# Patient Record
Sex: MALE | Race: WHITE
[De-identification: names, ages, dates, MRNs, and addresses within clinical notes are randomized per-mention and may not be internally consistent; named-entity substitution may affect disease eponyms.]

---

## 2017-01-06 NOTE — PROGRESS NOTES
FOLLOW UP NUTRITION CONSULTATION    Nutrition Assessment    Number of consultations with dietitian: 2    Height:  Ht Readings from Last 1 Encounters:  12/16/16 : 70\"      Weight:   Wt Readings from Last 2 Encounters:  01/06/17 : 251 lb  12/16/16 : 253

## 2017-01-10 ENCOUNTER — PRIOR ORIGINAL RECORDS (OUTPATIENT)
Dept: OTHER | Age: 52
End: 2017-01-10

## 2017-01-13 NOTE — TELEPHONE ENCOUNTER
From: Mary Bush  To: ANDREW Temple  Sent: 1/13/2017 12:04 PM CST  Subject: Visit Follow-up Question    Blood pressure 151 over 83 pulse 82 . I took it 3 times. I will bring my cuff in from now on to calibrate.

## 2017-01-13 NOTE — PROGRESS NOTES
CC: Patient presents with:  Weight Check: one pound weight gain       HPI:   1. Obesity. Patient met with dietician again and has been working out with weights and cardio. He is tolerating topamax and metformin. 2. ISAAC/Hypertenison.  He is using CPAP Take 1 tablet (600 mg total) by mouth 2 (two) times daily before meals. Disp: 60 tablet Rfl: 5   GuanFACINE HCl (TENEX) 1 MG Oral Tab Take 0.5 mg by mouth nightly. Disp:  Rfl:    Vortioxetine HBr (BRINTELLIX) 10 MG Oral Tab Take 10 mg by mouth daily.  Disp: Brachial neuritis or radiculitis NOS     Other specified cardiac dysrhythmias(427.89)     Hypopotassemia     Vitamin D deficiency     Thoracic aneurysm without mention of rupture     Coronary atherosclerosis of unspecified type of vessel, native or graft of metformin. He is at baseline weight.  Since starting topamax he has cut out moutain dew.  7th  B12 injections today and order to check B12 was put in asweeeeeeeInstructed him to hold topamax until he is seen by urologist as kidney stones may be relatedt

## 2017-01-13 NOTE — PATIENT INSTRUCTIONS
Take meds at home Immediately and check bp after [facility] Discharge Instructions for High Blood Pressure (Hypertension)  You have been diagnosed with high blood pressure (also called hypertension).  This means the force of blood · Follow the DASH (Dietary Approaches to Stop Hypertension) eating plan. This plan recommends vegetables, fruits, whole gains, and other heart healthy foods. · Begin an exercise program. Ask your doctor how to get started.  The American Heart Association r

## 2017-02-03 NOTE — PROGRESS NOTES
FOLLOW UP NUTRITION CONSULTATION    Nutrition Assessment    Number of consultations with dietitian: 3    Height:  Ht Readings from Last 1 Encounters:  01/13/17 : 70\"      Weight:   Wt Readings from Last 2 Encounters:  02/03/17 : 249 lb 9.6 oz  01/13/17

## 2017-02-07 NOTE — PROGRESS NOTES
[facility] Internal Medicine Progress Note    CC:  Patient presents with:  Rash: all over body, has tried OTC meds with no help, x 1 week      HPI:   HPI  Rash  Pt started with a rash on his arms about 1.5 weeks ago  And it has now spread all ove Disp: 180 tablet Rfl: 3   ATORVASTATIN CALCIUM 20 MG Oral Tab TAKE ONE TABLET BY MOUTH EVERY NIGHT AT BEDTIME Disp: 90 tablet Rfl: 3   Flecainide Acetate 100 MG Oral Tab Take 100 mg by mouth 2 (two) times daily.  Disp:  Rfl:    Warfarin Sodium 5 MG Oral Tab Negative for chest pain. Gastrointestinal: Negative for nausea and vomiting. Genitourinary: Positive for urgency. Negative for dysuria, frequency, hematuria, decreased urine volume, penile swelling, scrotal swelling, penile pain and testicular pain. at home  Pt to follow-up in 2 weeks for BP recheck, if still elevated at that time will make medication adjustments        Orders Placed This Encounter  URINALYSIS, AUTO, W/O SCOPE  Chlamydia/Gc Amplification [E]    Meds & Refills for this Visit:  Signed P

## 2017-02-07 NOTE — PATIENT INSTRUCTIONS
Thank you for choosing Graeme Flores PA-C at [place] [address]  To Do: Rivera Bush  1. Pt to begin medications as prescribed  2. Pt to follow-up with urology and dermatology  3.  Pt to monitor blood pressure at home, follow-up in 2 weeks for liz benefits outweigh those potential risks and we strive to make you healthier and to improve your quality of life.     Referrals, and Radiology Information:    If your insurance requires a referral to a specialist, please allow 5 business days to process your

## 2017-02-20 PROBLEM — N17.9 ACUTE RENAL FAILURE SUPERIMPOSED ON STAGE 2 CHRONIC KIDNEY DISEASE (HCC): Status: ACTIVE | Noted: 2017-02-20

## 2017-02-20 PROBLEM — R07.9 CHEST PAIN, CARDIAC: Status: ACTIVE | Noted: 2017-02-20

## 2017-02-20 PROBLEM — I25.10 CORONARY ARTERY DISEASE: Chronic | Status: ACTIVE | Noted: 2017-02-20

## 2017-02-20 PROBLEM — Z86.69 HISTORY OF MIGRAINE HEADACHES: Chronic | Status: ACTIVE | Noted: 2017-02-20

## 2017-02-20 PROBLEM — N18.2 ACUTE RENAL FAILURE SUPERIMPOSED ON STAGE 2 CHRONIC KIDNEY DISEASE (HCC): Status: ACTIVE | Noted: 2017-02-20

## 2017-02-20 PROBLEM — N18.2 ACUTE RENAL FAILURE SUPERIMPOSED ON STAGE 2 CHRONIC KIDNEY DISEASE: Status: ACTIVE | Noted: 2017-02-20

## 2017-02-20 PROBLEM — N17.9 ACUTE RENAL FAILURE SUPERIMPOSED ON STAGE 2 CHRONIC KIDNEY DISEASE: Status: ACTIVE | Noted: 2017-02-20

## 2017-02-20 PROBLEM — E11.9 TYPE 2 DIABETES MELLITUS (HCC): Chronic | Status: ACTIVE | Noted: 2017-02-20

## 2017-02-20 NOTE — ED NOTES
Pt appears anxious, hyperventilating, \"my arms and feet feel numb. \" He was cued to slow down breathing. O2 at 3L/nc applied. Reports, \"I still have CP, 6/10. \" Morphine IV given as ordered

## 2017-02-20 NOTE — PROGRESS NOTES
Pt seen this afternoon- c/o pain under left arm and left of center of chest- worse after coughing and raising arms. Felt dizzy when up today. Still c/o of left arm numbness.   Has sick contacts  At home and work  Hx cerv spine issues in past  Ate well at [employer]

## 2017-02-20 NOTE — ED NOTES
Pt returned from CT, states CP 5/10. Nitro increased to 10 mcg/min. Pt appears calmer, HR 70's, NSR.  Will monitor

## 2017-02-20 NOTE — PROGRESS NOTES
Seen and examined. Just returned from CT scan. Appears to have cervical spine symptoms at present -- s/o muscular spasm at the moment -- hopefully no major neural involvement.     Cardiac testing reviewed and normal.    Blood pressures amazingly good for

## 2017-02-20 NOTE — CONSULTS
[facility]  Cardiology Consultation    [facility] Patient Status:  Inpatient    1965 MRN [id number]   [facility] 6NE-A [facility] Day # 1 PCP Henna Limon MD     Reason for Consultation:  Chest p Surgical History    TONSILLECTOMY      INJECTION, W/WO CONTRAST, DX/THERAPEUTIC SUBSTANCE, EPIDURAL/SUBARACHNOID; CERVICAL/THORACIC  4/18/2012    Comment Procedure: CERVICAL EPIDURAL;  Surgeon: Lucas Giles MD;  Location: [address] [place] [address] Intravenous, Continuous  •  Atorvastatin Calcium (LIPITOR) tab 20 mg, 20 mg, Oral, Nightly  •  Clorazepate Dipotassium (TRANXENE) tab 7.5 mg, 7.5 mg, Oral, Daily  •  Flecainide Acetate (TAMBOCOR) tab 100 mg, 100 mg, Oral, BID  •  gemfibrozil (LOPID) tab 60 Systems:  A comprehensive review of systems was negative if not otherwise mention in above HPI.     /86 mmHg  Pulse 68  Temp(Src) 97.8 °F (36.6 °C) (Temporal)  Resp 16  Ht 5' 11\" (1.803 m)  Wt 245 lb (111.131 kg)  BMI 34.19 kg/m2  SpO2 100%  Temp (24 V4-6 compared to prior EKG 7/29/16    CTA chest 2/20/2017: CONCLUSION:     1. No acute pulmonary embolus. 2. Post surgical repair of the ascending thoracic aorta. Stable ectasia of the ascending aorta. No dissection or periaortic hemorrhage.   3. Large lef

## 2017-02-20 NOTE — ED PROVIDER NOTES
Patient Seen in: [facility] Emergency Department    History   Patient presents with:  Chest Pain Angina (cardiovascular)    Stated Complaint: chest pain    HPI    The patient is a 60-year-old male with multiple past medical problems including the re Procedure: CERVICAL EPIDURAL;  Surgeon: New Boyd MD;  Location: [address] [place] [address] [facility] [address] .[facility] [address]  N SV 5+ YR  4/18/2012    Comment Procedure: CERVICAL EPIDURAL;  Surgeon: New Boyd MD;  Location: [address] [place] [address] Dr PEÑA Take 1 tablet (600 mg total) by mouth 2 (two) times daily before meals. GuanFACINE HCl (TENEX) 1 MG Oral Tab,  Take 0.5 mg by mouth nightly. Vortioxetine HBr (BRINTELLIX) 10 MG Oral Tab,  Take 10 mg by mouth daily.    Clorazepate Dipotassium (TRANXENE) Current:/78 mmHg  Pulse 74  Temp(Src) 97.9 °F (36.6 °C) (Temporal)  Resp 18  Ht 180.3 cm (5' 11\")  Wt 111.131 kg  BMI 34.19 kg/m2  SpO2 97%        Physical Exam    General: Alert and oriented in no distress. Neuro: CN II-XII intact.  Grossly n The aPTT Heparin Therapeutic Range is approximately 65- 104 seconds. The therapeutic range has been validated against 0.3-0.7 heparin anti-Xa units/mL. LIPASE - Normal   CBC WITH DIFFERENTIAL WITH PLATELET    Narrative:      The following orders were cr pulmonary edema. No appreciable effusion. No pneumothorax. Cardia mediastinal silhouette appears normal.  Previous sternotomy. CTA chest:  CTA CHEST  IMPRESSION:  Good bolus, mild motion artifact. No definite pulmonary embolism.   Prior ascending aor

## 2017-02-20 NOTE — HISTORICAL OFFICE NOTE
DARIO MARSHALL  : 1965  ACCOUNT:  [id number]  [phone]  PCP: Dr. Richard Stoll     TODAY'S DATE: 01/10/2017  DICTATED BY:  Ed Quinones MD]    CHIEF COMPLAINT: [Followup of Aneurysm, thoracic, Followup of Carotid artery stenosis <50%, Followup of depression, appendectomy and tonsillectomy    PAST CV HISTORY: 7/2014, aneurysm repair 10/2014, cardiac catheterization, carotid artery stenosis, dyslipidemia and paroxysmal atrial fibrillation    FAMILY HISTORY: Negative for premature CAD.  Negative for AA good.  I was happy to see that some of the external stresses in his life are improved at this point. I think this is obviously good for his overall health. He will continue his exercise program and continue his CPAP treatment.       He does not need any Allergy        10MG      1 daily                                  12/18/12 Lipitor               20MG      daily                                      Noam Weinberg MD  WS:og/enid  C: Dr. Yumiko Miguel  DD: 01/11/2017; DT: 01/12/2017  Job #: [id number]

## 2017-02-20 NOTE — PAYOR COMM NOTE
Attending Physician: Kassy Huerta MD    Review Type: ADMISSION   Reviewer: Michelle Del Real       Date: February 20, 2017 - 9:39 AM  Payor: [facility] [address] Number: [id number]  Admit date: 2/19/2017 11:28 PM   Admitted fr     post op from [address] [place] [address]   •  High blood pressure      •  High cholesterol      •       •  Unspecified sleep apnea          wears CPAP    •  ISAAC (obstructive sleep apnea)  3-3-14-PSG/TX-8/19/16        AHI-16, O2 alyce-71%/CPAP-10cwp    •  Kidney stones     topiramate (TOPAMAX) 50 MG Oral Tab,  Take 1 tablet (50 mg total) by mouth 2 (two) times daily.    ATORVASTATIN CALCIUM 20 MG Oral Tab,  TAKE ONE TABLET BY MOUTH EVERY NIGHT AT BEDTIME   Flecainide Acetate 100 MG Oral Tab,  Take 100 mg by mouth 2 (two) maura Positive for stated complaint: chest pain with some shortness of breath.  No abdominal pain.  No nausea or vomiting. Other systems are as noted in HPI.   Constitutional and vital signs reviewed.      All other systems reviewed and negative except as noted   All other components within normal limits    CBC W/ DIFFERENTIAL - Abnormal; Notable for the following:       Neutrophil Absolute Prelim  7.73 (*)        Neutrophil Absolute  7.73 (*)        Monocyte Absolute  0.88 (*)        All other components within Reading: Sinus tachycardia without any ectopy, normal axis, normal intervals, there is significant ST segment depressions in leads I, II, V4, V5 and V6.  There is also ST segment elevation in lead V1 and aVR.  There are T-wave inversions in leads 1, 2, V4, The patient was hypokalemic and this was repleted in the ER.  After the nitroglycerin drip was started his chest pain continued to improve but was still never completely resolved.  It was a 3/10 in severity.  His EKG does show ischemic changes.  Troponin i History of Present Illness: Pretty Arnett is a 46year old male with history of atrial fibrillation, sleep apnea kidney stones presents emergency room with palpitations chest pain shortness of breath that started around 930 this evening pain is sharp lef Past Surgical History:       Past Surgical History      TONSILLECTOMY          INJECTION, W/WO CONTRAST, DX/THERAPEUTIC SUBSTANCE, EPIDURAL/SUBARACHNOID; CERVICAL/THORACIC    4/18/2012      Comment  Procedure: CERVICAL EPIDURAL;  Surgeon: Yolande Camejo nystatin-triamcinolone 738991-4.1 UNIT/GM-% External Ointment  Apply 1 Application topically 2 (two) times daily.  Disp: 1 Tube  Rfl: 0    MetFORMIN HCl  MG Oral Tablet 24 Hr  Take 2 tablets (1,500 mg total) by mouth daily.  Disp: 60 tablet  Rfl: 1  Albuterol Sulfate HFA (VENTOLIN) 108 (90 BASE) MCG/ACT Inhalation Aero Soln  Inhale 2 puffs into the lungs every 6 (six) hours as needed for Shortness of Breath.  (Patient taking differently: Inhale 2 puffs into the lungs every 6 (six) hours as needed for S Recent Labs    Lab  02/19/17   9701    TROP   <0.046        Imaging: Imaging data reviewed in Epic.        ASSESSMENT / PLAN:        · Chest pain-EKG showing sinus tachycardia with ST depression in leads I, 2, V4, V5, V6.  Also ST elevation in V1 and aVR.    Filed: 2/20/2017  6:30 AM Note Time: 2/20/2017  6:07 AM Status: Signed     : Sherman Tellez MD (Physician)         Consult Orders:     1. IP Consult to Cardiology Once [[id number]] ordered by Alexey Rosales DO at 02/20/17 0418            Ex •  Migraines      •  Visual impairment      •  Chronic atrial fibrillation (HCC)  10/14/14        post op from [facility]    •  High blood pressure      •  High cholesterol      •       •  Unspecified sleep apnea          wears CPAP    •  ISAAC (obstructive sleep a No Known Allergies    Medications:    Current facility-administered medications:    •  nitroGLYCERIN infusion 50mg in D5W 250ml, 5-400 mcg/min, Intravenous, Continuous  •  heparin (PORCINE) 02154aujqx/250mL infusion ED INITIAL DOSE, 1,000 Units/hr, Misha Coats •  HYDROmorphone HCl PF (DILAUDID) 1 MG/ML injection 0.2 mg, 0.2 mg, Intravenous, Q2H PRN **OR** HYDROmorphone HCl PF (DILAUDID) 1 MG/ML injection 0.4 mg, 0.4 mg, Intravenous, Q2H PRN **OR** HYDROmorphone HCl PF (DILAUDID) 1 MG/ML injection 0.8 mg, 0.8 mg, CREATSERUM  1.01  02/20/2017    BUN  17  02/20/2017    NA  142  02/20/2017    K  3.3  02/20/2017    CL  107  02/20/2017    CO2  28.0  02/20/2017    GLU  119  02/20/2017    CA  8.3  02/20/2017    ALB  3.8  02/19/2017    ALKPHO  87  02/19/2017    BILT  0.5  Chart Review: Note Routing History      Routing history could not be found for this note.  This is because the note has never been routed or because communication record creation was suppressed.           PLEASE FAX DAYS CERTIFIED AND NEXT REVIEW DATE

## 2017-02-20 NOTE — H&P
[facility]  History and Physical     Wilma Goodness Patient Status:  Inpatient    1965 MRN [id number]   [facility] 6NE-A Attending [facility] Day # 1 PCP Maria Dolores Amaya MD     Chief Complaint: Chest pain sh blood pressure    • High cholesterol    • Obesity, unspecified    • Unspecified sleep apnea      wears CPAP   • ISAAC (obstructive sleep apnea) 3-3-14-PSG/TX-8/19/16     AHI-16, O2 alyce-71%/CPAP-10cwp   • Kidney stones         Past Surgical History:     Pas facility-administered medications on file prior to encounter. Current Outpatient Prescriptions on File Prior to Encounter:  methylPREDNISolone (MEDROL) 4 MG Oral Tablet Therapy Pack As directed.  Disp: 1 kit Rfl: 0   nystatin-triamcinolone 987273-2.5 UNIT/ 6 (six) hours as needed for Shortness of Breath. (Patient taking differently: Inhale 2 puffs into the lungs every 6 (six) hours as needed for Shortness of Breath.  Indications: SOB) Disp: 6.7 g Rfl: 0   AmLODIPine Besylate (NORVASC) 10 MG Oral Tab Take 10 m depression in leads I, 2, V4, V5, V6. Also ST elevation in V1 and aVR. T-wave inversions in leads I, 2, V4, V5, V6 but does not meet criteria for STEMI. Troponin negative. CT of the chest negative for PE or  aortic dissection, or periaortic hemorrhage.

## 2017-02-20 NOTE — ED INITIAL ASSESSMENT (HPI)
Left-sided chest pain that radiates to his back, SOB, nausea, all sxs onset 9pm. Pt had ascending aorta repair back in 2014.

## 2017-02-21 NOTE — PLAN OF CARE
Assumed care of patient this a.m. @ 9913. Patient is A/O x3. VSS. Patient c/o left neck pain that shoots down left arm when he turns his head to the left. KALANIN Norco given. Patient transferred to 2625 CTU, report given to CTU RN.  Patient transferred by [facility]

## 2017-02-21 NOTE — TELEPHONE ENCOUNTER
Divina/Giovanny/Rn  [phone]   requesting pt to be seen as soon as possible for injection. States pt was seen in the ER by Dr. Retana Pretty: 2/19. Requesting to spk to Rn Please call, thank you.  Call connected

## 2017-02-21 NOTE — PLAN OF CARE
Assumed care of patient at 0730, a+ox 4, denies chest pain today but does have left arm and neck numbness.  During cervical xray when patient had to lift his arm over his head his pain became sever to the left arm, radha shah called and informed of this mr

## 2017-02-21 NOTE — RESPIRATORY THERAPY NOTE
ISAAC - Equipment Use Daily Summary:                  . Set Mode: AUTO CPAP WITH C-FLEX                . Usage in hours: 7:14                . 90% Pressure (EPAP) level: 10.4                . 90% Insp. Pressure (IPAP): Dawson Payton  AHI: 12.9

## 2017-02-21 NOTE — PROGRESS NOTES
No major issues overnight. Afebrile  Pressures up a bit this am 523-562 systolic -- sinus raine    MRI revealed significant cervical spine pathology.     A/P: Compensated CV status s/p prior surgical repair of an ascending thoracic aneurysm -- now hospit

## 2017-02-21 NOTE — CONSULTS
Addendum to spine Consult    I evaluated the patient myself and he has left rotator cuff arthopathy and L cervical radiculpathy. On the MRI he has foraminal stenosis correlating with her left C7 radiculopathy, He has very little triceps weakness.    We dis

## 2017-02-21 NOTE — PLAN OF CARE
Patient received from or at 1120. Drowsy on admission, now a+ox4. Tele on and patient is paced. ivf infusing per protocol. A line removed per protocol and pressure held with pressure dressing placed.  Dressing to left leg intact with scant old serosanguinou

## 2017-02-21 NOTE — PLAN OF CARE
Tele D/C'd. IV discontinued with catheter intact. Patient denies chest pain, SOB, dizziness or palpitations. Patient denies calf tenderness. Patient discharge instructions and medication side effects reviewed with patient and verbalized understanding.  Rx h

## 2017-02-21 NOTE — TELEPHONE ENCOUNTER
Call from [facility] for the hospitalist RODRIGUE. Want pt to have a cervical epidural injecitonset up . And PT.  REviewing lthe patient. Not seen here at [place] [address]. Confirmed with managed care. Pt is a [facility] patient not [facility] patient.

## 2017-02-21 NOTE — PROGRESS NOTES
[facility]  Progress Note     Laura Hernández Patient Status:  Inpatient    1965 MRN [id number]   [facility] 2NE-A Attending David Patino MD   [facility] Day # 2 PCP James Deleon MD     Chief Complaint:   Chest pain     S: Patient  Paul Gentleman 02/20/17   0426   TROP  <0.046  <0.046          Imaging: Imaging data reviewed in Epic.   MRI neck   MICRO:   Medications:   • Atorvastatin Calcium  20 mg Oral Nightly   • Clorazepate Dipotassium  7.5 mg Oral Daily   • Flecainide Acetate  100 mg Oral BID w/ pain sx for injection.      Estimated date of discharge: today  Discharge is dependent on: clearance by cards  At this point Mr. Mook Duarte is expected to be discharge to: home     Plan of care discussed with rn, pt , spine sx   ANDREW Hendrix  Pag

## 2017-02-21 NOTE — CONSULTS
[facility]    Spine Surgery H&P  Dr. Herminia De La Cruz Patient Status:  Inpatient    1965 MRN [id number]   [facility] 2NE-A Attending Natalia Lynn MD   Hosp Day # 2 PCP Jordon Oconnor MD     Subjective:   Rivera Bush i Intervention(s): Medication    Intake/Output:    Intake/Output Summary (Last 24 hours) at 02/21/17 1225  Last data filed at 02/21/17 0845   Gross per 24 hour   Intake    550 ml   Output   1100 ml   Net   -550 ml         Neck: tenderness to palpitation.  Ran Studies : REVIEWED    Labs:       Lab Results  Component Value Date   PT 13.7 07/14/2014   PT 13.8 02/07/2011   INR 1.10 02/20/2017   INR 0.97 02/19/2017   INR 1.25* 11/06/2016            Assessment/Plan:    Reviewed MRI imaging.    Dr. Hemalatha Worley MD also

## 2017-02-22 PROBLEM — M50.30 DDD (DEGENERATIVE DISC DISEASE), CERVICAL: Status: ACTIVE | Noted: 2017-02-22

## 2017-02-22 PROBLEM — M47.812 FACET ARTHROPATHY, CERVICAL: Status: ACTIVE | Noted: 2017-02-22

## 2017-02-22 NOTE — DISCHARGE SUMMARY
[facility]  DISCHARGE SUMMARY     [facility] Patient Status:  Inpatient    1965 MRN [id number]   [facility] 2NE-A Attending No att. providers found   [address] Day # 2 PCP Jeniffer Franco MD     Date of Admission: 2017  Date of today but no vomiting, no diarrhea or constipation.  Patient denies any numbness or tingling in extremities or any focal weakness.  No headache, sinus has nasal congestion, or cough.  No orthopnea or PND.  No dizziness, lightheadedness, or syncope.  No hem lessened and so is tingling of the fingers. He will need also follow up with PT therapy, order has been placed he will undergo PT therapy at [facility]. Patient then will need to follow-up with spine service.     Patient denies chest p consult for SUELLEN injection which will be done once has been approved by insurance  · Continue normal follow-up with cardiology   · needs to follow-up with spine surgery after gets injection andHas started PT therapy  · Needs to follow-up with PCP  · We alma delia Clorazepate Dipotassium 7.5 MG Tabs   Last time this was given:  7.5 mg on 2/21/2017  9:20 AM   Commonly known as:  TRANXENE        Take 7.5 mg by mouth daily.     Refills:  0       Flecainide Acetate 100 MG Tabs   Last time this was given:  100 mg on 2/21/ nystatin-triamcinolone 608555-6.6 UNIT/GM-% Oint   Commonly known as:  MYCOLOG           Warfarin Sodium 5 MG Tabs   Commonly known as:  COUMADIN                Where to Get Your Medications      Please  your prescriptions at the location directed b

## 2017-02-22 NOTE — PROGRESS NOTES
Initial Post Discharge Follow Up   Discharge Date: 2/21/17  Contact Date: 2/22/2017    Consent Verification:  Assessment Completed With: Patient  HIPAA Verified? Yes    1.  Tell me why you were in the hospital?  Per patient he was having chest pain, shortn 100-25 MG Oral Tab Take 1 tablet by mouth daily. Disp: 90 tablet Rfl: 1   Metoprolol Succinate ER (TOPROL-XL) 200 MG Oral Tablet 24 Hr Take 1 tablet by mouth 2 (two) times daily.  Disp:  Rfl: 6   gemfibrozil (LOPID) 600 MG Oral Tab Take 1 tablet (600 mg tot present    13. Do you have a follow up visit scheduled with your Primary Care Physician or Specialist?  NCM confirmed TCM HFU on 2/23/17, per Rivera no barriers to keeping appointment.  Corina Shane has an appointment scheduled tomorrow as well with Dr. Bayron Holland for kid

## 2017-02-23 ENCOUNTER — HOSPITAL (OUTPATIENT)
Dept: OTHER | Age: 52
End: 2017-02-23
Attending: UROLOGY

## 2017-02-23 NOTE — PROGRESS NOTES
HPI:    Buster Guzmán is a 46year old male here today for hospital follow up.    He was discharged from Inpatient hospital, [facility] to Home   Admission Date: 2/19/17   Discharge Date: 2/21/17  Hospital Discharge Diagnosis: Cervical radiculopath Pt has an GRISEL scheduled with Dr. David Pace, he saw pain management yesterday    He is having lithotripsy today with Dr. Nimo Fernandez    Pt states he is no longer having chest pain, just neck pain and numbness in the L arm. It is worse with movement to the L.   Brit Sanchez AmLODIPine Besylate (NORVASC) 10 MG Oral Tab Take 10 mg by mouth daily. Indications: High Blood Pressure   GuanFACINE HCl (TENEX) 1 MG Oral Tab Take 0.5 mg by mouth nightly. No current facility-administered medications on file prior to visit.       HIST Respiratory: Negative for cough, shortness of breath and wheezing. Cardiovascular: Negative for chest pain. Gastrointestinal: Negative for nausea and vomiting. Musculoskeletal: Positive for myalgias, neck pain and neck stiffness.  Negative for back p Hospital discharge follow-up  Chest pain, unspecified type  Pt's symptoms have resolved as far as chest pain goes  Symptoms likely due to cervical radicular pain    Cervical radicular pain  Pt has already followed up with pain management  Pt is scheduled f

## 2017-02-23 NOTE — PATIENT INSTRUCTIONS
Thank you for choosing Narciso Thomason PA-C at [place] [address]  To Do: [place] Bush  1. Pt to start physical therapy  2. Pt to follow-up with pain management as scheduled  3.  Follow-up in 6 weeks    • Please signup for MY CHART, which is electron healthier and to improve your quality of life.     Referrals, and Radiology Information:    If your insurance requires a referral to a specialist, please allow 5 business days to process your referral request.    If Kelly Presume, [facility] orders a CT or MRI

## 2017-02-27 PROBLEM — N30.01 ACUTE CYSTITIS WITH HEMATURIA: Status: ACTIVE | Noted: 2017-02-27

## 2017-02-27 PROBLEM — N20.0 KIDNEY STONE: Status: ACTIVE | Noted: 2017-02-27

## 2017-02-27 PROBLEM — Z96.0 RETAINED URETERAL STENT: Status: ACTIVE | Noted: 2017-02-27

## 2017-02-27 NOTE — ED INITIAL ASSESSMENT (HPI)
Patient presents with right flank pain and bladder pain after lithotripsy on Thursday. Passing clots and urine is red. Nausea, no fevers.

## 2017-02-27 NOTE — ED PROVIDER NOTES
Patient Seen in: [facility] Emergency Department    History   Patient presents with:  Abdomen/Flank Pain (GI/)    Stated Complaint: abd/flank pain    HPI    44-year-old male that comes the hospital she complained of having difficulty with abdomina Procedure: CERVICAL EPIDURAL;  Surgeon: New Boyd MD;  Location: [address] [place] [address] BY [facility] [address] [facility]  N SV 5+ YR  4/18/2012    Comment Procedure: CERVICAL EPIDURAL;  Surgeon: New Boyd MD;  Location: [address] [place] [address] Dr PEÑA Albuterol Sulfate HFA (VENTOLIN) 108 (90 BASE) MCG/ACT Inhalation Aero Soln,  Inhale 2 puffs into the lungs every 6 (six) hours as needed for Shortness of Breath. AmLODIPine Besylate (NORVASC) 10 MG Oral Tab,  Take 10 mg by mouth daily.  Indications: High cyanosis or edema noted.   Full range of motion noted without tenderness  Neuro: No focal deficits noted    ED Course     Labs Reviewed   COMP METABOLIC PANEL (14) - Abnormal; Notable for the following:     Potassium 3.3 (*)     All other components within the patient's urologist and the patient admitted to the hospitalist for further pain control  MDM           Disposition and Plan     Clinical Impression:  Acute cystitis with hematuria  (primary encounter diagnosis)  Retained ureteral stent  Kidney stone

## 2017-02-28 NOTE — CM/SW NOTE
SW attempted to see patient for assessment and discharge planning. Patient currently off floor, SW will try again as time permits.

## 2017-02-28 NOTE — PAYOR COMM NOTE
Attending Physician: Arron Skaggs MD    Review Type: ADMISSION   Reviewer: Noam Overall       Date: February 28, 2017 - 8:47 AM  Payor: [facility]/HMO/POS/EPO  Authorization Number: N/A  Admit date: 2/27/2017  5:25 PM     Patient Name: Jaqui Figueroa RN      HYDROmorphone HCl PF (DILAUDID) 1 MG/ML injection 1 mg     Date Action Dose Route User    2/27/2017 1746 Given 1 mg Intravenous Jaqui Figueroa RN      HYDROmorphone HCl PF (DILAUDID) 1 MG/ML injection 1 mg     Date Action Dose Route dehydration  3. Cervical radiculopathy to reschedule appointment with pain clinic  4. Patient denies history of diabetes and he states he was on metformin for weight loss.  He will be on Accu-Cheks twice daily for now  5.  Hypertension controlled continue h

## 2017-02-28 NOTE — PLAN OF CARE
PAIN - ADULT    • Verbalizes/displays adequate comfort level or patient's stated pain goal Not Progressing          GENITOURINARY - ADULT    • Absence of urinary retention Progressing        Patient c/o right groin pain rated 7/10, given morphine per prn o

## 2017-02-28 NOTE — H&P
[facility]  History and Physical     [facility]    1965 MRN [id number]   Location [facility] Attending Laith De Los Santos MD   Hosp Day # 0 PCP Marianna Donovan MD     Chief Complaint: Righ unspecified    • Unspecified sleep apnea      wears CPAP   • ISAAC (obstructive sleep apnea) 3-3-14-PSG/TX-8/19/16     AHI-16, O2 alyce-71%/CPAP-10cwp   • Kidney stones         Past Surgical History:     Past Surgical History    TONSILLECTOMY      INJECTION, encounter. Current Outpatient Prescriptions on File Prior to Encounter:  HYDROcodone-acetaminophen 5-325 MG Oral Tab Take 1 tablet by mouth every 4 (four) hours as needed for Pain.  Disp: 20 tablet Rfl: 0   HYDROcodone-acetaminophen 5-325 MG Oral Tab Take differently: Inhale 2 puffs into the lungs every 6 (six) hours as needed for Shortness of Breath. Indications: SOB) Disp: 6.7 g Rfl: 0   AmLODIPine Besylate (NORVASC) 10 MG Oral Tab Take 10 mg by mouth daily.  Indications: High Blood Pressure Disp:  Rfl: is going to be continued on Ancef started in the emergency room she will be on Norco and morphine as well as n.p.o. after midnight to be evaluated by urology  2. Continue IV fluids for dehydration  3.  Cervical radiculopathy to reschedule appointment with nikki

## 2017-02-28 NOTE — RESPIRATORY THERAPY NOTE
ISAAC - Equipment Use Daily Summary:  · Set Mode   · Usage in hours:   · 90% Pressure (EPAP) level:   · 90% Insp Pressure (IPAP):   · AHI:   · Supplemental Oxygen:  · Comments: NO DATA

## 2017-02-28 NOTE — CONSULTS
[facility]  Report of Consultation    [facility] Patient Status:  Inpatient    1965 MRN [id number]   [facility] 3NE-A Attending Cecilia Fu MD   Hosp Day # 1 PCP Manjinder Oh MD     Impression and Plan:  Postop symptoms a total) by mouth 3 (three) times daily. Disp: 90 tablet Rfl: 5   MetFORMIN HCl  MG Oral Tablet 24 Hr Take 2 tablets (1,500 mg total) by mouth daily.  Disp: 60 tablet Rfl: 1   Ipratropium Bromide (ATROVENT) 0.03 % Nasal Solution 2 sprays by Nasal route tab 20 mEq 20 mEq Oral BID   topiramate (Topamax) tab 50 mg 50 mg Oral BID   glucose (DEX4) oral liquid 15 g 15 g Oral Q15 Min PRN   Or      Glucose-Vitamin C (DEX-4) 4-0.006 g chewable tab 4 tablet 4 tablet Oral Q15 Min PRN   Or      dextrose injection 50 Past Surgical History    TONSILLECTOMY      INJECTION, W/WO CONTRAST, DX/THERAPEUTIC SUBSTANCE, EPIDURAL/SUBARACHNOID; CERVICAL/THORACIC  4/18/2012    Comment Procedure: CERVICAL EPIDURAL;  Surgeon: Yolande Camejo MD;  Location: [facility]  comsumption. Drug Use: No    Sexual Activity: Not on file   Not on file  Other Topics Concern    Caffeine Concern Yes    Comment: 1 cup of coffee daily    Exercise Yes    Comment: 3 times per week.      Social History Narrative       Review of Systems:

## 2017-02-28 NOTE — PROGRESS NOTES
[facility] Note: Antimicrobial Weight Dose Adjustment for: Rocephin (ceftriaxone)    John Ramirez is a 46year old male who has been prescribed Rocephin (ceftriaxone) 1 g IV every 24 hours.   The estimated creatinine clearance is 72.2 mL/min (based on

## 2017-02-28 NOTE — PROGRESS NOTES
[facility]  Progress Note     Alessandro Wilhelm Patient Status:  Inpatient    1965 MRN [id number]   [facility] 3NE-A Attending Shola Fuentes MD   Hosp Day # 1 PCP Rochelle Sawant MD     Chief Complaint: flank pain    S: Patient cont PLAN:     1. Pyelonephritis  1. Rocephin for now pending UCX  2. Groin Pain  1. Likely due to sent  2. Start pyridium and uroxatral  3. P A. Fib  1. Cont. Rate control  2. Off coumadin for now for cervical steroid injection  4. Cervical DJD  1.  For steroid

## 2017-02-28 NOTE — CM/SW NOTE
FUENTES fFound pt thru casefinding for readmission and met with pt for assessment and d/c planning. Pt is a 46 y.o male admitted on 02/27 with acute cystitis. Pt's most recent d/c was on 02/21 with no needs.  Pt had an outpatient ureteroscopic stone extraction o

## 2017-02-28 NOTE — PLAN OF CARE
NURSING ADMISSION NOTE      Patient admitted via Cart  Oriented to room. Safety precautions initiated. Bed in low position. Call light in reach. Received patient from ED via cart, awake and oriented. Admission assessment completed.  On room air, ivy

## 2017-03-01 NOTE — PROGRESS NOTES
Patient seen and examined. Medically clear to discharge home today after stent removal if cleared by .     Saima Upton MD

## 2017-03-01 NOTE — RESPIRATORY THERAPY NOTE
ISAAC - Equipment Use Daily Summary:  · Set Mode AFLEX  · Usage in hours: 4  · 90% Pressure (EPAP) level:   · 90% Insp Pressure (IPAP): 18.8  · AHI: 54  · Supplemental Oxygen:  · Comments:

## 2017-03-01 NOTE — PLAN OF CARE
Diabetes/Glucose Control    • Glucose maintained within prescribed range Progressing        GENITOURINARY - ADULT    • Absence of urinary retention Progressing        METABOLIC/FLUID AND ELECTROLYTES - ADULT    • Electrolytes maintained within normal limit

## 2017-03-01 NOTE — PROGRESS NOTES
NURSING DISCHARGE NOTE    Patient discharged. Parents are taking patient home. He had all of his belongings and discharge paperwork with him. Discharge instructions were reviewed, verbalized understanding.

## 2017-03-01 NOTE — CONSULTS
Pain persists. Discussed options and risks with pt.     Procedure Note    3/1/2017    PREOPERATIVE DIAGNOSIS: flank pain    POSTOPERATIVE DIAGNOSIS: flank pain    PROCEDURE PERFORMED: Bedside Flexible Cystoscopy and Ureteral stent removal    ANESTHESIA:

## 2017-03-02 NOTE — DISCHARGE SUMMARY
[facility]  DISCHARGE SUMMARY     [facility] Patient Status:  Inpatient    1965 MRN [id number]   [facility] 3NE-A Attending No att. providers found   [address] Day # 2 PCP Jas Issa MD     Date of Admission: 2017  Date of lithotripsy and cervical epidural shots that was supposed to be done on March 1.   Consult   Brief Synopsis:   77-year-old with recent diagnosis of kidney stone had undergone outpatient lithotripsy since then had been passing blood and having right testic Medications      CONTINUE taking these medications       Instructions Prescription details    Albuterol Sulfate  (90 Base) MCG/ACT Aers        Inhale 2 puffs into the lungs every 6 (six) hours as needed for Shortness of Breath.     Quantity:  6.7 g mouth daily. Quantity:  90 tablet   Refills:  1       MetFORMIN HCl  MG Tb24   Commonly known as:  GLUCOPHAGE-XR        Take 2 tablets (1,500 mg total) by mouth daily.     Stop taking on:  3/14/2017   Quantity:  60 tablet   Refills:  1       Metopr

## 2017-03-02 NOTE — PROGRESS NOTES
Initial Post Discharge Follow Up   Discharge Date: 3/1/17  Contact Date: 3/2/2017    Consent Verification:  Assessment Completed With: Patient  HIPAA Verified? Yes    1.  Tell me why you were in the hospital?  Per patient I went in for the kidney stone rem times daily before meals. Disp: 60 tablet Rfl: 5   GuanFACINE HCl (TENEX) 1 MG Oral Tab Take 0.5 mg by mouth daily. Disp:  Rfl:    Potassium Chloride ER (K-DUR M20) 20 MEQ Oral Tab CR Take 20 mEq by mouth 2 (two) times daily.    Disp:  Rfl:    Albuterol S ANDREW [facility] ([facility] [address])    Thursday March 16, 2017  9:10 AM FOLLOW UP with Carlin Pack MD [address] [place] [address]  [facility] - [address])    Friday April 07, 2017  9:00 AM Follow up with Dong

## 2017-03-02 NOTE — CM/SW NOTE
Pt d/c 03/01 home with no d/c needs.        03/02/17 1400   Discharge disposition   Discharged to: Home or Self   Discharge transportation Private car

## 2017-03-03 ENCOUNTER — PRIOR ORIGINAL RECORDS (OUTPATIENT)
Dept: OTHER | Age: 52
End: 2017-03-03

## 2017-03-06 ENCOUNTER — PRIOR ORIGINAL RECORDS (OUTPATIENT)
Dept: OTHER | Age: 52
End: 2017-03-06

## 2017-03-09 NOTE — TELEPHONE ENCOUNTER
Future Appointments  Date Time Provider Sindy Cisneros   3/9/2017 2:30 PM Carissa Hammonds PA-C [facility] [address] [facility] [address]   3/10/2017 9:15 AM ANDREW Esteban [facility] [address]   3/13/2017 8:00 AM Caroline MOLINA [facility]   3/16/2017 9:10

## 2017-03-09 NOTE — PATIENT INSTRUCTIONS
Thank you for choosing Orquidea Ward PA-C at [place] [address]  To Do: [place]  1. Pt to continue to follow-up with specialist  2.  Follow-up in office in 3 months, sooner if problems    • Please signup for MY CHART, which is electronic acces healthier and to improve your quality of life.     Referrals, and Radiology Information:    If your insurance requires a referral to a specialist, please allow 5 business days to process your referral request.    If Kelly Presume, [facility] orders a CT or MRI

## 2017-03-09 NOTE — PROGRESS NOTES
HPI:    Amy Street is a 46year old male here today for hospital follow up.    He was discharged from Inpatient hospital, [facility] to Home   Admission Date: 2/27/17   Discharge Date: 3/1/17  Hospital Discharge Diagnosis: Polynephritis  TCM Ailcia He has No Known Allergies. Current Meds:    Current Outpatient Prescriptions on File Prior to Visit:  Atorvastatin Calcium 20 MG Oral Tab Take 20 mg by mouth nightly. Warfarin Sodium 10 MG Oral Tab Take 10 mg by mouth nightly.    aspirin EC 81 MG Oral He  has a past medical history of Unspecified essential hypertension; Pain; Brachial neuritis or radiculitis NOS;  Thoracic or lumbosacral neuritis or radiculitis, unspecified; Migraine, unspecified, without mention of intractable migraine without mention o Genitourinary: Negative for dysuria, urgency, frequency, hematuria, decreased urine volume and testicular pain. Musculoskeletal: Positive for myalgias, neck pain and neck stiffness. Negative for back pain, joint swelling, joint pain and gait problem.    Yair Vaughn Pt to continue to follow-up with urology    Cervical radicular pain  Pt is to be getting GRISEL tomorrow with Dr. Saenz  Pt will follow-up with Dr. Zelda Worrell on when to restart coumadin    Will assist pt with STD paperwork    RTC in 3 months    No orders of the de

## 2017-03-10 ENCOUNTER — PRIOR ORIGINAL RECORDS (OUTPATIENT)
Dept: OTHER | Age: 52
End: 2017-03-10

## 2017-03-10 NOTE — PATIENT INSTRUCTIONS
Eating Heart-Healthy Food: Using the [address] for your heart doesn’t have to be hard or boring. You just need to know how to make healthier choices. The DASH eating plan has been developed to help you do just that.  DASH stands for Dietary Ap · 1 egg  Best choices: Lean poultry and fish. Trim away visible fat. Broil, grill, roast, or boil instead of frying. Remove skin from poultry before eating.  Limit how much red meat you eat.  Nuts, seeds, beans  Servings: 4 to 5 a week  A serving is:  · One

## 2017-03-10 NOTE — PROGRESS NOTES
CC: Patient presents with:  Weight Check: lost 7 pounds       HPI:         Current Outpatient Prescriptions:  topiramate 50 MG Oral Tab Take 50 mg by mouth 2 (two) times daily.  Disp:  Rfl:    Atorvastatin Calcium 20 MG Oral Tab Take 20 mg by mouth ni L3,L4   • Migraine, unspecified, without mention of intractable migraine without mention of status migrainosus    • Other testicular hypofunction    • Unspecified disorder of kidney and ureter    • Other specified cardiac dysrhythmias(427.89)    • Pneumoni kidney disease ([facility] [address]     Foraminal stenosis of cervical region     Cervical radiculopathy     DDD (degenerative disc disease), cervical     Facet arthropathy, cervical     Acute cystitis with hematuria     Retained ureteral stent     Kidney stone     CAD i home and bring in his cuff to calibrate.  Patient is using new CPAP equipment and is feeling so much better with improved sleep quality but needs to try to increase length of time.

## 2017-03-11 NOTE — PROGRESS NOTES
CC: Patient presents with:  Weight Check: lost 7 pounds       HPI:   Obesity. Patient had been in hospital for kidney stone then UTI.  He also thought he was having heart attack as stabbing pain in chest and back and left arm was numb and unable to l Rfl: 5   GuanFACINE HCl (TENEX) 1 MG Oral Tab Take 0.5 mg by mouth daily. Disp:  Rfl:    Potassium Chloride ER (K-DUR M20) 20 MEQ Oral Tab CR Take 20 mEq by mouth 2 (two) times daily.    Disp:  Rfl:    Albuterol Sulfate HFA (VENTOLIN) 108 (90 BASE) MCG/AC or unspecified     Hyperlipidemia     LAD (lymphadenopathy), inguinal     Hypertension     Pharyngitis     Accelerated hypertension     Thoracic aortic aneurysm (HCC)     ISAAC on CPAP     Aneurysm of thoracic aorta (HCC)     Depressive disorder     DARIUS (gen (BMI 30-39. 9)     PLAN:  1.  Obesity (BMI 30-39. 9)/Kidney Stones  Patient lost 7 lb this month on topamax andmetformin. Net loss is 7 lbs. Denies any chest pain but had irecent kidney stone will discontinue topamax but continue metformin.   Topamax had real

## 2017-03-13 NOTE — PROGRESS NOTES
SPINE EVALUATION:   Referring Physician: Dr. Sofy Ferrell  Diagnosis: Cervical radiculopathy Left C6-C7 facet arthrosis with left-sided foraminal stenosis, cervical disc bulge Date of Service: 3/13/2017     PATIENT SUMMARY   Rosa Bloom is a 46 year ol stretches did not help his recent flare up. Neeraj Roque would benefit from skilled Physical Therapy to address the above impairments to increase flexibility, strength, AROM and decrease pain.      Precautions:  None  OBJECTIVE:   Observation/Posture: rounded shoul limitation: None  Rehab Potential:good    FOTO: 46/100    Patient/Family/Caregiver was advised of these findings, precautions, and treatment options and has agreed to actively participate in planning and for this course of care.     Thank you for your refer

## 2017-03-14 NOTE — TELEPHONE ENCOUNTER
LM for patient to call back. Does he want STD paperwork filled out for the cystitis issues or the cervical pain issues or both? If it is both we will need another STD form. We cannot put both conditions on the same form.

## 2017-03-15 NOTE — TELEPHONE ENCOUNTER
I spoke with patient about his disability paperwork. He does need it for his cervical radiculopathy and cystitis. I advised we have been informed by disability company that each condition should be on separate forms.   Patient said he spoke with [employer] and

## 2017-03-16 NOTE — TELEPHONE ENCOUNTER
I spoke with patient and informed form is completed. He will pickup copy - sent to front. Original faxed and confirmed. Copy to green folder.

## 2017-03-17 NOTE — PROGRESS NOTES
Dx: Cervical radiculopathy Left C6-C7 facet arthrosis with left-sided foraminal stenosis, cervical disc bulge         Authorized # of Visits:  10         Next MD visit: none scheduled  Fall Risk: standard         Precautions: n/a             Subjective: he tried to increase cervical rotation and cont to report pressure at T1. Pt discussed performing more cervical mobilizations next to increase mobility and decrease pain. Goals:   Goals:   To be met in 10 visits  Pt to be able to increase cervical ROM

## 2017-03-20 NOTE — TELEPHONE ENCOUNTER
The supplemental questionnaire for [employer] is for patient to fill out. There is no specific indication that a physician office should fill this out and there is not place for a physician signature. Patient informed of this and will  paperwork.  Placed

## 2017-03-20 NOTE — PROGRESS NOTES
Dx: Cervical radiculopathy Left C6-C7 facet arthrosis with left-sided foraminal stenosis, cervical disc bulge         Authorized # of Visits:  10         Next MD visit: none scheduled  Fall Risk: standard         Precautions: n/a             Subjective: Goals:   Goals: To be met in 10 visits  Pt to be able to increase cervical ROM to allow pt to check blind spots while driving. Pt to be able to increase cervical ROM to allow pt to look up into a shelf and down to the floor.   Pt to be able to lift a

## 2017-03-22 ENCOUNTER — PRIOR ORIGINAL RECORDS (OUTPATIENT)
Dept: OTHER | Age: 52
End: 2017-03-22

## 2017-03-22 NOTE — TELEPHONE ENCOUNTER
I spoke with Braden who will RX muscle relaxer for patient. She does not need to see him this Friday - she will give him a note, but patient must get any further medications and notes related to his pain from pain management.   I informed patient and he ve

## 2017-03-22 NOTE — TELEPHONE ENCOUNTER
Patient is suppose to get GRISEL #2 on Monday. Patient has tightness and pain in his neck - shoots down left side. He would like to try a muscle relaxer.   He is getting PT and using Norco.  Patient saw Mookie Mayfield on 3/9/17:  Cervical radicular pain  Pt is to be

## 2017-03-22 NOTE — PROGRESS NOTES
Dx: Cervical radiculopathy Left C6-C7 facet arthrosis with left-sided foraminal stenosis, cervical disc bulge         Authorized # of Visits:  10         Next MD visit: none scheduled  Fall Risk: standard         Precautions: n/a             Subjective: visits  Pt to be able to increase cervical ROM to allow pt to check blind spots while driving. Pt to be able to increase cervical ROM to allow pt to look up into a shelf and down to the floor.   Pt to be able to lift and carry 25-50# with no pain in neck

## 2017-03-23 ENCOUNTER — PRIOR ORIGINAL RECORDS (OUTPATIENT)
Dept: OTHER | Age: 52
End: 2017-03-23

## 2017-03-24 NOTE — PROGRESS NOTES
Dx: Cervical radiculopathy Left C6-C7 facet arthrosis with left-sided foraminal stenosis, cervical disc bulge         Authorized # of Visits:  10         Next MD visit: none scheduled  Fall Risk: standard         Precautions: n/a             Subjective: every couple of months and be able to remain at work. Pt is currently trying to get approved for short term disability. Pt cont to be limited with cervical ROM and difficulty driving with checking blind sports. Goals:   Goals:   To be met in 10 visit

## 2017-03-27 NOTE — TELEPHONE ENCOUNTER
Work letter faxed to number below, confirmation rcvd. Left message on voicemail informing patient that letter was faxed and there is a copy on his MyChart.

## 2017-03-27 NOTE — TELEPHONE ENCOUNTER
Signed note, I think it got sent to my chart though, so could you please print it so I can sign and fax it.

## 2017-03-27 NOTE — TELEPHONE ENCOUNTER
Pt states he needs the note to say that it is okay for him to return to work today and dated for todays date.  Patient states he went to pain management MD who released him to return to work, states they gave him a note for today to return to work but needs

## 2017-03-29 NOTE — PROGRESS NOTES
Dx: Cervical radiculopathy Left C6-C7 facet arthrosis with left-sided foraminal stenosis, cervical disc bulge         Authorized # of Visits:  10         Next MD visit: none scheduled  Fall Risk: standard         Precautions: n/a             Subjective: in 10 visits  Pt to be able to increase cervical ROM to allow pt to check blind spots while driving. Pt to be able to increase cervical ROM to allow pt to look up into a shelf and down to the floor.   Pt to be able to lift and carry 25-50# with no pain in

## 2017-03-31 ENCOUNTER — PRIOR ORIGINAL RECORDS (OUTPATIENT)
Dept: OTHER | Age: 52
End: 2017-03-31

## 2017-03-31 NOTE — PROGRESS NOTES
Dx: Cervical radiculopathy Left C6-C7 facet arthrosis with left-sided foraminal stenosis, cervical disc bulge         Authorized # of Visits:  10         Next MD visit: none scheduled  Fall Risk: standard         Precautions: n/a             Subjective: Goals:   Goals: To be met in 10 visits  Pt to be able to increase cervical ROM to allow pt to check blind spots while driving. Pt to be able to increase cervical ROM to allow pt to look up into a shelf and down to the floor.   Pt to be able to lift and

## 2017-04-03 NOTE — PROGRESS NOTES
Dx: Cervical radiculopathy Left C6-C7 facet arthrosis with left-sided foraminal stenosis, cervical disc bulge         Authorized # of Visits:  10         Next MD visit: none scheduled  Fall Risk: standard         Precautions: n/a             Subjective: spots while driving. Pt to be able to increase cervical ROM to allow pt to look up into a shelf and down to the floor.   Pt to be able to lift and carry 25-50# with no pain in neck or shoulders  Pt to be able to report decreased HA as well as tension in n

## 2017-04-05 NOTE — PROGRESS NOTES
Dx: Cervical radiculopathy Left C6-C7 facet arthrosis with left-sided foraminal stenosis, cervical disc bulge         Authorized # of Visits:  10         Next MD visit: none scheduled  Fall Risk: standard         Precautions: n/a             Subjective: neck or shoulders  Pt to be able to report decreased HA as well as tension in neck and shoulders    Plan:  Pt to be able to require less overall pressure during manual therapy and kait more therex as symptoms reduce.      Date: 3/17/2017  Tx#: 2/10 Date:   3

## 2017-04-07 NOTE — PATIENT INSTRUCTIONS
Eating Healthy on the [address] pre-made salads for eating on the run. Need to eat on the run? This often means grabbing \"junk\" or fast food full of fat, salt, sugar, and cholesterol.  But being in a rush doesn't mean that yo

## 2017-04-07 NOTE — PROGRESS NOTES
FOLLOW UP NUTRITION CONSULTATION    Nutrition Assessment    Number of consultations with dietitian: 4    Height:  Ht Readings from Last 1 Encounters:  03/23/17 : 70\"      Weight:   Wt Readings from Last 2 Encounters:  04/07/17 : 242 lb  03/23/17 : 246

## 2017-04-07 NOTE — PROGRESS NOTES
Progress Summary    Pt has attended 10, cancelled 0, and no shown 0 visits in Physical Therapy.      Subjective:   Pt reports feeling good overall, with approx 100% improvement in functionality with ability to complete all work duties and ADLs with less pa blind spots while driving. met  Pt to be able to increase cervical ROM to allow pt to look up into a shelf and down to the floor.  Met and cont with no tightness   Pt to be able to lift and carry 25-50# with no pain in neck or shoulders cont  Pt to be able tightness  Sidebending: R WFL; L WFL  Rotation: R WFL; L WFL      Palpation:   mod tightness and STRs in L UT, LS, pectoral ms, scalenes, and SCM as well as rhomboids and subscapularis, thoracic and cervical paraspinals, forearm flexors and min/mod on R. 4/3/17  Tx#: 8/10 Date  4/5/17  Tx#  9/10 Date  4/7/17  Tx#  10/10    Joint mobilization x 10 X 10  X 10  X 5 X 5 X 5    Manual therapy x 30 min X 45 X 30 X 25 X 35 X  20    Cervical traction 15 min X 15 - X  15 - -    Thoracic roll - X 2 min - X 2 min X

## 2017-04-07 NOTE — PROGRESS NOTES
CC: Patient presents with:  Weight Check: lost 5 pounds       HPI:   Obesity. Patient doing well on metformin but had prescribed Belviq last visit and pharmacy would not accept coupon and have him pay cash it was over $200.  He is exercising more reg mouth 2 (two) times daily. Disp:  Rfl:    Albuterol Sulfate HFA (VENTOLIN) 108 (90 BASE) MCG/ACT Inhalation Aero Soln Inhale 2 puffs into the lungs every 6 (six) hours as needed for Shortness of Breath.  Disp: 6.7 g Rfl: 0   AmLODIPine Besylate (NORVASC) on CPAP     Aneurysm of thoracic aorta (HCC)     Depressive disorder     DARIUS (generalized anxiety disorder)     Obsessive-compulsive disorders     Depression     Suicidal thoughts     Anxiety disorder     OCD (obsessive compulsive disorder)     Paroxysmal trouble with [place] with Belviq last month. Discontinued Topamax due to kidney stones. He continues to meet with Sandeep Monday. Increased exercise. 2. ISAAC/Hypertension Using CPAP while he sleeps and has been trying to get more and bp good  today.  He has

## 2017-04-21 NOTE — PROGRESS NOTES
Dx: Cervical radiculopathy Left C6-C7 facet arthrosis with left-sided foraminal stenosis, cervical disc bulge         Authorized # of Visits:  20         Next MD visit: Injections on 14th  Fall Risk: standard         Precautions: n/a             Subjective overall improvement in tension and tightness following tx session. Goals:   To be met in 10 visits  Pt to be able to increase cervical ROM to allow pt to check blind spots while driving. met  Pt to be able to increase cervical ROM to allow pt to look u

## 2017-05-01 NOTE — PROGRESS NOTES
Dx: Cervical radiculopathy Left C6-C7 facet arthrosis with left-sided foraminal stenosis, cervical disc bulge         Authorized # of Visits:  20         Next MD visit: Injections on 14th  Fall Risk: standard         Precautions: n/a             Subjective met  Pt to be able to increase cervical ROM to allow pt to look up into a shelf and down to the floor.  Met and cont with no tightness   Pt to be able to lift and carry 25-50# with no pain in neck or shoulders cont  Pt to be able to report decreased HA as w

## 2017-05-01 NOTE — TELEPHONE ENCOUNTER
[facility] Appointment canceled >','<< Less Detail',event)\" href=\"javascript:;\">More Detail >>     Appointment canceled     Yolande Bhakta     Sent: MAX 2017  8:12 AM      Yolande Bhakta     MRN: [id number] : 1965     Pt Home

## 2017-06-05 NOTE — PROGRESS NOTES
CC: Patient presents with: Follow - Up:  Will be coming off the Tenex and Trintellix soon  Diabetes: foot exam, diabetic eye exam  Tingling: in his feet and hands, nerve pain, back pain - he thinks its from the Xarelto or Diabetes - this started about (two) times daily. Disp:  Rfl:    Losartan Potassium-HCTZ 100-25 MG Oral Tab Take 1 tablet by mouth daily. Disp: 90 tablet Rfl: 1   Metoprolol Succinate ER (TOPROL-XL) 200 MG Oral Tablet 24 Hr Take 1 tablet by mouth 2 (two) times daily.  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  Bilateral monofilament/sensation of both feet is decreased bilaterally to mid foot  Pulsation pedal pulse to work on weight loss in clinic   -continue metformin   -check a1c/ microalbumin   -foot exam shows neuropathy symptoms   -refer for eye exam    Vitamin D deficiency  -recheck today   -not taking otc supplemention     Pure hypercholesterolemia  -continue

## 2017-06-23 NOTE — PROGRESS NOTES
FOLLOW UP NUTRITION CONSULTATION    Nutrition Assessment    Number of consultations with dietitian: 5    Height:  Ht Readings from Last 1 Encounters:  06/05/17 : 70\"      Weight:   Wt Readings from Last 2 Encounters:  06/23/17 : 250 lb  06/05/17 : 247

## 2017-07-21 NOTE — PROGRESS NOTES
FOLLOW UP NUTRITION CONSULTATION    Nutrition Assessment    Number of consultations with dietitian: 6    Height:  Ht Readings from Last 1 Encounters:  06/05/17 : 70\"      Weight:   Wt Readings from Last 2 Encounters:  07/21/17 : 247 lb 11.2 oz  06/23/1
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
## 2017-09-05 NOTE — PATIENT INSTRUCTIONS
Thank you for choosing [facility] at [place] [address]  To Do: Rivera Bush  1.  Make appt with Dr. Jesus Blackwood (neck, shoulder pain)  - warm compresses to affected area  - can take muscle relaxer (Methocarbamol) (as needed for pain- no more th [place]  Call [phone] for info    • Please call our office about any questions regarding your treatment/medicines/tests as a result of today's visit.  For your safety, read the entire package insert of all medicines prescribed to you and be aware o

## 2017-09-05 NOTE — TELEPHONE ENCOUNTER
Requesting Atorvastatin  LOV: 6/5/17  RTC: 3 months  Last Labs: 6/5/17  Filled: 8/19/16 #90 with 3 refills  Passes protocol - refilled  Time started: 8181    Time ended: 0816    Total time spent on chart: 2min      Future Appointments  Date Time Provider D
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
## 2017-10-13 NOTE — TELEPHONE ENCOUNTER
Dr. Tanya Mcgrath- Received medical records request on 10/13/2017 from [address] [place] [address]. Sent to [employer] on 10/13/2017.

## 2017-12-05 NOTE — TELEPHONE ENCOUNTER
Requesting: Gemfibrozil 600mg   LOV: 9/5/17  RTC: -  Last Relevant Labs: 6/5/17  Filled: 1/26/16 #60 with 5 refills    No future appointments.       Cholesterol Medication Protocol Tob[place]08/4 10:21 AM   ALT < 80    ALT resulted within past year    Lipid pan

## 2017-12-08 RX ORDER — METFORMIN HYDROCHLORIDE 750 MG/1
TABLET, EXTENDED RELEASE ORAL
Qty: 60 TABLET | Refills: 0 | OUTPATIENT
Start: 2017-12-08

## 2017-12-08 NOTE — TELEPHONE ENCOUNTER
Requesting Methocarbamol 750 mg  LOV: 9/5/17 HFU  RTC: n.a  Last Labs: 6/5/17  Filled: 9/9/17 #30 with 0 refills    No future appointments.     Total time spent on chart: 2 mins

## 2017-12-08 NOTE — TELEPHONE ENCOUNTER
Requesting METFORMIN HCL  MG Oral Tablet 24 Hr  LOV: 4/7/17  RTC: 4 weeks  Last Relevant Labs: 2/3/17  Filled: 8/2/1 #60 with 0 refills    No future appointments.

## 2017-12-26 NOTE — PROGRESS NOTES
Fausto Marcano is a 46year old male. HPI:   Here to establish care. Cardio- sees cardiology for afib, on flecainide, amlodipine, metoprolol, and xarelto. S/p open ascending thoracic aortic aneurysm repair 2014 at [facility].    Hypertension: BP at home TAKE 2 TABLETS BY MOUTH EVERY DAY Disp: 60 tablet Rfl: 0   XARELTO 20 MG Oral Tab Take 1 tablet by mouth daily. Disp:  Rfl:    Sildenafil Citrate (VIAGRA) 50 MG Oral Tab Take 1 tablet (50 mg total) by mouth as needed for Erectile Dysfunction.  Disp: 8 table (obstructive sleep apnea) 3-3-14-PSG/TX-8/19/16    AHI-16, O2 alyce-71%/CPAP-10cwp   • Other specified cardiac dysrhythmias(427.89)    • Other testicular hypofunction    • Pneumonia, organism unspecified(486)    • Suicidal thoughts 9/24/2015   • Thoracic o encounter diagnosis)- check labs, referred to ophthalmology, continue low-carb diet and regular exercise  Benign essential htn- poorly controlled, pt states he did not take meds today.  Continue present meds and monitor BID, f/u 1-2 wks for recheck and poss

## 2017-12-26 NOTE — PATIENT INSTRUCTIONS
Continue current medications  Have labs drawn, fasting 8-10 hours prior (water before is ok). Start symbicort inhaler as directed, 2 puffs twice a day. Monitor blood pressure.  Please contact us if your blood pressure remains over 150/100 and we will

## 2017-12-27 NOTE — TELEPHONE ENCOUNTER
Requesting  METFORMIN HCL  MG Oral Tablet 24 Hr   LOV: 4/7/17  RTC: 4 weeks  Last Relevant Labs:   Filled: 8/2/1#60 with 0 refills    Future Appointments  Date Time Provider Sindy Cisneros   1/12/2018 9:00 AM Nathalie Giles PA-C [facility]

## 2017-12-27 NOTE — TELEPHONE ENCOUNTER
----- Message from Jose Daniel Zuniga PA-C sent at 12/27/2017 10:13 AM CST -----  Please inform pt: Vitamin D low- replace with 50,000 IU weekly x 12 wks then recheck . Otherwise labs are stable, cholesterol at goal and DM at goal. Continue current meds.  R

## 2017-12-28 PROBLEM — Z96.0 RETAINED URETERAL STENT: Status: RESOLVED | Noted: 2017-02-27 | Resolved: 2017-12-28

## 2017-12-28 PROBLEM — Z86.69 HISTORY OF MIGRAINE HEADACHES: Chronic | Status: RESOLVED | Noted: 2017-02-20 | Resolved: 2017-12-28

## 2017-12-28 PROBLEM — N20.0 KIDNEY STONE: Status: RESOLVED | Noted: 2017-02-27 | Resolved: 2017-12-28

## 2017-12-28 PROBLEM — R07.9 CHEST PAIN, CARDIAC: Status: RESOLVED | Noted: 2017-02-20 | Resolved: 2017-12-28

## 2017-12-28 PROBLEM — N18.2 ACUTE RENAL FAILURE SUPERIMPOSED ON STAGE 2 CHRONIC KIDNEY DISEASE (HCC): Status: RESOLVED | Noted: 2017-02-20 | Resolved: 2017-12-28

## 2017-12-28 PROBLEM — M47.812 FACET ARTHROPATHY, CERVICAL: Status: RESOLVED | Noted: 2017-02-22 | Resolved: 2017-12-28

## 2017-12-28 PROBLEM — Z86.79 S/P ASCENDING AORTIC ANEURYSM REPAIR: Status: ACTIVE | Noted: 2017-12-28

## 2017-12-28 PROBLEM — N18.2 ACUTE RENAL FAILURE SUPERIMPOSED ON STAGE 2 CHRONIC KIDNEY DISEASE: Status: RESOLVED | Noted: 2017-02-20 | Resolved: 2017-12-28

## 2017-12-28 PROBLEM — N17.9 ACUTE RENAL FAILURE SUPERIMPOSED ON STAGE 2 CHRONIC KIDNEY DISEASE (HCC): Status: RESOLVED | Noted: 2017-02-20 | Resolved: 2017-12-28

## 2017-12-28 PROBLEM — J45.41 MODERATE PERSISTENT ASTHMA WITH ACUTE EXACERBATION: Status: ACTIVE | Noted: 2017-12-28

## 2017-12-28 PROBLEM — Z98.890 S/P ASCENDING AORTIC ANEURYSM REPAIR: Status: ACTIVE | Noted: 2017-12-28

## 2017-12-28 PROBLEM — N30.01 ACUTE CYSTITIS WITH HEMATURIA: Status: RESOLVED | Noted: 2017-02-27 | Resolved: 2017-12-28

## 2017-12-28 PROBLEM — I25.10 CORONARY ARTERY DISEASE: Chronic | Status: RESOLVED | Noted: 2017-02-20 | Resolved: 2017-12-28

## 2017-12-28 PROBLEM — N17.9 ACUTE RENAL FAILURE SUPERIMPOSED ON STAGE 2 CHRONIC KIDNEY DISEASE: Status: RESOLVED | Noted: 2017-02-20 | Resolved: 2017-12-28

## 2018-01-05 NOTE — TELEPHONE ENCOUNTER
Denied.      LOV: 4/7/17 Araceli Mckeon 69    RTC: 4 weeks    Last refilled: 8/2/17 #60 with No refills  [facility]      6:09 PM  8/2/17  Note      Last refill as patient needs to schedule appt before he is out again        Future Appointments  Date Time

## 2018-01-05 NOTE — TELEPHONE ENCOUNTER
From: Amrik Bush  Sent: 1/5/2018 10:59 AM CST  Subject: Medication Renewal Request    William Milligandat would like a refill of the following medications:     METFORMIN HCL  MG Oral Tablet 24 Hr [facility] Hairston]    Preferred pharmacy: [address]

## 2018-01-12 NOTE — PATIENT INSTRUCTIONS
Increase hydralazine to 50mg three times daily and monitor blood pressure  Continue other medications  Stop symbicort and switch to dulera 2 puffs twice daily  Take prednisone as directed until completely finished. Recommend taking with food.      Schedule

## 2018-01-12 NOTE — PROGRESS NOTES
Wellness Exam    CC: Patient is presenting for a wellness exam    HPI:   Current Complaints: asthma still not well controlled. Last visit added symbicort 160/4.5, pt taking as directed along with prn albuterol and claritin for allergies.  Still feels chest disorders 9/24/2015   • ISAAC (obstructive sleep apnea) 3-3-14-PSG/TX-8/19/16    AHI-16, O2 alyce-71%/CPAP-10cwp   • Other specified cardiac dysrhythmias(427.89)    • Other testicular hypofunction    • Pneumonia, organism unspecified(486)    • Suicidal thoug Quit date: 7/12/2004  Smokeless tobacco: Never Used                      Alcohol use: Yes              Comment: social. He is trying to limit his alcohol               comsumption.       Current Outpatient Prescriptions on File Prior to Visit:  Angie Beebe Rfl: 0   AmLODIPine Besylate (NORVASC) 10 MG Oral Tab Take 10 mg by mouth daily. Indications: High Blood Pressure Disp:  Rfl:      No current facility-administered medications on file prior to visit.      Review of Systems   Constitutional: Negative for fev There is no tenderness. No HSM. Abdominal aorta normal in size, no hernia appreciated. Musculoskeletal: Normal range of motion. He exhibits no edema. Lymphadenopathy: He has no cervical or supraclavicular adenopathy. : No hernias.   No lesions or te Patient/Caregiver Education:  Patient/Caregiver Education: There are no barriers to learning. Medical education done. Outcome: Patient verbalizes understanding.       Educated by: Hiram English

## 2018-01-16 ENCOUNTER — PRIOR ORIGINAL RECORDS (OUTPATIENT)
Dept: OTHER | Age: 53
End: 2018-01-16

## 2018-02-16 NOTE — PROGRESS NOTES
Fausto Marcano is a 46year old male. HPI:   Patient presents for recheck of his hypertension. Pt has been taking medications as instructed, no medication side effects, home BP monitoring in the range of 386'O systolic and 16'S diastolic.  Affected by 0   GEMFIBROZIL 600 MG Oral Tab TAKE ONE TABLET TWO TIMES A DAY WITH FOOD Disp: 60 tablet Rfl: 0   ATORVASTATIN 20 MG Oral Tab TAKE ONE TABLET BY MOUTH EVERY NIGHT AT BEDTIME Disp: 30 tablet Rfl: 5   METFORMIN HCL  MG Oral Tablet 24 Hr TAKE 2 TABLETS 6/29/2012   • Migraines    • Obesity, unspecified    • Obsessive-compulsive disorders 9/24/2015   • ISAAC (obstructive sleep apnea) 3-3-14-PSG/TX-8/19/16    AHI-16, O2 alyce-71%/CPAP-10cwp   • Other specified cardiac dysrhythmias(427.89)    • Other testicula Packs/day: 0.00      Years: 28.00        Quit date: 7/12/2004  Smokeless tobacco: Never Used                      Alcohol use:  Yes              Comment: social. He is trying to limit his alcohol 21. AAP reviewed. Continue current tx. No prescriptions requested or ordered in this encounter . The patient indicates understanding of these issues and agrees to the plan.   The patient is asked to return in Return in about 3 months (around 5/16/2

## 2018-02-19 NOTE — PATIENT INSTRUCTIONS
Continue current medications and monitor blood pressure.  Please call or send us a message if readings are above 140/90

## 2018-03-07 NOTE — TELEPHONE ENCOUNTER
From: César Bush  Sent: 3/7/2018 10:44 AM CST  Subject: Medication Renewal Request    Lamar Pérez would like a refill of the following medications:     ATORVASTATIN 20 MG Oral Tab Lisa Bazan MD]    Preferred pharmacy: [facility][id number] Ame Estevez

## 2018-03-15 ENCOUNTER — MYAURORA ACCOUNT LINK (OUTPATIENT)
Dept: OTHER | Age: 53
End: 2018-03-15

## 2018-03-15 ENCOUNTER — PRIOR ORIGINAL RECORDS (OUTPATIENT)
Dept: OTHER | Age: 53
End: 2018-03-15

## 2018-03-28 DIAGNOSIS — E55.9 VITAMIN D DEFICIENCY: ICD-10-CM

## 2018-03-28 RX ORDER — ERGOCALCIFEROL 1.25 MG/1
CAPSULE ORAL
Qty: 4 CAPSULE | Refills: 0 | OUTPATIENT
Start: 2018-03-28

## 2018-04-06 ENCOUNTER — MYAURORA ACCOUNT LINK (OUTPATIENT)
Dept: OTHER | Age: 53
End: 2018-04-06

## 2018-04-11 ENCOUNTER — PRIOR ORIGINAL RECORDS (OUTPATIENT)
Dept: OTHER | Age: 53
End: 2018-04-11

## 2018-04-26 NOTE — PROGRESS NOTES
HPI:   Mukesh Cardenas is a 48year old male who presents with ill symptoms for  1  weeks. Patient reports itching eyes, facial pressure, nasal congestion, dry throat, non productive cough, some wheezing.  Has tried Albuterol inhaler 4 times daily with some Soln Inhale 2 puffs into the lungs every 6 (six) hours as needed for Shortness of Breath. Disp: 6.7 g Rfl: 0   AmLODIPine Besylate (NORVASC) 10 MG Oral Tab Take 10 mg by mouth daily.  Indications: High Blood Pressure Disp:  Rfl:      No current facility-adm [facility]               [facility]NAG[facility]  5/11/2012: INJECTION, W/WO CONTRAST, DX/THERAPEUTIC SUBST*      Comment: Procedure: CERVICAL EPIDURAL;  Surgeon:                Jonh Hassan MD;  Location: [place] [address] MANAGEMENT  4/18/201 congested but non toxic, sniffling and clearing throat often  HEENT: atraumatic, normocephalic,eyes with red sclera and tearing, ears with normal appearing TMs bilaterally, nares with minimal erythema or edema, throat with no erythema or edema, some clear exposure and wear sunscreen during use. · Probiotics or yogurt daily during antibiotic use will help decrease stomach upset and restore good bacteria to the gut. · Start daily Allegra or Claritin for allergic rhinitis.   · Use Albuterol inhaler 2 puffs ev

## 2018-05-07 ENCOUNTER — PRIOR ORIGINAL RECORDS (OUTPATIENT)
Dept: OTHER | Age: 53
End: 2018-05-07

## 2018-05-18 NOTE — PROGRESS NOTES
HPI:   Michael Mo is a 48year old male who presents for recheck of his diabetes. Patient’s FBS have been unchecked- does not have meter at home. Diet control has been fair. Last visit with ophthalmologist was > 1 yr.     Pt has been checking his f CHOLESTROL (mg/dL)   Date Value   07/12/2014 74   04/07/2014 93   12/31/2013 116   ----------  LDL Cholesterol (mg/dL)   Date Value   12/26/2017 76   06/05/2017 88   04/21/2016 92   ----------  AST (U/L)   Date Value   12/26/2017 38   06/05/2017 23 02/27 tablet (25 mg total) by mouth 3 (three) times daily. Disp: 90 tablet Rfl: 5   Ipratropium Bromide (ATROVENT) 0.03 % Nasal Solution 2 sprays by Nasal route every 12 (twelve) hours.  Disp: 1 Bottle Rfl: 0   Flecainide Acetate 100 MG Oral Tab Take 100 mg by mo RADIOLOGICAL S & I      Comment: Procedure: CERVICAL EPIDURAL;  Surgeon:                Audi Mireles MD;  Location: [address] [facility] FOR               PAIN MANAGEMENT  5/11/2012: H[facility]ctor CHEN & Janelle Common NDL/CATH SPI DX/THER KXC      Comment: Procedure: CERVICAL balance.    PSYCH: denies depression, anxiety , insomnia    EXAM:   /74   Pulse 72   Temp 98 °F (36.7 °C) (Oral)   Resp 16   Ht 70\"   Wt 258 lb   SpO2 98%   BMI 37.02 kg/m²   GENERAL: well developed, well nourished,in no apparent distress  SKIN: no r Take 1 tablet (20 mg total) by mouth 2 (two) times daily. Blood Glucose Monitoring Suppl (ONETOUCH VERIO) w/Device Does not apply Kit 1 kit 0      Si strip by Does not apply route daily.           The patient indicates understanding of these issues

## 2018-05-18 NOTE — PATIENT INSTRUCTIONS
Have labs drawn, fasting 8-10 hours prior (water before is ok). Take prednisone and cyclobenzaprine as directed for rib pain.  Call if pain not resolved, we may recommend a chest x-ray  Continue current medications  Watch portion control and continue regu

## 2018-05-18 NOTE — TELEPHONE ENCOUNTER
Called to discuss blood work results from today  Hypokalemia- pt states he has has had in the past, d/t diuretic. Recommend k-dur 20meq daily x 14 days then recheck. Orders placed. DM still under excellent control.  Ok to decrease metformin to 750mg ER

## 2018-06-18 PROBLEM — J18.9 COMMUNITY ACQUIRED PNEUMONIA OF LEFT LOWER LOBE OF LUNG: Status: ACTIVE | Noted: 2018-06-18

## 2018-06-18 NOTE — H&P
[facility]  History and Physical     [facility] Patient Status:  Emergency    1965 MRN [id number]   Location [address] [facility] Attending Lakia Renee MD   Hosp Day # 0 PCP Jim Armstrong MD     Chief Complaint: ANGIOGRAM      Comment: no cad noted  4/18/2012: EPIDUROGRAPHY, RADIOLOGICAL S & I      Comment: Procedure: CERVICAL EPIDURAL;  Surgeon:                Kulwinder Perez MD;  Location: [place] [address] MANAGEMENT  5/11/2012: FLUOR GID & LO No Known Allergies    Medications:      No current facility-administered medications on file prior to encounter.    Current Outpatient Prescriptions on File Prior to Encounter:  MetFORMIN HCl  MG Oral Tablet 24 Hr Take 1 tablet (750 mg total) by mouth (113.4 kg)   SpO2 98%   BMI 34.87 kg/m²   General: No acute distress. Alert and oriented x 3. HEENT: Normocephalic atraumatic. Respiratory: Diminished, minimal air movement  Cardiovascular: S1, S2. Irregular   Chest and Back: No tenderness or deformity.

## 2018-06-18 NOTE — ED NOTES
Patient had received 1 liter of NS fluids per marta ALEMAN - currently getting his second liter of the septic bolus    Pt provided ice pack for back of neck per request

## 2018-06-18 NOTE — ED PROVIDER NOTES
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 Gait normal.  Skin:  warm and dry, no rashes. No jaundice. Brisk capillary refill  Musculoskeletal: neck is supple, no lymphadenopathy, non tender.  no meningeal sign saturations and his pulse became variable. His temperature continued to climb in spite of Tylenol. He had an IV line established and blood work was drawn.   White blood cell count was 17,000 and, potassium was noted to be 2.8, creatinine slightly elevated

## 2018-06-18 NOTE — ED PROVIDER NOTES
I reviewed that chart and discussed the case. I have examined the patient and noted HEENT exam is normal lungs were clear cardiovascular exam shows a tachycardia abdomen soft nontender skin shows no signs of rash is diaphoretic.       I agree with the foll

## 2018-06-18 NOTE — ED NOTES
Patient sts that the chest pressure has improved. Diaphoresis has improved. Patient sts that he is feeling slightly better. Denies dizziness or nausea.

## 2018-06-18 NOTE — PROGRESS NOTES
[facility] Pharmacy Note: Antimicrobial Weight Dose Adjustment for: ceftriaxone ([place])    Fausto Marcano is a 48year old male who has been prescribed ceftriaxone (ROCEPHIN) 1 g x1 in ER.   CrCl is CrCl cannot be calculated (Patient's most recent lab result i

## 2018-06-18 NOTE — ED PROVIDER NOTES
Patient Seen in: [facility] Emergency Department    History   Patient presents with:  Fever (infectious)  Hypokalemia  Dyspnea HALEY SOB (respiratory)    Stated Complaint: Shortness of breath     HPI    Mauro Bahai is a 24-year-old male who presents for evalu hypertensive 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 and with chronic kidney disease stage I through stage IV, or unspecified(404.00) 6/29/2012   • Migraines    • Obesity, unspecified    • Obsessive-compulsive disorders 9/24/2015   • ISAAC (obstructiv MANAGEMENT  10/14/2014: SYMP AAA URGENT REPAIR      Comment: [place]; ascending aorta  No date: TONSILLECTOMY        Smoking status: Former Smoker                                                              Packs/day: 0.00      Years: 28.00        Quit dae BLUE   RAINBOW DRAW LAVENDER   RAINBOW DRAW LIGHT GREEN   RAINBOW DRAW GOLD   BLOOD CULTURE   BLOOD CULTURE     EKG    Rate, intervals and axes as noted on EKG Report. Rate: 87bpm  Rhythm: Sinus Rhythm  Reading: Normal sinus rhythm.   Left ventricular hype CARDIAC:  No enlargement, pericardial thickening, or significant calcification. PLEURA:  No mass or effusion. CHEST WALL:  Sternal wires are present.  LIMITED ABDOMEN:  Bilateral adrenal hyperplasia, previously identified left adrenal adenoma is incomplete scenario. Followup to resolution. Dictated by: Sisi Agarwal MD on 6/18/2018 at 15:00     Approved by: Sisi Agarwal MD            This case is discussed with Dr. Eri Pérez who evaluates the patient and agrees with the plan of care.     NATALEE   Cli

## 2018-06-18 NOTE — ED INITIAL ASSESSMENT (HPI)
Pt arrives from [facility] w/ c/o fever x4 days, dizziness and lightheadedness and shortness of breath w/ 02 sat room air = 88%.  Pt 97% 2L  - had blood completed w/ WBC = 17; D-DIMER 629, K+ = 2.4     Temp was 102.3 upon arrival to [facility]

## 2018-06-18 NOTE — ED NOTES
Patient c/o feeling dizzy, lightheaded and chest pressure. Dr June Xiao at bedside to evaluate patient.

## 2018-06-19 NOTE — RESPIRATORY THERAPY NOTE
ISAAC - Equipment Use Daily Summary:  · Set Mode AFLEX  · Usage in hours: 7  · 90% Pressure (EPAP) level:   · 90% Insp Pressure (IPAP): 7  · AHI: 1  · Supplemental Oxygen:  · Comments:

## 2018-06-19 NOTE — CM/SW NOTE
06/19/18 1400   CM/SW Screening   Referral [address] staff; Chart review;Nursing rounds   Patient's Mental Status Alert;Oriented   Patient lives with (Family)   Patient Status Prior to Admission   Independent with ADLs and

## 2018-06-19 NOTE — PROGRESS NOTES
Assumed patient care from [address] [place] [address] at approximately 2300. Patient appears to be resting comfortably in bed. Will continue to monitor.

## 2018-06-19 NOTE — PAYOR COMM NOTE
--------------  Order Requisition   Admit to inpatient Once (Order #[id number]) on 6/18/18     ADMISSION REVIEW     Payor: Vee Moseley #:  [id number]  Authorization Number: N/A    Admit date: 6/18/18  Admit time: 1830 Abnormal; Notable for the following:      ISTAT Potassium 2.8 (*)       ISTAT Chloride 96 (*)       ISTAT Ionized Calcium 1.08 (*)       ISTAT Creatinine 1.40 (*)       ISTAT Glucose 131 (*)       All other components within normal limits   ISTAT TROPONIN 80-year-old male who presents for evaluation of fever, shortness of breath and chest pain. He was transferred here from [facility] immediate care in [facility] after having a syncopal episode in their care.   He has a past medical history of a.fib, diabetes, unspecified     L3,L4     Past Surgical History:  10/14/2014: SYMP AAA URGENT REPAIR      Comment: [place]; ascending aorta    Physical Exam   BP: 126/57  Pulse: 86  Resp: 22  Temp: (!) 102  Oral  SpO2: 97 % on O2 Device: Nasal cannula  BMI 34.87      Physi lung       [facility]  History and Physical     Chief Complaint: Fever    History of Present Illness: Rogerio Johnson is a 48year old male with a history of Chronic atrial fibrillation on Xarelto, Essential hypertension, Diabetes mellitus, Chronic 1,000 mg     Date Action Dose Route User    Admitted on 6/18/2018    Discharged on 6/18/2018 6/18/2018 1239 Given 1000 mg Oral Maeve Giles RN      atorvastatin (LIPITOR) tab 20 mg     Date Action Dose Route User    6/18/2018 2139 Given 20 mg Route User    6/19/2018 0804 Given 3 mL Nebulization Krupa Marquis    6/19/2018 0445 Given 3 mL Nebulization Karla Valerie, [facility]    6/19/2018 0100 Given 3 mL Nebulization Karla Valerie, [place]    6/18/2018 1927 Given 3 mL Nebulization Daryle Barley

## 2018-06-19 NOTE — PROGRESS NOTES
NURSING ADMISSION NOTE      Patient admitted via Cart  Oriented to room 530. Safety precautions initiated. Bed in low position. Call light in reach.

## 2018-06-19 NOTE — PROGRESS NOTES
Pharmacy Progress Note: Discharge Medication Counseling    Fausto Marcano has been counseled on 17 medications.    Of these medications, the following are new to the patient:    1. prednisone    2. cefdinir    The indication and common side effects of the

## 2018-06-19 NOTE — PROGRESS NOTES
[facility]  Progress Note     Laura Hernández Patient Status:  Inpatient    1965 MRN [id number]   [facility] 5NW-A Attending Hernandez Iglesias MD   Hosp Day # 1 PCP Josué mAanda MD     Chief Complaint: SOB  S:  Feels much bett 40 mg Intravenous Q8H   • Metoprolol Succinate ER  200 mg Oral 2x Daily(Beta Blocker)     ASSESSMENT / PLAN:   1. Fever with hypoxia 2/2 Acute on chronic bronchitis with bronchospasm. Suspect early bacterial component  1. Convert to oral abx  2.  Convert to

## 2018-06-20 ENCOUNTER — HOSPITAL (OUTPATIENT)
Dept: OTHER | Age: 53
End: 2018-06-20
Attending: HOSPITALIST

## 2018-06-20 LAB
2009 H1N1 SUBTYPE (RF1N1): NOT DETECTED
ADENOVIRUS (RADENO): NOT DETECTED
ALBUMIN SERPL-MCNC: 3.2 GM/DL (ref 3.6–5.1)
ALBUMIN/GLOB SERPL: 0.8 {RATIO} (ref 1–2.4)
ALP SERPL-CCNC: 83 UNIT/L (ref 45–117)
ALT SERPL-CCNC: 59 UNIT/L
AMPHETAMINES UR QL SCN>500 NG/ML: NEGATIVE
ANALYZER ANC (IANC): ABNORMAL
ANION GAP SERPL CALC-SCNC: 15 MMOL/L (ref 10–20)
APTT PPP: 36 SECONDS (ref 22–30)
APTT PPP: ABNORMAL S
AST SERPL-CCNC: 34 UNIT/L
BARBITURATES UR QL SCN>200 NG/ML: NEGATIVE
BASE DEFICIT BLDA-SCNC: ABNORMAL MMOL/L
BASE EXCESS BLDA CALC-SCNC: 2 MMOL/L (ref 0–3)
BASOPHILS # BLD: 0 THOUSAND/MCL (ref 0–0.3)
BASOPHILS NFR BLD: 0 %
BDY SITE: ABNORMAL
BENZODIAZ UR QL SCN>200 NG/ML: NEGATIVE
BILIRUB SERPL-MCNC: 0.3 MG/DL (ref 0.2–1)
BNP SERPL-MCNC: 445 PG/ML
BOCAVIRUS (RBOCA): NOT DETECTED
BODY TEMPERATURE: 36.4 DEGREES
BUN SERPL-MCNC: 27 MG/DL (ref 6–20)
BUN SERPL-MCNC: 31 MG/DL (ref 6–20)
BUN/CREAT SERPL: 25 (ref 7–25)
BUN/CREAT SERPL: 30 (ref 7–25)
BZE UR QL SCN>150 NG/ML: NEGATIVE
C. PNEUMONIAE (RCHLP): NOT DETECTED
CA-I BLD ISE-SCNC: 1.2 MMOL/L (ref 1.15–1.29)
CA-I BLDA-SCNC: 18 % (ref 15–23)
CALCIUM SERPL-MCNC: 9.2 MG/DL (ref 8.4–10.2)
CANNABINOIDS UR QL SCN>50 NG/ML: NEGATIVE
CENTROMERE AB SER-ACNC: <0.2 AI (ref 0–0.9)
CHLORIDE: 107 MMOL/L (ref 98–107)
CHROMATIN AB SERPL-ACNC: <0.2 AI (ref 0–0.9)
CO2 SERPL-SCNC: 21 MMOL/L (ref 21–32)
COHGB MFR BLD: 1.1 %
CONDITION: ABNORMAL
CONDITION: ABNORMAL
CORONAVIRUS 229E (RC229E): NOT DETECTED
CORONAVIRUS HKU1 (RCHKU1): NOT DETECTED
CORONAVIRUS NL63 (RCNL63): NOT DETECTED
CORONAVIRUS OC43 (RCO43): NOT DETECTED
CREAT SERPL-MCNC: 1.04 MG/DL (ref 0.67–1.17)
CREAT SERPL-MCNC: 1.1 MG/DL (ref 0.67–1.17)
DIFFERENTIAL METHOD BLD: ABNORMAL
DSDNA AB SER IA-ACNC: 1 UNIT/ML
ENA JO1 IGG SER-ACNC: <0.2 AI (ref 0–0.9)
ENA RNP IGG SER IA-ACNC: 0.2 AI (ref 0–0.9)
ENA SCL70 IGG SER QL: <0.2 AI (ref 0–0.9)
ENA SM IGG SER IA-ACNC: <0.2 AI (ref 0–0.9)
ENA SM+RNP IGG SER IA-ACNC: <0.2 AI (ref 0–0.9)
ENA SS-A AB SER IA-ACNC: <0.2 AI (ref 0–0.9)
ENA SS-B IGG SER IA-ACNC: <0.2 AI (ref 0–0.9)
EOSINOPHIL # BLD: 0 THOUSAND/MCL (ref 0.1–0.5)
EOSINOPHIL NFR BLD: 0 %
ERYTHROCYTE [DISTWIDTH] IN BLOOD: 14.6 % (ref 11–15)
GLOBULIN SER-MCNC: 4.1 GM/DL (ref 2–4)
GLUCOSE BLDC GLUCOMTR-MCNC: 125 MG/DL (ref 65–99)
GLUCOSE BLDC GLUCOMTR-MCNC: 166 MG/DL (ref 65–99)
GLUCOSE SERPL-MCNC: 122 MG/DL (ref 65–99)
HCO3 BLDA-SCNC: 25 MMOL/L (ref 22–28)
HEMATOCRIT: 41.1 % (ref 39–51)
HGB BLD-MCNC: 13.9 GM/DL (ref 13–17)
HGB BLD-MCNC: 14.2 GM/DL (ref 13–17)
HIV ANTIGEN/ANTIBODY SCREEN: NONREACTIVE
HOROWITZ INDEX BLD+IHG-RTO: ABNORMAL MM[HG]
IMM GRANULOCYTES # BLD AUTO: 0.2 THOUSAND/MCL (ref 0–0.2)
IMM GRANULOCYTES NFR BLD: 1 %
INFLUENZA A SUBTYPE H1 (RFLH1): NOT DETECTED
INFLUENZA A SUBTYPE H3 (RFLH3): NOT DETECTED
INFLUENZA A UNSUBTYPABLE (RIAU): NOT DETECTED
INFLUENZA B VIRUS (XFLUB): NOT DETECTED
INR PPP: 1.2
LACTATE BLDA-MCNC: 2.2 MMOL/L
LACTATE BLDV-SCNC: 2.3 MMOL/L (ref 0–2)
LACTATE BLDV-SCNC: 2.6 MMOL/L (ref 0–2)
LDH SERPL-CCNC: 283 UNIT/L (ref 86–234)
LIPASE SERPL-CCNC: 118 UNIT/L (ref 73–393)
LYMPHOCYTES # BLD: 0.9 THOUSAND/MCL (ref 1–4)
LYMPHOCYTES NFR BLD: 4 %
M. PNEUMONIAE (RMYPP): NOT DETECTED
MCH RBC QN AUTO: 30 PG (ref 26–34)
MCHC RBC AUTO-ENTMCNC: 33.8 GM/DL (ref 32–36.5)
MCV RBC AUTO: 88.8 FL (ref 78–100)
METAPNEUMOVIRUS (RMETA): NOT DETECTED
METHGB MFR BLD: 0.8 %
MONOCYTES # BLD: 1.3 THOUSAND/MCL (ref 0.3–0.9)
MONOCYTES NFR BLD: 5 %
NEUTROPHILS # BLD: 22.3 THOUSAND/MCL (ref 1.8–7.7)
NEUTROPHILS NFR BLD: 90 %
NEUTS SEG NFR BLD: ABNORMAL %
NRBC (NRBCRE): 0 /100 WBC
OPIATES UR QL SCN>300 NG/ML: NEGATIVE
OXYHGB MFR BLD: 89.6 % (ref 94–98)
PARAINFLUENZA, TYPE 1 (RPAR1): NOT DETECTED
PARAINFLUENZA, TYPE 2 (RPAR2): NOT DETECTED
PARAINFLUENZA, TYPE 3 (RPAR3): NOT DETECTED
PARAINFLUENZA, TYPE 4 (RPAR4): NOT DETECTED
PCO2 BLDA: 33 MM HG (ref 35–48)
PCP UR QL SCN>25 NG/ML: NEGATIVE
PH BLDA: 7.48 UNIT (ref 7.35–7.45)
PLATELET # BLD: 252 THOUSAND/MCL (ref 140–450)
PO2 BLDA: 58 MM HG (ref 83–108)
POTASSIUM BLD-SCNC: 3.5 MMOL/L (ref 3.4–5.1)
POTASSIUM SERPL-SCNC: 3.6 MMOL/L (ref 3.4–5.1)
PROCALCITONIN SERPL IA-MCNC: 0.12 NG/ML
PROT SERPL-MCNC: 7.3 GM/DL (ref 6.4–8.2)
PROTHROMBIN TIME: 11.9 SECONDS (ref 9.7–11.8)
PROTHROMBIN TIME: ABNORMAL
RBC # BLD: 4.63 MILLION/MCL (ref 4.5–5.9)
RHINOVIRUS/ENTEROVIRUS (RRHINO): NOT DETECTED
RIBOSOMAL P IGG SER-ACNC: <0.2 AI (ref 0–0.9)
RSV, SUBTYPE A (RRSVA): NOT DETECTED
RSV, SUBTYPE B (RRSVB): NOT DETECTED
SAO2 % BLDA: 91 % (ref 95–99)
SODIUM BLD-SCNC: 142 MMOL/L (ref 135–145)
SODIUM SERPL-SCNC: 139 MMOL/L (ref 135–145)
SPECIMEN SOURCE: NORMAL
TROPONIN I SERPL HS-MCNC: 0.04 NG/ML
TSH SERPL-ACNC: 2.04 MCUNIT/ML (ref 0.35–5)
WBC # BLD: 24.7 THOUSAND/MCL (ref 4.2–11)

## 2018-06-20 NOTE — PROGRESS NOTES
Patient called to report that he is currently inpatient at [facility]. Patient reports that he had been taking his medications as directed and then last night started having difficulty breathing, states that he can't lie down, he checked his pulse oxi

## 2018-06-20 NOTE — PROGRESS NOTES
Initial Post Discharge Follow Up   Discharge Date: 6/19/18  Contact Date: 6/20/2018    Consent Verification:  Assessment Completed With: {Consent:5161}  HIPAA Verified?   {Yes/No:829::\"Yes\"}    Discharge Dx:  Pneumonia    General:   • How have you been mouth nightly. Disp:  Rfl:    gemfibrozil 600 MG Oral Tab Take 600 mg by mouth 2 (two) times daily before meals. Disp:  Rfl:    Rivaroxaban (XARELTO) 20 MG Oral Tab Take 20 mg by mouth daily with food.  Disp:  Rfl:    MetFORMIN HCl  MG Oral Tablet 24 effects discussed? {yes no:300426}  o Do you have any questions about your new medication?  {YES NO STONHLB:2773807}  • Did you  your discharge medications when you left the hospital? {YES NO [facility]:0450979}  • May I go over your medications with [facility], [address] [place] [address] [facility] [address] [phone]          PCP TCM/HFU appointment: scheduled at D/C within 7-14 days  {yes no:354791}     NCM Reviewed/scheduled/resc

## 2018-06-20 NOTE — DISCHARGE SUMMARY
[facility]PITAL[facility]  DISCHARGE SUMMARY     Bonnell Boeck [facility] Patient Status:  Inpatient    1965 MRN [id number]   [facility] 5NW-A Attending Gwen Riley MD   Hosp Day # 1 PCP Law David MD     Date of Admission: 2018  Date of saturating well on room air upon discharge. He is to f/u with PCP in 1 week. He was Dc in good condition.      Procedures during hospitalization:   • None     Incidental or significant findings and recommendations (brief descriptions):  • Per Brief Synops Ipratropium Bromide 0.03 % Soln  Commonly known as:  ATROVENT      2 sprays by Nasal route every 12 (twelve) hours.    Quantity:  1 Bottle  Refills:  0     Losartan Potassium-HCTZ 100-25 MG Tabs  Commonly known as:  HYZAAR      Take 1 tablet by mouth helen

## 2018-06-21 LAB
ALBUMIN SERPL-MCNC: 2.9 GM/DL (ref 3.6–5.1)
ALBUMIN/GLOB SERPL: 0.7 {RATIO} (ref 1–2.4)
ALP SERPL-CCNC: 74 UNIT/L (ref 45–117)
ALT SERPL-CCNC: 41 UNIT/L
ANALYZER ANC (IANC): ABNORMAL
ANION GAP SERPL CALC-SCNC: 13 MMOL/L (ref 10–20)
AST SERPL-CCNC: 17 UNIT/L
BASOPHILS # BLD: 0 THOUSAND/MCL (ref 0–0.3)
BASOPHILS NFR BLD: 0 %
BILIRUB SERPL-MCNC: 0.4 MG/DL (ref 0.2–1)
BUN SERPL-MCNC: 33 MG/DL (ref 6–20)
BUN/CREAT SERPL: 31 (ref 7–25)
CALCIUM SERPL-MCNC: 8.6 MG/DL (ref 8.4–10.2)
CHLORIDE: 99 MMOL/L (ref 98–107)
CO2 SERPL-SCNC: 26 MMOL/L (ref 21–32)
CREAT SERPL-MCNC: 1.08 MG/DL (ref 0.67–1.17)
DIFFERENTIAL METHOD BLD: ABNORMAL
EOSINOPHIL # BLD: 0 THOUSAND/MCL (ref 0.1–0.5)
EOSINOPHIL NFR BLD: 0 %
ERYTHROCYTE [DISTWIDTH] IN BLOOD: 14 % (ref 11–15)
GLOBULIN SER-MCNC: 4 GM/DL (ref 2–4)
GLUCOSE BLDC GLUCOMTR-MCNC: 191 MG/DL (ref 65–99)
GLUCOSE BLDC GLUCOMTR-MCNC: 194 MG/DL (ref 65–99)
GLUCOSE BLDC GLUCOMTR-MCNC: 223 MG/DL (ref 65–99)
GLUCOSE BLDC GLUCOMTR-MCNC: 229 MG/DL (ref 65–99)
GLUCOSE SERPL-MCNC: 240 MG/DL (ref 65–99)
HEMATOCRIT: 37.9 % (ref 39–51)
HGB BLD-MCNC: 12.6 GM/DL (ref 13–17)
IMM GRANULOCYTES # BLD AUTO: 0.1 THOUSAND/MCL (ref 0–0.2)
IMM GRANULOCYTES NFR BLD: 1 %
LACTATE BLDV-SCNC: 1.5 MMOL/L (ref 0–2)
LYMPHOCYTES # BLD: 0.8 THOUSAND/MCL (ref 1–4)
LYMPHOCYTES NFR BLD: 6 %
MAGNESIUM SERPL-MCNC: 2.2 MG/DL (ref 1.7–2.4)
MCH RBC QN AUTO: 29.1 PG (ref 26–34)
MCHC RBC AUTO-ENTMCNC: 33.2 GM/DL (ref 32–36.5)
MCV RBC AUTO: 87.5 FL (ref 78–100)
MONOCYTES # BLD: 0.7 THOUSAND/MCL (ref 0.3–0.9)
MONOCYTES NFR BLD: 5 %
NEUTROPHILS # BLD: 12.3 THOUSAND/MCL (ref 1.8–7.7)
NEUTROPHILS NFR BLD: 88 %
NEUTS SEG NFR BLD: ABNORMAL %
NRBC (NRBCRE): 0 /100 WBC
PLATELET # BLD: 239 THOUSAND/MCL (ref 140–450)
POTASSIUM SERPL-SCNC: 3.2 MMOL/L (ref 3.4–5.1)
PROT SERPL-MCNC: 6.9 GM/DL (ref 6.4–8.2)
RBC # BLD: 4.33 MILLION/MCL (ref 4.5–5.9)
SODIUM SERPL-SCNC: 135 MMOL/L (ref 135–145)
WBC # BLD: 13.8 THOUSAND/MCL (ref 4.2–11)

## 2018-06-22 LAB
ALBUMIN SERPL-MCNC: 2.8 GM/DL (ref 3.6–5.1)
ALBUMIN/GLOB SERPL: 0.8 {RATIO} (ref 1–2.4)
ALP SERPL-CCNC: 69 UNIT/L (ref 45–117)
ALT SERPL-CCNC: 39 UNIT/L
ANALYZER ANC (IANC): ABNORMAL
ANION GAP SERPL CALC-SCNC: 11 MMOL/L (ref 10–20)
ANION GAP SERPL CALC-SCNC: 12 MMOL/L (ref 10–20)
AST SERPL-CCNC: 14 UNIT/L
BASOPHILS # BLD: 0 THOUSAND/MCL (ref 0–0.3)
BASOPHILS NFR BLD: 0 %
BILIRUB SERPL-MCNC: 0.3 MG/DL (ref 0.2–1)
BUN SERPL-MCNC: 29 MG/DL (ref 6–20)
BUN SERPL-MCNC: 31 MG/DL (ref 6–20)
BUN/CREAT SERPL: 27 (ref 7–25)
BUN/CREAT SERPL: 28 (ref 7–25)
CALCIUM SERPL-MCNC: 8.5 MG/DL (ref 8.4–10.2)
CALCIUM SERPL-MCNC: 8.9 MG/DL (ref 8.4–10.2)
CHLORIDE: 101 MMOL/L (ref 98–107)
CHLORIDE: 101 MMOL/L (ref 98–107)
CO2 SERPL-SCNC: 29 MMOL/L (ref 21–32)
CO2 SERPL-SCNC: 30 MMOL/L (ref 21–32)
CREAT SERPL-MCNC: 1.02 MG/DL (ref 0.67–1.17)
CREAT SERPL-MCNC: 1.14 MG/DL (ref 0.67–1.17)
DIFFERENTIAL METHOD BLD: ABNORMAL
EOSINOPHIL # BLD: 0 THOUSAND/MCL (ref 0.1–0.5)
EOSINOPHIL NFR BLD: 0 %
ERYTHROCYTE [DISTWIDTH] IN BLOOD: 13.5 % (ref 11–15)
GLOBULIN SER-MCNC: 3.7 GM/DL (ref 2–4)
GLUCOSE BLDC GLUCOMTR-MCNC: 125 MG/DL (ref 65–99)
GLUCOSE BLDC GLUCOMTR-MCNC: 129 MG/DL (ref 65–99)
GLUCOSE BLDC GLUCOMTR-MCNC: 131 MG/DL (ref 65–99)
GLUCOSE BLDC GLUCOMTR-MCNC: 196 MG/DL (ref 65–99)
GLUCOSE SERPL-MCNC: 138 MG/DL (ref 65–99)
GLUCOSE SERPL-MCNC: 159 MG/DL (ref 65–99)
HEMATOCRIT: 38.2 % (ref 39–51)
HGB BLD-MCNC: 12.6 GM/DL (ref 13–17)
IMM GRANULOCYTES # BLD AUTO: 0.2 THOUSAND/MCL (ref 0–0.2)
IMM GRANULOCYTES NFR BLD: 1 %
LYMPHOCYTES # BLD: 1.1 THOUSAND/MCL (ref 1–4)
LYMPHOCYTES NFR BLD: 7 %
MAGNESIUM SERPL-MCNC: 2.2 MG/DL (ref 1.7–2.4)
MAGNESIUM SERPL-MCNC: 2.4 MG/DL (ref 1.7–2.4)
MCH RBC QN AUTO: 28.8 PG (ref 26–34)
MCHC RBC AUTO-ENTMCNC: 33 GM/DL (ref 32–36.5)
MCV RBC AUTO: 87.4 FL (ref 78–100)
MONOCYTES # BLD: 1 THOUSAND/MCL (ref 0.3–0.9)
MONOCYTES NFR BLD: 7 %
NEUTROPHILS # BLD: 12.5 THOUSAND/MCL (ref 1.8–7.7)
NEUTROPHILS NFR BLD: 85 %
NEUTS SEG NFR BLD: ABNORMAL %
NRBC (NRBCRE): 0 /100 WBC
PLATELET # BLD: 246 THOUSAND/MCL (ref 140–450)
POTASSIUM SERPL-SCNC: 3.3 MMOL/L (ref 3.4–5.1)
POTASSIUM SERPL-SCNC: 3.6 MMOL/L (ref 3.4–5.1)
PROT SERPL-MCNC: 6.5 GM/DL (ref 6.4–8.2)
RBC # BLD: 4.37 MILLION/MCL (ref 4.5–5.9)
SODIUM SERPL-SCNC: 138 MMOL/L (ref 135–145)
SODIUM SERPL-SCNC: 139 MMOL/L (ref 135–145)
WBC # BLD: 14.8 THOUSAND/MCL (ref 4.2–11)

## 2018-06-22 NOTE — TELEPHONE ENCOUNTER
Dr. Dana Torres called and Belkis Morrison is being discharged from [facility] on 06/23/2018 or 6/25/2018 and would like us to make sure he schedules a follow up.

## 2018-06-22 NOTE — TELEPHONE ENCOUNTER
Short term disability claim form received from [facility], IK#[phone], BRV#[phone]. Complete and sign Attending Physician's Statement and fax back to # above. Forms in triage.

## 2018-06-23 LAB
ALBUMIN SERPL-MCNC: 2.8 GM/DL (ref 3.6–5.1)
ALBUMIN/GLOB SERPL: 0.8 {RATIO} (ref 1–2.4)
ALP SERPL-CCNC: 60 UNIT/L (ref 45–117)
ALT SERPL-CCNC: 39 UNIT/L
ANALYZER ANC (IANC): ABNORMAL
ANION GAP SERPL CALC-SCNC: 4 MMOL/L (ref 10–20)
AST SERPL-CCNC: 14 UNIT/L
BASOPHILS # BLD: 0 THOUSAND/MCL (ref 0–0.3)
BASOPHILS NFR BLD: 0 %
BILIRUB SERPL-MCNC: 0.4 MG/DL (ref 0.2–1)
BUN SERPL-MCNC: 29 MG/DL (ref 6–20)
BUN/CREAT SERPL: 26 (ref 7–25)
CALCIUM SERPL-MCNC: 8.4 MG/DL (ref 8.4–10.2)
CHLORIDE: 104 MMOL/L (ref 98–107)
CO2 SERPL-SCNC: 31 MMOL/L (ref 21–32)
CREAT SERPL-MCNC: 1.13 MG/DL (ref 0.67–1.17)
DIFFERENTIAL METHOD BLD: ABNORMAL
EOSINOPHIL # BLD: 0.1 THOUSAND/MCL (ref 0.1–0.5)
EOSINOPHIL NFR BLD: 1 %
ERYTHROCYTE [DISTWIDTH] IN BLOOD: 14.1 % (ref 11–15)
GLOBULIN SER-MCNC: 3.6 GM/DL (ref 2–4)
GLUCOSE BLDC GLUCOMTR-MCNC: 117 MG/DL (ref 65–99)
GLUCOSE BLDC GLUCOMTR-MCNC: 83 MG/DL (ref 65–99)
GLUCOSE SERPL-MCNC: 95 MG/DL (ref 65–99)
HEMATOCRIT: 40.2 % (ref 39–51)
HGB BLD-MCNC: 13.2 GM/DL (ref 13–17)
IMM GRANULOCYTES # BLD AUTO: 0.1 THOUSAND/MCL (ref 0–0.2)
IMM GRANULOCYTES NFR BLD: 1 %
LYMPHOCYTES # BLD: 1.9 THOUSAND/MCL (ref 1–4)
LYMPHOCYTES NFR BLD: 16 %
MAGNESIUM SERPL-MCNC: 2.3 MG/DL (ref 1.7–2.4)
MCH RBC QN AUTO: 29.2 PG (ref 26–34)
MCHC RBC AUTO-ENTMCNC: 32.8 GM/DL (ref 32–36.5)
MCV RBC AUTO: 88.9 FL (ref 78–100)
MONOCYTES # BLD: 0.8 THOUSAND/MCL (ref 0.3–0.9)
MONOCYTES NFR BLD: 7 %
NEUTROPHILS # BLD: 9.4 THOUSAND/MCL (ref 1.8–7.7)
NEUTROPHILS NFR BLD: 75 %
NEUTS SEG NFR BLD: ABNORMAL %
NRBC (NRBCRE): 0 /100 WBC
PLATELET # BLD: 267 THOUSAND/MCL (ref 140–450)
POTASSIUM SERPL-SCNC: 3.3 MMOL/L (ref 3.4–5.1)
PROT SERPL-MCNC: 6.4 GM/DL (ref 6.4–8.2)
RBC # BLD: 4.52 MILLION/MCL (ref 4.5–5.9)
SODIUM SERPL-SCNC: 136 MMOL/L (ref 135–145)
WBC # BLD: 12.5 THOUSAND/MCL (ref 4.2–11)

## 2018-06-26 ENCOUNTER — PRIOR ORIGINAL RECORDS (OUTPATIENT)
Dept: OTHER | Age: 53
End: 2018-06-26

## 2018-06-26 PROBLEM — I50.9 ACUTE CONGESTIVE HEART FAILURE (HCC): Status: ACTIVE | Noted: 2018-06-26

## 2018-06-26 NOTE — PROGRESS NOTES
HPI:    Leia Mcardle is a 48year old male here today for hospital follow up.    He was discharged from Inpatient hospital, [facility] to Home   Admit Date: 6/18/18  Discharge Date: 6/19/18  Hospital Discharge Diagnosis:    Hospital Discharge Diag with community providers and services. Medication Reconciliation:  I am aware of an inpatient discharge within the last 30 days. The discharge medication list has been reconciled with the patient's current medication list and reviewed by me.   See medic (2/27/2017); Anxiety disorder; Brachial neuritis or radiculitis NOS; Chronic atrial fibrillation ([facility] (10/14/14); Depression (9/24/2015); Diabetes ([facility] High blood pressure; High cholesterol; Hyperlipidemia; Kidney stone (2/27/2017);  Malignant hypertensi vision or double vision  HEENT: denies nasal congestion, sinus pain or ST  LUNGS: denies shortness of breath with exertion. Cough improving, no more wheezing.   CARDIOVASCULAR: denies chest pain on exertion or palpitations  GI: denies abdominal pain, denies without long-term current use of insulin ([facility]- well controlled, reschedule diabetic eye exam.   -     MetFORMIN HCl  MG Oral Tablet 24 Hr; Take 1 tablet (750 mg total) by mouth once daily.     Acute bronchitis, unspecified organism- resolved, f/u wit

## 2018-06-28 NOTE — TELEPHONE ENCOUNTER
Approvedon June 26   CaseId:[id number];Status:Approved; Review Type:Prior Auth; Coverage Start Date:05/27/2018; Coverage End Date:06/25/2021;  Pharmacy aware.

## 2018-06-29 ENCOUNTER — PRIOR ORIGINAL RECORDS (OUTPATIENT)
Dept: OTHER | Age: 53
End: 2018-06-29

## 2018-06-29 ENCOUNTER — MYAURORA ACCOUNT LINK (OUTPATIENT)
Dept: OTHER | Age: 53
End: 2018-06-29

## 2018-07-18 ENCOUNTER — MYAURORA ACCOUNT LINK (OUTPATIENT)
Dept: OTHER | Age: 53
End: 2018-07-18

## 2018-07-18 ENCOUNTER — PRIOR ORIGINAL RECORDS (OUTPATIENT)
Dept: OTHER | Age: 53
End: 2018-07-18

## 2018-07-18 NOTE — TELEPHONE ENCOUNTER
[facility] called and requests a refill for patient for One Touch Delica Strips. I spoke w/Donya and call back # [phone]    Ref.  #[id number]    [address], [facility] [phone], [phone]

## 2018-07-23 LAB
BUN: 32 MG/DL
CALCIUM: 8.8 MG/DL
CHLORIDE: 106 MEQ/L
CREATININE, SERUM: 1.4 MG/DL
GLUCOSE: 187 MG/DL
MAGNESIUM: 2.3 MG/DL
POTASSIUM, SERUM: 4 MEQ/L
SODIUM: 138 MEQ/L

## 2018-08-15 ENCOUNTER — PRIOR ORIGINAL RECORDS (OUTPATIENT)
Dept: OTHER | Age: 53
End: 2018-08-15

## 2018-08-17 PROBLEM — J18.9 COMMUNITY ACQUIRED PNEUMONIA OF LEFT LOWER LOBE OF LUNG: Status: RESOLVED | Noted: 2018-06-18 | Resolved: 2018-08-17

## 2018-08-17 PROBLEM — J45.40 MODERATE PERSISTENT ASTHMA WITHOUT COMPLICATION: Status: ACTIVE | Noted: 2017-12-28

## 2018-08-17 PROBLEM — I50.9 CHRONIC CONGESTIVE HEART FAILURE (HCC): Status: ACTIVE | Noted: 2018-06-26

## 2018-08-17 NOTE — PROGRESS NOTES
HPI:   Johanna Raza is a 48year old male who presents for recheck of his diabetes. Patient’s FBS have been well controlled. Feels he may have had better appetite control when taking metformin BID. Last a1c was 5.4%.    Last visit with ophthalmologist 38   06/05/2017 23   07/11/2014 23   04/07/2014 21   12/03/2013 23   ----------  ALT (U/L)   Date Value   07/11/2014 44   04/07/2014 34   12/03/2013 32   ----------  Alt (U/L)   Date Value   05/18/2018 26   12/26/2017 56   06/05/2017 33   ----------     Ma Potassium-HCTZ 100-25 MG Oral Tab Take 1 tablet by mouth daily. Disp: 90 tablet Rfl: 1   Metoprolol Succinate ER (TOPROL-XL) 200 MG Oral Tablet 24 Hr Take 200 mg by mouth 2 (two) times daily.    Disp:  Rfl: 6   Albuterol Sulfate HFA (VENTOLIN) 108 (90 BASE) Surgeon:                Andressa Valerio MD;  Location: [address] [place] [address] Drive MANAGEMENT  5/11/2012: Odalis CHEN & Ricardo Hernandez NDL/CATH SPI DX/THER EQE      Comment: Procedure: CERVICAL EPIDURAL;  Surgeon:                Maxim Kaye MD;  Location lb   SpO2 99%   BMI 36.59 kg/m²   GENERAL: well developed, well nourished,in no apparent distress  SKIN: no rashes,no suspicious lesions  NECK: supple,no adenopathy,no bruits  LUNGS: clear to auscultation  CARDIO: RRR without murmur  GI: good BS's,no adry

## 2018-08-30 LAB
BUN: 12 MG/DL
CALCIUM: 9 MG/DL
CHLORIDE: 108 MEQ/L
CREATININE, SERUM: 1.08 MG/DL
GLUCOSE: 76 MG/DL
POTASSIUM, SERUM: 3.4 MEQ/L
SODIUM: 143 MEQ/L

## 2018-09-05 NOTE — PROGRESS NOTES
FOLLOW UP NUTRITION CONSULTATION    Nutrition Assessment    Number of consultations with dietitian: 7    Height:  Ht Readings from Last 1 Encounters:  08/17/18 : 70\"      Weight:   Wt Readings from Last 2 Encounters:  09/05/18 : 258 lb  08/17/18 : 255

## 2018-10-25 PROBLEM — M50.30 DDD (DEGENERATIVE DISC DISEASE), CERVICAL: Status: RESOLVED | Noted: 2017-02-22 | Resolved: 2018-10-25

## 2018-10-26 NOTE — PROGRESS NOTES
HPI:    Patient ID: Fausto Marcano is a 48year old male. HPI  HPI:   Fausto Marcano is a 48year old male who presents for recheck of his diabetes, htn and hyperlipidemia. Patient’s FBS have been at goal. No hypoglycemia.  Last visit with ophthalmolog mouth daily.  (Patient taking differently: Take 40 mg by mouth daily.  ) Disp: 90 tablet Rfl: 0   ONETOUCH DELICA LANCETS FINE Does not apply Misc  Disp:  Rfl:    Budesonide-Formoterol Fumarate 160-4.5 MCG/ACT Inhalation Aerosol Inhale 2 puffs into the lung failure and with chronic kidney disease stage I through stage IV, or unspecified(404.00) 6/29/2012   • Migraines    • Obesity, unspecified    • Obsessive-compulsive disorders 9/24/2015   • ISAAC (obstructive sleep apnea) 3-3-14-PSG/TX-8/19/16    AHI-16, O2 n Location: [facility]   • M-SEDAJ BY  [facility]YS [id number] [facility]  N SVC 5+ YR  4/18/2012    Procedure: CERVICAL EPIDURAL;  Surgeon: Lucas Giles MD;  Location: [facility] FOR PAIN MANAGEMENT   • M-SEDAJ BY  PHYS [address] [facility] 59  N SVC 5+ YR  5/11/2012    P goal  HTN- uncontrolled interestingly his potassium has been low prior to lasix. And htn dx's in his 19's. Suspect hyperaldo. Check ration  And adolfo levels.  Cont urrent med even though bp is not controlled due to possible additionof aldactone  Recommendati mouth daily. Disp: 90 tablet Rfl: 1   Metoprolol Succinate ER (TOPROL-XL) 200 MG Oral Tablet 24 Hr Take 200 mg by mouth 2 (two) times daily.    Disp:  Rfl: 6   Albuterol Sulfate HFA (VENTOLIN) 108 (90 BASE) MCG/ACT Inhalation Aero Soln Inhale 2 puffs into t

## 2018-11-08 NOTE — TELEPHONE ENCOUNTER
----- Message from Jeremiah Chance MD sent at 11/8/2018  6:22 AM CST -----  dm2 controlled. micro protein in UA. Cont arb  hiv and syphilis screen are negative  Normal renal function  Low potassium due to diuretic vs hyperaldosterone.  Increase k dur to 40 m

## 2018-11-08 NOTE — TELEPHONE ENCOUNTER
Relayed results and recommendations to patient and he verbalized understanding. He agreed with plan. Patient currently taking K+ 20 mEq BID. Per Dr. Cuba Iglesias continue current dose: 20mEq BID.  Await Tarun results    Patient aware and agreed with plan

## 2018-11-08 NOTE — PROGRESS NOTES
Patient teaching on Bydureon performed. Pt injected in the office today with a sample pen. Patient demonstrated back, all questions were answered and patient verbalized understanding.  SIENA

## 2018-11-08 NOTE — PROGRESS NOTES
HPI:    Patient ID: Jose Elias Deluna is a 48year old male. Hypertension       Jose Elias Deluna is a 48year old male. HPI:   Patient presents for recheck of his hypertension.  Pt has been taking medications as instructed, no medication side effects, h tablet Rfl: 1   Glucose Blood (ONETOUCH VERIO) In Vitro Strip Test blood sugar once daily Disp: 100 strip Rfl: 2   furosemide 40 MG Oral Tab Take 1 tablet (40 mg total) by mouth daily.  (Patient taking differently: Take 40 mg by mouth daily.  ) Disp: 90 tab 9/24/2015   • Diabetes ([facility]    • Flatulence/gas pain/belching    • Food intolerance    • Heart palpitations    • Hemorrhoids    • High blood pressure    • High cholesterol    • Hyperlipidemia    • Kidney stone 2/27/2017   • Leg swelling    • Malignant hyp 5/11/2012    Procedure: CERVICAL EPIDURAL;  Surgeon: Devyn Paniagua MD;  Location: [address] [place] [address]   • INJECTION, W/WO CONTRAST, DX/THERAPEUTIC SUBSTANCE, EPIDURAL/SUBARACHNOID; CERVICAL/THORACIC  4/18/2012    Procedure: CERVICAL EPIDURAL; masses, HSM or tenderness  EXTREMITIES: no cyanosis, clubbing or edema    ASSESSMENT AND PLAN:   Pt presents for a recheck of his hypertension. BP is no significant medication side effects noted, needs improvement.   PLAN: will continue present medications, Rfl:    hydrALAzine HCl 25 MG Oral Tab Take 1 tablet (25 mg total) by mouth 3 (three) times daily. Disp: 90 tablet Rfl: 5   Flecainide Acetate 100 MG Oral Tab Take 100 mg by mouth 2 (two) times daily.  Disp:  Rfl:    Losartan Potassium-HCTZ 100-25 MG Oral

## 2018-11-12 PROBLEM — E26.09 BENIGN SECONDARY HYPERTENSION DUE TO PRIMARY ALDOSTERONISM (HCC): Status: ACTIVE | Noted: 2018-11-12

## 2018-11-12 PROBLEM — I15.2 BENIGN SECONDARY HYPERTENSION DUE TO PRIMARY ALDOSTERONISM (HCC): Status: ACTIVE | Noted: 2018-11-12

## 2018-11-12 PROBLEM — E26.9 HYPERALDOSTERONISM (HCC): Status: ACTIVE | Noted: 2018-11-12

## 2018-11-12 NOTE — PROGRESS NOTES
HISTORY OF PRESENT ILLNESS  Patient presents with:  Weight Check: up 15 lb      Regan Jiménez is a 48year old male here for follow up in medical weight loss program.     Denies chest pain, shortness of breath, dizziness, blurred vision, headache, parest 107 11/07/2018    AST 16 11/07/2018    ALT 26 11/07/2018    BILT 0.6 11/07/2018    TP 7.5 11/07/2018    ALB 3.9 11/07/2018    GLOBULIN 3.6 11/07/2018     (H) 11/07/2018    K 3.2 (L) 11/07/2018     11/07/2018    CO2 32.0 11/07/2018     Lab Resul HCl 25 MG Oral Tab Take 1 tablet (25 mg total) by mouth 3 (three) times daily. Disp: 90 tablet Rfl: 5   Flecainide Acetate 100 MG Oral Tab Take 100 mg by mouth 2 (two) times daily.  Disp:  Rfl:    Losartan Potassium-HCTZ 100-25 MG Oral Tab Take 1 tablet by weight loss. Reviewed the role of insulin resistance as inhibitor of weight loss. · Follow up with dietitian and psychologist as recommended. · Discussed the role of sleep and stress in weight management.   · Counseled on comprehensive weight loss plan i

## 2018-11-12 NOTE — TELEPHONE ENCOUNTER
The pt has an appointment scheduled with Dheeraj Tijerina for 12/6/2018. Rx sent to retail and lab order was entered.

## 2018-11-12 NOTE — TELEPHONE ENCOUNTER
----- Message from Law David MD sent at 11/12/2018 10:25 AM CST -----  Discussed with pt  Tarun/renin ratio points to hyperaldosterone as cause of HTN. At this time please send alactone 25 mg daily to pt's local pharmacy.  Recheck bmp in 2 weeks and foll

## 2018-11-16 NOTE — TELEPHONE ENCOUNTER
Called [facility] to see if they are able to get medication. They state they have been trying to order bydureon bcise since last week and it is still on back order. He is do to do his next injection Thursday.  They want to know if he can get a rx for regular bydu

## 2018-11-16 NOTE — TELEPHONE ENCOUNTER
Pharmacy called states the rx byderuon sent is not in stock , would like to know if they can change rx to regular bydureon

## 2018-11-19 NOTE — TELEPHONE ENCOUNTER
Called pt back and asked if he had another pharmacy to check if they had in stock. Pt states he doesn't use any other pharmacy and asked for a sample. Sample set aside for him.

## 2018-11-20 NOTE — TELEPHONE ENCOUNTER
Patient does not want to switch pharmacies and the pharmacy still can not get bydureon bcise. He wants to know if can go back to the regular Bydureon.

## 2018-11-30 NOTE — PROGRESS NOTES
FOLLOW UP NUTRITION CONSULTATION    Nutrition Assessment    Number of consultations with dietitian: 8    Height:  Ht Readings from Last 1 Encounters:  11/08/18 : 70\"      Weight:   Wt Readings from Last 2 Encounters:  11/28/18 : 263 lb 12.8 oz  11/08/1

## 2018-12-06 PROBLEM — I50.32 CHRONIC DIASTOLIC CONGESTIVE HEART FAILURE (HCC): Status: ACTIVE | Noted: 2018-06-26

## 2018-12-06 NOTE — PROGRESS NOTES
HPI:    Patient ID: Cailin Boucher is a 48year old male. Patient presents with:  Hypertension  Cough: cough, runny nose, sinus congestion, sore throat x2-3 days      Hypertension   This is a chronic problem.  The current episode started more than 1 yea nausea, sore throat, vertigo or weakness. The symptoms are aggravated by stress (work related). He has tried rest and relaxation for the symptoms. The treatment provided no relief. Review of Systems   Constitutional: Negative. Negative for fatigue. Obsessive-compulsive disorders 9/24/2015   • ISAAC (obstructive sleep apnea) 3-3-14-PSG/TX-8/19/16    AHI-16, O2 alyce-71%/CPAP-10cwp   • Other specified cardiac dysrhythmias(427.89)    • Other testicular hypofunction    • Pain in joints    • Pneumonia, orga on file    Tobacco Use      Smoking status: Former Smoker        Years: 28.00        Quit date: 2004        Years since quittin.4      Smokeless tobacco: Never Used    Substance and Sexual Activity      Alcohol use: Yes        Comment: social. He for Erectile Dysfunction. Disp: 8 tablet Rfl: 2   Vitamin D3 2000 units Oral Cap Take 2,000 Units by mouth 2 (two) times daily. Disp:  Rfl:    hydrALAzine HCl 25 MG Oral Tab Take 1 tablet (25 mg total) by mouth 3 (three) times daily.  Disp: 90 tablet Rfl: 5 51 05/18/2018    VLDL 47 (H) 05/18/2018    TCHDLRATIO 3.72 05/18/2018    NONHDLC 98 05/18/2018     Lab Results   Component Value Date     11/07/2018    A1C 5.3 11/07/2018     Lab Results   Component Value Date    T4F 1.0 08/06/2013    TSH 2.050 12/2 daily.    Cough- start taking flonase twice a day to help with PND      BMP today, follow up in 2 weeks, I will discuss next steps with Dr. Tristen Tyler regarding repeating renin level or CT abdomen            REINA Skinner

## 2018-12-11 NOTE — TELEPHONE ENCOUNTER
Per Radha MORALES CT abdomen without contrast to further assess adrenal nodule seen on CTA in June. D/w pt above testing. He is aware not to schedule until notified by our referral dept. He expressed understanding. Order placed.

## 2018-12-13 NOTE — ED INITIAL ASSESSMENT (HPI)
Pt states he has been having congestion and multiple nose bleeds daily x 1 week. Pt states each nose bleed lasts approximately 5 minutes. This morning pt began with vomiting blood x 2.  Pt is having lightheadedness and states he \"sees stars\", + abdominal

## 2018-12-13 NOTE — ED PROVIDER NOTES
Patient Seen in: [facility] Emergency Department    History   Patient presents with:  Nausea/Vomiting/Diarrhea (gastrointestinal)  Nose Bleed (nasopharyngeal)    Stated Complaint: patient was vomiting this morning, saw blood clot.  States he is on Xa dysrhythmias(427.89)    • Other testicular hypofunction    • Pain in joints    • Pneumonia, organism unspecified(486)    • Sleep disturbance    • Stress    • Suicidal thoughts 9/24/2015   • Thoracic or lumbosacral neuritis or radiculitis, unspecified     L air)       Current:BP (!) 140/100   Pulse 80   Resp 18   Ht 180.3 cm (5' 11\")   Wt 117.9 kg   SpO2 96%   BMI 36.26 kg/m²         Physical Exam    Well-developed well-nourished male who is sitting on the gurney, he is awake and alert and appears in no acut Final result                 Please view results for these tests on the individual orders.    RAINBOW DRAW BLUE   RAINBOW DRAW LAVENDER   RAINBOW DRAW LIGHT GREEN   RAINBOW DRAW GOLD                MDM   Patient remained stable during his visit here in the

## 2018-12-13 NOTE — TELEPHONE ENCOUNTER
The pt was seen in the office by Unknown Repine for cough and congestion on 12/6/2018. The pt states has been having a cough for the past week. The pt states has been having the chills and sweats, rhinorrhea, facial pressure that has worsened during this week.  Erna An

## 2018-12-20 NOTE — PROGRESS NOTES
Dedrick Claros is covered, but a prior authorization ([place]) is needed      Patient teaching on Ozempic performed. Pt injected Ozempic 0.25 mg in the office with a sample pen.    Patient demonstrated back, all questions were answered and patient verbalized [place]

## 2018-12-20 NOTE — PROGRESS NOTES
HISTORY OF PRESENT ILLNESS  Patient presents with:  Weight Check: down 2 lb      Sandra Cordoba is a 48year old male here for follow up in medical weight loss program.     Denies chest pain, shortness of breath, dizziness, blurred vision, headache, pares 1.36 (H) 12/13/2018    ANIONGAP 8 12/13/2018    GFR 80 12/26/2017    GFRNAA 59 (L) 12/13/2018    GFRAA 68 12/13/2018    CA 9.2 12/13/2018    OSMOCALC 295 12/13/2018    ALKPHO 115 12/13/2018    AST 21 12/13/2018    ALT 34 12/13/2018    BILT 0.7 12/13/2018 into the lungs 2 (two) times daily. Disp: 3 Inhaler Rfl: 1   atorvastatin 20 MG Oral Tab Take 20 mg by mouth nightly. Disp:  Rfl:    Rivaroxaban (XARELTO) 20 MG Oral Tab Take 20 mg by mouth daily with food.  Disp:  Rfl:    Sildenafil Citrate (VIAGRA) 50 MG patient. Medication contraindications: cannot use sympathomimetics, topamax due to history, significant history of mood disorder. Victoza and saxenda are not covered.    · Advised to continue to work with Jaqui Rivera  · We reviewed studies in regards to bydureon

## 2018-12-27 NOTE — PATIENT INSTRUCTIONS
Increase hydralazine to 50 mg three time a day  Increase spirolactolone 50 mg daily  Continue amolidpine 10 mg and metorpolol 200 mg twice a day  Increase lexapro 10 mg daily, notify me if headaches are worsen with increase  [facility] navigator will call

## 2018-12-27 NOTE — PROGRESS NOTES
HPI:    Patient ID: Chantal Hassan is a 48year old male. Patient presents with: Anxiety: Started Lexapro--does not feel it works for him, experiencing many headaches  HTN: patient took medications this am      Anxiety   This is a recurrent problem.  Jayy Rutledge experiencing: feelings of worthlessness, palpitations, shortness of breath, suicidal ideas, suicidal planning and thoughts of death.   Frequency of symptoms: constantly   Severity: interfering with daily activities   Aggravated by: family issues, work stres [facility])    • Flatulence/gas pain/belching    • Food intolerance    • Heart palpitations    • Hemorrhoids    • High blood pressure    • High cholesterol    • Hyperlipidemia    • Kidney stone 2/27/2017   • Leg swelling    • Malignant hypertensive heart and kid Disorder Paternal Grandfather    • Hypertension Sister    • Lipids Sister    • Hypertension Brother    • Lipids Brother    • Psychiatric Brother    • Hypertension Mother    • Lipids Mother    • Psychiatric Mother      Social History    Socioeconomic Histor total) by mouth daily. (Patient taking differently: Take 40 mg by mouth daily.  ) Disp: 90 tablet Rfl: 0   Budesonide-Formoterol Fumarate 160-4.5 MCG/ACT Inhalation Aerosol Inhale 2 puffs into the lungs 2 (two) times daily.  Disp: 3 Inhaler Rfl: 1   atorvas .0 12/13/2018    MPV 11.2 (H) 10/27/2011     Lab Results   Component Value Date    CHOLEST 134 05/18/2018    TRIG 235 (H) 05/18/2018    HDL 36 (L) 05/18/2018    LDL 51 05/18/2018    VLDL 47 (H) 05/18/2018    TCHDLRATIO 3.72 05/18/2018    [place] Diagnoses and all orders for this visit:    Essential hypertension, benign- increase spironolactone, increase hydralazine 50 mg TID, continue other medications as directed  -     spironolactone 50 MG Oral Tab; Take 1 tablet (50 mg total) by mouth daily.

## 2019-01-01 ENCOUNTER — EXTERNAL RECORD (OUTPATIENT)
Dept: HEALTH INFORMATION MANAGEMENT | Facility: OTHER | Age: 54
End: 2019-01-01

## 2019-01-01 DIAGNOSIS — F41.9 ANXIETY: ICD-10-CM

## 2019-01-02 RX ORDER — ESCITALOPRAM OXALATE 5 MG/1
TABLET ORAL
Qty: 30 TABLET | Refills: 0 | OUTPATIENT
Start: 2019-01-02

## 2019-01-03 NOTE — PATIENT INSTRUCTIONS
Anatomy of a Normal Spine  The spinal column is a stack of bones (vertebrae) that are  by soft pads of tissue (disks). Each of these bones has a canal that runs top to bottom.  Together these canals form a tunnel called the spinal canal. Running

## 2019-01-04 NOTE — PROGRESS NOTES
HPI:    Patient ID: Juan Rosas is a 48year old male. Hypertension   Associated symptoms include neck pain. Pertinent negatives include no chest pain, palpitations or shortness of breath.    Depression Patient is not experiencing: choking sensation, Outpatient Medications:  spironolactone (ALDACTONE) 100 MG Oral Tab Take 1 tablet (100 mg total) by mouth daily. Disp: 90 tablet Rfl: 1   HydrALAZINE HCl 100 MG Oral Tab Take 1 tablet (100 mg total) by mouth 3 (three) times daily.  Disp: 270 tablet Rfl: 1 (VENTOLIN) 108 (90 BASE) MCG/ACT Inhalation Aero Soln Inhale 2 puffs into the lungs every 6 (six) hours as needed for Shortness of Breath. Disp: 6.7 g Rfl: 0   AmLODIPine Besylate (NORVASC) 10 MG Oral Tab Take 10 mg by mouth daily.  Indications: High Blood CERVICAL EPIDURAL N/A 3/27/2017    Performed by Alok Hermosillo MD at [facility] N/A 3/10/2017    Performed by Alok Hermosillo MD at [address]  N/A 5/11/2012    Performed b eval of cervical stenosis of spine  Cont current med therapy for depression and asthma as these are controlled  The patient indicates understanding of these issues and agrees to the plan. The patient is asked to return in 2 weeks.     Review of Systems   C Tab Take 1 tablet (40 mg total) by mouth daily. (Patient taking differently: Take 40 mg by mouth daily.  ) Disp: 90 tablet Rfl: 0   Budesonide-Formoterol Fumarate 160-4.5 MCG/ACT Inhalation Aerosol Inhale 2 puffs into the lungs 2 (two) times daily.  Disp: 3

## 2019-01-15 ENCOUNTER — MYAURORA ACCOUNT LINK (OUTPATIENT)
Dept: OTHER | Age: 54
End: 2019-01-15

## 2019-01-15 ENCOUNTER — PRIOR ORIGINAL RECORDS (OUTPATIENT)
Dept: OTHER | Age: 54
End: 2019-01-15

## 2019-01-15 NOTE — H&P
NEUROSURGERY CLINIC VISIT    [facility]  4/17/1965      CC: Neck pain, cervical stenosis      HPI:   Juan Rosas is a 48year old right handed male with a past m Diagnosis Date   • Acute cystitis with hematuria 2/27/2017   • Anxiety disorder    • Arthritis    • Back pain    • Blood in the stool    • Brachial neuritis or radiculitis NOS     C4,C5, nerve roots   • [facility] ([facility] 10/14/14    post • CERVICAL EPIDURAL N/A 4/18/2012    Performed by Charyl Hatchet, MD at [facility], INFRARENL ABDOM AORTIC ANEURYSM/DISSECT     • SYMP AAA URGENT REPAIR  10/14/2014    [place]; ascending aorta   • TONSILLECTOMY (two) times daily. Disp:  Rfl:    Flecainide Acetate 100 MG Oral Tab Take 100 mg by mouth 2 (two) times daily. Disp:  Rfl:    Metoprolol Succinate ER (TOPROL-XL) 200 MG Oral Tablet 24 Hr Take 200 mg by mouth 2 (two) times daily.    Disp:  Rfl: 6   Albuterol prominently at C5-6 and C6-7. No clonus. Romberg mildly positive. Positive Spurling's bilaterally. Negative Bustos's/Tromner's bilaterally. Lhermitte positive.      Upper extremity strength:         Deltoid     Triceps      Biceps          Wrist Extens abnormality, spinal stenosis, or foraminal narrowing. C3-C4:  No significant disc/facet abnormality, spinal stenosis, or foraminal narrowing.   C4-C5:  Mild broad-based bulging disc with mild central canal stenosis with mild dural     effacement mild ventr bilateral    (R29.898) Subjective upper extremity weakness    (R29.818) Lhermitte's sign positive    (R29.818) Romberg's test positive    (R26.9) Poor tandem gait      1.  Medication:    - Discussed side effects with escitalopram with [facility] inpatient pharmacy time: > 60 min  More than 50% spent coordinating care, discussing medication therapy, reviewing imaging, providing patient education, discussing further imaging, discussing PT and pain management and counseling. Roman Brown M.S., CODI [place]

## 2019-01-15 NOTE — PROGRESS NOTES
Location of Pain: neck pain radiating to bilateral shoulders     Date Pain Began:  5 years - on and off           Work Related:   No        Receiving Work Comp/Disability:   No    Numeric Rating Scale:  Pain at Present:  5

## 2019-01-17 PROBLEM — Z12.11 SPECIAL SCREENING FOR MALIGNANT NEOPLASM OF COLON: Status: ACTIVE | Noted: 2019-01-17

## 2019-01-17 PROBLEM — D64.9 ANEMIA: Status: ACTIVE | Noted: 2019-01-17

## 2019-01-22 NOTE — PROGRESS NOTES
HPI:    Patient ID: Mariposa Miranda is a 48year old male. HTN--  He is being seen by a cardiologist and was start on Clonidine as needed when he feels like his BP is elevated. He took it this morning before leaving, ~1hr ago.      Vision loss--  Pt pres Psychiatric/Behavioral: Positive for sleep disturbance. Negative for self-injury and suicidal ideas. Current Outpatient Medications:  escitalopram 20 MG Oral Tab Take 1 tablet (20 mg total) by mouth daily.  Disp: 90 tablet Rfl: 1   Cyclobenzaprin Rfl: 6   Albuterol Sulfate HFA (VENTOLIN) 108 (90 BASE) MCG/ACT Inhalation Aero Soln Inhale 2 puffs into the lungs every 6 (six) hours as needed for Shortness of Breath.  Disp: 6.7 g Rfl: 0   AmLODIPine Besylate (NORVASC) 10 MG Oral Tab Take 10 mg by mouth take prescribed medications from cardiologist  -repeat labs prior to f/u in 1mo    Intractable migraine without aura and without status migrainosus  -check CT brain, vision changes  -f/u with opthalmology for full eye exam and dilated eye exam    Anxiety

## 2019-01-22 NOTE — TELEPHONE ENCOUNTER
Per [place] pharmacist quantity was adjusted and medication was covered. No need for PA.  Patient has picked up script

## 2019-01-24 NOTE — TELEPHONE ENCOUNTER
rcvd Letter of medical necessity & rx from [facility] for a postural brace/spinal 1/24/19.  Endorsed to [facility]

## 2019-01-24 NOTE — PROGRESS NOTES
CERVICAL EVALUATION:   Referring Physician: Dr. Thomas Kurtz  Diagnosis: cervical pain     Date of Service: 1/24/2019     PATIENT Jessica Layne is a 48year old y/o male who presents to therapy today with complaints of neck pain.   Pt with new diagnos colon      ASSESSMENT  Pt presents with limited cervical AROM with greatest limitation in extension, SB and right rotation. Pt with limited AP glides in C4-7 and upper thoracic spine.   Pt with max STRs and tightness in pectoral ms, scalenes, SCM, UT, LS, Treatment: 45 min     Total Treatment Time: 60 min     PLAN OF CARE:    Goals:    · Pt will improve cervical AROM SB to 45 degrees to improve tolerance for looking down to tie shoes (12 visits)  · Pt will improve cervical AROM extension  by mod limitation From:1/24/2019 To 4/24/2019

## 2019-01-27 NOTE — PROGRESS NOTES
HISTORY OF PRESENT ILLNESS  Patient presents with:  Weight Check: down 8 lb      Laura Him is a 48year old male here for follow up in medical weight loss program.     Denies chest pain, shortness of breath, dizziness, blurred vision, headache, pares 12/13/2018     12/27/2018    K 3.4 (L) 12/27/2018     12/27/2018    CO2 30.0 12/27/2018     Lab Results   Component Value Date     11/07/2018    A1C 5.3 11/07/2018     Lab Results   Component Value Date    CHOLEST 134 05/18/2018    TRIG furosemide 40 MG Oral Tab Take 1 tablet (40 mg total) by mouth daily.  (Patient taking differently: Take 40 mg by mouth daily.  ) Disp: 90 tablet Rfl: 0   Budesonide-Formoterol Fumarate 160-4.5 MCG/ACT Inhalation Aerosol Inhale 2 puffs into the lungs 2 (t for stress and work related stress  · Follow up with dietitian and psychologist as recommended. · Discussed the role of sleep and stress in weight management.   · Counseled on comprehensive weight loss plan including attention to nutrition, exercise and be

## 2019-01-28 ENCOUNTER — PRIOR ORIGINAL RECORDS (OUTPATIENT)
Dept: OTHER | Age: 54
End: 2019-01-28

## 2019-01-28 NOTE — TELEPHONE ENCOUNTER
Patient left a voicemail saying his pulse rate has been high for a few hours - between 150-160 - and he wants to speak with a nurse about possibly changing up his medication. Please c/b on cell #.

## 2019-01-28 NOTE — TELEPHONE ENCOUNTER
Spoke with pt last night he had dinner with his family and had 3 glasses of wine. His noticed that is blood pressure was elevated and his pulse was 50. This morning he woke up and his chest did not feel right.   He took his blood pressure and it was 150/1

## 2019-01-31 NOTE — PROGRESS NOTES
Dx: Cervical pain          Authorized # of Visits:  12         Next MD visit: none scheduled  Fall Risk: NA        Precautions: A fib             Subjective:   Pt reports that he was feeling good on Friday and sat with good overall relief but Sunday starte Charges: manual:4       Total Timed Treatment: 60 min  Total Treatment Time: 60 min

## 2019-02-07 NOTE — PROGRESS NOTES
Dx: Cervical pain          Authorized # of Visits:  12         Next MD visit: none scheduled  Fall Risk: NA        Precautions: A fib             Subjective:   Pt reports that he had great relief following last tx session until yesterday and then tension s Date:2/7/19               TX#: 3/12   Date:               TX#: 4/ Date:               TX#: 5/ Date:               TX#: 6/ Date:               TX#: 7/ Date:               TX#: 8/   Manual: STM, deep tissue massage, trigger point release, suboccipital releas

## 2019-02-11 NOTE — TELEPHONE ENCOUNTER
Medication: Gabapentin 100 Mg     Date of last refill: 1/15/19  60 Tabs     [place]     Last office visit: 1/15/19     Due back to clinic per last office note: 3 Months     Date next office visit scheduled: 4/4/19 management prior. 6. Follow up in 3 months with Dr. Norah Ritchie for possible surgical discussion, or call, come sooner or go to the ED for any new, worsening or concerning signs/symptoms.     I reviewed imaging.  I discussed the plan and reviewed imaging with kaitlin

## 2019-02-13 NOTE — PROGRESS NOTES
Dx: Cervical pain          Authorized # of Visits:  12         Next MD visit: none scheduled  Fall Risk: NA        Precautions: A fib             Subjective:   Pt reports that with the ice and rain he has had more soreness in hips and low back as well as m TX#: 5/ Date:               TX#: 6/ Date:               TX#: 7/ Date:               TX#: 8/   Manual: STM, deep tissue massage, trigger point release, suboccipital release and shoulder stretching.   Manual: STM, deep tissue massage, trigger point relea

## 2019-02-14 NOTE — PROGRESS NOTES
Dx: Cervical pain          Authorized # of Visits:  12         Next MD visit: none scheduled  Fall Risk: NA        Precautions: A fib             Subjective:   Pt reports that he felt great following last tx session and all through work but woke up with mi release  Date: 1/31/2019   TX#: 2/12 Date:2/7/19               TX#: 3/12   Date: 2/12/19        TX#: 412/ Date: 2/14/19          TX#: 5/12 Date:               TX#: 6/ Date:               TX#: 7/ Date:               TX#: 8/   Manual: STM, deep tissue massag

## 2019-02-18 NOTE — PROGRESS NOTES
Wellness Exam    CC: Patient is presenting for a wellness exam    HPI:   Current Complaints: Overall feels ok. Migraines are affecting him daily. Blood pressure is improved with current treatment. FBS stable, range . Checks feet daily.  Wears CPAP nig AHI-16, O2 alyce-71%/CPAP-10cwp   • Other specified cardiac dysrhythmias(427.89)    • Other testicular hypofunction    • Pain in joints    • Pneumonia, organism unspecified(486)    • Sleep disturbance    • Stress    • Suicidal thoughts 9/24/2015   • Thorac HCl 0.1 MG Oral Tab  Disp:  Rfl:    escitalopram 20 MG Oral Tab Take 1 tablet (20 mg total) by mouth daily. Disp: 90 tablet Rfl: 1   Cyclobenzaprine HCl 10 MG Oral Tab Take 0.5 tablets (5 mg total) by mouth nightly.  Disp: 30 tablet Rfl: 0   spironolactone In Vitro Strip Test blood sugar once daily Disp: 100 strip Rfl: 2   Vitamin D3 2000 units Oral Cap Take 2,000 Units by mouth 2 (two) times daily. Disp:  Rfl:    Flecainide Acetate 100 MG Oral Tab Take 100 mg by mouth 2 (two) times daily.  Disp:  Rfl:      N heard.  Pulmonary/Chest: Effort normal and breath sounds normal b/l. He has no wheezes or rales. Abdominal: Soft. Bowel sounds are normal. There is no tenderness. No HSM. Abdominal aorta normal in size, no hernia appreciated.   Musculoskeletal: Normal ra

## 2019-02-19 NOTE — TELEPHONE ENCOUNTER
Requesting Ozempic  LOV: 1/24/19  RTC: 8 weeks  Last Relevant Labs: 2/15/19  Filled: 1/24/19 #2 pens with 1 refills      3/21/2019  1:00 PM Sean JORGENSEN EDCHRISTIE ROMEOVIL   3/28/2019 11:40 AM Calleen Denver, MD [facility]     Denied refill

## 2019-02-21 NOTE — PROGRESS NOTES
Dx: Cervical pain          Authorized # of Visits:  12         Next MD visit: none scheduled  Fall Risk: NA        Precautions: A fib             Subjective:   Pt reports that the spinal Q caused some skin irritation along LS, UT secondary to wearing again foam rolling and lacrosse ball trigger point release  Date: 1/31/2019   TX#: 2/12 Date:2/7/19               TX#: 3/12   Date: 2/12/19        TX#: 412/ Date: 2/14/19          TX#: 5/12 Date: 2/21/19          TX#: 6/12 Date:               TX#: 7/ Date:

## 2019-02-27 NOTE — PROGRESS NOTES
Dx: Cervical pain          Authorized # of Visits:  12         Next MD visit: none scheduled  Fall Risk: NA        Precautions: A fib             Subjective:   Pt reports that he is sore and tight across thoracic region, B hips and lower lumbar region, sub progressing  · Pt will report decreased frequency of headaches to <1-2x/week (12 visits) progressing  · Pt will be independent and compliant with comprehensive HEP to maintain progress achieved in PT (12 visits)    Plan:   Pt to be able to cont with foam r

## 2019-02-28 VITALS
SYSTOLIC BLOOD PRESSURE: 152 MMHG | BODY MASS INDEX: 35.56 KG/M2 | WEIGHT: 254 LBS | HEIGHT: 71 IN | HEART RATE: 100 BPM | DIASTOLIC BLOOD PRESSURE: 74 MMHG

## 2019-02-28 VITALS
HEIGHT: 71 IN | BODY MASS INDEX: 34.72 KG/M2 | SYSTOLIC BLOOD PRESSURE: 110 MMHG | WEIGHT: 248 LBS | DIASTOLIC BLOOD PRESSURE: 60 MMHG | HEART RATE: 63 BPM

## 2019-02-28 VITALS
WEIGHT: 246 LBS | BODY MASS INDEX: 34.44 KG/M2 | HEIGHT: 71 IN | HEART RATE: 73 BPM | DIASTOLIC BLOOD PRESSURE: 82 MMHG | SYSTOLIC BLOOD PRESSURE: 120 MMHG

## 2019-02-28 VITALS
SYSTOLIC BLOOD PRESSURE: 115 MMHG | HEIGHT: 71 IN | WEIGHT: 252 LBS | HEART RATE: 74 BPM | DIASTOLIC BLOOD PRESSURE: 70 MMHG | BODY MASS INDEX: 35.28 KG/M2

## 2019-02-28 VITALS
BODY MASS INDEX: 35.14 KG/M2 | WEIGHT: 251 LBS | HEIGHT: 71 IN | SYSTOLIC BLOOD PRESSURE: 144 MMHG | HEART RATE: 68 BPM | DIASTOLIC BLOOD PRESSURE: 88 MMHG

## 2019-02-28 NOTE — PROGRESS NOTES
Dx: Cervical pain          Authorized # of Visits:  12         Next MD visit: none scheduled  Fall Risk: NA        Precautions: A fib             Subjective:   Pt reports migraine HA yesterday secondary to having an eye doctor apt and having to use pen lig transition to more stretching  Date: 1/31/2019   TX#: 2/12 Date:2/7/19               TX#: 3/12   Date: 2/12/19        TX#: 835/ Date: 2/14/19          TX#: 5/12 Date: 2/21/19          TX#: 6/12 Date:2/26/19               TX#: 7/12 Date:2/28/19

## 2019-03-01 VITALS
SYSTOLIC BLOOD PRESSURE: 116 MMHG | HEIGHT: 71 IN | HEART RATE: 75 BPM | BODY MASS INDEX: 34.72 KG/M2 | WEIGHT: 248 LBS | DIASTOLIC BLOOD PRESSURE: 64 MMHG

## 2019-03-01 VITALS
WEIGHT: 253 LBS | HEIGHT: 71 IN | DIASTOLIC BLOOD PRESSURE: 90 MMHG | HEART RATE: 72 BPM | BODY MASS INDEX: 35.42 KG/M2 | SYSTOLIC BLOOD PRESSURE: 150 MMHG

## 2019-03-05 NOTE — PROGRESS NOTES
Dx: Cervical pain          Authorized # of Visits:  12         Next MD visit: none scheduled  Fall Risk: NA        Precautions: A fib             Subjective:   Pt reports migraine HA that started Sunday night and into Monday and continuing today.   Pt repor Date:2/26/19               TX#: 7/12 Date:2/28/19               TX#: 8/12 3/5/19  9/12   Manual: STM, deep tissue massage, trigger point release, suboccipital release and shoulder stretching.   Manual: STM, deep tissue massage, trigger point release, subocc

## 2019-03-07 NOTE — PROGRESS NOTES
Dx: Cervical pain          Authorized # of Visits:  12         Next MD visit: none scheduled  Fall Risk: NA        Precautions: A fib             Subjective:   Pt reports that he saw neurosurgeon this am and is recommending epidural injections mainly on L upright posturing and decreased pain with reaching OH (12 visits) progressing  · Pt will report decreased frequency of headaches to <1-2x/week (12 visits) progressing  · Pt will be independent and compliant with comprehensive HEP to maintain progress achie

## 2019-03-07 NOTE — H&P
Name: Fran Wilson   : 1965   DOS: 3/7/2019     Chief complaint: Cervical radiculopathy    History of present illness:  Fran Wilson is a 48year old right-handed male with a past medical history of diabetes, hyperaldosteronism, asthma hypert cardiac dysrhythmias(427.89)    • Other testicular hypofunction    • Pain in joints    • Pneumonia, organism unspecified(486)    • Sleep disturbance    • Stress    • Suicidal thoughts 9/24/2015   • Thoracic or lumbosacral neuritis or radiculitis, unspecifi 1 TABLET DAILY Disp: 90 tablet Rfl: 1   Glucose Blood (ONETOUCH VERIO) In Vitro Strip Test blood sugar once daily Disp: 100 strip Rfl: 2   furosemide 40 MG Oral Tab Take 1 tablet (40 mg total) by mouth daily.  (Patient taking differently: Take 40 mg by mout aorta   • TONSILLECTOMY        Family History   Problem Relation Age of Onset   • Hypertension Father    • Lipids Father    • Other (Other[other]) Paternal Grandfather    • Heart Disorder Paternal Grandfather    • Hypertension Sister    • Lipids Sister Extension:   5    5   Knee Flexion:    5    5   Ant.  Tibialis:    5    5  Extensor Hallucis Longus:   5    5  Peroneals:     5    5  Gastrocsoleus:     5    5      Radiology diagnostic studies:       Cervical MRI dated February 4, 2019 reviewed independent

## 2019-03-07 NOTE — TELEPHONE ENCOUNTER
Started PA thru [address] [place] [id number]  Pending reference number is [id number]

## 2019-03-07 NOTE — TELEPHONE ENCOUNTER
Medical clearance needed- yes- Xarelto / Dr. Amber Dangelo    Pt seen in [address] today by Dr. Ameya Lucas and recommended for CESIs (X 3). Please begin PA process for procedure(s).      Laterality: n/a  Level(s): n/a    Pt informed callback will be given when PA feedback receiv

## 2019-03-07 NOTE — TELEPHONE ENCOUNTER
Medical clearance requested from Dr. Anthony Blandon for patient to hold xarelto for 3 days for TBD procedures. Awaiting response.

## 2019-03-08 NOTE — TELEPHONE ENCOUNTER
Spoke to Luann mcdaniel at Dr Malia Vann office,Medical Clearance Letter for upcoming Dr Kevin Lam procedures has been received, waiting for Dr Lucas Castro to respond

## 2019-03-11 NOTE — TELEPHONE ENCOUNTER
Medication: Gabapentin 100 Mg and Cyclobenzaprine 10 Mg     Date of last refill: Gabapentin 2/11/19 60 Caps and Cyclobenzaprine 1/15/19 30 Tabs     [place]     Last office visit: 1/15/19     Due back to clinic per las will exhaust all conservative options including PT and pain management prior.   6. Follow up in 3 months with Dr. Peri Green for possible surgical discussion, or call, come sooner or go to the ED for any new, worsening or concerning signs/symptoms.     I reviewe

## 2019-03-12 NOTE — PROGRESS NOTES
Dx: Cervical pain          Authorized # of Visits:  12         Next MD visit: none scheduled  Fall Risk: NA        Precautions: A fib             Subjective:   Pt reports he has been feeling good lately with use of hot pack at home, spinal Q brace and cerv TX#: 7/12 Date:2/28/19               TX#: 8/12 3/5/19  9/12 3/7/19  10/12 3/12/19  11/12   Manual: STM, deep tissue massage, trigger point release, suboccipital release and shoulder stretching.   Manual: STM, deep tissue, trigger point, subocc

## 2019-03-13 NOTE — TELEPHONE ENCOUNTER
Spoke to Luann mcdaniel at Dr Vance Zhao office in re to the Medical Clearance Letter for upcoming Dr Scott Ward procedures,Sandy states Dr oJrdyn Frias has received the letter but has not responded yet

## 2019-03-19 NOTE — TELEPHONE ENCOUNTER
Spoke to Luann carty at Dr Joo Torres office in re to the Medical Clearance Letter for upcoming Dr Kofi Chun procedures, Luann mcdaniel states she will try to get the response and fax it over

## 2019-03-19 NOTE — PROGRESS NOTES
Progress Summary  Pt has attended 12 visits in Physical Therapy.      Dx: Cervical pain          Authorized # of Visits:  12         Next MD visit: none scheduled  Fall Risk: NA        Precautions: A fib             Subjective:   Pt reports that he still mccarty with comprehensive HEP to maintain progress achieved in PT (12 visits).  Met and cont    NEW GOALS:  Pt to be able to perform cervical extension with min limitations to be able to reach [facility] to perform work duties  Pt to be able to perform lift and carry appr release to UT, LS, SCM, lumbar paraspinals, perisacral region, glute med,Passive stretching to neck and shoulders.   Manual: suboccipital release, supra/infraorbital pressure release, temoral massage, C7 mobs grade 2-3, thoracic STM and prone L IR stretch w

## 2019-03-20 ENCOUNTER — TELEPHONE (OUTPATIENT)
Dept: CARDIOLOGY | Age: 54
End: 2019-03-20

## 2019-03-21 ENCOUNTER — TELEPHONE (OUTPATIENT)
Dept: CARDIOLOGY | Age: 54
End: 2019-03-21

## 2019-03-25 NOTE — TELEPHONE ENCOUNTER
Response received to Medical Clearance Letter from Dr Yumiko Mccann for upcoming Dr Zoya Martinez procedures,endorsed to Pain Nurse Medical Clearance folder in [phone]

## 2019-03-26 NOTE — PROGRESS NOTES
Dx: Cervical pain          Authorized # of Visits:  25         Next MD visit:cervical injection 4/3  Fall Risk: NA        Precautions: A fib             Subjective:   Pt reports overall improvement in HA and pain as well as tension with use of heat.       O Manual: suboccipital release, supra/infraorbital pressure release, temoral massage, C7 mobs grade 2-3, thoracic STM and prone L IR stretch with post delt massage.   Manual:PT performing manual therapy with passive cervical stretching, lateral glides along C

## 2019-03-26 NOTE — TELEPHONE ENCOUNTER
Patient called and scheduled for:    Appointment Date:  4/3/19  Procedure Time:  1704 am  Check-in Time:  945 am    Xarelto approved to be held only 48 hours. Pre-procedure instructions including medication holds reviewed with patient.   Patient Montana Cruz

## 2019-03-26 NOTE — TELEPHONE ENCOUNTER
Spoke to [employer] at [employer]. Authorization #[id number] updated for 4/3/19 DOS. Time on call: 7:16.

## 2019-03-26 NOTE — TELEPHONE ENCOUNTER
Received letter from Dr. Bar Smith. It states that patient may hold Xarelto for 48 hours only. Please advise if ok to proceed.

## 2019-03-28 NOTE — PROGRESS NOTES
Preventative Counseling for Diet and Exercise     /70   Pulse 88   Resp 16   Ht 70\"   Wt 244 lb   BMI 35.01 kg/m²   Body mass index is 35.01 kg/m².     Wt Readings from Last 6 Encounters:  03/28/19 : 244 lb  03/07/19 : 250 lb  02/15/19 : 248 lb  01/2

## 2019-03-28 NOTE — PROGRESS NOTES
HISTORY OF PRESENT ILLNESS  Patient presents with:  Weight Check: down 3 lb      Buster Guzmán is a 48year old male here for follow up in medical weight loss program.     Denies chest pain, shortness of breath, dizziness, blurred vision, headache, pares 0.6 02/15/2019    TP 7.3 02/15/2019    ALB 4.2 02/15/2019    GLOBULIN 3.1 02/15/2019     (H) 02/15/2019    K 4.0 02/15/2019     (H) 02/15/2019    CO2 31.0 02/15/2019     Lab Results   Component Value Date    EAG 97 02/15/2019    A1C 5.0 02/15/2 once daily Disp: 100 strip Rfl: 2   furosemide 40 MG Oral Tab Take 1 tablet (40 mg total) by mouth daily.  (Patient taking differently: Take 40 mg by mouth daily.  ) Disp: 90 tablet Rfl: 0   Budesonide-Formoterol Fumarate 160-4.5 MCG/ACT Inhalation Aerosol · Nutrition: low carb diet/ recommended to eat breakfast daily/ regular protein intake  · Continue ozempic, increase to 1.0 mg as tolerated  · Continue metformin  · Medication use and side effects reviewed with patient.   Medication contraindications: can

## 2019-03-28 NOTE — PROGRESS NOTES
Dx: Cervical pain          Authorized # of Visits:  25         Next MD visit:cervical injection 4/3  Fall Risk: NA        Precautions: A fib             Subjective:   Pt reports that he is having more pectoral pain in R UE.   Pt cont to start therapy with u Pt to be able to transition to more stretching  Date: 1/31/2019   TX#: 2/12 3/7/19  10/12 3/12/19  11/12 3/19/19  12/12 3/26/19  13/24 3/28/19  14/24   Manual: STM, deep tissue massage, trigger point release, suboccipital release and shoulder stretching.

## 2019-03-29 NOTE — TELEPHONE ENCOUNTER
Left message for patient to call back to confirm procedure date of 4/3/19 with Dr Bucky Chris and to be checked in at outpatient registration at 9:45 am. Patient instructed to call pre-procedure line before procedure at [phone].  Patient instructed to call of

## 2019-04-02 NOTE — TELEPHONE ENCOUNTER
Requesting Ozempic  LOV: 3/28/19  RTC: 3 months  Last Relevant Labs: 2/15/19  Filled: 3/28/19 #2 pens with 1 refills    6/27/2019 11:40 AM Helena Scanlon MD [facility]     Refill denied because pharmacy requesting received the refill.

## 2019-04-03 NOTE — OPERATIVE REPORT
[facility]  Operative Report  4/3/2019     [facility] Patient Status:  Hospital Outpatient Surgery    1965 MRN [id number]   [facility] ENDOSCOPY Attending Maryan Lacy MD   Hosp Day # 0 PCP Mj Kitchen MD     Indication:  To recovered and was discharged to a responsible adult after discharge criteria were met. Complications: None. Follow up: The patient was followed in the pain clinic as needed basis.           Cindy Robbins MD

## 2019-04-03 NOTE — H&P
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  Disp: 180 tablet Rfl: 3 Taking   Glucose Blood (ONETOUCH VERIO) In Vitro Strip Test blood sugar once daily Disp: 100 strip Rfl: 2 Taking   furosemide 40 MG Oral Tab Take 1 tablet (40 mg tota Acute cystitis with hematuria 2/27/2017   • Anxiety disorder    • Arthritis    • Back pain    • Blood in the stool    • Brachial neuritis or radiculitis NOS     C4,C5, nerve roots   • Chronic atrial fibrillation ([facility] [address] 10/14/14    post op from [facility]   • Commu N/A 4/18/2012    Performed by Helane Closs, MD at [facility], INFRARENL ABDOM AORTIC ANEURYSM/DISSECT     • SYMP AAA URGENT REPAIR  10/14/2014    [place]; ascending aorta   • TONSILLECTOMY       Family History   Pr

## 2019-04-04 NOTE — PROGRESS NOTES
Neurosurgery Cervical  Follow up      REASON FOR VISIT: Follow up after pain management      HISTORY OF PRESENT ILLNESS:Rivera Druan is a 48year old male s/p SUELLEN with Dr. Zuly Padilla on 4/5/19.  Having residual burning to left upper shoulder and tricep region 2016/2017 states they provided relief for about 4-6 months. Numbness and tingling to the legs. Heaviness to the legs when climbing stairs.      Rates the pain a 5/10. Physical activity aggravates the pain. Heat and arching his back alleviate mildly.  Unable stenosis. C3-C4:  Posterior disc osteophyte complex without spinal canal or neural foraminal stenosis.   C4-C5:  Posterior disc osteophyte complex with mild right facet hypertrophic change and moderate to severe bilateral neural foraminal stenosis and mild reversal of the cervical lordosis in the neutral position. There is decreased range of motion in flexion extension. Especially the lower cervical spine. There is mild C5-6 uncinate arthropathy. No   fracture.   DISC SPACES:  There are anterior endplate providing patient education, discussing pain management, discussing ortho referral and counseling. Roman Thomas M.S., PA-C  [address], [address] [facility] Bo Rodgers, [address] [facility]  [phone]  4/4/2019 11:09 AM

## 2019-04-04 NOTE — PROGRESS NOTES
Dx: Cervical pain          Authorized # of Visits:  25         Next MD visit:cervical injection 4/3  Fall Risk: NA        Precautions: A fib             Subjective:   Pt reports that he had cervical injections in neck C7-T1 with improved mobility.   Pt had comprehensive HEP to maintain progress achieved in PT (12 visits).  Met and cont    NEW GOALS:  Pt to be able to perform cervical extension with min limitations to be able to reach [facility] to perform work duties  Pt to be able to perform lift and carry approx 50 min

## 2019-04-11 NOTE — PROGRESS NOTES
Dx: Cervical pain          Authorized # of Visits:  25         Next MD visit:cervical injection 4/3  Fall Risk: NA        Precautions: A fib             Subjective:   Pt reports that he had a migraine on Monday and today and feels that the lack of sleep ma 3/28/19  14/24 4/4/19  15/24 4/11/19  16/24   Manual: STM, deep tissue massage, trigger point release, suboccipital release and shoulder stretching.   Manual: supraorbital pressure release and sinus release, suboccipital release, UT stretching, thoracic PA

## 2019-04-12 RX ORDER — ATORVASTATIN CALCIUM 20 MG/1
TABLET, FILM COATED ORAL
COMMUNITY
Start: 2012-12-18 | End: 2020-01-21 | Stop reason: SDUPTHER

## 2019-04-12 RX ORDER — CETIRIZINE HYDROCHLORIDE 10 MG/1
TABLET ORAL
COMMUNITY

## 2019-04-12 RX ORDER — METOPROLOL SUCCINATE 200 MG/1
TABLET, EXTENDED RELEASE ORAL
COMMUNITY
Start: 2018-06-29 | End: 2019-06-13 | Stop reason: SDUPTHER

## 2019-04-12 RX ORDER — SPIRONOLACTONE 100 MG/1
TABLET, FILM COATED ORAL
COMMUNITY
Start: 2019-01-15

## 2019-04-12 RX ORDER — HYDRALAZINE HYDROCHLORIDE 100 MG/1
TABLET, FILM COATED ORAL
COMMUNITY
Start: 2019-01-15 | End: 2020-05-26

## 2019-04-12 RX ORDER — POTASSIUM CHLORIDE 1500 MG/1
TABLET, EXTENDED RELEASE ORAL
COMMUNITY
Start: 2018-08-15

## 2019-04-12 RX ORDER — FUROSEMIDE 20 MG/1
TABLET ORAL
COMMUNITY
Start: 2018-07-23 | End: 2019-05-25 | Stop reason: SDUPTHER

## 2019-04-12 RX ORDER — FLUTICASONE PROPIONATE 50 MCG
SPRAY, SUSPENSION (ML) NASAL
COMMUNITY
Start: 2014-07-21

## 2019-04-12 RX ORDER — ESCITALOPRAM OXALATE 10 MG/1
20 TABLET ORAL
COMMUNITY
Start: 2019-01-15

## 2019-04-12 RX ORDER — AMLODIPINE BESYLATE 10 MG/1
TABLET ORAL
COMMUNITY
Start: 2018-06-29 | End: 2019-05-25 | Stop reason: SDUPTHER

## 2019-04-12 RX ORDER — ATORVASTATIN CALCIUM 20 MG/1
TABLET, FILM COATED ORAL
COMMUNITY
Start: 2018-06-29

## 2019-04-12 RX ORDER — ERGOCALCIFEROL 1.25 MG/1
CAPSULE ORAL
COMMUNITY
Start: 2016-04-22 | End: 2021-01-26

## 2019-04-12 RX ORDER — LOSARTAN POTASSIUM 100 MG/1
TABLET ORAL
COMMUNITY
Start: 2019-01-15

## 2019-04-12 RX ORDER — FLECAINIDE ACETATE 100 MG/1
TABLET ORAL
COMMUNITY
Start: 2018-12-21

## 2019-04-18 NOTE — TELEPHONE ENCOUNTER
Prior authorization request completed for: [address] [place] [address] Road #[id number]  Authorization dates: 5/6/19  CPT codes approved: 97956  Number of visits/dates of service approved: 1  Physician: Criss Jnekins    *authorization is date specific, please notify PA tea

## 2019-04-18 NOTE — PROGRESS NOTES
Discharge Summary    Pt has attended 17, cancelled 0, and no shown 0 visits in Physical Therapy. Subjective:   Pt reports that he is battling a flare up with kidney stones today.   Pt reports that he started to get a migraine yesterday and cont with mi actively participate in planning and for this course of care. Thank you for your referral. Please co-sign or sign and return this letter via fax as soon as possible to [phone]. If you have any questions, please contact me at Dept: [phone]. Hot pack to thoracic spine     Charges:manual:3     Total Timed Treatment: 45 min  Total Treatment Time: 45 min

## 2019-04-18 NOTE — TELEPHONE ENCOUNTER
Patient called and scheduled procedure. Instructions below reviewed including xarelto hold for 48 hours only per medical clearance. Patient verbalized understanding.     [address]  PRE-PROCEDURE INSTRUCTIONS WITHOUT SEDATION    Appointment  Good nutrition is important when healing from an illness, injury, or surgery. Follow any nutrition recommendations given to you during your hospital stay.  If you were given an oral nutrition supplement while in the hospital, continue to take this supplement at home. You can take it with meals, in-between meals, and/or before bedtime. These supplements can be purchased at most local grocery stores, pharmacies, and chain super-stores.  If you have any questions about your diet or nutrition, call the hospital and ask for the dietitian.       Renal diet low in potassium ? Lovenox (Enoxaparin) 24 hours  ? Aspirin  ? 81mg 24 hours  ? Greater than 81 mg (325mg) 7 days  ? Coumadin       5 days   · Procedure may be cancelled if INR is elevated. ? Excedrin (with aspirin) 7 days  ? Plavix (Clopidogrel)  ?  Epidural ____ - 10 day ** TO AVOID CANCELLATION AND/OR RESCHEDULING: PLEASE CALL [facility] PRE-PROCEDURE LINE -[phone] FOR DETAILED INSTRUCTIONS FIVE TO SEVEN DAYS PRIOR TO PROCEDURE**      Epidural Injections  What is an epidural injection?   An epidural injection is an injecti No, this procedure is done with a local anesthetic. Some patients choose to receive intravenous sedation that can make the procedure easier to tolerate.  This type of sedation may cause amnesia and patients may not remember parts or all of the actual proced Most patients do not receive more than three injections in a six-month period. This is because the effects of the medication injected frequently last for six months or more.  If three injections have not helped you very much, you may be a candidate for a di

## 2019-04-18 NOTE — TELEPHONE ENCOUNTER
Spoke with Belia Weiss at [facility]  Reference #:[id number]   Time:12:27    Upated DOS to 4/24/19

## 2019-04-19 NOTE — TELEPHONE ENCOUNTER
Neena Dubin from [address] [place] [address] called to say the patient was there for an appt this morning and his blood pressure was high. She said at 8:05am it was 197/121, then the patient took his BP med dose at 8:10am; she re-checked at 8:38am and it was 192/117.  Patient ha

## 2019-04-19 NOTE — TELEPHONE ENCOUNTER
Spoke to patient, confirmed procedure date of 4/24/19 and to be checked in at outpatient registration at 1:30 pm. Patient called pre-procedure line and understood instructions. Patient instructed to call office if there are additional questions .

## 2019-04-19 NOTE — TELEPHONE ENCOUNTER
Pt is currently home now and does not have a vehicle to get him back to office today. He typically takes his medication at 9am and 9pm.      His current blood pressure is 163/104 pulse 94.     Per Bucky MORALES continue current medication regimen and continue t

## 2019-04-23 NOTE — PROGRESS NOTES
HPI:    Patient ID: Juan Rosas is a 47year old male. Flank Pain    Pain location: left flank. This is a recurrent problem. The current episode started in the past 7 days. There has been no history of extremity trauma.  The problem occurs intermitte difficulty urinating, dysuria, frequency, hematuria and urgency. Musculoskeletal: Positive for neck pain (chronic, improved with neck injections and PT). Negative for back pain, myalgias and stiffness. Skin: Negative. Negative for itching.    Neurologi LANCETS FINE Does not apply Misc Test blood sugar once daily asdirected Disp: 100 each Rfl: 2   Potassium Chloride ER 20 MEQ Oral Tab CR Take 1 tablet (20 mEq total) by mouth 2 (two) times daily.  Disp: 180 tablet Rfl: 3   Glucose Blood (ONETOUCH VERIO) In 254 lb   BMI 35.43 kg/m²         ASSESSMENT/PLAN:   Moderate persistent asthma without complication    -well controlled, continue current inhalers   History of nephrolithiasis  Left flank pain  -UA/UC ordered, check CT abd/pelvis kidney stone protocol.  Patti Gilmore

## 2019-04-23 NOTE — PATIENT INSTRUCTIONS
Continue current medications  Take 1000mg tylenol as needed for pain; follow up with Dr. Emiliana Mahoney as planned tomorrow  Schedule CT scan after your insurance has approved the order.  Call immediately if any new or worsening symptoms  Watch sodium intake and cont

## 2019-04-24 NOTE — H&P
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  Disp: 180 tablet Rfl: 3 Taking   Gl pain    • Blood in the stool    • Blurred vision    • Brachial neuritis or radiculitis NOS     C4,C5, nerve roots   • Calculus of kidney     Last year do to medication i was taking   • Chronic atrial fibrillation ([address] 10/14/14    post op from [facility]   • Comm [facility]   • CERVICAL EPIDURAL N/A 3/10/2017    Performed by Marija Mckee MD at [address] N/A 5/11/2012    Performed by Teo Vargas MD at [address] [facility] [place] EP

## 2019-04-24 NOTE — OPERATIVE REPORT
[facility]  Operative Report  2019     [facility] Patient Status:  Hospital Outpatient Surgery    1965 MRN [id number]   [facility] ENDOSCOPY Attending Shan Mejia MD   Hosp Day # 0 PCP Josué Amanda MD     Indication: T recovered and was discharged to a responsible adult after discharge criteria were met. Complications: None. Follow up: The patient was followed in the pain clinic as needed basis.           Lina Jarquin MD

## 2019-05-07 NOTE — PROGRESS NOTES
Sandra Cordoba is a 47year old male. HPI:   Patient presents for recheck of his hypertension. Pt has been taking medications as instructed, no medication side effects, home BP monitoring in the range of 183-708'S systolic and 'Q diastolic.     Marcos Riddle mg into the skin once a week. Disp: 2 pen Rfl: 1   GABAPENTIN 100 MG Oral Cap TAKE 1 CAPSULE BY MOUTH TWICE DAILY Disp: 60 capsule Rfl: 0   Sildenafil Citrate 50 MG Oral Tab Take 1 tablet (50 mg total) by mouth daily as needed for Erectile Dysfunction.  Dis neuritis or radiculitis NOS     C4,C5, nerve roots   • Calculus of kidney     Last year do to medication i was taking   • Chronic atrial fibrillation ([address] 10/14/14    post op from A[facility]   • Community acquired pneumonia of left lower lobe of lung ([address] 6/18/ 3/10/2017    Performed by Yovany Hernández MD at [address] N/A 5/11/2012    Performed by Roberto Patel MD at [address] N/A 4/18/2012    Performed by Samantha Lindsey MD side effects with med change. The patient indicates understanding of these issues and agrees to the plan. The patient is asked to return in 2w.

## 2019-05-07 NOTE — PATIENT INSTRUCTIONS
Increase spironolactone to 150mg (1.5 tablets) daily  Decrease losartan to 50mg (0.5 tablets) daily  Monitor blood pressure twice a day and record  Complete blood test before your next appointment; fast 4 hours ahead  Call if any adverse reaction to the me

## 2019-05-09 NOTE — TELEPHONE ENCOUNTER
Started PA for last injection online [facility]   Uploaded clinical notes   Case pending [id number]

## 2019-05-15 NOTE — TELEPHONE ENCOUNTER
Prior authorization request completed for: 3rd SUELLEN   Authorization #:[id number]    Authorization dates: 05/20/19  CPT codes approved:72065  Number of visits/dates of service approved: 1  Physician: Emily Mora     Patient can be scheduled, Please advise PA Team

## 2019-05-21 NOTE — TELEPHONE ENCOUNTER
Called patient and scheduled procedure. Instructions below reviewed including xarelto hold for 48 hours per medical clearance. Patient verbalized understanding.     [address]  PRE-PROCEDURE INSTRUCTIONS WITHOUT SEDATION    Appointment Date ? Lovenox (Enoxaparin) 24 hours  ? Aspirin  ? 81mg 24 hours  ? Greater than 81 mg (325mg) 7 days  ? Coumadin       5 days   · Procedure may be cancelled if INR is elevated. ? Excedrin (with aspirin) 7 days  ? Plavix (Clopidogrel)  ?  Epidural ____ - 10 day ** TO AVOID CANCELLATION AND/OR RESCHEDULING: PLEASE CALL [facility] PRE-PROCEDURE LINE -[phone] FOR DETAILED INSTRUCTIONS FIVE TO SEVEN DAYS PRIOR TO PROCEDURE**      Epidural Injections  What is an epidural injection?   An epidural injection is an injecti No, this procedure is done with a local anesthetic. Some patients choose to receive intravenous sedation that can make the procedure easier to tolerate.  This type of sedation may cause amnesia and patients may not remember parts or all of the actual proced Most patients do not receive more than three injections in a six-month period. This is because the effects of the medication injected frequently last for six months or more.  If three injections have not helped you very much, you may be a candidate for a di

## 2019-05-24 NOTE — PROGRESS NOTES
Cailin Boucher is a 47year old male. HPI:   Patient presents for recheck of his hypertension. Pt has been taking medications as instructed, no medication side effects, home BP monitoring in the range of 195'Z systolic and 48'N diastolic.     Last visi density, or significant focal lesion. BILIARY:  Stable tiny calcific foci consistent with gallstones in gallbladder lumen. No bile duct dilatation. PANCREAS:  No lesion, fluid collection, ductal dilatation, or atrophy.   SPLEEN:  No enlargement or focal l HDL 35 (L) 02/15/2019    HDL 36 (L) 05/18/2018    HDL 33 (L) 12/26/2017     Lab Results   Component Value Date    TRIG 183 (H) 02/15/2019    TRIG 235 (H) 05/18/2018    TRIG 85 12/26/2017     Lab Results   Component Value Date    LDL 80 02/15/2019    LDL tablet (100 mg total) by mouth 3 (three) times daily.  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

## 2019-05-24 NOTE — PATIENT INSTRUCTIONS
Continue current medications  Monitor blood pressure   Before next visit, complete blood draw    Schedule follow-up with Dr. Ko Thomas regarding flank pain  Establish with surgeon for umbilical hernia

## 2019-05-28 RX ORDER — HYDRALAZINE HYDROCHLORIDE 100 MG/1
100 TABLET, FILM COATED ORAL 3 TIMES DAILY
Qty: 270 TABLET | Refills: 0 | Status: SHIPPED | OUTPATIENT
Start: 2019-05-28 | End: 2019-11-07 | Stop reason: SDUPTHER

## 2019-05-28 RX ORDER — RIVAROXABAN 20 MG/1
TABLET, FILM COATED ORAL
Qty: 90 TABLET | Refills: 0 | Status: SHIPPED | OUTPATIENT
Start: 2019-05-28 | End: 2019-11-07 | Stop reason: SDUPTHER

## 2019-05-28 RX ORDER — AMLODIPINE BESYLATE 10 MG/1
TABLET ORAL
Qty: 90 TABLET | Refills: 0 | Status: SHIPPED | OUTPATIENT
Start: 2019-05-28 | End: 2019-11-07 | Stop reason: SDUPTHER

## 2019-05-28 RX ORDER — FUROSEMIDE 20 MG/1
TABLET ORAL
Qty: 90 TABLET | Refills: 0 | Status: SHIPPED | OUTPATIENT
Start: 2019-05-28 | End: 2019-11-07 | Stop reason: SDUPTHER

## 2019-05-31 NOTE — TELEPHONE ENCOUNTER
Spoke to patient, confirmed procedure date of 6/5/19 and to be checked in at outpatient registration at 10:15 am. Patient called pre-procedure line and understood instructions. Patient instructed to call office if there are additional questions .

## 2019-06-05 NOTE — OPERATIVE REPORT
[facility]  Operative Report  2019     [facility] Patient Status:  Hospital Outpatient Surgery    1965 MRN [id number]   [facility] ENDOSCOPY Attending Annemarie Rocha MD   Hosp Day # 0 PCP Alisson Rojas MD     Indication:  To recovered and was discharged to a responsible adult after discharge criteria were met. Complications: None. Follow up: The patient was followed in the pain clinic as needed basis.           Shaheed Ulloa MD

## 2019-06-05 NOTE — H&P
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  Disp: 180 tablet Rfl: 3 Taking   Glucose Blood (ONETOUCH VERIO) In Vitro Strip Test blood sugar once daily Disp: 100 strip Rfl: 2 Taking   furosemide 40 MG Oral Tab Take 1 tablet (40 mg year do to medication i was taking   • Chronic atrial fibrillation ([facility] [address] 10/14/14    post op from [facility]   • Community acquired pneumonia of left lower lobe of lung ([facility] [address] 6/18/2018   • Decorative tattoo    • Depression 9/24/2015   • Diabetes [facility])    • Sherren Person CERVICAL EPIDURAL N/A 5/11/2012    Performed by Vadim Crabtree MD at [address] [facility] N/A 4/18/2012    Performed by Lauro Garner MD at [facility] [address] [facility] STEROID INJECTION N/A 4/24

## 2019-06-05 NOTE — TELEPHONE ENCOUNTER
Spoke to pt who states he has experienced sneezing, congested, itchy eyes, post-nasal drip within the past 3-4 days. Pt states he attributes these sx to seasonal allergies as opposed to illness.  Pt denies fever, yellow sputum, or any other indication of in

## 2019-06-06 NOTE — PROGRESS NOTES
HISTORY OF PRESENT ILLNESS  Patient presents with:  Weight Check: up 7 lb      Elmo Santana is a 47year old male here for follow up in medical weight loss program.     Denies chest pain, shortness of breath, dizziness, blurred vision, headache, paresth 05/23/2019    OSMOCALC 291 05/23/2019    ALKPHO 104 02/15/2019    AST 17 02/15/2019    ALT 32 02/15/2019    BILT 0.6 02/15/2019    TP 7.3 02/15/2019    ALB 4.2 02/15/2019    GLOBULIN 3.1 02/15/2019     05/23/2019    K 3.5 05/23/2019     05/23/2 total) by mouth 3 (three) times daily.  Disp: 270 tablet Rfl: 1   ONETOUCH DELICA LANCETS FINE Does not apply Misc Test blood sugar once daily asdirected Disp: 100 each Rfl: 2   Potassium Chloride ER 20 MEQ Oral Tab CR Take 1 tablet (20 mEq total) by mouth (JARDIANCE) 10 MG Oral Tab; Take 1 tablet by mouth daily.         PLAN  · Initial consult 12/37/18: 255 lb   · Up 7 lb / down 4 lb total   · Nutrition: low carb diet/ recommended to eat breakfast daily/ regular protein intake  · Continue ozempic at  1.0 mg

## 2019-06-06 NOTE — TELEPHONE ENCOUNTER
I spoke with [facility] since I could not do the PA online with [facility].   I was transferred 4 times and a case was started  Case #[id number]  [facility] is [phone]  Pt ID is [id number]    Awaiting decision

## 2019-06-12 NOTE — TELEPHONE ENCOUNTER
Received note from [employer] stating PA is cancelled for Ozempic - the maximum number of allowed retail fills has been exceeded. [phone] to set up account at [facility] - he cannot get med locally after the initial fill.     7/18/2019  9:00 AM Flip Moss,

## 2019-06-13 RX ORDER — METOPROLOL SUCCINATE 200 MG/1
TABLET, EXTENDED RELEASE ORAL
Qty: 180 TABLET | Refills: 0 | Status: SHIPPED | OUTPATIENT
Start: 2019-06-13 | End: 2019-10-10 | Stop reason: SDUPTHER

## 2019-06-14 NOTE — TELEPHONE ENCOUNTER
Denied refill of Ozempic today from [facility] because it was sent 6/12/19 and confirmed. OZEMPIC 1 MG/DOSE Subcutaneous Solution Pen-injector 6 pen 0 6/12/2019     Sig - Route: Inject 1 mg into the skin once a week.  - Subcutaneous    Sent to pharmacy as: Gopi Stephenson

## 2019-06-18 ENCOUNTER — TELEPHONE (OUTPATIENT)
Dept: CARDIOLOGY | Age: 54
End: 2019-06-18

## 2019-06-19 NOTE — TELEPHONE ENCOUNTER
Fax from [facility] - possible interaction between spironolactone 25mg and potassium cl 20meq. Complete form and fax back to #[phone]. Form in triage.

## 2019-06-21 NOTE — PROGRESS NOTES
Cailin Boucher is a 47year old male. HPI:   Patient presents for recheck of his hypertension. Pt has been taking medications as instructed, no medication side effects, home BP monitoring in the range of 586-563'Z systolic and 15'H diastolic.   Stopped tablet Rfl: 1   Albuterol Sulfate  (90 Base) MCG/ACT Inhalation Aero Soln Inhale 2 puffs into the lungs every 6 (six) hours as needed for Shortness of Breath.  Disp: 3 Inhaler Rfl: 3   Budesonide-Formoterol Fumarate 160-4.5 MCG/ACT Inhalation Aerosol CYCLOBENZAPRINE HCL 10 MG Oral Tab TAKE 1/2 TABLET (5MG) BY MOUTH NIGHTLY Disp: 15 tablet Rfl: 0      Past Medical History:   Diagnosis Date   • Acute cystitis with hematuria 2/27/2017   • Anxiety disorder    • Arthritis    • Back pain    • Blood in the Laterality Date   • ANGIOGRAM  7/15/14    no cad noted   • CERVICAL EPIDURAL N/A 4/14/2017    Performed by Suyapa Hall MD at [address] [facility] N/A 3/27/2017    Performed by Suyapa Hall MD at [address] [facility] adenopathy,no bruits  LUNGS: clear to auscultation  CARDIO: RRR without murmur  GI: good BS's,no masses, HSM or tenderness  EXTREMITIES: no cyanosis, clubbing or edema    ASSESSMENT AND PLAN:   Hyperaldosteronism (hcc)  (primary encounter diagnosis)  Velia

## 2019-07-31 NOTE — TELEPHONE ENCOUNTER
Requesting Ozempic  LOV: 6/6/19  RTC: one month  Last Relevant Labs: 2/15/19  Filled: 6/12/19 #6 pens with 0 refills    Future Appointments   Date Time Provider Sindy Cisneros   8/29/2019 10:20 AM Arjun Batista MD [facility] [address]   9/20/2019  9:00 AM

## 2019-08-05 NOTE — TELEPHONE ENCOUNTER
Requesting Jardiance  LOV: 6/6/19  RTC: one month  Last Relevant Labs: 2/15/19  Filled: 6/6/19 #30 with 1 refills    Future Appointments   Date Time Provider Sindy Cisneros   8/29/2019 10:20 AM Zaida Carson MD [facility] [address]   9/20/2019  9:00 AM Cra

## 2019-08-07 NOTE — TELEPHONE ENCOUNTER
Request from [address] [place] [address] to do PA for Robin [phone] [id number]    Questions answered on [facility]    Awaiting decisions.

## 2019-08-15 NOTE — PROGRESS NOTES
HISTORY OF PRESENT ILLNESS  Patient presents with:  Weight Check: down 2 lb      Lobito Ryan is a 47year old male here for follow up in medical weight loss program.     Denies chest pain, shortness of breath, dizziness, blurred vision, headache, pares 73 05/23/2019    GFRAA 84 05/23/2019    CA 8.9 05/23/2019    OSMOCALC 291 05/23/2019    ALKPHO 104 02/15/2019    AST 17 02/15/2019    ALT 32 02/15/2019    BILT 0.6 02/15/2019    TP 7.3 02/15/2019    ALB 4.2 02/15/2019    GLOBULIN 3.1 02/15/2019     0 MG Oral Cap TAKE 1 CAPSULE BY MOUTH TWICE DAILY Disp: 60 capsule Rfl: 0   Sildenafil Citrate 50 MG Oral Tab Take 1 tablet (50 mg total) by mouth daily as needed for Erectile Dysfunction.  Disp: 6 tablet Rfl: 2   CloNIDine HCl 0.1 MG Oral Tab Take 0.1 mg by OZEMPIC 1 MG/DOSE Subcutaneous Solution Pen-injector; Inject 1 mg into the skin once a week. -     Semaglutide (OZEMPIC) 0.25 or 0.5 MG/DOSE Subcutaneous Solution Pen-injector; Inject 0.5 mg into the skin every 7 days.         PLAN  · Initial consult 12/37

## 2019-08-16 NOTE — TELEPHONE ENCOUNTER
----- Message from Nancy Zaman PA-C sent at 8/16/2019  6:31 AM CDT -----  Please inform pt: potassium slightly low increase potassium to 40meq qam, 20mg qpm, recheck BMP 1 week

## 2019-08-26 NOTE — TELEPHONE ENCOUNTER
Requesting Metformin ER  LOV: 8/15/19  RTC: 3 months  Last Relevant Labs: 2/15/19  Filled: 6/6/19 #90 with 1 refills to local pharmacy    Future Appointments   Date Time Provider Sindy Cisneros   9/20/2019  9:00 AM Nathalie Giles PA-C [facility] 14 [facility] 95

## 2019-09-20 NOTE — PATIENT INSTRUCTIONS
Continue with psychologist  Increase lexapro to 1.5 tablets daily  Take xanax at bedtime if needed for sleep

## 2019-09-20 NOTE — PROGRESS NOTES
HPI:   Leia Mcardle is a 47year old male who presents for recheck of his diabetes. Patient’s FBS have been controlled. Last visit with ophthalmologist was 9/2018. Pt has been checking his feet on a regular basis.  Pt denies any tingling of the fe Tab Take 1 tablet (0.25 mg total) by mouth 2 (two) times daily as needed for Sleep. Disp: 30 tablet Rfl: 0   escitalopram 20 MG Oral Tab Take 1.5 tablets (30 mg total) by mouth daily.  Disp: 135 tablet Rfl: 1   METFORMIN HCL  MG Oral Tablet 24 Hr TAKE Vitro Strip Test blood sugar once daily Disp: 100 strip Rfl: 2   furosemide 40 MG Oral Tab Take 1 tablet (40 mg total) by mouth daily.  (Patient taking differently: Take 40 mg by mouth daily.  ) Disp: 90 tablet Rfl: 0   atorvastatin 20 MG Oral Tab Take 20 m sweats    • Obesity, unspecified    • Obsessive-compulsive disorders 9/24/2015   • ISAAC (obstructive sleep apnea) 3-3-14-PSG/TX-8/19/16    AHI-16, O2 alyce-71%/CPAP-10cwp   • Other specified cardiac dysrhythmias(427.89)    • Other testicular hypofunction Smokeless tobacco: Never Used    Alcohol use: Yes      Comment: Wine club once a month    Drug use: No         REVIEW OF SYSTEMS:   GENERAL HEALTH: feels well otherwise  SKIN: denies any unusual skin lesions or rashes  RESPIRATORY: denies shortness of kellee weeks (around 11/15/2019) for follow-up.

## 2019-09-25 NOTE — TELEPHONE ENCOUNTER
Fax from [facility] - clarification needed on escitalopram tab 20mg - directions to take 1 1/2 tabs daily exceed maximum recommended dose and may require PA. Needs to sign off ok to continue or will change therapy. Sign and fax back to #[phone].    Form

## 2019-09-25 NOTE — TELEPHONE ENCOUNTER
[facility] is aware of dosage and does not want to change. Form completed and returned.

## 2019-10-03 NOTE — TELEPHONE ENCOUNTER
Requesting Ozempic  LOV: 8/15/19  RTC: 3 months  Last Relevant Labs: 2/15/19  Filled: 8/15/19 #6 pens with 0 refills    Future Appointments   Date Time Provider Sindy Cisneros   10/10/2019  1:00 PM Jillian Tyler RD [facility] [address]   10/17/2019 12:20

## 2019-10-11 RX ORDER — METOPROLOL SUCCINATE 200 MG/1
TABLET, EXTENDED RELEASE ORAL
Qty: 180 TABLET | Refills: 1 | Status: SHIPPED | OUTPATIENT
Start: 2019-10-11 | End: 2020-03-17

## 2019-10-16 NOTE — PROGRESS NOTES
HISTORY OF PRESENT ILLNESS  Patient presents with:  Weight Check: 6# down      Rashaad Deleon is a 47year old male here for follow up in medical weight loss program.     Denies chest pain, shortness of breath, dizziness, blurred vision, headache, paresth ANIONGAP 5 09/20/2019    GFR 80 12/26/2017    GFRNAA 73 09/20/2019    GFRAA 84 09/20/2019    CA 8.9 09/20/2019    OSMOCALC 293 09/20/2019    ALKPHO 104 02/15/2019    AST 17 02/15/2019    ALT 32 02/15/2019    BILT 0.6 02/15/2019    TP 7.3 02/15/2019    ALB capsule, Rfl: 0  Sildenafil Citrate 50 MG Oral Tab, Take 1 tablet (50 mg total) by mouth daily as needed for Erectile Dysfunction. , Disp: 6 tablet, Rfl: 2  HydrALAZINE HCl 100 MG Oral Tab, Take 1 tablet (100 mg total) by mouth 3 (three) times daily. , Disp: tablet (750 mg total) by mouth once daily. 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         PLAN  · I

## 2019-11-07 RX ORDER — FUROSEMIDE 20 MG/1
TABLET ORAL
Qty: 90 TABLET | Refills: 0 | Status: SHIPPED | OUTPATIENT
Start: 2019-11-07 | End: 2019-11-21 | Stop reason: SDUPTHER

## 2019-11-07 RX ORDER — RIVAROXABAN 20 MG/1
TABLET, FILM COATED ORAL
Qty: 90 TABLET | Refills: 0 | Status: SHIPPED | OUTPATIENT
Start: 2019-11-07 | End: 2019-11-21 | Stop reason: SDUPTHER

## 2019-11-07 RX ORDER — HYDRALAZINE HYDROCHLORIDE 100 MG/1
100 TABLET, FILM COATED ORAL 3 TIMES DAILY
Qty: 270 TABLET | Refills: 0 | Status: SHIPPED | OUTPATIENT
Start: 2019-11-07 | End: 2019-11-21 | Stop reason: SDUPTHER

## 2019-11-07 RX ORDER — AMLODIPINE BESYLATE 10 MG/1
TABLET ORAL
Qty: 90 TABLET | Refills: 0 | Status: SHIPPED | OUTPATIENT
Start: 2019-11-07 | End: 2019-11-21 | Stop reason: SDUPTHER

## 2019-11-07 NOTE — TELEPHONE ENCOUNTER
Requesting Ozempic  LOV: 10/16/19  RTC: 3 months  Last Relevant Labs: 2/15/19  Filled: 10/16/19 #6 pens with 0 refills    1/16/2020  9:00 AM MD REED Sal [facility] [address]     Denied refill request as receipt is confirmed at pharmacy requesting.

## 2019-11-15 NOTE — TELEPHONE ENCOUNTER
----- Message from Zachary Harrell PA-C sent at 11/15/2019 12:44 PM CST -----  Please inform pt: findings consistent with nondisplaced fracture of lateral malleolus, area of patient's tenderness.  See ortho Dr. Kary Love

## 2019-11-15 NOTE — PATIENT INSTRUCTIONS
Complete blood test (no fasting required) and ankle x-ray  Start physical therapy  Take muscle relaxer at bedtime

## 2019-11-15 NOTE — PROGRESS NOTES
Wilma Ruano is a 47year old male. HPI:   Pt here for f/u on anxiety. Last visit increased lexapro to 30mg daily and added prn xanax. Pt reports significant improvement with these changes. Patient presents for recheck of his hypertension.  Pt has 100 MG Oral Tab Take 1.5 tablets (150 mg total) by mouth daily. 90 tablet 1   • Albuterol Sulfate  (90 Base) MCG/ACT Inhalation Aero Soln Inhale 2 puffs into the lungs every 6 (six) hours as needed for Shortness of Breath.  3 Inhaler 3   • Budesonide post op from [address] acquired pneumonia of left lower lobe of lung 6/18/2018   • Decorative tattoo    • Depression 9/24/2015   • [facility]    • Diarrhea, unspecified     I believe from my medication   • Feeling lonely    • Flatulence/gas jarod shortness of breath with exertion, denies cough or wheezing  CARDIOVASCULAR: denies chest pain or palpitations, denies leg swelling  GI: denies abdominal pain and denies heartburn.  Denies nausea, vomiting, diarrhea, constipation  NEURO: denies headaches, d nightly. The patient indicates understanding of these issues and agrees to the plan. The patient is asked to return in prn; 6m med check.

## 2019-11-15 NOTE — TELEPHONE ENCOUNTER
[address], [facility] [address] [facility], [address] [place] [address]  [phone]    Results and referral recommendations d/w pt he expressed understanding. Referral info given.

## 2019-11-18 NOTE — TELEPHONE ENCOUNTER
----- Message from Judge Courtney PA-C sent at 11/18/2019 11:56 AM CST -----  Please inform pt: no concerns on CBC, CMP, UA, TSH; A1C shows excellent DM control.  Recommend MRI brain w/wo contrast for chronic intermittent blurred vision with normal eye

## 2019-11-22 NOTE — TELEPHONE ENCOUNTER
Requesting Ozempic  LOV: 10/16/19  RTC: 3 months  Last Relevant Labs: 11/15/19  Filled: 10/16/19 #6 pens with 0 refills    1/16/2020  9:00 AM Galen Duggan MD [facility] [address]   5/15/2020  9:30 AM Nathalie Giles PA-C [facility] [address]     Denied re

## 2019-11-22 NOTE — TELEPHONE ENCOUNTER
Pt states he has sever claustrophobia while in in MRI increased anxiety substantially    Will consult with cathy and advise pt on plan of care     Per cathy pt is to use diazepam is to be used 30 minutes prior and then immediatly before scan.  Pt also advis

## 2019-11-22 NOTE — TELEPHONE ENCOUNTER
Pt tried to have an MRI at the [place] location and could not do it, wondering if he can get something to relax him, call back

## 2019-11-25 RX ORDER — HYDRALAZINE HYDROCHLORIDE 100 MG/1
100 TABLET, FILM COATED ORAL 3 TIMES DAILY
Qty: 270 TABLET | Refills: 0 | Status: SHIPPED | OUTPATIENT
Start: 2019-11-25 | End: 2020-01-21 | Stop reason: SDUPTHER

## 2019-11-25 RX ORDER — RIVAROXABAN 20 MG/1
TABLET, FILM COATED ORAL
Qty: 90 TABLET | Refills: 0 | Status: SHIPPED | OUTPATIENT
Start: 2019-11-25 | End: 2020-02-06 | Stop reason: SDUPTHER

## 2019-11-25 RX ORDER — AMLODIPINE BESYLATE 10 MG/1
TABLET ORAL
Qty: 90 TABLET | Refills: 0 | Status: SHIPPED | OUTPATIENT
Start: 2019-11-25 | End: 2020-02-06 | Stop reason: SDUPTHER

## 2019-11-25 RX ORDER — FUROSEMIDE 20 MG/1
TABLET ORAL
Qty: 90 TABLET | Refills: 0 | Status: SHIPPED | OUTPATIENT
Start: 2019-11-25 | End: 2020-01-21 | Stop reason: ALTCHOICE

## 2019-12-07 NOTE — PROGRESS NOTES
FOLLOW UP NUTRITION CONSULTATION    Nutrition Assessment    Number of consultations with dietitian: 10    Height:  Ht Readings from Last 1 Encounters:  11/18/19 : 71\"      Weight:   Wt Readings from Last 2 Encounters:  12/05/19 : 250 lb 6.4 oz (113.6 k

## 2019-12-09 NOTE — TELEPHONE ENCOUNTER
Requesting Ozempic  LOV: 10/16/19  RTC: 3 months  Last Relevant Labs: 11/15/19  Filled: 10/16/19 #6 pens with 0 refills    1/16/2020  9:00 AM Helena Scanlon MD [facility] [address]   5/15/2020  9:30 AM Nathalie Giles PA-C [facility] [address]     Not due f

## 2019-12-12 NOTE — PROGRESS NOTES
LOWER EXTREMITY EVALUATION:   Referring Physician: Dr. Joya Bonilla  Diagnosis: Closed low lateral malleolus fracture (S82.61XA     Date of Service: 12/12/2019     PATIENT SUMMARY   Rashaad Deleon is a 47year old male who presents to therapy today with comp primary aldosteronism ([facility]     Anemia     Special screening for malignant neoplasm of colon                  Axis III:   Past Medical History:   Diagnosis Date   • Acute cystitis with hematuria 2/27/2017   • Anxiety disorder    • Arthritis    • Back pain ASSESSMENT  Kit Jeffery presents to physical therapy evaluation with primary c/o R ankle pain.  The results of the objective tests and measures show max tightness in all directions with AROM and PROM, decreased strength and balance as well as increased edema Low Complexity, Manual:1, TE:2      Total Timed Treatment: 45 min     Total Treatment Time: 60 min     Based on clinical rationale and outcome measures, this evaluation involved Low Complexity   PLAN OF CARE:    Goals: (to be met in 10 visits)  · Pt will d Date____________________    Certification From: 15/89/0644  To:3/11/2020

## 2019-12-17 NOTE — PROGRESS NOTES
Dx: Closed low lateral malleolus fracture (S82.61XA)             Authorized # of Visits:  10         Next MD visit: none scheduled  Fall Risk: medium        Precautions: NA             Subjective:   Pt report good compliance with HEP but has difficulty wit 12/17/2019   TX#: 2/10 Date:               TX#: 3/   Date:               TX#: 4/ Date:               TX#: 5/ Date:               TX#: 6/ Date:               TX#: 7/ Date:               TX#: 8/   TE x 45 mins  Bike X 5 mins seat 4  TM x 5 mins   Slant board

## 2019-12-19 NOTE — PROGRESS NOTES
Dx: Closed low lateral malleolus fracture (S82.61XA)             Authorized # of Visits:  10         Next MD visit: none scheduled  Fall Risk: medium        Precautions: NA             Subjective:   Pt reports increased pain since last visit with more diff board stretch 3 x 30 sec R and 2 x 30 sec L  Heel and toe raises x 15  Towel crunches x 2 mins  IV/EV slides x 2 min ea  [place]  ---  PF roll out x 2 min  Seated IV/EV stretch  Alphabet x 1  TE x 15 mins  Rocker board DF/PF x 2 mins  Slant board str

## 2019-12-24 NOTE — PROGRESS NOTES
Dx: Closed low lateral malleolus fracture (S82.61XA)             Authorized # of Visits:  10         Next MD visit: none scheduled  Fall Risk: medium        Precautions: NA             Subjective:   Pt reports that he is feeling good today and has been abl work and home activities such as stair negotiation and prolonged community amb, ascending ramp.     · Pt will be independent and compliant with comprehensive HEP to maintain progress achieved in PT    Plan:   Pt to be able to normalize gait with less deviat

## 2019-12-26 NOTE — TELEPHONE ENCOUNTER
Requesting Ozempic  LOV: 10/16/19  RTC: 3 months  Last Relevant Labs: 11/15/19  Filled: 10/16/19 #6 pens with 0 refills    1/16/2020  9:00 AM Calleen Denver, MD [facility] [address]     Mail order requesting.   Pended for approval

## 2019-12-30 NOTE — TELEPHONE ENCOUNTER
Requesting Ozempic   LOV: 10/16  RTC: 3 mths  Last Relevant Labs:   Filled: 12/30/19 #6 pens with 0 refills    Future Appointments   Date Time Provider Sindy Cervantesisti   1/3/2020 12:00 PM Bharati MAGAÑA [facility]   1/6/2020 11:00 AM Rosendo Urrutia

## 2020-01-02 NOTE — PROGRESS NOTES
Dx: Closed low lateral malleolus fracture (S82.61XA)             Authorized # of Visits:  10         Next MD visit: none scheduled  Fall Risk: medium        Precautions: NA             Subjective:   Pt reports that he has noticed more overall edema in B LE pain with work and home activities such as stair negotiation and prolonged community amb, ascending ramp.     · Pt will be independent and compliant with comprehensive HEP to maintain progress achieved in PT    Plan:   Pt to be able to normalize gait with l

## 2020-01-06 LAB
ANION GAP SERPL CALC-SCNC: NORMAL MMOL/L
BUN SERPL-MCNC: 20 MG/DL
BUN/CREAT SERPL: NORMAL
CALCIUM SERPL-MCNC: 8.9 MG/DL
CHLORIDE SERPL-SCNC: 108 MMOL/L
CO2 SERPL-SCNC: 30 MMOL/L
CREAT SERPL-MCNC: 1.31 MG/DL
GLUCOSE SERPL-MCNC: 145 MG/DL
LENGTH OF FAST TIME PATIENT: NORMAL H
POTASSIUM SERPL-SCNC: 4.6 MMOL/L
SODIUM SERPL-SCNC: 142 MMOL/L

## 2020-01-06 NOTE — PROGRESS NOTES
Dx: Closed low lateral malleolus fracture (S82.61XA)             Authorized # of Visits:  10         Next MD visit: none scheduled  Fall Risk: medium        Precautions: NA             Subjective:   Pt reports that he is feeling stronger and is able to amb 12/19/19          TX#: 3/10   Date: 12/24/19        TX#: 4/10 Date: 1/2/20               TX#: 5/10 Date:1/6/20               TX#: 6/10 Date:               TX#: 7/ Date:               TX#: 8/   TE x 45 mins  Bike X 5 mins seat 4  TM x 5 mins   Slant board s

## 2020-01-08 NOTE — PROGRESS NOTES
Dx: Closed low lateral malleolus fracture (H56.04PG)             Authorized # of Visits:  10         Next MD visit: none scheduled  Fall Risk: medium        Precautions: NA             Subjective:   Pt reports that he did not have any issues with soreness TX#: 4/10 Date: 1/2/20               TX#: 5/10 Date:1/6/20               TX#: 6/10 Date: 1/8/20               TX#: 7/10 Date:               TX#: 8/ TE x 45 mins  Bike X 5 mins seat 4  TM x 5 mins   Slant board stretch 3 x 30 sec R and 2 x 30 sec L  Ryan

## 2020-01-14 NOTE — PROGRESS NOTES
Dx: Closed low lateral malleolus fracture (S82.61XA) R LILLY             Authorized # of Visits:  10         Next MD visit: none scheduled  Fall Risk: medium        Precautions: NA             Subjective:   Pt reports that overall he is making great improveme TX#: 6/10 Date: 1/8/20               TX#: 7/10 Date: 1/14/20              TX#: 8/10   TE x 45 mins  Bike X 5 mins seat 4  TM x 5 mins   Slant board stretch 3 x 30 sec R and 2 x 30 sec L  Heel and toe raises x 15  Towel crunches x 2 mins  IV/E

## 2020-01-15 PROBLEM — G43.811 OTHER MIGRAINE WITH STATUS MIGRAINOSUS, INTRACTABLE: Status: ACTIVE | Noted: 2020-01-15

## 2020-01-15 PROBLEM — G43.909 MIGRAINE: Status: ACTIVE | Noted: 2020-01-15

## 2020-01-15 NOTE — ED PROVIDER NOTES
Patient Seen in: [facility] Emergency Department      History   Patient presents with:  Headache  Numbness Weakness    Stated Complaint: stroke vs migraine, facial droop/altered taste x few days    HPI    Simmie Coma is a pleasant 59-year-old male presentin apnea) 3-3-14-PSG/TX-8/19/16    AHI-16, O2 alyce-71%/CPAP-10cwp   • Other specified cardiac dysrhythmias(427.89)    • Other testicular hypofunction    • Pain in joints    • Pneumonia, organism unspecified(486)    • Sleep disturbance    • Stress    • Suicid Drug use: No             Review of Systems    Positive for stated complaint: stroke vs migraine, facial droop/altered taste x few days  Other systems are as noted in HPI. Constitutional and vital signs reviewed.       All other systems reviewed and negativ and unremitting headache it did initially show some improvement with morphine but it is since returned he will be admitted for pain control and further evaluation  Admission disposition: 1/15/2020 10:12 PM                   Disposition and [address] [facility]

## 2020-01-15 NOTE — ED INITIAL ASSESSMENT (HPI)
Pt to ED from MD office with c/o migraine x2 weeks, facial drooping, eye tearing, sour taste and left neck pain x3 days

## 2020-01-15 NOTE — PROGRESS NOTES
Cailin Boucher is a 47year old male. HPI:   Patient presents today complaining of significant headache, squeezing sensation in neck, and labile BP numbers. Readings at home past 3 days have been 100/60 range which is very low for him.  States he has noti total) by mouth daily. 135 tablet 1   • HYDROcodone-acetaminophen 5-325 MG Oral Tab   0   • spironolactone (ALDACTONE) 100 MG Oral Tab Take 1.5 tablets (150 mg total) by mouth daily.  90 tablet 1   • Albuterol Sulfate  (90 Base) MCG/ACT Inhalation Ae medication i was taking   • Chronic atrial fibrillation ([facility] 10/14/14    post op from [facility]   • Community acquired pneumonia of left lower lobe of lung 6/18/2018   • Decorative tattoo    • Depression 9/24/2015   • [facility]    • Diarrhea, unspecified weight change  SKIN: denies any unusual skin lesions or rashes  NECK: + Squeezing sensation, left side  RESPIRATORY: denies shortness of breath with exertion, denies cough or wheezing  CARDIOVASCULAR: + palpitations and racing heart intermittently.  Denies

## 2020-01-16 NOTE — ED NOTES
Patient taken back to CT at this time by PCT. Patient and his mother remain updated with plan of care.

## 2020-01-16 NOTE — PLAN OF CARE
Improved weakness to BLE and BUE  No facial droop noted  Per patient improved headache and photo sensitivity  IVF  Neurology on consult  Possible increase steroids  Pain control

## 2020-01-16 NOTE — RESPIRATORY THERAPY NOTE
ISAAC - Equipment Use Daily Summary:                  . Set Mode: AUTO CPAP WITH C-FLEX                . Usage in hours:8:14                . 90% Pressure (EPAP) level:8.7                . 90% Insp. Pressure (IPAP): Cosme Grant AHI:1.4                .  Reji Romero

## 2020-01-16 NOTE — ED NOTES
Patient reports head pain slightly decreased to 5/10 but neck pain remains at 8/10. MD at bedside to reassess patient as well and update with plan of care.

## 2020-01-16 NOTE — TELEPHONE ENCOUNTER
From: Joya Bush  To: Henna Limon MD  Sent: 1/16/2020 12:29 AM CST  Subject: Visit Follow-up Question    I was admitted to [facility] last night. I need another follow up appointment.

## 2020-01-16 NOTE — PLAN OF CARE
PATIENT TO FLOOR FROM ER. REPORT RECEIVED. PATIENT ALERT AND ORIENTED X4. VSS ON ROOM AIR/CPAP OVERNIGHT. SR ON TELE. DENIES CHEST PAIN AND SOB. PATIENT C/O RIGHT SIDED HEADACHE WITH PHOTOSENSITIVITY AND LEFT SIDED NECK PAIN.   MILD LEFT SIDED WEAKNES

## 2020-01-16 NOTE — PROGRESS NOTES
[facility]  Progress Note     Priscila Manning Patient Status:  Observation    1965 MRN [id number]   [facility] 0SW-A Attending [facility] Day # 0 PCP Vinay Stallings MD     Chief Complaint: Headache and left CrCl cannot be calculated (Unknown ideal weight.). No results for input(s): PTP, INR in the last 168 hours. Recent Labs   Lab 01/15/20  1714   TROP <0.045            Imaging: Imaging data reviewed in Epic.     Medications:   • spironolactone  10

## 2020-01-16 NOTE — OCCUPATIONAL THERAPY NOTE
Attempted to see pt for OT. Pt with L side weakness and neuro consult pending. PM: Pt seen by neuro APN. MRI pending to r/o stroke. Will re-attempt to see pt as appropriate and as able.

## 2020-01-16 NOTE — H&P
[facility]  History and Physical     [facility] Patient Status:  Emergency    1965 MRN [id number]   Location [address] [facility] Attending Cristin Dickson MD   Hosp Day # 0 PCP Adin Whitman MD     Chief Complaint: medication   • Feeling lonely    • Flatulence/gas pain/belching    • Food intolerance    • Headache disorder     migraines   • Heart palpitations    • Hemorrhoids    • High blood pressure    • High cholesterol    • History of depression    • Hyperlipidemia Performed by Rinku Carreon MD at [address] [place] [address] INJECTION N/A 4/24/2019    Performed by Rinku Carreon MD at [address] [facility] INJECTION N/A 4/3/2019    Performed by Rinku Carreon MD at [facility] ENDOSCOPY   • ENDO DELICA PLUS [id number] Does not apply Misc, USE TO TEST BLOOD SUGAR  ONCE DAILY AS DIRECTED, Disp: 100 each, Rfl: 2  metFORMIN HCl  MG Oral Tablet 24 Hr, Take 1 tablet (750 mg total) by mouth once daily. , Disp: 90 tablet, Rfl: 0  ALPRAZolam (XANAX) 0. Acetate 100 MG Oral Tab, Take 100 mg by mouth 2 (two) times daily. , Disp: , Rfl:   Metoprolol Succinate ER (TOPROL-XL) 200 MG Oral Tablet 24 Hr, Take 200 mg by mouth 2 (two) times daily.   , Disp: , Rfl: 6  AmLODIPine Besylate (NORVASC) 10 MG Oral Tab, Take control  4. Neurochecks  5. Neurology consult  2. Atrial fibrillation on DOAC  1. Toprol  2. DOAC  3. Telemetry  3. Essential hypertension   1. Toprol, Hydralazine, Norvasc, Losartan  2. Monitor hemodynamics   4. Dyslipidemia   1. Lipitor   5.  Diabetes kym

## 2020-01-16 NOTE — PROGRESS NOTES
[id number] Sonja Obrien Neurology Preliminary Note    Leslee Brittle [facility] Patient Status:  Observation    1965 MRN [id number]   [facility] 0SW-A [facility] PCP Rakesh Gr MD     [address]  disorder    • Arthritis    • Back pain    • Blood in the stool    • Blurred vision    • Brachial neuritis or radiculitis NOS     C4,C5, nerve roots   • Calculus of kidney     Last year do to medication i was taking   • [facility] 10/ EPIDURAL N/A 3/27/2017    Performed by Marija Mckee MD at [address] [facility] N/A 3/10/2017    Performed by Marija Mckee MD at [address]  N/A 5/11/2012    Performed by Aleena De La Garza injection 15 mg, 15 mg, Intravenous, Q6H PRN    Or  ketorolac tromethamine (TORADOL) 30 MG/ML injection 30 mg, 30 mg, Intravenous, Q6H PRN  Albuterol Sulfate  (90 Base) MCG/ACT inhaler 2 puff, 2 puff, Inhalation, Q6H PRN  ALPRAZolam (XANAX) tab 0.25 PRN  Metoclopramide HCl (REGLAN) injection 10 mg, 10 mg, Intravenous, Q8H PRN  Insulin Aspart Pen (NOVOLOG) 100 UNIT/ML flexpen 2-10 Units, 2-10 Units, Subcutaneous, TID CC and HS  influenza vaccine split quad (FLULAVAL) ages 6 months to 64 years inj 0.5ml taste. He was given decadron in ED with improvement of his headache.  HCT with on acute process  Will obtain MRI to r/o stroke   Will cont decadron for his headache     Possible complex migraine vs less likely small stroke       Plan:     Cont home xeralto

## 2020-01-16 NOTE — ED NOTES
Patient taken to CT department at this time by cart by PCT. Remains updated with plan of care. Mother sitting at bedside.

## 2020-01-17 NOTE — RESPIRATORY THERAPY NOTE
ISAAC - Equipment Use Daily Summary:  · Set Mode CFLEX  · Usage in hours: 5:36  · 90% Pressure (EPAP) level: 6.5  · 90% Insp Pressure (IPAP):   · AHI: 1.8  · Supplemental Oxygen:   · Comments:

## 2020-01-17 NOTE — CONSULTS
[id number] Sonja Obrien Neurology Initial Consultation     [facility] Patient Status:  Observation    1965 MRN [id number]   [facility] 0SW-A Attending [place] [facility] Day # 0 PCP Marybelle Mortimer, MD     REASON FOR CON Anxiety disorder    • Arthritis    • Back pain    • Blood in the stool    • Blurred vision    • Brachial neuritis or radiculitis NOS     C4,C5, nerve roots   • Calculus of kidney     Last year do to medication i was taking   • Chronic atrial fibrillation ( EPIDURAL N/A 3/27/2017    Performed by Pino Velázquez MD at [address] [facility] N/A 3/10/2017    Performed by Pino Velázquez MD at [address]  N/A 5/11/2012    Performed by Sonia Nix injection 15 mg, 15 mg, Intravenous, Q6H PRN    Or  ketorolac tromethamine (TORADOL) 30 MG/ML injection 30 mg, 30 mg, Intravenous, Q6H PRN  Albuterol Sulfate  (90 Base) MCG/ACT inhaler 2 puff, 2 puff, Inhalation, Q6H PRN  ALPRAZolam (XANAX) tab 0.25 PRN  Metoclopramide HCl (REGLAN) injection 10 mg, 10 mg, Intravenous, Q8H PRN  Insulin Aspart Pen (NOVOLOG) 100 UNIT/ML flexpen 2-10 Units, 2-10 Units, Subcutaneous, TID CC and HS  influenza vaccine split quad (FLULAVAL) ages 6 months to 64 years inj 0.5ml 18:16     Approved by: Ivana Sanches MD on 1/15/2020 at 18:24          Ct Soft Tissue Of Neck(contrast Only) (cpt=70491)    Result Date: 1/15/2020  CONCLUSION:   1.  Mildly enlarged level 1B submandibular group lymph nodes measuring up to 1.4 cm with fatt

## 2020-01-17 NOTE — PROGRESS NOTES
[facility]PITAL[facility]  Progress Note     Saba Conroy Patient Status:  Observation    1965 MRN [id number]   [facility] 0SW-A [facility] PCP Ana Cristina De La Torre MD     Chief Complaint: Headache and left hours. Recent Labs   Lab 01/15/20  1714   TROP <0.045            Imaging: Imaging data reviewed in Epic.     Medications:   • spironolactone  100 mg Oral Daily   • gabapentin  100 mg Oral Daily   • dexamethasone Sodium Phosphate  4 mg Intravenous Q8H   •

## 2020-01-17 NOTE — PROGRESS NOTES
[id number] Sonja Obrien Neurology Progress Note    [facility] Patient Status:  Observation    1965 MRN [id number]   [facility] 0SW-A [facility] PCP Alisson Rojas MD         Subjective:   Kei Vaughn 100 mg Oral TID   • losartan  100 mg Oral Daily   • Metoprolol Succinate ER  200 mg Oral 2x Daily(Beta Blocker)   • furosemide  20 mg Oral Daily   • Rivaroxaban  20 mg Oral Daily with food   • Insulin Aspart Pen  2-10 Units Subcutaneous TID CC and HS migrainosus, intractable  Active Problems:    Migraine    Recommendations:  Ok to dc   outpt follow up with [facility] neurology after dc  Advised No work for 3 days  I discussed with patient/family at length regarding all studies' results, assessment, treatment

## 2020-01-17 NOTE — PROGRESS NOTES
01/16/20 1803   Clinical Encounter Type   Visited With Patient   Routine Visit Introduction   Continue Visiting No   Patient's Supportive Strategies/Resources  provided emotional support to the patient.    Patient Spiritual Encounters   Spiritual

## 2020-01-17 NOTE — PLAN OF CARE
RECEIVED PATIENT ALERT AND ORIENTED X4. MRI COMPLETED THIS NOC. IMPROVEMENT NOTED TO LEFT FACIAL DROOP AND LEFT SIDED WEAKNESS. PATIENT NOTES IMPROVEMENT TO RIGHT SIDED HEADACHE, PHOTOSENSITIVITY AND LEFT SIDED NECK PAIN. NEURO CHECKS EVERY 4 HOURS.   I

## 2020-01-17 NOTE — OCCUPATIONAL THERAPY NOTE
OCCUPATIONAL THERAPY QUICK EVALUATION - INPATIENT    Room Number: 0017/0017-A  Evaluation Date: 1/17/2020     Type of Evaluation: Initial  Presenting Problem: complex migraine    Physician Order: IP Consult to Occupational Therapy  Reason for Therapy:  ADL 3-3-14-PSG/TX-8/19/16    AHI-16, O2 alyce-71%/CPAP-10cwp   • Other specified cardiac dysrhythmias(427.89)    • Other testicular hypofunction    • Pain in joints    • Pneumonia, organism unspecified(486)    • Sleep disturbance    • Stress    • Suicidal thou Glasses    Prior Level of Function: Pt reports independence with ADL/IADL and ambulation without AD prior to admit.       SUBJECTIVE      Patient self-stated goal is to go home    OBJECTIVE     Fall Risk: Standard fall risk    WEIGHT BEARING RESTRICTION and concerns addressed; Family present    ASSESSMENT     Patient is a 47year old male admitted on 1/15/2020 for complex migraine. Complete medical history and occupational profile noted above. Functional outcome measures completed include AMPAC, ROM, MMT.

## 2020-01-21 ENCOUNTER — OFFICE VISIT (OUTPATIENT)
Dept: CARDIOLOGY | Age: 55
End: 2020-01-21

## 2020-01-21 VITALS
BODY MASS INDEX: 34.86 KG/M2 | SYSTOLIC BLOOD PRESSURE: 142 MMHG | DIASTOLIC BLOOD PRESSURE: 80 MMHG | HEIGHT: 71 IN | WEIGHT: 249 LBS | HEART RATE: 77 BPM

## 2020-01-21 DIAGNOSIS — I65.23 BILATERAL CAROTID ARTERY STENOSIS: ICD-10-CM

## 2020-01-21 DIAGNOSIS — I71.21 THORACIC ASCENDING AORTIC ANEURYSM (CMD): ICD-10-CM

## 2020-01-21 DIAGNOSIS — I1A.0 RESISTANT HYPERTENSION: Primary | ICD-10-CM

## 2020-01-21 DIAGNOSIS — I48.0 PAROXYSMAL ATRIAL FIBRILLATION (CMD): ICD-10-CM

## 2020-01-21 DIAGNOSIS — E78.2 HYPERLIPIDEMIA, MIXED: ICD-10-CM

## 2020-01-21 PROCEDURE — 93000 ELECTROCARDIOGRAM COMPLETE: CPT | Performed by: INTERNAL MEDICINE

## 2020-01-21 PROCEDURE — 99214 OFFICE O/P EST MOD 30 MIN: CPT | Performed by: INTERNAL MEDICINE

## 2020-01-21 RX ORDER — FUROSEMIDE 40 MG/1
40 TABLET ORAL 2 TIMES DAILY
COMMUNITY
End: 2020-02-07 | Stop reason: DRUGHIGH

## 2020-01-21 RX ORDER — CLONIDINE HYDROCHLORIDE 0.1 MG/1
0.1 TABLET ORAL PRN
COMMUNITY

## 2020-01-21 RX ORDER — GABAPENTIN 100 MG/1
100 CAPSULE ORAL 2 TIMES DAILY
COMMUNITY

## 2020-01-21 RX ORDER — METFORMIN HYDROCHLORIDE 750 MG/1
750 TABLET, EXTENDED RELEASE ORAL
COMMUNITY

## 2020-01-21 ASSESSMENT — PATIENT HEALTH QUESTIONNAIRE - PHQ9
1. LITTLE INTEREST OR PLEASURE IN DOING THINGS: NOT AT ALL
2. FEELING DOWN, DEPRESSED OR HOPELESS: NOT AT ALL
SUM OF ALL RESPONSES TO PHQ9 QUESTIONS 1 AND 2: 0
SUM OF ALL RESPONSES TO PHQ9 QUESTIONS 1 AND 2: 0

## 2020-01-21 NOTE — PROGRESS NOTES
Discharge Summary  Pt has attended 9 visits in Physical Therapy.      Dx: Closed low lateral malleolus fracture (S82.61XA) R LE             Authorized # of Visits:  10         Next MD visit: none scheduled  Fall Risk: medium        Precautions: NA [de-identified] certification required:  Yes  Please co-sign or sign and return this letter via fax as soon as possible to [phone]. I certify the need for these services furnished under this plan of treatment and while under my care.     X___________ TE x 45 mins  Backward walking on TM x 5 min . 6mph  Slant board stretch 3 x 30 sec ea  Balance beam front and back and side to side x 4 ea  SLB ball toss front/side/side x 20 R ball  baps board CW/CCW x 1 min ea  Ankle 4 way x 20 G TB  Heel and toe walking

## 2020-01-21 NOTE — PROGRESS NOTES
Initial Post Discharge Follow Up   Discharge Date: 1/17/20  Contact Date: 1/21/2020    Consent Verification:  Assessment Completed With: Patient  HIPAA Verified? Yes    Discharge Dx:      Other migraine with status migrainosus, intractable  Afib  CHF  A today at 11:15 for a-fib follow up and medication review. The patient plans to bring medications in original pill bottles along with the patient's medication list/pill box.   The patient did report glucose levels have been controlled with fasting readings i tablet 0   • LOSARTAN 100 MG Oral Tab TAKE 1 TABLET BY MOUTH  DAILY 90 tablet 1   • Potassium Chloride ER 20 MEQ Oral Tab CR Take 2 tablets in the morning and 1 tablet in the evening.  (Patient taking differently: Take 1 tablets in the morning and 1 tablet 20 MG Oral Tab Take 20 mg by mouth daily with food. • Vitamin D3 2000 units Oral Cap Take 2,000 Units by mouth 2 (two) times daily. • Flecainide Acetate 100 MG Oral Tab Take 100 mg by mouth 2 (two) times daily.      • Metoprolol Succinate ER (TOPROL CODI [address], Ana Maria Melgoza ([address] [facility]            [address], [facility] [address]  [address] [place] [address], [address] [address]  [phone] e

## 2020-01-21 NOTE — DISCHARGE SUMMARY
[facility] HOSPITAL[facility]  DISCHARGE SUMMARY     [facility] Patient Status:  Observation    1965 MRN [id number]   [facility] 0SW-A Attending No att. providers found   Hosp Day # 0 PCP Jaimee Valadez MD     Date of Admission: 1/15/2020  Da pressure, atrial fibrillation. Patient was placed on hyperglycemia protocol with correction factor insulin.   With patient's had a resolving facial droop improved patient was cleared for discharge home by all consulting services    Lace+ Score: 61  59-90 H ALPRAZolam 0.25 MG Tabs  Commonly known as:  XANAX      Take 1 tablet (0.25 mg total) by mouth 2 (two) times daily as needed for Sleep.    Quantity:  30 tablet  Refills:  0     amLODIPine Besylate 10 MG Tabs  Commonly known as:  NORVASC      Take 10 mg by ONETOUCH VERIO Strp      Test blood sugar once daily   Quantity:  100 strip  Refills:  2     OZEMPIC (1 MG/DOSE) 2 MG/1.5ML Sopn  Generic drug:  Semaglutide (1 MG/DOSE)      INJECT SUBCUTANEOUSLY 1MG  ONCE A WEEK   Quantity:  6 pen  Refills:  0     Silde minutes

## 2020-01-23 ENCOUNTER — TELEPHONE (OUTPATIENT)
Dept: INTERNAL MEDICINE CLINIC | Facility: CLINIC | Age: 55
End: 2020-01-23

## 2020-01-23 NOTE — PROGRESS NOTES
HISTORY OF PRESENT ILLNESS  Patient presents with:  Weight Check: up 9 lb      Rashaad Deleon is a 47year old male here for follow up in medical weight loss program.     Denies chest pain, shortness of breath, dizziness, blurred vision, headache, paresth 01/16/2020    MPV 11.2 (H) 10/27/2011     Lab Results   Component Value Date     (H) 01/16/2020    BUN 20 (H) 01/16/2020    BUNCREA 15.3 01/16/2020    CREATSERUM 1.31 (H) 01/16/2020    ANIONGAP 4 01/16/2020    GFR 80 12/26/2017    GFRNAA 61 01/16/20 once daily. , Disp: 90 tablet, Rfl: 0  ALPRAZolam (XANAX) 0.25 MG Oral Tab, Take 1 tablet (0.25 mg total) by mouth 2 (two) times daily as needed for Sleep., Disp: 30 tablet, Rfl: 0  escitalopram 20 MG Oral Tab, Take 1.5 tablets (30 mg total) by mouth daily. Metoprolol Succinate ER (TOPROL-XL) 200 MG Oral Tablet 24 Hr, Take 200 mg by mouth 2 (two) times daily. , Disp: , Rfl: 6  AmLODIPine Besylate (NORVASC) 10 MG Oral Tab, Take 10 mg by mouth daily.  Indications: High Blood Pressure, Disp: , Rfl:     No curr management. · Counseled on comprehensive weight loss plan including attention to nutrition, exercise and behavior/stress management for success. See patient instruction below for more details.   · Discussed strategies to overcome barriers to successful diamond

## 2020-01-27 PROBLEM — Z12.11 SPECIAL SCREENING FOR MALIGNANT NEOPLASM OF COLON: Status: RESOLVED | Noted: 2019-01-17 | Resolved: 2020-01-27

## 2020-01-27 PROBLEM — G43.811 OTHER MIGRAINE WITH STATUS MIGRAINOSUS, INTRACTABLE: Status: RESOLVED | Noted: 2020-01-15 | Resolved: 2020-01-27

## 2020-01-27 RX ORDER — SEMAGLUTIDE 1.34 MG/ML
1 INJECTION, SOLUTION SUBCUTANEOUS WEEKLY
Qty: 6 PEN | Refills: 0 | OUTPATIENT
Start: 2020-01-27 | End: 2020-02-27

## 2020-01-27 NOTE — PROGRESS NOTES
HPI:    Sandra Cordoba is a 47year old male here today for hospital follow up.    He was discharged from Inpatient hospital, [facility] to Home   Admission Date: 1/15/20   Discharge Date: 1/17/20  Hospital Discharge Diagnoses (since 12/28/2019)   D home by all consulting services    Since discharge: c/o return of migraines, since stopped steroid. Has congestion, sinus pressure, and ear discomfort, worse on left. Allergies:  He is allergic to jardiance [empagliflozin].     Current Meds:  Austin Mcrae )  Glucose Blood (ONETOUCH VERIO) In Vitro Strip, Test blood sugar once daily  atorvastatin 20 MG Oral Tab, Take 20 mg by mouth nightly. Rivaroxaban (XARELTO) 20 MG Oral Tab, Take 20 mg by mouth daily with food.   Flecainide Acetate 100 MG Oral Tab, Take 1 Thoracic or lumbosacral neuritis or radiculitis, unspecified, Uncomfortable fullness after meals, Unspecified essential hypertension, Unspecified sleep apnea, Visual impairment, Wears glasses, Weight gain, and Weight loss.     He  has a past surgical histor anemia, or bruising, denies bleeding  ENDOCRINE: denies thyroid history  ALL/ASTHMA: denies hx of allergy or asthma    PHYSICAL EXAM:   No LMP for male patient.   Estimated body mass index is 35.87 kg/m² as calculated from the following:    Height as of thi methylPREDNISolone (MEDROL) 4 MG Oral Tablet Therapy Pack 1 kit 0     Sig: As directed. • Doxycycline Hyclate 100 MG Oral Cap 20 capsule 0     Sig: Take 1 capsule (100 mg total) by mouth 2 (two) times daily for 10 days.        Imaging & Consults:  None

## 2020-01-27 NOTE — PATIENT INSTRUCTIONS
Take antibiotic and steroid pack as directed until completely finished. Contact us if you experience any adverse reaction to the medication.

## 2020-01-27 NOTE — PROGRESS NOTES
HPI:    Yolande Bhakta is a 47year old male here today for hospital follow up.    He was discharged from Inpatient hospital, [facility] to Home   Admission Date: 1/15/20   Discharge Date: 1/17/20  Hospital Discharge Diagnoses (since 12/28/2019)   Non ongoing global headache x 2 weeks. Associated with sensitivity to light and sound. No numbness, tingling, weakness. He typically takes OTC migraine medication without relief. He also noted tenderness to left TMJ, worse with eating.  His left neck also felt Tablet 24 Hr, Take 1 tablet (750 mg total) by mouth once daily.   ALPRAZolam (XANAX) 0.25 MG Oral Tab, Take 1 tablet (0.25 mg total) by mouth 2 (two) times daily as needed for Sleep.  escitalopram 20 MG Oral Tab, Take 1.5 tablets (30 mg total) by mouth helen in the stool, Blurred vision, Brachial neuritis or radiculitis NOS, Calculus of kidney, Chronic atrial fibrillation ([facility] [address] (10/14/14), Community acquired pneumonia of left lower lobe of lung (6/18/2018), Decorative tattoo, Depression (9/24/2015), Diabetes ( grandfather; Heart Disorder in his paternal grandfather; Hypertension in his brother, father, maternal grandfather, mother, and sister; Lipids in his brother, father, mother, and sister;  Other in his paternal grandfather; Psychiatric in his brother and mot

## 2020-02-05 DIAGNOSIS — Z51.81 ENCOUNTER FOR THERAPEUTIC DRUG MONITORING: ICD-10-CM

## 2020-02-05 DIAGNOSIS — E66.9 OBESITY (BMI 30-39.9): ICD-10-CM

## 2020-02-05 RX ORDER — TOPIRAMATE 25 MG/1
25 TABLET ORAL NIGHTLY
Qty: 30 TABLET | Refills: 0 | OUTPATIENT
Start: 2020-02-05 | End: 2020-03-06

## 2020-02-06 ENCOUNTER — TELEPHONE (OUTPATIENT)
Dept: CARDIOLOGY | Age: 55
End: 2020-02-06

## 2020-02-06 ENCOUNTER — CLINICAL ABSTRACT (OUTPATIENT)
Dept: CARDIOLOGY | Age: 55
End: 2020-02-06

## 2020-02-06 NOTE — TELEPHONE ENCOUNTER
Requesting   Requested Prescriptions     Pending Prescriptions Disp Refills   • METFORMIN HCL  MG Oral Tablet 24 Hr [Pharmacy Med Name: METFORMIN ER 750MG TABLET] 90 tablet 0     Sig: TAKE 1 TABLET BY MOUTH ONCE DAILY     LOV: 1/23/20  RTC: 3 months

## 2020-02-07 RX ORDER — RIVAROXABAN 20 MG/1
TABLET, FILM COATED ORAL
Qty: 90 TABLET | Refills: 0 | Status: SHIPPED | OUTPATIENT
Start: 2020-02-07 | End: 2020-05-08

## 2020-02-07 RX ORDER — FUROSEMIDE 20 MG/1
TABLET ORAL
Qty: 90 TABLET | Refills: 3 | Status: SHIPPED | OUTPATIENT
Start: 2020-02-07

## 2020-02-07 RX ORDER — AMLODIPINE BESYLATE 10 MG/1
TABLET ORAL
Qty: 90 TABLET | Refills: 3 | Status: SHIPPED | OUTPATIENT
Start: 2020-02-07 | End: 2021-01-26

## 2020-02-09 NOTE — TELEPHONE ENCOUNTER
Started PA for 2nd SUELLEN [id number] thru HOSP [facility] Clinical Information   Reference #:[id number] Pillow/Family informed/Explanation of wait/Warm blanket/Patient informed

## 2020-02-14 NOTE — PROGRESS NOTES
[facility] Neurology outpatient progress note  Date of service: 2/14/2020    Patient was in the hospital on 01/15/2020 for headaches. The patient states while on the medrol dose daquan he had no headaches,.  The patient states he is having some headache he believes in the morning and 1 tablet in the evening. ), Disp: 270 tablet, Rfl: 1  •  ONETOUCH DELICA PLUS [id number] Does not apply Misc, USE TO TEST BLOOD SUGAR  ONCE DAILY AS DIRECTED, Disp: 100 each, Rfl: 2  •  ALPRAZolam (XANAX) 0.25 MG Oral Tab, Take 1 tablet ( Indications: High Blood Pressure, Disp: , Rfl:   •  Sildenafil Citrate 50 MG Oral Tab, Take 1 tablet (50 mg total) by mouth daily as needed for Erectile Dysfunction.  (Patient not taking: Reported on 2/14/2020 ), Disp: 6 tablet, Rfl: 2  Allergies:    Trung Brown essential hypertension    • Unspecified sleep apnea     wears CPAP   • Visual impairment    • Wears glasses    • Weight gain    • Weight loss      Past Surgical History:   Procedure Laterality Date   • ANGIOGRAM  7/15/14    no cad noted   • CERVICAL EPIDUR Maternal Grandfather       Physical Examination:  /74   Pulse 74   Resp 16   Wt 250 lb (113.4 kg)   BMI 35.87 kg/m²   Lungs: clear to auscultation   CV: S1, S2+  Abdomen: soft, non tender, no masses  Extremities: no edema  Carotid bruits: no  Neurolo patient indicates understanding of these issues and agrees with the plan.   RTC botox injection once approved, nerve block prn, would not consider anti CGRP mabs given her cardiac & cerebrovascluar risk factors  Pt is advised to go ER for any new or worseni

## 2020-02-14 NOTE — TELEPHONE ENCOUNTER
Patient in office and referral for Botox placed. Explained process to patient. Patient denies any further needs at this time. Encouraged patient to call office for any questions or concerns. Patient VU.

## 2020-02-14 NOTE — PROGRESS NOTES
Patient was in the hospital on 01/15/2020 for headaches. The patient states while on the medrol dose daquan he had no headaches,. The patient states he is having some headache he believes it how his computer is position.  The patient has light and sound sensi

## 2020-02-26 NOTE — TELEPHONE ENCOUNTER
Requesting ozempic  LOV: 1/23/20  RTC: 3 months  Last Relevant Labs: 01/15/20  Filled: 1/27/20 #6 pens with 0 refills  Sent to local [place]    Future Appointments   Date Time Provider Sindy Cisneros   5/15/2020  9:30 AM Nathalie Giles PA-C [facility]

## 2020-02-27 NOTE — TELEPHONE ENCOUNTER
Received note from [facility] stating patient wants to move his refill to them    Now sent to [facility]

## 2020-03-03 NOTE — TELEPHONE ENCOUNTER
Last time medication was refilled 4/23/19  Quantity and number of refills 3 w/ 3  Last OV 1/27/2020  Next OV 5/15/2020

## 2020-03-17 RX ORDER — METOPROLOL SUCCINATE 200 MG/1
TABLET, EXTENDED RELEASE ORAL
Qty: 180 TABLET | Refills: 3 | Status: SHIPPED | OUTPATIENT
Start: 2020-03-17

## 2020-03-30 NOTE — TELEPHONE ENCOUNTER
Last time medication was refilled 10/28/19  Quantity and number of refills 90 w/ 1  Last OV 1/27/2020  Next OV 5/15/2020

## 2020-03-31 NOTE — TELEPHONE ENCOUNTER
Spoke with pt regarding refill on spironolactone as doses are conflicting. Pt confirms he is taking Spironolactone 100mg 1.5 tabs daily. Pt does not need refill at this time.   Pt does report 15# weight gain and with swelling in ankles by the end of the d

## 2020-03-31 NOTE — TELEPHONE ENCOUNTER
Requesting Metformin  mg  LOV: 1/23/20  RTC: 3 months  Last Relevant Labs: 1/15/20  Filled: 2/6/20 #90 with 0 refills    Future Appointments   Date Time Provider Sindy Cisneros   5/15/2020  9:30 AM Nathalie Giles PA-C [facility] [address]

## 2020-04-13 NOTE — TELEPHONE ENCOUNTER
Last time medication was refilled 10/28/19  Quantity and number of refills 90 w/ 1  Last OV 8/1/19  Next OV 5/15/2020

## 2020-04-26 DIAGNOSIS — I50.32 CHRONIC DIASTOLIC CONGESTIVE HEART FAILURE (HCC): ICD-10-CM

## 2020-04-26 RX ORDER — FUROSEMIDE 20 MG/1
20 TABLET ORAL DAILY
Qty: 30 TABLET | Refills: 2 | Status: SHIPPED | OUTPATIENT
Start: 2020-04-26 | End: 2020-07-09

## 2020-04-30 NOTE — TELEPHONE ENCOUNTER
Requesting ozempic  LOV: 1/23/20  RTC: 3 months  Last Relevant Labs: 1/15/20  Filled: 2/27/20 #6 pens with 0 refills    Future Appointments   Date Time Provider Sindy Cisneros   6/15/2020 10:00 AM REINA Wharton [facility] [address]     Receipt co

## 2020-05-08 DIAGNOSIS — J45.40 MODERATE PERSISTENT ASTHMA WITHOUT COMPLICATION: ICD-10-CM

## 2020-05-08 RX ORDER — BUDESONIDE AND FORMOTEROL FUMARATE DIHYDRATE 160; 4.5 UG/1; UG/1
AEROSOL RESPIRATORY (INHALATION)
Qty: 3 INHALER | Refills: 1 | Status: SHIPPED | OUTPATIENT
Start: 2020-05-08 | End: 2020-12-17

## 2020-05-08 RX ORDER — RIVAROXABAN 20 MG/1
TABLET, FILM COATED ORAL
Qty: 90 TABLET | Refills: 0 | Status: SHIPPED | OUTPATIENT
Start: 2020-05-08 | End: 2020-10-14

## 2020-05-08 RX ORDER — HYDRALAZINE HYDROCHLORIDE 100 MG/1
TABLET, FILM COATED ORAL
Qty: 270 TABLET | OUTPATIENT
Start: 2020-05-08

## 2020-05-10 NOTE — TELEPHONE ENCOUNTER
Future Appointments   Date Time Provider Sindy Cisneros   5/18/2020  9:20 AM Zack Schultz MD [facility] [address]   6/15/2020 10:00 AM REINA Wharton [facility] [address]     Now scheduled do you want to refill?

## 2020-05-10 NOTE — TELEPHONE ENCOUNTER
Requesting ozempic and metformin  LOV: 1/23/20  RTC: 3 months  Last Relevant Labs: 1/15/20  Filled: 2/27/20 #6 pens with 0 refills  Filled: 2/6/20 #90 with 0 refills  Metformin    Future Appointments   Date Time Provider Sindy Cisneros   6/15/2020 10:00

## 2020-05-18 NOTE — PROGRESS NOTES
[facility] Weight Management check in/follow up encounter  Virtual/Telephone Check-In    [facility] verbally consents to a Virtual/Telephone Check-In service on 05/18/20  Back on second shift   Does feel he is eating less.    Wakes up and exercise

## 2020-05-26 RX ORDER — HYDRALAZINE HYDROCHLORIDE 100 MG/1
TABLET, FILM COATED ORAL
Qty: 270 TABLET | Refills: 2 | Status: SHIPPED | OUTPATIENT
Start: 2020-05-26

## 2020-06-15 PROBLEM — E26.9 HYPERALDOSTERONISM (HCC): Status: RESOLVED | Noted: 2018-11-12 | Resolved: 2020-06-15

## 2020-06-15 NOTE — PROGRESS NOTES
Wellness Exam    CC: Patient is presenting for a wellness exam    HPI:   Current Complaints: Taking medications as directed. Reviewed medications today. Feels a lot of head pressure after he takes his morning medication.  Monitors blood pressure ranges from Heart palpitations    • Hemorrhoids    • High blood pressure    • High cholesterol    • History of depression    • Hyperlipidemia    • Indigestion 2-7-19    not normal   • Kidney stone 2/27/2017   • Leg swelling    • Loss of appetite    • Malignant hyperte EPIDURAL STEROID INJECTION N/A 4/3/2019    Performed by Marino Diaz MD at [address], INFRARENL ABDOM AORTIC ANEURYSM/DISSECT     • SYMP AAA URGENT REPAIR  10/14/2014    [place]; ascending aorta   • TONSILLECTOMY       Social History Vitamin D3 2000 units Oral Cap, Take 2,000 Units by mouth 2 (two) times daily. , Disp: , Rfl:   Flecainide Acetate 100 MG Oral Tab, Take 100 mg by mouth 2 (two) times daily. , Disp: , Rfl:   Metoprolol Succinate ER (TOPROL-XL) 200 MG Oral Tablet 24 Hr, Wang Exam   Constitutional: He is oriented to person, place, and time. He appears well-developed. No distress. HENT: Normocephalic and atraumatic. Nose normal. TMs pearly gray, + light reflex.   Mucous membranes moist, dentition normal.  Oropharynx without santa rate controlled, on xarelto    Migraines- controlled    ISAAC- wears cpap nightly, discussed cleaning mask routinely    Obesity- following with Dr. Tish Kohli, on ozempic- sample provided today     Early satiety- check CT with contrast    Adrenal adenoma- recheck

## 2020-06-17 RX ORDER — SPIRONOLACTONE 25 MG/1
TABLET ORAL
Qty: 90 TABLET | Refills: 0 | OUTPATIENT
Start: 2020-06-17

## 2020-06-17 NOTE — TELEPHONE ENCOUNTER
----- Message from REINA Bolton sent at 6/17/2020  9:50 AM CDT -----  Results reviewed.  Please inform patient PSA, TSH, CBC, UA, Kidney, liver, and electrolytes WNL, although potassium level is low which can be d/t diuretics, is he taking potassium

## 2020-06-17 NOTE — TELEPHONE ENCOUNTER
D/w pt results. He reports he has been taking Potassium 20 meq BID and was fasting. Per Roman MORALES no change in potassium supplement at this time repeat potassium in 1 week, diet changes for elevated triglycerides.  Schedule f/u with nephrology and complet

## 2020-06-18 NOTE — TELEPHONE ENCOUNTER
English [1] Pain

Patient becoming restless with grimacing. Administered 1 mg morphine. Will continue to 
monitor.

## 2020-07-06 ENCOUNTER — TELEPHONE (OUTPATIENT)
Dept: CARDIOLOGY | Age: 55
End: 2020-07-06

## 2020-07-14 DIAGNOSIS — I1A.0 RESISTANT HYPERTENSION: ICD-10-CM

## 2020-07-14 DIAGNOSIS — E87.6 HYPOKALEMIA: Primary | ICD-10-CM

## 2020-07-14 RX ORDER — SPIRONOLACTONE 25 MG/1
TABLET ORAL
Qty: 90 TABLET | Refills: 0 | Status: SHIPPED | OUTPATIENT
Start: 2020-07-14 | End: 2020-10-14

## 2020-07-29 NOTE — PROCEDURES
[address]       Accredited by the [facility] ([facility])    PATIENT'S NAME:        Mehrdad Noonan  ATTENDING PHYSICIAN:   Nidia Horan M.D. REFERRING PHYSICIAN:   PRISCILA Pinto initiated on CPAP at 5 cm of water and titrated up to a final pressure of 11 cm of water. On this setting patient was able to achieve a prolonged period of stage REM sleep in the supine position.   While on this setting patient had an overall apnea-hypopne

## 2020-08-06 NOTE — TELEPHONE ENCOUNTER
Last time medication was refilled 7/9/2020  Quantity and number of refills 30 w/ 0  Last OV 6/15/2020  Next OV 9/4/2020

## 2020-08-10 DIAGNOSIS — Z51.81 THERAPEUTIC DRUG MONITORING: ICD-10-CM

## 2020-08-10 DIAGNOSIS — E11.9 TYPE 2 DIABETES MELLITUS WITHOUT COMPLICATION, WITHOUT LONG-TERM CURRENT USE OF INSULIN (HCC): Chronic | ICD-10-CM

## 2020-08-10 NOTE — TELEPHONE ENCOUNTER
Requesting Metformin  mg  LOV: 5/18/20  RTC: one month  Last Relevant Labs: 6/15/20  Filled: 5/11/20 #90 with 0 refills             No appt scheduled and overdue = my chart sent.

## 2020-08-11 RX ORDER — METFORMIN HYDROCHLORIDE 750 MG/1
TABLET, EXTENDED RELEASE ORAL
Qty: 90 TABLET | Refills: 3 | OUTPATIENT
Start: 2020-08-11

## 2020-08-11 NOTE — TELEPHONE ENCOUNTER
Requesting Ozempic   LOV: 5/18/20  RTC: one month   Last Relevant Labs: 6/2020  Filled: 6/15/20 #9 ml with 0 refills    Future Appointments   Date Time Provider Sindy Cisneros   8/21/2020 11:40 AM CODI [place] [facility] [address]     Denied

## 2020-08-21 DIAGNOSIS — J45.40 MODERATE PERSISTENT ASTHMA WITHOUT COMPLICATION: ICD-10-CM

## 2020-08-21 RX ORDER — ALBUTEROL SULFATE 90 UG/1
AEROSOL, METERED RESPIRATORY (INHALATION)
Qty: 54 G | Refills: 1 | Status: SHIPPED | OUTPATIENT
Start: 2020-08-21 | End: 2023-04-21

## 2020-08-21 NOTE — PATIENT INSTRUCTIONS
We are here to support you with weight loss, but please remember that you still need your primary care provider for your routine health maintenance. Please try to work on the following dietary changes:  1.  Goals: Aim for 20-30 grams of protein/ meal 2. \"7 minute workout\" to help with exercise/activity which takes 7 minutes of your day and that you can do at home! 3. \"Calm\" or \"Headspace\" which helps with mindfulness, meditation, clarity, sleep, and lakisha to your daily life.    4. [employer] blog

## 2020-08-21 NOTE — PROGRESS NOTES
HISTORY OF PRESENT ILLNESS  Patient presents with:  Weight Check: up 2076 Josué Daniels is a 54year old male here for follow up in medical weight loss program.   Up 4lbs  Pt is on Ozempic and metformin  Recent A1C 5.1  He states he was all the way up BUNCREA 15.3 01/16/2020    CREATSERUM 1.11 06/15/2020    ANIONGAP 4 01/16/2020    GFR 80 12/26/2017    GFRNAA 74 06/15/2020    GFRAA 86 06/15/2020    CA 9.0 06/15/2020    OSMOCALC 299 (H) 01/16/2020    ALKPHO 114 06/15/2020    AST 18 06/15/2020    ALT 23 ONETOUCH DELICA PLUS [id number] Does not apply Misc, USE TO TEST BLOOD SUGAR  ONCE DAILY AS DIRECTED, Disp: 100 each, Rfl: 2  ALPRAZolam (XANAX) 0.25 MG Oral Tab, Take 1 tablet (0.25 mg total) by mouth 2 (two) times daily as needed for Sleep., Disp: 30 tabl ISAAC on CPAP    CAD in native artery    Other orders  -     OZEMPIC, 1 MG/DOSE, 2 MG/1.5ML Subcutaneous Solution Pen-injector; Inject 1 mg into the skin once a week.         PLAN  · Initial weight: 255lbs  · Up 4lbs,/Net gain 257 lbs  · Will continue Ozempic 3. Focus on protein: (15-30 grams with each meal) ie. greek yogurt, cottage cheese, string cheese, hard boiled eggs  4.  Healthy snacks: peanut butter and apples, hummus and carrots, berries, nuts (1/4 cup), tuna and crackers                 Protein Shakes: -cured meats (monitor for sodium issues)   -hummus with vegetables  -bean dip with vegetables     FRUIT  Low carb fruit options   Raspberries: Half a cup (60 grams) contains 3 grams of carbs. Blackberries: Half a cup (70 grams) contains 4 grams of carbs.

## 2020-08-21 NOTE — TELEPHONE ENCOUNTER
Requesting Metformin  mg  LOV: 5/18/20  RTC: one month  Last Relevant Labs: 6/2020  Filled: 5/11/20 #90 with 0 refills    Future Appointments   Date Time Provider Sindy Cisneros   8/21/2020 11:40 AM Taylor Byrd PA-C EM[facility]EL [facility] [address]

## 2020-09-01 NOTE — PROGRESS NOTES
Nephrology Consult Note    REASON FOR CONSULT: HTN    ASSESSMENT/PLAN:        1) HTN- longstanding refractory hypertension x 30 yrs with persistent hypokalemia, metabolic alkalosis and borderline hyponatremia is suggestive of hyperaldosteronism; this suppo above.    ROS:    Denies fever/chills  Denies wt loss/gain  Denies HA or visual changes  Denies CP or palpitations  Denies SOB/cough/hemoptysis  Denies abd or flank pain  Denies N/V/D  Denies change in urinary habits or gross hematuria  Denies LE edema  Aneta Dance Unspecified essential hypertension    • Unspecified sleep apnea     wears CPAP   • Visual impairment    • Wears glasses    • Weight gain    • Weight loss        PSH:  Past Surgical History:   Procedure Laterality Date   • ANGIOGRAM  7/15/14    no cad noted SPIRONOLACTONE 25 MG Oral Tab TAKE 1 TABLET BY MOUTH  DAILY 90 tablet 0   • hydrALAZINE HCl 100 MG Oral Tab Take 1 tablet (100 mg total) by mouth daily.  30 tablet 0   • Budesonide-Formoterol Fumarate 160-4.5 MCG/ACT Inhalation Aerosol USE 2 PUFFS BY MOUTH file    Tobacco Use      Smoking status: Former Smoker        Packs/day: 0.50        Years: 28.00        Pack years: 15        Quit date: 2004        Years since quittin.1      Smokeless tobacco: Never Used    Substance and Sexual Activity      A

## 2020-09-04 NOTE — PROGRESS NOTES
CC: HTN    HPI:   Current Complaints: Taking medications as directed. Saw Dr. Ramon Melton and completed labs as ordered c/o blood pressure dropping into 110's in mid afternoon, he has sensation of heaviness in his chest. No pain or shortness of breath.  Does not c 1/2 TABLETS BY  MOUTH DAILY, Disp: 135 tablet, Rfl: 1  ALPRAZolam (XANAX) 0.25 MG Oral Tab, Take 1 tablet (0.25 mg total) by mouth 2 (two) times daily as needed for Sleep., Disp: 30 tablet, Rfl: 0  spironolactone (ALDACTONE) 100 MG Oral Tab, Take 1.5 table Neurological: Negative for tremors, weakness and numbness. Hematological: Negative for adenopathy. Does not bruise/bleed easily. Psychiatric/Behavioral: Negative for confusion and agitation. The patient is not nervous/anxious.       BP (!) 146/92   Pu

## 2020-09-14 NOTE — TELEPHONE ENCOUNTER
Left VM for pt- metanephrines WNL; adolfo / renin > 100- will proceed with adrenal vein sampling given need for multiple BP meds despite near maximal adolfo-blockade-    Ashley- please have pt schedule a phone visit- ari Arrieta

## 2020-10-14 DIAGNOSIS — E87.6 HYPOKALEMIA: ICD-10-CM

## 2020-10-14 DIAGNOSIS — E26.09 BENIGN SECONDARY HYPERTENSION DUE TO PRIMARY ALDOSTERONISM (HCC): ICD-10-CM

## 2020-10-14 DIAGNOSIS — I15.2 BENIGN SECONDARY HYPERTENSION DUE TO PRIMARY ALDOSTERONISM (HCC): ICD-10-CM

## 2020-10-14 DIAGNOSIS — I1A.0 RESISTANT HYPERTENSION: ICD-10-CM

## 2020-10-14 RX ORDER — SPIRONOLACTONE 25 MG/1
TABLET ORAL
Qty: 90 TABLET | Refills: 1 | Status: SHIPPED | OUTPATIENT
Start: 2020-10-14 | End: 2020-12-04

## 2020-10-14 RX ORDER — LOSARTAN POTASSIUM 100 MG/1
TABLET ORAL
Qty: 90 TABLET | Refills: 1 | Status: SHIPPED | OUTPATIENT
Start: 2020-10-14 | End: 2020-12-04

## 2020-10-14 RX ORDER — RIVAROXABAN 20 MG/1
TABLET, FILM COATED ORAL
Qty: 90 TABLET | Refills: 3 | Status: SHIPPED | OUTPATIENT
Start: 2020-10-14

## 2020-10-19 NOTE — PROGRESS NOTES
HPI:    Patient ID: Tammy Casas is a 54year old male. Patient presents with: Foot Pain: left foot \"under arch and outter edge\" ongoing few months     Foot Pain   The pain is present in the left foot. This is a new problem.  The current episode s mg total) by mouth 2 (two) times daily with meals.  180 tablet 0   • VENTOLIN  (90 Base) MCG/ACT Inhalation Aero Soln INHALE 2 PUFFS INTO THE  LUNGS EVERY 6 HOURS AS  NEEDED FOR SHORTNESS OF  BREATH. 54 g 1   • OZEMPIC, 1 MG/DOSE, 2 MG/1.5ML Subcutan Indications: High Blood Pressure       Allergies:  Jardiance [Empaglif*    DIZZINESS   PHYSICAL EXAM:   Physical Exam   Constitutional: He appears well-developed and well-nourished. No distress.    Cardiovascular: Normal rate, regular rhythm, normal heart s QUAD PRESERVATIVE FREE 0.5 ML       #[id number]

## 2020-10-19 NOTE — PATIENT INSTRUCTIONS
Take naproxen twice daily with food for 7 days, then up to twice a day with food as needed  Complete x-ray  If normal, we will send an order to start physical therapy.  If abnormal, we will place a referral for podiatry   Add hydralazine 25mg twice daily du

## 2020-10-20 NOTE — TELEPHONE ENCOUNTER
Xu Case D.P.M. [facility], [address]  [place], [address]  [phone]    Patient expresses understanding of image results and processes going forward.    Contact information provided via my chart

## 2020-10-20 NOTE — TELEPHONE ENCOUNTER
----- Message from Katrina Lanier PA-C sent at 10/20/2020  7:10 AM CDT -----  Please inform pt: No fracture seen. + heel spurs present.  Recommend consulting podiatry before starting PT, Dr. Jareth Meier

## 2020-10-27 NOTE — PROGRESS NOTES
Nephrology Progress Note      ASSESSMENT/PLAN:        1) HTN- longstanding refractory hypertension x 30 yrs with persistent hypokalemia, metabolic alkalosis and borderline hyponatremia is c/w hyperaldosteronism; this supported by an elevated aldosterone to Anxiety disorder    • Arthritis    • Back pain    • Blood in the stool    • Blurred vision    • Brachial neuritis or radiculitis NOS     C4,C5, nerve roots   • Calculus of kidney     Last year do to medication i was taking   • Chronic atrial fibrillation ( 3/27/2017    Performed by Alok Hermosillo MD at [address] N/A 3/10/2017    Performed by Alok Hermosillo MD at [address] N/A 5/11/2012    Performed by Wilma Joya MD a 0   • Potassium Chloride ER 20 MEQ Oral Tab CR TAKE 1 TABLET BY MOUTH IN  THE MORNING AND 1 TABLET BY MOUTH IN THE EVENING 180 tablet 3   • FUROSEMIDE 20 MG Oral Tab TAKE 1 TABLET BY MOUTH DAILY  30 tablet 0   • hydrALAZINE HCl 100 MG Oral Tab Take 1 table Use      Smoking status: Former Smoker        Packs/day: 0.50        Years: 28.00        Pack years: 15        Quit date: 2004        Years since quittin.3      Smokeless tobacco: Never Used    Substance and Sexual Activity      Alcohol use: Not

## 2020-11-06 NOTE — H&P
[facility] New Patient Note    CC: Patient presents with: Foot Pain: left heel, arch pain. no known injury      HPI: This 54year old male presents today with complaints of left heel pain of about 3 months duration. No injury.   Patient has hyper Other testicular hypofunction    • Pain in joints    • Pneumonia, organism unspecified(486)    • Sleep disturbance    • Stress    • Suicidal thoughts 9/24/2015   • Thoracic or lumbosacral neuritis or radiculitis, unspecified     L3,L4   • Uncomfortable ful MG Oral Tab TAKE 1 TABLET BY MOUTH  DAILY 90 tablet 1   • SPIRONOLACTONE 25 MG Oral Tab TAKE 1 TABLET BY MOUTH  DAILY 90 tablet 1   • cloNIDine HCl 0.1 MG Oral Tab Take 1 tablet (0.1 mg total) by mouth daily as needed.  90 tablet 0   • metFORMIN HCl 500 MG daily.     • Flecainide Acetate 100 MG Oral Tab Take 100 mg by mouth 2 (two) times daily. • Metoprolol Succinate ER (TOPROL-XL) 200 MG Oral Tablet 24 Hr Take 200 mg by mouth 2 (two) times daily.     6   • AmLODIPine Besylate (NORVASC) 10 MG Oral Tab Geena Cancer heel mild pain with side-to-side compression, negative Tinel's sign at the ankle pain up into the arch as well.   Upon stance he has a high arch foot configuration bilaterally    Imaging: personally viewed, independently interpreted and radiology report lyndon

## 2020-11-12 NOTE — PROGRESS NOTES
LOWER EXTREMITY EVALUATION:   Referring Physician: Dr. 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

## 2020-11-17 NOTE — PROGRESS NOTES
Dx: L plantar fasciitis           Insurance (Authorized # of Visits):  PPO           Authorizing Physician: Dr. Tian Castro  Next MD visit: none scheduled  Fall Risk: standard         Precautions: n/a             Subjective: Felt much better after the initial t L1 x5 min  ankle pumps x15  seated toe extension x10  seated marble  (poor-6) x3 min  standing calf stretch (toes on step) 2x30s ea       Manual Therapy   Prone TC distraction with pronation and supination calcaneal glides x3 min  STM and IASTM to L

## 2020-11-18 NOTE — PROGRESS NOTES
Dx: L plantar fasciitis           Insurance (Authorized # of Visits):  PPO           Authorizing Physician: Dr. Lois Navarrete  Next MD visit: none scheduled  Fall Risk: standard         Precautions: n/a             Subjective: Felt great after session yesterday. hindfoot accessory motion to be able to ambulate >30 minutes with 4/10 pain  · Pt will report <5/10 pain following work day without need to take anti-inflammatory prior to bed  · Pt will be independent and compliant with comprehensive HEP to maintain progr

## 2020-11-19 NOTE — PROCEDURES
[facility]  Pre-Procedure Note    Name: Joe Joseph  MRN#: [id number]  : 1965    Procedure: Adrenal vein sampling    Indication: Long standing HTN. Hypokalemia. Bilateral adrenal enlargement.   Left adrenal mass    Allergies:      Jose Daniel Modi

## 2020-11-19 NOTE — IVS NOTE
Pt s/p IR procedure and recovered in holding. Right groin vein manual pressure, dressing CDI no bleeding or hematoma. +pedals. Pt flat for one hour then HOB elevated. Pt ate and drank.  Pt urinated in urinal. Discharge instructions reviewed with pt and pt kurt

## 2020-11-19 NOTE — PROCEDURES
[facility] Patient Status:  Outpatient in a Bed    1965 MRN [id number]   Location [facility] Attending Rickie Mack MD   [facility] Day # 0 PCP Luz Becker MD         Brief Procedure Report    Pre

## 2020-11-24 NOTE — PROGRESS NOTES
Dx: L plantar fasciitis           Insurance (Authorized # of Visits):  PPO           Authorizing Physician: Dr. Stephanie Moore  Next MD visit: none scheduled  Fall Risk: standard         Precautions: n/a             Subjective: Pt reports he was off his feet from flexibility to be able to standing with <4/10 pain after driving to work or therapy (45+min) -progress  · Pt will have improved gastroc flexibility (MIN-MOD tightness) and WNL hindfoot accessory motion to be able to ambulate >30 minutes with 4/10 pain -pro IASTM to L plantar fascia, heel, and lateral foot x10 min   Dorsal/plantar mobilizations through first and 5th ray, cuboid and navicular gr III x4 min  PF and calf stretch prone 3x30       Neuro Re-ed  - - Neuro Re-ed  airex march x20 (good)  airex SLS wit

## 2020-11-25 NOTE — PROGRESS NOTES
Dx: L plantar fasciitis           Insurance (Authorized # of Visits):  PPO           Authorizing Physician: Dr. Shyam Shaw  Next MD visit: none scheduled  Fall Risk: standard         Precautions: n/a             Subjective: Pt reports that he felt great after 11/17/2020  TX#: 2/10 Date: 11/18/2020             TX#: 3/10 Date: 11/24/2020       TX#: 4/10 Date: 11/25/2020         TX#: 5/10 Date:    Tx#: 6/   Therex  Bike L1 x5 min  ankle pumps x15  seated toe extension x10  seated marble  (poor-6) x3 min  sta dorsal/plantar glides Gr III x2 min  -first ray dorsal/plantar glides Gr III x2 min  -cuboid whip x3 (no cavitation)    Seated talocrural distraction manip x2 (no cavitation)  Seated dorsal/plantar mobilizations through first and 5th ray, and navicular gr

## 2020-12-02 NOTE — PROGRESS NOTES
Dx: L plantar fasciitis           Insurance (Authorized # of Visits):  PPO           Authorizing Physician: Dr. Kelsy Garay  Next MD visit: 12/11/20  Fall Risk: standard         Precautions: n/a             Subjective: Pt reports that he has been feeling good l will have decreased plantar flexor tenderness and improved flexibility to be able to standing with <4/10 pain after driving to work or therapy (45+min) -part MET but with anti-inflammatories   · Pt will have improved gastroc flexibility (MIN-MOD tightness) calf stretch 2x30s  Manual Therapy   Prone TC distraction with pronation and supination calcaneal glides x3 min  STM for L foot and ankle edema x3 min  STM and IASTM to L plantar fascia, heel, and lateral foot x10 min   Dorsal/plantar mobilizations through x8 min  US  L plantar fascia x8 min US  L plantar fascia x8 min US  L plantar fascia x5 min US  L plantar fascia x5 min   Ice L heel x10 min Ice at home - Ice L heel x10 min -   HEP: ankle pumps x10, seated toe extension x10, seated towel crunch x1 min, st

## 2020-12-04 NOTE — TELEPHONE ENCOUNTER
Confirmed with pt he is only taking spironolactone 100mg take 1 1/2 tabs daily. He is not taking additional 25mg. Fax sent to [facility].

## 2020-12-04 NOTE — PROGRESS NOTES
HPI:    Patient ID: Suzi Brian is a 54year old male. Patient presents with:  Hypertension      Taking medications as prescribed.  Completed adrenal vein sampling last month, he has been in contact with Dr. 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 90 tablet 1   • spironolactone (ALDACTONE) 100 MG Oral Tab Take 1.5 tablets (150 mg total) by mouth daily.  90 tablet 2   • Potassium Chloride ER 20 MEQ Oral Tab CR TAKE 1 TABLET BY MOUTH IN  THE MORNING AND 1 TABLET BY MOUTH IN THE EVENING 180 06/15/2020    ALB 4.4 06/15/2020    GLOBULT 2.5 06/15/2020    GLOBULIN 3.7 01/15/2020    ALBGLOBRAT 1.8 06/15/2020     11/16/2020    K 4.1 11/16/2020     11/16/2020    CO2 31.0 11/16/2020     Lab Results   Component Value Date    WBC 7.8 11/16/ person, place, and time. Skin: Skin is warm and dry. Psychiatric: He has a normal mood and affect.             ASSESSMENT/PLAN:   Diagnoses and all orders for this visit:    Resistant hypertension- awaiting adrenal sampling, stable at this time, continu

## 2020-12-08 NOTE — PROGRESS NOTES
Dx: L plantar fasciitis           Insurance (Authorized # of Visits):  PPO           Authorizing Physician: Dr. Shyam Shaw  Next MD visit: 12/11/20  Fall Risk: standard         Precautions: n/a             Subjective: Pt reports that his L foot feels much bett accessory motion to be able to ambulate >30 minutes with 4/10 pain -progress with anti-inflammatories  · Pt will report <5/10 pain following work day without need to take anti-inflammatory prior to bed -part MET, but with mid-day dose  · Pt will be indepen for L foot and ankle edema x3 min  STM and IASTM to L plantar fascia, heel, and lateral foot x10 min   Dorsal/plantar mobilizations through first and 5th ray, cuboid and navicular gr III x4 min  PF and calf stretch seated 3x30  TC posterior glide with DF M back x20 s (increased L pain); L back x30s (no pain) Neuro Re-ed  airex tandem stance R back x30s ea  Gait on airex beam and 1/2 foam roll x1 lap (fair, increase soreness)  SLS with short foot and ER reach 2x6 ea    Neuro Re-ed  SLS on airex with ER cone t

## 2020-12-09 NOTE — TELEPHONE ENCOUNTER
From: Dylan Bush  To: Beverley Barnett MD  Sent: 12/2/2020 10:28 AM CST  Subject: Test Results Question    I haven't heard anything from my IVR test on November19th.

## 2020-12-09 NOTE — TELEPHONE ENCOUNTER
Spoke to pt at length- informed him that adrenal vein sampling procedure to eval hyperaldosteronism was performed appropriately but unfortunately blood was sent for cortisol and renin instead of cortisol and aldosterone from R / L adrenal veins + IVC.  Pt i

## 2020-12-10 NOTE — PROGRESS NOTES
ProgressSummary  Pt has attended 8 visits in Physical Therapy.    Dx: L plantar fasciitis           Insurance (Authorized # of Visits):  PPO           Authorizing Physician: Dr. Luisa Espinoza  Next MD visit: 12/11/20  Fall Risk: standard         Precautions: n/a <4/10 pain after driving to work or therapy (45+min) -part MET but with anti-inflammatories   · Pt will have improved gastroc flexibility (MIN-MOD tightness) and WNL hindfoot accessory motion to be able to ambulate >30 minutes with 4/10 pain -progress with (toes on step) 2x30s ea Therex  Bike L1 x5 min  Standing KINGSLEY tilt board  -A/P x15   -M/L x15  seated marble  (good) x3 min    TC self glides on step x15 Therex  Bike L1 x5 min  Standing KINGSLEY tilt board  -A/P x15   -M/L x15  seated marble  (goo ray dorsal/plantar glides Gr III x2 min  -cuboid whip x3 (no cavitation)    Seated talocrural distraction manip x2 (no cavitation)  Seated dorsal/plantar mobilizations through first and 5th ray, and navicular gr III x3 min    IASTM to L plantar fascia, tino x8 min US  L plantar fascia x5 min US  L plantar fascia x5 min - -   Ice L heel x10 min Ice at home - Ice L heel x10 min - declined -   HEP: ankle pumps x10, seated toe extension x10, seated towel crunch x1 min, standing calf stretch (toes on step) 2x30s;

## 2020-12-11 NOTE — PROGRESS NOTES
[facility] Progress Note    Subjective: Veronica Riddle returns for his second injection today left foot/heel. He has improved and physical therapy has been very helpful. He states that the pain has moved.   He has good inserts and good shoe gear    Objectiv

## 2020-12-16 NOTE — PROGRESS NOTES
Discharge  Dx: L plantar fasciitis           Insurance (Authorized # of Visits):  PPO           Authorizing Physician: Dr. Rivera Martinez  Next MD visit: n/a  Fall Risk: standard         Precautions: n/a             Subjective/Assessment: Rivera reports he got the board  -A/P x15   -M/L x15  seated marble  (good) x3 min    TC self glides on step x15    Short foot training in seated 5s x10   -standing 5s x10  Therex  Bike L1 x5 min  Standing KINGSLEY tilt board no UE  -A/P x15   -M/L x15    Standing heel raises 1x1 plantar fascia, heel, and lateral foot x8 min    Plantar fascia pin and stretch x5    gastroc contract-relax stretch x6     STM R ankle x5 min Manual Therapy   Prone TC distraction with pronation and supination calcaneal glides Gr IV x2 min  -cuboid mobs d

## 2020-12-17 NOTE — TELEPHONE ENCOUNTER
LMTCB: Looks like med was d/c'd 12/4, but Tila Ambrosio is saying pt wants 1 yr refill. Is pt taking med?

## 2020-12-28 NOTE — PROGRESS NOTES
HPI:    Patient ID: Aniceto Seymour is a 54year old male. Patient presents with:  Vertigo: Pt has had vertigo in the past. His symptoms started around 12/23. Began with a migraine and now he feels he is spinning at times.  Tried Dramamine with some hel rhythm, normal heart sounds and intact distal pulses. No murmur heard. Pulmonary/Chest: Effort normal and breath sounds normal.   Neurological: He is alert and oriented to person, place, and time. No cranial nerve deficit.  Coordination normal.   Skin: S

## 2020-12-30 NOTE — H&P
[facility] Patient Status:  Outpatient in a Bed    1965 MRN [id number]   Location [address] [facility] Attending Jose Allen MD   [facility] Day # 0 PCP Jenny Love MD     Admitting Marilyn Tineo radiculitis, unspecified     L3,L4   • Uncomfortable fullness after meals    • Unspecified essential hypertension    • Unspecified sleep apnea     wears CPAP   • Visual impairment    • Wears glasses    • Weight gain    • Weight loss        Past Surgical Hi Mother    • Lipids Mother    • Psychiatric Mother    • Hypertension Maternal Grandfather    • Heart Attack Maternal Grandfather    • Stroke Maternal Grandfather        Allergies/Medications:    Allergies:    Jardiance [Empaglif*    DIZZINESS    Medications:

## 2020-12-30 NOTE — PLAN OF CARE
Patient received from IR lab s/p adrenal vein samling. Right groin site soft, no hematoma, dressing C/D/I. Pulses intact. Hemodynamics stable. Post-op orders received and implemented. Patient and family educated on flat bedrest, verbalize understanding.

## 2020-12-30 NOTE — PLAN OF CARE
Patient ambulated in verde without any difficulty s/p adrenal vein sampling. Right groin site remains soft, no hematoma, dressing C/D/I. Tolerating PO intake. Discharge orders received on patient.  Discharge instructions/education reviewed with patient and f

## 2020-12-30 NOTE — PROCEDURES
[facility]  Procedure Note    [facility] Patient Status:  Outpatient in a Bed    1965 MRN [id number]   Location [address] Attending Ledy Salguero MD   [address] Day # 0 PCP Jim Armstrong MD     Procedure: Marty Joseph

## 2020-12-31 NOTE — PROGRESS NOTES
VESTIBULAR EVALUATION:   Referring Physician: Dr. Clyde Allen  Diagnosis: vertigo     Date of Service: 12/29/2020     PATIENT SUMMARY   Carrie Clements is a 54year old male who presents to therapy today with reports of dizziness that started on 12/23/20 9/24/2015   • Diabetes ([facility]    • Diarrhea, unspecified     I believe from my medication   • Feeling lonely    • Flatulence/gas pain/belching    • Food intolerance    • Headache disorder     migraines   • Heart palpitations    • Hemorrhoids    • High blood necessary to address the above impairments and reach functional goals.     Precautions:  None     OBJECTIVE:   Physical Exam:  Posture/Observation: fwd head, rounded shoulders  Neuro Screen: Sensation: light touch WNL     Coordination Testing:   Finger to N structures involved/activity limitations, and unstable symptoms including changes in dizziness. PLAN OF CARE:    Goals: (to be met in 6 visits)     1. Pt will report no symptoms of dizziness for 5 consecutive days in order to return to ADLs  2.  Pt will

## 2020-12-31 NOTE — TELEPHONE ENCOUNTER
Patient called to report all symptoms of dizziness have resolved following CRP during evaluation. Pt. Would like to cx remaining visits.

## 2021-01-03 NOTE — TELEPHONE ENCOUNTER
----- Message from Annalisa Pretty MD sent at 1/2/2021 11:12 AM CST -----  Pt has an abnormal MARLIN and autoantibodies consistent with SLE  Refer to DR Fontaine (rheum) for further eval

## 2021-01-05 NOTE — TELEPHONE ENCOUNTER
Dr. Coronado [facility]    [address]  [phone]       Spoke with patient and discussed results and recommendations, contact information was provided, and understanding was expressed.      Order placed

## 2021-01-06 NOTE — TELEPHONE ENCOUNTER
D/W pt- unable to cannulate R adrenal vein based on cortisol levels so study is nondiagnostic- cannot differentiate between unilateral vs bilateral hyperaldo- referred to Dr. Samina Paniagua at [facility] to eval; continue current meds; will f/u after eval- thx fa Single

## 2021-01-08 NOTE — PROGRESS NOTES
?  RHEUMATOLOGY NEW PATIENT   Date of visit: 1/8/2021  ? Patient presents with:  Establish Care: new pt. Dr. Lauren Silveira (nephrology) referral. Joint pain, swelling, fatigue. Previously thought it was side effects of meds.  Bloodwork abnormal. Seeing neph for blo results to the patient/family/caregiver and care coordination with the patient's other providers.     ?  Plan:  Diagnoses and all orders for this visit:    Positive MARLIN (antinuclear antibody)  -     CK CREATINE KINASE (NOT CREATININE)  -     LDH  -     STEPHANIE and pain in his feet/ankles. Admits to significant dry eyes and dry mouth. Symptoms have been present for years. Gets swelling in the legs, not just over the joints.  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, or unspecified(404.00) 6/29/2012   • Migraines    • Nausea     on and off   • Night sweats    • Obesity, unspecifi ascending aorta   • TONSILLECTOMY       Family History:  Family History   Problem Relation Age of Onset   • Hypertension Father    • Lipids Father    • Other (Other[other]) Paternal Grandfather    • Heart Disorder Paternal Grandfather    • Hypertension Sis [id number] Does not apply Misc, USE TO TEST BLOOD SUGAR  ONCE DAILY AS DIRECTED, Disp: 100 each, Rfl: 2    •  Glucose Blood (ONETOUCH VERIO) In Vitro Strip, Test blood sugar once daily, Disp: 100 strip, Rfl: 2    •  atorvastatin 20 MG Oral Tab, Take 20 mg b nervous/anxious. The patient does not have insomnia. PHYSICAL EXAM   Today's Vitals:  Temperature Blood Pressure Heart Rate Resp Rate SpO2   Temp: 97.8 °F (36.6 °C) BP: (!) 174/92 Pulse: 62 Resp: 18     ?   Current Weight Height BMI BSA Pain   Wt Readi not diaphoretic. No erythema. Psychiatric: He has a normal mood and affect. His behavior is normal.   Nursing note and vitals reviewed.     ?  Radiology review:  Ir Venogram    Result Date: 12/30/2020  CONCLUSION:  Technically successful bilateral adrenal normal      Leigha Fontaine DO  [facility]  1/8/2021

## 2021-01-13 NOTE — TELEPHONE ENCOUNTER
Call patient. Discussed test results in detail  Labs are grossly normal including MARLIN was negative, complements were normal, muscle enzymes were grossly normal.  Minimal elevation in aldolase, however CK and LDH were normal.  ANCA studies were negative.

## 2021-01-17 NOTE — TELEPHONE ENCOUNTER
Requesting Metformin  mg  LOV: 10/16/20  RTC: one month  Last Relevant Labs: 12/29/20  Filled: 12/4/20 #180 with 1 refills    Denied refill at [facility] as it was sent for 6 months to [facility]

## 2021-01-26 ENCOUNTER — OFFICE VISIT (OUTPATIENT)
Dept: CARDIOLOGY | Age: 56
End: 2021-01-26

## 2021-01-26 VITALS
SYSTOLIC BLOOD PRESSURE: 146 MMHG | DIASTOLIC BLOOD PRESSURE: 78 MMHG | HEIGHT: 71 IN | BODY MASS INDEX: 38.5 KG/M2 | WEIGHT: 275 LBS | HEART RATE: 68 BPM

## 2021-01-26 DIAGNOSIS — I65.23 BILATERAL CAROTID ARTERY STENOSIS: ICD-10-CM

## 2021-01-26 DIAGNOSIS — I48.0 PAROXYSMAL ATRIAL FIBRILLATION (CMD): ICD-10-CM

## 2021-01-26 DIAGNOSIS — I71.21 THORACIC ASCENDING AORTIC ANEURYSM (CMD): ICD-10-CM

## 2021-01-26 DIAGNOSIS — I1A.0 RESISTANT HYPERTENSION: Primary | ICD-10-CM

## 2021-01-26 DIAGNOSIS — E78.2 HYPERLIPIDEMIA, MIXED: ICD-10-CM

## 2021-01-26 PROCEDURE — 93000 ELECTROCARDIOGRAM COMPLETE: CPT | Performed by: INTERNAL MEDICINE

## 2021-01-26 PROCEDURE — 99215 OFFICE O/P EST HI 40 MIN: CPT | Performed by: INTERNAL MEDICINE

## 2021-01-26 RX ORDER — AMLODIPINE BESYLATE 10 MG/1
TABLET ORAL
Qty: 90 TABLET | Refills: 3 | Status: SHIPPED | OUTPATIENT
Start: 2021-01-26

## 2021-01-26 ASSESSMENT — PATIENT HEALTH QUESTIONNAIRE - PHQ9
SUM OF ALL RESPONSES TO PHQ9 QUESTIONS 1 AND 2: 0
CLINICAL INTERPRETATION OF PHQ9 SCORE: NO FURTHER SCREENING NEEDED
SUM OF ALL RESPONSES TO PHQ9 QUESTIONS 1 AND 2: 0
2. FEELING DOWN, DEPRESSED OR HOPELESS: NOT AT ALL
1. LITTLE INTEREST OR PLEASURE IN DOING THINGS: NOT AT ALL
CLINICAL INTERPRETATION OF PHQ2 SCORE: NO FURTHER SCREENING NEEDED

## 2021-03-24 NOTE — TELEPHONE ENCOUNTER
Pt c/o gross hematuria that began 03/23 at nighttime. Pt also c/o bilateral lower back pain that feels like severe muscle soreness, pain rated 8/10. Pt denies any pain while urinating, increased urgency, fever, nausea/vomitting, diarrhea.  Has hx of kidney

## 2021-03-25 NOTE — PROGRESS NOTES
HPI:    Patient ID: Jose Elias Deluna is a 54year old male. Patient presents with:  Swelling      Hx of systolic heart failure last echo Ef 40-45%, taking lasix 20 mg daily. Reports bilateral LE swelling at night his legs are the same size as thighs.  He Feeling lonely    • Flatulence/gas pain/belching    • Food intolerance    • Headache disorder     migraines   • Heart palpitations    • Hemorrhoids    • High blood pressure    • High cholesterol    • History of depression    • Hyperlipidemia    • Indigesti CERVICAL EPIDURAL STEROID INJECTION N/A 4/24/2019    Performed by Tatiana Mary MD at [facility] N/A 4/3/2019    Performed by Tatiana Mary MD at [address] [address], INFRARENL ABDOM AORTIC ANEURYSM/DISSE Feeling of Stress :   Social Connections:       Frequency of Communication with Friends and Family:       Frequency of Social Gatherings with Friends and Family:       Attends Jainism Services:       Active Member of Clubs or Organizations:       Attends (TOPROL-XL) 200 MG Oral Tablet 24 Hr Take 200 mg by mouth 2 (two) times daily. 6   • AmLODIPine Besylate (NORVASC) 10 MG Oral Tab Take 10 mg by mouth daily.  Indications: High Blood Pressure     • ONETOUCH DELICA PLUS [id number] Does not apply Misc USE TO Component Value Date    IRON 67 05/23/2019     Lab Results   Component Value Date    B12 359 11/07/2018     No results found for: PHOS, PHOSPHORUS  Lab Results   Component Value Date    MG 2.1 08/15/2019        PHYSICAL EXAM:   BP (!) 164/96   Pulse 64

## 2021-06-04 NOTE — PROGRESS NOTES
Wellness Exam    CC: Patient is presenting for a wellness exam    HPI:   Current Complaints: asthma control is good, using rescue inhaler rarely. Compliant on symbicort. ACT today is 25. The patient presents for recheck of his diabetes.  Patient’s FBS h Community acquired pneumonia of left lower lobe of lung 6/18/2018   • Decorative tattoo    • Depression 9/24/2015   • [facility]    • Diarrhea, unspecified     I believe from my medication   • Feeling lonely    • Flatulence/gas pain/belching    • Food EPIDURAL/SUBARACHNOID; CERVICAL/THORACIC  4/18/2012    Procedure: CERVICAL EPIDURAL;  Surgeon: Tarun Hodges MD;  Location: [facility] FOR PAIN [facility]NAG[facility]   • INJECTION, W/WO CONTRAST, DX/THERAPEUTIC SUBSTANCE, EPIDURAL/SUBARACHNOID; CERVICAL/THORACIC tablet, Rfl: 2  Potassium Chloride ER 20 MEQ Oral Tab CR, TAKE 1 TABLET BY MOUTH IN  THE MORNING AND 1 TABLET BY MOUTH IN THE EVENING, Disp: 180 tablet, Rfl: 3  VENTOLIN  (90 Base) MCG/ACT Inhalation Aero Soln, INHALE 2 PUFFS INTO THE  LUNGS EVERY 6 weakness. Occasional numbness left outer thigh, noticed since he has gained weight  Hematological: Negative for adenopathy. Does not bruise/bleed easily. Psychiatric/Behavioral: Negative for confusion and agitation. The patient is not nervous/anxious. labs, safety, immunizations were discussed with the patient and ordered as follows:    Routine physical examination  (primary encounter diagnosis)  Encounter for routine laboratory testing  -check labs    Type 2 diabetes mellitus without complication, with

## 2021-10-04 NOTE — TELEPHONE ENCOUNTER
Confirmed with pt he is no longer taking Hydralazine 100mg BID. New Rx sent with note to [facility] regarding this.

## 2021-11-05 PROBLEM — I26.93 SINGLE SUBSEGMENTAL PULMONARY EMBOLISM WITHOUT ACUTE COR PULMONALE (HCC): Status: ACTIVE | Noted: 2021-11-05

## 2021-11-05 NOTE — TELEPHONE ENCOUNTER
Pt took BP medicine at 11:30 and BP has gone up since then. Last reading 180/110 at 12:00     Pt feels like his head is going to expolode. He is having a hard time talking.        Pulse 68   he thinks he is a-fibing too    Ears ringing

## 2021-11-05 NOTE — TELEPHONE ENCOUNTER
Per patient symptoms began this morning. Patient took blood pressure around noon with a reading of 180/110. Patient has been keeping track of his heart rate and states his heart rate was as high as the 130s.  Per patient states he feels his heart is \"flutt

## 2021-11-05 NOTE — ED INITIAL ASSESSMENT (HPI)
Pt reports left sided chest pain that started at 1130, \"it feels like afib, like I get a rush feeling in my chest and throat, and it comes and goes. \" on xarelto.   \"when I am talking I feel like I am yelling and I feel so much pressure in my head\" start

## 2021-11-06 NOTE — HISTORICAL OFFICE NOTE
Khushbu Olvera MD - 2021 10:15 AM CST  Formatting of this note might be different from the original.  2021    [address] [facility] [address] [address]    [facility]  Clinic Note    Debbe Balloon  : 1965 Reactions   • Empagliflozin DIZZINESS     Medications:    Current Outpatient Medications   Medication Sig Dispense Refill   • Cholecalciferol (vitamin D3) 25 mcg (1,000 units) capsule Take by mouth daily.  Take two capsules daily   • Xarelto 20 MG Tab TAKE auscultation  Cardiac: RRR   Abdomen: Soft, non tender, difficult to assess abd aorta  Musculoskeletal: ambulatory  Extremities: pulses intact  Skin: Warm  Neuro: A & O  Psychiatric: Normal affect.     Labs:    Cholesterol, Total (mg/dL)   Date Value   04/2

## 2021-11-06 NOTE — PLAN OF CARE
Pt. Is alert and oriented times four. He says that his chest pain is resolved and his headache is almost gone. He is sinus rhythm on monitor at present. Heparin infusing at 300 units/hr. Lower extremities with minimal pitting edema noted.

## 2021-11-06 NOTE — PROGRESS NOTES
[facility]  Progress Note     Fran Wilson Patient Status:  Inpatient    1965 MRN [id number]   [facility] 8NE-A Attending Anabela Ross, [address] [facility] Day # 0 PCP Dawna Dickerson MD     Chief Complaint: Chest pain    S: Patient re 11/05/21  1612   PTP 15.8*   INR 1.23*     Recent Labs   Lab 11/05/21  1612 11/05/21  1941 11/06/21  0318   TROP <0.045 <0.045 <0.045      Imaging: Imaging data reviewed in Epic.     Medications:   • amLODIPine  10 mg Oral Daily   • atorvastatin  20 mg Oral expected to be discharge to: Home    Plan of care discussed with patient, RN.     Ally Mckinney DO

## 2021-11-06 NOTE — ED PROVIDER NOTES
Patient Seen in: [facility] Emergency Department      History   No chief complaint on file.     Stated Complaint: Chest Pain    Subjective:   HPI    12-year-old male history of A. fib on Xarelto, asthma, heart failure, diabetes, status post ascending on and off   • Night sweats    • Obesity, unspecified    • Obsessive-compulsive disorders 9/24/2015   • ISAAC (obstructive sleep apnea) 3-3-14-PSG/TX-8/19/16    AHI-16, O2 alyce-71%/CPAP-10cwp   • Other specified cardiac dysrhythmias(427.89)    • Other testi normal          ED Course     Labs Reviewed   PROTHROMBIN TIME (PT) - Abnormal; Notable for the following components:       Result Value    PT 15.8 (*)     INR 1.23 (*)     All other components within normal limits   PTT, ACTIVATED - Abnormal; Notable for ---------                               -----------         ------                     CBC W/ DIFFERENTIAL[[id number]]                              Final result                 Please view results for these tests on the individual orders.    PTT, ACTI Cbc done twice today, plt was 176 on one and 149 on other.      Admitted to hospitalist.      Critical Care Statement:   Upon my evaluation, this patient had a high probability of imminent or life-threatening deterioration which required my direct atten

## 2021-11-06 NOTE — ED QUICK NOTES
Orders for admission, patient is aware of plan and ready to go upstairs. Any questions, please call ED RN Rodolfo Knowles at extension [phone].      Vaccinated yes  Type of COVID test sent:  COVID Suspicion level: Low      Titratable drug(s) infusing:  Rate: 23ml hepar

## 2021-11-06 NOTE — CONSULTS
THE [facility] Report of Consultation      Patient Name: Maria Luisa Kirby   YOB: 1965  Medical Record Number: [id number]  Consulting Physician: Mesha Kemp. Paulina Lowe M.D.    Referring Physician: Ford Chris DO    Date of Con • Heart palpitations    • Hemorrhoids    • High blood pressure    • High cholesterol    • History of depression    • Hyperlipidemia    • Indigestion 2-7-19    not normal   • Kidney stone 2/27/2017   • Leg swelling    • Loss of appetite    • Malignant hyp Procedure: CERVICAL EPIDURAL;  Surgeon: Andie Sawant MD;  Location: [address] [place] [address] BY [facility] [address] .[facility]  [facility] 5+ YR  4/18/2012    Procedure: CERVICAL EPIDURAL;  Surgeon: Alfred Banuelos MD;  Location: [address] [place] [address] reactions. Eyes No significant visual changes. ENMT No oral pain, sore throat, epistaxis, hearing changes. Hematologic/Lymphatic No tender or enlarged lymph nodes; no easy bruising or bleeding. Respiratory As per HPI.   Cardiovascular No anginal chest p Potassium 3.8 3.5 - 5.1 mmol/L    Chloride 107 98 - 112 mmol/L    CO2 29.0 21.0 - 32.0 mmol/L    Anion Gap 4 0 - 18 mmol/L    BUN 12 7 - 18 mg/dL    Creatinine 1.17 0.70 - 1.30 mg/dL    Calcium, Total 9.4 8.5 - 10.1 mg/dL    Calculated Osmolality 289 275 - 11/05/21  4:12 PM   Result Value Ref Range    Pro-Beta Natriuretic Peptide 105 <125 pg/mL   Troponin I    Collection Time: 11/05/21  7:41 PM   Result Value Ref Range    Troponin <0.045 <0.045 ng/mL       Radiology   PROCEDURE:  CT ANGIOGRAPHY, CHEST (CPT=7 or significant calcification.    PLEURA:  No mass or effusion.     CHEST WALL:  No mass or axillary adenopathy.     LIMITED ABDOMEN:  Limited images of the upper abdomen are unremarkable.     BONES:  No bony lesion or fracture.  Mild degenerative changes of AM        Impression and Plan   It is not at all certain that the findings on CTA represent an acute PE. Patient's chest pain is musculoskeletal in nature as it is reproducible upon chest wall palpation. His shortness of breath improved with IV diuresis.  D

## 2021-11-06 NOTE — H&P
[facility]  History and Physical     Lobito Ryan Patient Status:  Inpatient    1965 MRN [id number]   [facility] 8NE-A Attending Alli Bertrand MD   Hosp Day # 0 PCP Rakesh Gr MD     Chief Complaint: Chest pain    Histor kidney disease stage I through stage IV, or unspecified(404.00) 6/29/2012   • Migraines    • Nausea     on and off   • Night sweats    • Obesity, unspecified    • Obsessive-compulsive disorders 9/24/2015   • ISAAC (obstructive sleep apnea) 3-3-14-PSG/TX-8/19 EPIDURAL;  Surgeon: Roberto Patel MD;  Location: [facility] FOR PAIN MANAGEMENT   • SYMP AAA URGENT REPAIR  10/14/2014    [place]; ascending aorta   • TONSILLECTOMY         Social History:  reports that he quit smoking about 17 years ago.  He has a 14.00 p 2  Glucose Blood (ONETOUCH VERIO) In Vitro Strip, Test blood sugar once daily, Disp: 100 strip, Rfl: 2  atorvastatin 20 MG Oral Tab, Take 20 mg by mouth nightly., Disp: , Rfl:   Rivaroxaban 20 MG Oral Tab, Take 20 mg by mouth daily with food. , Disp: , Rfl: No focal neurological deficits. CNII-XII grossly intact. Musculoskeletal: Moves all extremities. Extremities: No edema or cyanosis. Integument: No rashes or lesions. Psychiatric: Appropriate mood and affect.       Diagnostic Data:      Labs:  Recent La

## 2021-11-06 NOTE — CONSULTS
[facility] SERVICES Cardiovascular [place]  Report of Consultation    Amrik Will [facility] Patient Status:  Inpatient    1965 MRN [id number]   [facility] 8NE-A Attending Kaylie Luna, [address] [facility] Day # 0 PCP MD Lynda Prince 6/18/2018   • Decorative tattoo    • Depression 9/24/2015   • Diabetes ([facility]    • Diarrhea, unspecified     I believe from my medication   • Feeling lonely    • Flatulence/gas pain/belching    • Food intolerance    • Headache disorder     migraines   • Hea Procedure: CERVICAL EPIDURAL;  Surgeon: Jessie Dean MD;  Location: [facility] FOR PAIN MANAGEMENT   • INJECTION, W/WO CONTRAST, DX/THERAPEUTIC SUBSTANCE, EPIDURAL/SUBARACHNOID; CERVICAL/THORACIC  5/11/2012    Procedure: CERVICAL EPIDURAL;  Surgeon: Destiny Irvin Oral, 2x Daily(Beta Blocker)  spironolactone (ALDACTONE) tab 150 mg, 150 mg, Oral, Daily  fluticasone furoate-vilanterol (BREO ELLIPTA) 200-25 MCG/INH inhaler 1 puff, 1 puff, Inhalation, Daily  albuterol 108 (90 Base) MCG/ACT inhaler 2 puff, 2 puff, Inhala °C), Min:97.8 °F (36.6 °C), Max:98.1 °F (36.7 °C)    Wt Readings from Last 3 Encounters:  11/06/21 : 271 lb 6.4 oz (123.1 kg)  08/27/21 : 278 lb (126.1 kg)  06/04/21 : 278 lb (126.1 kg)      Telemetry: reviewed and in NSR  General: Awake, alert and in no a ([address]      1. Completely reproducible musculoskeletal chest wall discomfort  2. Small pulmonary embolism  3. History of thoracic ascending aorta repair  4. History of atrial fibrillation, on flecainide and Xarelto  5. Hypertension  6.   Dyslipidemia  7

## 2021-11-07 NOTE — PLAN OF CARE
Patient alert and oriented X 4. Up ad gael on RA. NSR on tele. Continent of bowel and bladder. No complaints of pain, sob, or chest pain/discomfort. POC: discharge planning. Call light within reach.   Problem: Diabetes/Glucose Control  Goal: Glucose maintain

## 2021-11-07 NOTE — PROGRESS NOTES
NURSING DISCHARGE NOTE    Discharged Home via Wheelchair. Accompanied by Support staff  Belongings Taken by patient/family. Patient discharged to home. Discharge instructions given to and understood by patient. Appointments reviewed.  All questions

## 2021-11-07 NOTE — PLAN OF CARE
Received pt at 1930. A&Ox4. NSR on tele. On RA. No complaints of CP or SOB. Heparin gtt switched to xarelto BID. PO lasix daily. QID. Lungs diminished. CPAP at night. 1+ edema to LLE, 2+ to RLE, doppler (-) for DVT.  Pt currently resting in chair and update

## 2021-11-07 NOTE — PROGRESS NOTES
THE [facility] Progress Note      Patient Name: Oleg Mcclelland   YOB: 1965  Medical Record Number: [id number]    Subjective  Patient feels well. Breathing is comfortable. No bleeding.      Current Medications potassium chlori MG/5ML suspension 30 mL, 30 mL, Oral, Daily PRN  bisacodyl (DULCOLAX) rectal suppository 10 mg, 10 mg, Rectal, Daily PRN  Fleet Enema (FLEET) 7-19 GM/118ML enema 133 mL, 1 enema, Rectal, Once PRN  ondansetron (ZOFRAN) injection 4 mg, 4 mg, Intravenous, Q6H POC Glucose 87 70 - 99 mg/dL   POCT Glucose    Collection Time: 11/06/21  9:47 PM   Result Value Ref Range    POC Glucose 83 70 - 99 mg/dL   Potassium    Collection Time: 11/07/21  4:58 AM   Result Value Ref Range    Potassium 3.4 (L) 3.5 - 5.1 mmol/L   PO

## 2021-11-07 NOTE — DISCHARGE SUMMARY
[facility]ITAL[facility]  DISCHARGE SUMMARY     [facility] Patient Status:  Inpatient    1965 MRN [id number]   [facility] 8NE-A Attending Loni Garcia, [address] [facility] Day # 1 PCP Jenny Love MD     Date of Admission: 2021  Date of Marilyn Tineo Risk  0-28   Low Risk       TCM Follow-Up Recommendation:  LACE > 58:  High Risk of readmission after discharge from the hospital.    Procedures during hospitalization:   • None    Incidental or significant findings and recommendations (brief descriptions): MAG-OX      Take 400 mg by mouth daily.    Refills: 0     metFORMIN HCl  MG Tb24  Commonly known as: GLUCOPHAGE-XR      TAKE 2 TABLETS BY MOUTH  DAILY   Quantity: 180 tablet  Refills: 3     Metoprolol Succinate  MG Tb24  Commonly known as: TOPRO an appointment as soon as possible for a visit in 2 weeks  Please call office Monday for appt 1-2 weeks with ANDREW or Dr. Leidy Helm for Next 30 Days 11/7/2021 - 12/7/2021              Date Arrival Time Visit Type Length Department Provider

## 2021-11-07 NOTE — PROGRESS NOTES
Subjective:   OOB in chair on exam , no acute events overnight. He denies CP, SOB, dizziness, or palpitations. Still with some mild lower extremity edema but improved per patient, eager to go home.     Objective:   /75 (BP Location: Left arm) sinus of Valsalva. 3. Ascending aorta: The ascending aorta was poorly visualized. Prior repair of ascending aorta graft noted. 4. Left atrium: The left atrium was moderately to markedly dilated. 5. Right ventricle:  The cavity size was normal. Systoli nightly., Disp: , Rfl:   Rivaroxaban 20 MG Oral Tab, Take 20 mg by mouth daily with food. , Disp: , Rfl:   Vitamin D3 2000 units Oral Cap, Take 2,000 Units by mouth 2 (two) times daily. , Disp: , Rfl:   Flecainide Acetate 100 MG Oral Tab, Take 100 mg by mout

## 2021-11-07 NOTE — PROGRESS NOTES
[facility]  Progress Note     Jose Verdugo Patient Status:  Inpatient    1965 MRN [id number]   [facility] 8NE-A Attending Brandy Romano, [address] [facility] Day # 1 PCP Star Locke MD     Chief Complaint: Chest pain    S: Patient de 11/05/21  1612   PTP 15.8*   INR 1.23*     Recent Labs   Lab 11/05/21  1612 11/05/21  1941 11/06/21  0318   TROP <0.045 <0.045 <0.045      Imaging: Imaging data reviewed in Epic.     Medications:   • potassium chloride  40 mEq Oral Q4H   • amLODIPine  10 mg Diamantos is expected to be discharge to: Home    Plan of care discussed with patient, RN.     Amanda Wiseman DO

## 2021-11-08 NOTE — PAYOR COMM NOTE
--------------  ADMISSION REVIEW     Payor: [address] [place] [address] #:  [id number]  Authorization Number: [id number]       ED Provider Notes        History   No chief complaint on file.     Stated Complaint: Chest Pain    Subjective: chronic kidney disease stage I through stage IV, or unspecified(404.00) 6/29/2012   • Migraines    • Nausea     on and off   • Night sweats    • Obesity, unspecified    • Obsessive-compulsive disorders 9/24/2015   • ISAAC (obstructive sleep apnea) 3-3-14-PSG ACTIVATED - Abnormal; Notable for the following components:    .6 (*)     All other components within normal limits   BASIC METABOLIC PANEL (8) - Abnormal; Notable for the following components:    CO2 36.0 (*)     Creatinine 1.31 (*)     All other c aortic aneurysm, acute coronary syndrome, PE    Reviewed records and saw cardiac cath 2014 with angiographically normal coronary arteries and normal left ventricular systolic function. Troponin negative, no new ischemic changes on EKG.   CTA chest done g similar to when he had a AAA diagnosed that required repair. Does take his Xarelto every day. Denies any trauma, falls, recent exercise. Denies fever, chills, nausea/vomiting, diarrhea.   When the pain is there does take his breath away but otherwise no resume xarelto 15 mg BID x 3 weeks and then 20 mg daily thereafter due to questionable PE  5. LE dopplers negative for DVT  6. 2D echo  2. Epigastric pain, resolved  1. Lipase normal  2. Trial of PPI  3. AAA s/p repair  1. Stable on imaging  4. ISAAC  1.  ISAAC Xarelto, DVT study negative  · Hypokalemia–as expected with diuresis        Plan:  · Continue current cardiac meds–rates well controlled on flecainide and beta-blocker  · Anticoagulated with Xarelto, appreciate hematology recs  · Hypertension–controlled on

## 2021-11-09 NOTE — PROGRESS NOTES
Initial Post Discharge Follow Up   Discharge Date: 11/7/21  Contact Date: 11/9/2021    Consent Verification:  Assessment Completed With: Patient  HIPAA Verified?   Yes    Discharge Dx:   Single subsegmental pulmonary embolism without acute cor pulmonale TAKE 2 TABLETS BY MOUTH  DAILY 180 tablet 3   • FUROSEMIDE 20 MG Oral Tab TAKE 1 TABLET BY MOUTH  DAILY 90 tablet 3   • SPIRONOLACTONE 100 MG Oral Tab TAKE 1 AND 1/2 TABLETS BY  MOUTH DAILY 135 tablet 3   • CLONIDINE HCL 0.1 MG Oral Tab TAKE ONE TABLET BY D/C:  Home Health/Services ordered at D/C? No      DME ordered at D/C? No        SDOH:  Transportation Needs: No Transportation Needs      Lack of Transportation (Medical):  No      Lack of Transportation (Non-Medical): No  Financial Resource Strain: Low Ri states that he is feeling absolutely great right now, better than he has in a long time. He has one complaint which is right elbow pain. He states that it is near where the IV site was and is constant.  NCM instructed on s/s of infection, which he denied ha

## 2021-11-10 NOTE — PAYOR COMM NOTE
--------------  CONTINUED STAY REVIEW    Payor: [facility] CHOICE/HMO/POS/EPO  Subscriber #:  [id number]  Authorization Number: [id number]      OBS IS IN ERROR, PLEASE REVIEW AS INPT  THANKS

## 2021-11-12 NOTE — TELEPHONE ENCOUNTER
From: Mary Bush  To: Justin Celis PA-C  Sent: 11/12/2021 3:02 AM CST  Subject: Refill for Flecainide Acet 100mg    Can I get a refill for Felcainide Acet 100mg twice a day sent to the [facility] on file

## 2021-11-15 ENCOUNTER — TELEPHONE (OUTPATIENT)
Dept: CARDIOLOGY | Age: 56
End: 2021-11-15

## 2021-11-15 NOTE — PROGRESS NOTES
HPI:    Alessandro Gutierrez is a 64year old male here today for hospital follow up.    He was discharged from Inpatient hospital, [facility] to Home   Admission Date: 11/5/21   Discharge Date: 11/7/21  Hospital Discharge Diagnoses (since 10/16/2021)   S strain. He was continued on his home cardiac regimen and discharged home in stable condition with outpatient follow-up.     Since discharge: complains of neck pain x 5 days. Stiffness, L>R posterior neck, sometimes shoots into arms.  Worse in am after [facility] Units by mouth 2 (two) times daily. Metoprolol Succinate ER (TOPROL-XL) 200 MG Oral Tablet 24 Hr, Take 200 mg by mouth 2 (two) times daily. AmLODIPine Besylate (NORVASC) 10 MG Oral Tab, Take 10 mg by mouth daily.  Indications: High Blood Pressure    No (4/18/2012); epidurography, radiological s & i (4/18/2012); [facility]2012); injection, w/wo contrast, dx/therapeutic substance, epidural/subarachnoid; cervical/thoracic (5/11/2012); m-sedaj by  phys [facility] 5[facility] yr (5/ (36.7 °C)   Resp 16   Ht 5' 11\" (1.803 m)   Wt 282 lb (127.9 kg)   SpO2 97%   BMI 39.33 kg/m²    GENERAL: well developed, well nourished, in no apparent distress  SKIN: no rashes, no suspicious lesions  HEENT: atraumatic, normocephalic, ears and throat ar patient was made within 2 business days of discharge on date above   Medical Decision Making- Based on service period of discharge to 30 days:   · Number of Possible Diagnoses and/or Management Options: moderate  · Amount and/or Complexity of Data to Be Re

## 2021-11-15 NOTE — PATIENT INSTRUCTIONS
Schedule eye exam if due  Before next visit: Have labs drawn, fasting 8-10 hours prior (water before is ok).

## 2021-11-15 NOTE — PAYOR COMM NOTE
Discharge Notification    Patient Name: Marylene Stai: [address] #: [id number]  Authorization Number: [id number]  Admit Date/Time: 11/5/2021 6:20 PM  Discharge Date/Time: 11/7/2021 1:57 PM

## 2021-12-02 NOTE — TELEPHONE ENCOUNTER
Contacted patient to review screening questions before OV tomorrow with Ciaran Maldonado at 11:00 am. Patient stated he does has a cough /chest congestion that seems to be getting worse with the weather change , however goes away when he takes his inhaler.  Please adv

## 2021-12-03 NOTE — PROGRESS NOTES
Leia Mcardle is a 64year old male. HPI:   C/o irritating cough, chest tightness, frequently over the past several weeks. Intermittent. Denies fever, chills, sweats, body aches, loss of taste/smell. COVID testing negative when hospitalized last month. INHALATIONS BY MOUTH  TWICE DAILY 1 Inhaler 3   • losartan 100 MG Oral Tab Take 1 tablet (100 mg total) by mouth daily.  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

## 2021-12-17 NOTE — PATIENT INSTRUCTIONS
Follow up with psychiatrist   Start lexapro: take 10mg daily x 2 weeks then increase to 20mg daily  Start nexium over-the-counter for 2 weeks  Continue other current medications   Schedule COVID booster

## 2021-12-17 NOTE — PROGRESS NOTES
Elmo Santana is a 64year old male. HPI:   Patient presents for recheck of his hypertension. Pt has been taking medications as instructed, no medication side effects, home BP monitoring in the range of 434'J systolic and 95'I diastolic.   C/o some in Capsule SR 24 Hr Take 120 mg by mouth daily. • rivaroxaban 20 MG Oral Tab Take 20 mg by mouth daily. • cyclobenzaprine 10 MG Oral Tab Take 1 tablet (10 mg total) by mouth nightly.  7 tablet 0   • spironolactone 100 MG Oral Tab Take 1 tablet (100 mg stool    • Blurred vision    • Brachial neuritis or radiculitis NOS     C4,C5, nerve roots   • Calculus of kidney     Last year do to medication i was taking   • Chronic atrial fibrillation ([address] 10/14/14    post op from AAR   • Community acquired pneumoni MANAGEMENT   • GIN GID & Cristhian Mariscal NDL/CATH SPI DX/THER NJX  5/11/2012    Procedure: CERVICAL EPIDURAL;  Surgeon: Ray Cody MD;  Location: [facility]   • INJECTION, W/WO CONTRAST, DX/THERAPEUTIC SUBSTANCE, EPIDURAL/SUBARACHNOID; CE clear  NECK: supple,no adenopathy,no bruits  LUNGS: clear to auscultation  CARDIO: RRR without murmur  GI: good BS's,no masses, HSM or tenderness  EXTREMITIES: no cyanosis, clubbing or edema    ASSESSMENT AND PLAN:   Anxiety, generalized  (primary encounte

## 2022-01-17 NOTE — PROGRESS NOTES
Kat Diaz is a 64year old male. HPI:   Here for f/u on anxiety, insomnia  Since last visit: still has difficulty sleeping. Worse past few days with sinus symptoms  C/o sinus congestion, ear pressure, eyes are painful, migraine headache x 5 days.  [facility] MG Oral Tab TAKE ONE TABLET BY MOUTH DAILY AS NEEDED  90 tablet 0   • magnesium oxide 400 MG Oral Tab Take 400 mg by mouth daily.      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 After irrigation: b/l TM's injected, right > left. No perforation or effusion.   + frontal and maxillary sinus tenderness  +nasal turbinate inflammation   Voice is normal  Conjunctivae mildly injected bl  NECK: supple,no adeno

## 2022-01-17 NOTE — PATIENT INSTRUCTIONS
Schedule COVID booster through mychart  Take antibiotic as directed until completely finished. Contact us if you experience any adverse reaction to the medication.     Continue current medications   Take maxalt if needed for migraine headache    What can I sleep aid. Antidepressants often improve sleep and can help with other worries, too. Can I use alcohol to help me sleep? — No, do not use alcohol as a sleep aid. Even though alcohol makes you sleepy at first, it disrupts sleep later in the night.        Al

## 2022-02-01 ENCOUNTER — APPOINTMENT (OUTPATIENT)
Dept: CARDIOLOGY | Age: 57
End: 2022-02-01

## 2022-02-25 PROBLEM — D64.9 ANEMIA: Status: RESOLVED | Noted: 2019-01-17 | Resolved: 2022-02-25

## 2022-02-25 NOTE — PATIENT INSTRUCTIONS
[facility]  [address] [place] [address]  [phone]      Schedule eye exam

## 2022-04-19 NOTE — PATIENT INSTRUCTIONS
Take hydralazine if greater than 155 systolic or 281 diastolic: maximum of 3 tablets per day  Increase spirinolactone     Schedule eye exam   Establish with psychiatrist     Try nexium x 2 weeks over-the-counter

## 2022-04-20 PROBLEM — I26.93 SINGLE SUBSEGMENTAL PULMONARY EMBOLISM WITHOUT ACUTE COR PULMONALE (HCC): Status: RESOLVED | Noted: 2021-11-05 | Resolved: 2022-04-20

## 2022-05-06 NOTE — PATIENT INSTRUCTIONS
Schedule visit with Dr. Justin Garcia for elevated heart rate and afib episode  Continue current medications including spironolactone 125mg daily   Establish with psychiatrist for anxiety  Schedule annual eye exam

## 2022-06-03 NOTE — PATIENT INSTRUCTIONS
Start naproxen over-the-counter, 2 tablets twice a day for 5 days.  Take with meals  Use aquaphor or vaseline on the irritated skin as needed

## 2022-06-06 NOTE — TELEPHONE ENCOUNTER
----- Message from Erick Perea PA-C sent at 6/4/2022 12:08 PM CDT -----  Triglycerides are still above goal. Recommend adding vascepa 2g bid with meals, continue statin. Recheck FLP 3m.

## 2022-06-25 NOTE — PATIENT INSTRUCTIONS
Take meclizine up to 3 times daily as needed for dizziness  *schedule follow-up with cardiologist for palpitations  *schedule yearly eye exam    HOME TREATMENT OF BPPV:  RIOS-DAROFF EXERCISES    The Rios-Daroff Exercises are a home method of treating BPPV, usually used when the side of BPPV is unclear. Their use has been declining in recent years, as the home Epley maneuver (see below) is considerably more effective. They succeed in 95% of cases but are more arduous than the office treatments. These exercises also may take longer than the other maneuvers -- the response rate at one week is only about 25% Chan et al, 1999). These exercises are performed in three sets per day for two weeks. In each set, one performs the maneuver as shown five times. 1 repetition = maneuver done to each side in turn (takes 2 minutes)   Suggested Schedule for Rios-Daroff exercises   Time Exercise Duration   Morning 5 repetitions 10 minutes   Noon 5 repetitions 10 minutes   Evening 5 repetitions 10 minutes   Start sitting upright (position 1). Then move into the side-lying position (position 2), with the head angled upward about long term. An easy way to remember this is to imagine someone standing about 6 feet in front of you, and just keep looking at their head at all times. Stay in the side-lying position for 30 seconds, or until the dizziness subsides if this is longer, then go back to the sitting position (position 3). Stay there for 30 seconds, and then go to the opposite side (position 4) and follow the same routine. These exercises should be performed for two weeks, three times per day, or for three weeks, twice per day. This adds up to 42 sets in total. In most persons, complete relief from symptoms is obtained after 30 sets, or about 10 days. In approximately 30 percent of patients, BPPV will recur within one year. Unfortunately, daily exercises are not effective in preventing recurrence (Mariluz and Tlyer, 2008).  The Rios-Daroff exercises as well as the Semont and Epley maneuvers are compared in an article by Molly Cabrera (1994), listed in the reference section. When performing the Rios-Daroff maneuver, caution is advised should neurological symptoms (i.e. weakness, numbness, visual changes other than vertigo) occur. Occasionally such symptoms are caused by compression of the vertebral arteries Jordy Scotty et al, 2003). In this situation we advise not proceeding with the exercises and consulting ones physician.

## 2022-06-29 NOTE — TELEPHONE ENCOUNTER
Spoke with pt and stated he is taking medication twice daily and somewhat helps with his symptoms, he will discuss further at his upcoming appt   Has few tabs left  Rx pended for approval

## 2022-07-01 NOTE — PATIENT INSTRUCTIONS
Schedule eye exam  In 3 months: Have labs drawn, fasting 8-10 hours prior (water before is ok).     See cardiologist and complete groin ultrasound as planned

## 2022-07-08 NOTE — TELEPHONE ENCOUNTER
Meclizine 25 mg tab  Last time medication was refilled 6/30/22  Quantity and # of refills 21 tab 1 refill  Last OV 7/1/22 annual phys  Next OV 10/22/22

## 2022-07-21 NOTE — TELEPHONE ENCOUNTER
Meclizine 25 mg tab  Last time medication was refilled  6/30/22  Quantity and # of refills 21 tab, seven day supply  Last OV 7/1/22 annual physical  Next OV 10/22/22  for med check

## 2022-07-25 NOTE — TELEPHONE ENCOUNTER
Last time medication was refilled 7/21/22  Quantity and # of refills 21 tabs w/0 refills  Last OV 7/1/22  Next OV   Future Appointments   Date Time Provider Sindy Cisneros   10/22/2022  9:00 AM Nathalie Giles PA-C [facility] [address]

## 2022-10-22 NOTE — PATIENT INSTRUCTIONS
Have labs drawn, fasting 8-10 hours prior (water before is ok).     Schedule eye exam   COVID booster at pharmacy

## 2022-11-06 NOTE — TELEPHONE ENCOUNTER
Last time medication was refilled 8/5/22  Quantity and # of refills 90/0  Last OV 11/1/22  Next OV 1/28/23

## 2022-12-02 NOTE — TELEPHONE ENCOUNTER
Diabetic eye exam results received via fax from [facility]. DM flow sheet updated and fax sent to scan.

## 2023-01-01 NOTE — PROGRESS NOTES
FOLLOW UP NUTRITION CONSULTATION    Nutrition Assessment    Number of consultations with dietitian: 9    Height:  Ht Readings from Last 1 Encounters:  09/20/19 : 71\"      Weight:   Wt Readings from Last 2 Encounters:  10/10/19 : 247 lb (112 kg)  09/20/ High calorie content of cheese and nuts was discussed. Adding vegetables to snacking and reducing high fat foods was encouraged.     Goals:  · Plan dinners  · Opt for lower calorie snacks (listed) vs. Cheese and nuts in large portions    Monitoring/Evalua cross cradle football hold

## 2023-01-29 PROBLEM — E66.01 SEVERE OBESITY (BMI 35.0-39.9) WITH COMORBIDITY (HCC): Status: ACTIVE | Noted: 2023-01-29

## 2023-02-03 DIAGNOSIS — E78.2 ELEVATED TRIGLYCERIDES WITH HIGH CHOLESTEROL: ICD-10-CM

## 2023-02-03 RX ORDER — ATORVASTATIN CALCIUM 80 MG/1
TABLET, FILM COATED ORAL
Qty: 90 TABLET | Refills: 0 | Status: SHIPPED | OUTPATIENT
Start: 2023-02-03

## 2023-02-15 PROCEDURE — 3061F NEG MICROALBUMINURIA REV: CPT | Performed by: PHYSICIAN ASSISTANT

## 2023-02-16 DIAGNOSIS — Z79.899 ENCOUNTER FOR LONG-TERM (CURRENT) USE OF MEDICATIONS: Primary | ICD-10-CM

## 2023-02-16 DIAGNOSIS — E78.2 ELEVATED TRIGLYCERIDES WITH HIGH CHOLESTEROL: ICD-10-CM

## 2023-02-16 RX ORDER — FENOFIBRATE 145 MG/1
145 TABLET, COATED ORAL DAILY
Qty: 30 TABLET | Refills: 1 | Status: SHIPPED | OUTPATIENT
Start: 2023-02-16

## 2023-02-17 DIAGNOSIS — I48.20 CHRONIC ATRIAL FIBRILLATION (HCC): ICD-10-CM

## 2023-02-17 RX ORDER — FLECAINIDE ACETATE 100 MG/1
100 TABLET ORAL 2 TIMES DAILY
Qty: 180 TABLET | Refills: 3 | Status: SHIPPED | OUTPATIENT
Start: 2023-02-17

## 2023-03-13 RX ORDER — BLOOD SUGAR DIAGNOSTIC
STRIP MISCELLANEOUS
Qty: 100 STRIP | Refills: 0 | Status: SHIPPED | OUTPATIENT
Start: 2023-03-13

## 2023-03-21 ENCOUNTER — HOSPITAL ENCOUNTER (OUTPATIENT)
Dept: ULTRASOUND IMAGING | Age: 58
Discharge: HOME | End: 2023-03-21
Attending: PHYSICIAN ASSISTANT
Payer: COMMERCIAL

## 2023-03-21 ENCOUNTER — HOSPITAL ENCOUNTER (OUTPATIENT)
Dept: CT IMAGING | Age: 58
Discharge: HOME | End: 2023-03-21
Attending: PHYSICIAN ASSISTANT
Payer: COMMERCIAL

## 2023-03-21 DIAGNOSIS — R31.9 HEMATURIA, UNSPECIFIED TYPE: Primary | ICD-10-CM

## 2023-03-21 DIAGNOSIS — K80.20 GALLSTONES: ICD-10-CM

## 2023-03-21 DIAGNOSIS — N32.89 BLADDER WALL THICKENING: ICD-10-CM

## 2023-03-21 DIAGNOSIS — K42.9 UMBILICAL HERNIA WITHOUT OBSTRUCTION AND WITHOUT GANGRENE: ICD-10-CM

## 2023-03-21 DIAGNOSIS — R31.9 HEMATURIA, UNSPECIFIED TYPE: ICD-10-CM

## 2023-03-21 DIAGNOSIS — N20.0 KIDNEY STONES: ICD-10-CM

## 2023-03-21 DIAGNOSIS — N50.89 SWELLING OF SCROTUM: ICD-10-CM

## 2023-03-21 PROCEDURE — 76870 US EXAM SCROTUM: CPT | Performed by: PHYSICIAN ASSISTANT

## 2023-03-21 PROCEDURE — 74176 CT ABD & PELVIS W/O CONTRAST: CPT | Performed by: PHYSICIAN ASSISTANT

## 2023-03-21 PROCEDURE — 93975 VASCULAR STUDY: CPT | Performed by: PHYSICIAN ASSISTANT

## 2023-04-07 DIAGNOSIS — E78.2 ELEVATED TRIGLYCERIDES WITH HIGH CHOLESTEROL: ICD-10-CM

## 2023-04-07 RX ORDER — FENOFIBRATE 145 MG/1
TABLET, COATED ORAL
Qty: 30 TABLET | Refills: 0 | Status: SHIPPED | OUTPATIENT
Start: 2023-04-07

## 2023-04-21 ENCOUNTER — OFFICE VISIT (OUTPATIENT)
Dept: INTERNAL MEDICINE CLINIC | Facility: CLINIC | Age: 58
End: 2023-04-21
Payer: COMMERCIAL

## 2023-04-21 VITALS
TEMPERATURE: 98 F | SYSTOLIC BLOOD PRESSURE: 130 MMHG | HEIGHT: 71 IN | DIASTOLIC BLOOD PRESSURE: 70 MMHG | HEART RATE: 60 BPM | WEIGHT: 280 LBS | BODY MASS INDEX: 39.2 KG/M2 | RESPIRATION RATE: 19 BRPM | OXYGEN SATURATION: 98 %

## 2023-04-21 DIAGNOSIS — I48.20 CHRONIC ATRIAL FIBRILLATION (HCC): ICD-10-CM

## 2023-04-21 DIAGNOSIS — E26.09 BENIGN SECONDARY HYPERTENSION DUE TO PRIMARY ALDOSTERONISM (HCC): ICD-10-CM

## 2023-04-21 DIAGNOSIS — I15.2 BENIGN SECONDARY HYPERTENSION DUE TO PRIMARY ALDOSTERONISM (HCC): ICD-10-CM

## 2023-04-21 DIAGNOSIS — E11.65 TYPE 2 DIABETES MELLITUS WITH HYPERGLYCEMIA, WITHOUT LONG-TERM CURRENT USE OF INSULIN (HCC): Primary | Chronic | ICD-10-CM

## 2023-04-21 DIAGNOSIS — N64.4 BREAST TENDERNESS IN MALE: ICD-10-CM

## 2023-04-21 DIAGNOSIS — E66.01 SEVERE OBESITY (BMI 35.0-39.9) WITH COMORBIDITY (HCC): ICD-10-CM

## 2023-04-21 DIAGNOSIS — R73.01 IMPAIRED FASTING GLUCOSE: ICD-10-CM

## 2023-04-21 DIAGNOSIS — J45.40 MODERATE PERSISTENT ASTHMA WITHOUT COMPLICATION: ICD-10-CM

## 2023-04-21 DIAGNOSIS — N20.0 NEPHROLITHIASIS: ICD-10-CM

## 2023-04-21 DIAGNOSIS — E78.2 ELEVATED TRIGLYCERIDES WITH HIGH CHOLESTEROL: ICD-10-CM

## 2023-04-21 DIAGNOSIS — E87.6 HYPOKALEMIA: ICD-10-CM

## 2023-04-21 DIAGNOSIS — F41.1 ANXIETY, GENERALIZED: ICD-10-CM

## 2023-04-21 DIAGNOSIS — I10 RESISTANT HYPERTENSION: Primary | ICD-10-CM

## 2023-04-21 RX ORDER — ATORVASTATIN CALCIUM 80 MG/1
80 TABLET, FILM COATED ORAL NIGHTLY
Qty: 90 TABLET | Refills: 0 | Status: SHIPPED | OUTPATIENT
Start: 2023-04-21

## 2023-04-21 RX ORDER — ESCITALOPRAM OXALATE 20 MG/1
20 TABLET ORAL DAILY
Qty: 90 TABLET | Refills: 0 | Status: SHIPPED | OUTPATIENT
Start: 2023-04-21

## 2023-04-21 RX ORDER — DILTIAZEM HYDROCHLORIDE 120 MG/1
120 CAPSULE, EXTENDED RELEASE ORAL DAILY
Qty: 90 CAPSULE | Refills: 0 | Status: SHIPPED | OUTPATIENT
Start: 2023-04-21

## 2023-04-21 RX ORDER — ALBUTEROL SULFATE 90 UG/1
2 AEROSOL, METERED RESPIRATORY (INHALATION) EVERY 6 HOURS PRN
Qty: 54 G | Refills: 1 | Status: SHIPPED | OUTPATIENT
Start: 2023-04-21

## 2023-04-21 RX ORDER — METOPROLOL SUCCINATE 200 MG/1
200 TABLET, EXTENDED RELEASE ORAL 2 TIMES DAILY
Qty: 180 TABLET | Refills: 1 | Status: SHIPPED | OUTPATIENT
Start: 2023-04-21

## 2023-04-21 RX ORDER — TIRZEPATIDE 2.5 MG/.5ML
2.5 INJECTION, SOLUTION SUBCUTANEOUS WEEKLY
Qty: 0.5 ML | Refills: 0 | Status: SHIPPED | OUTPATIENT
Start: 2023-04-21

## 2023-04-21 RX ORDER — FLECAINIDE ACETATE 100 MG/1
100 TABLET ORAL 2 TIMES DAILY
Qty: 180 TABLET | Refills: 3 | Status: SHIPPED | OUTPATIENT
Start: 2023-04-21

## 2023-04-21 RX ORDER — SPIRONOLACTONE 100 MG/1
100 TABLET, FILM COATED ORAL DAILY
Qty: 135 TABLET | Refills: 3 | Status: SHIPPED | OUTPATIENT
Start: 2023-04-21

## 2023-04-21 RX ORDER — HYDRALAZINE HYDROCHLORIDE 25 MG/1
25 TABLET, FILM COATED ORAL 2 TIMES DAILY
Qty: 180 TABLET | Refills: 3 | Status: SHIPPED | OUTPATIENT
Start: 2023-04-21

## 2023-04-21 RX ORDER — METFORMIN HYDROCHLORIDE 750 MG/1
1500 TABLET, EXTENDED RELEASE ORAL DAILY
Qty: 180 TABLET | Refills: 3 | Status: SHIPPED | OUTPATIENT
Start: 2023-04-21

## 2023-04-21 RX ORDER — POTASSIUM CHLORIDE 20 MEQ/1
TABLET, EXTENDED RELEASE ORAL
Qty: 180 TABLET | Refills: 3 | Status: SHIPPED | OUTPATIENT
Start: 2023-04-21

## 2023-04-21 RX ORDER — BUDESONIDE AND FORMOTEROL FUMARATE DIHYDRATE 160; 4.5 UG/1; UG/1
2 AEROSOL RESPIRATORY (INHALATION) 2 TIMES DAILY
Qty: 1 EACH | Refills: 3 | Status: SHIPPED | OUTPATIENT
Start: 2023-04-21

## 2023-04-21 RX ORDER — LOSARTAN POTASSIUM 100 MG/1
100 TABLET ORAL DAILY
Qty: 90 TABLET | Refills: 3 | Status: SHIPPED | OUTPATIENT
Start: 2023-04-21

## 2023-04-21 RX ORDER — FENOFIBRATE 145 MG/1
145 TABLET, COATED ORAL DAILY
Qty: 30 TABLET | Refills: 0 | Status: SHIPPED | OUTPATIENT
Start: 2023-04-21 | End: 2023-04-24

## 2023-04-21 NOTE — PATIENT INSTRUCTIONS
After 5/15: Have labs drawn, fasting 8-10 hours prior (water before is ok). Fiber supplement 1-2 tsp daily   Start mounjaro: take 2.5mg weekly x 4 weeks then we will increase to 5mg weekly     -try taking spironolactone at night to see if it helps the breast tenderness.  Also discuss with Dr Boaz Ryan

## 2023-04-24 ENCOUNTER — TELEPHONE (OUTPATIENT)
Dept: INTERNAL MEDICINE CLINIC | Facility: CLINIC | Age: 58
End: 2023-04-24

## 2023-04-24 DIAGNOSIS — I48.20 CHRONIC ATRIAL FIBRILLATION (HCC): Primary | ICD-10-CM

## 2023-04-24 DIAGNOSIS — E78.2 ELEVATED TRIGLYCERIDES WITH HIGH CHOLESTEROL: ICD-10-CM

## 2023-04-24 RX ORDER — FENOFIBRATE 145 MG/1
145 TABLET, COATED ORAL DAILY
Qty: 90 TABLET | Refills: 1 | Status: SHIPPED | OUTPATIENT
Start: 2023-04-24

## 2023-04-24 NOTE — TELEPHONE ENCOUNTER
Pharmacy has questions on 3 meds:    Tirzepatide [facility]) 2.5 MG/0.5ML Subcutaneous Solution Pen-injector    rivaroxaban 20 MG Oral Tab    fenofibrate 145 MG Oral Tab

## 2023-04-24 NOTE — TELEPHONE ENCOUNTER
Spoke with pharmacist insurance requires 90 day for xarelto and fenofibrate. Mounjaro needs PA. They will fax info. PA initiated via Epic. Awaiting task. Refills sent. Unable to complete via Epic. PA completed via [employer] electronically. Awaiting decision.

## 2023-04-27 NOTE — TELEPHONE ENCOUNTER
[address] and spoke with Wilfred Gonzalez. Advised PA has been approved for the [facility] and out of pocket cost is $25. rx will be ready for pt to . Called and notified pt as well and pt verbalized understanding.

## 2023-05-03 DIAGNOSIS — E78.2 ELEVATED TRIGLYCERIDES WITH HIGH CHOLESTEROL: ICD-10-CM

## 2023-05-03 RX ORDER — ATORVASTATIN CALCIUM 80 MG/1
TABLET, FILM COATED ORAL
Qty: 90 TABLET | Refills: 0 | OUTPATIENT
Start: 2023-05-03

## 2023-05-18 DIAGNOSIS — E11.65 TYPE 2 DIABETES MELLITUS WITH HYPERGLYCEMIA, WITHOUT LONG-TERM CURRENT USE OF INSULIN (HCC): Chronic | ICD-10-CM

## 2023-05-19 RX ORDER — TIRZEPATIDE 2.5 MG/.5ML
2.5 INJECTION, SOLUTION SUBCUTANEOUS WEEKLY
Qty: 2 ML | Refills: 0 | Status: SHIPPED | OUTPATIENT
Start: 2023-05-19

## 2023-05-30 ENCOUNTER — PATIENT MESSAGE (OUTPATIENT)
Dept: INTERNAL MEDICINE CLINIC | Facility: CLINIC | Age: 58
End: 2023-05-30

## 2023-05-30 ENCOUNTER — TELEMEDICINE (OUTPATIENT)
Dept: INTERNAL MEDICINE CLINIC | Facility: CLINIC | Age: 58
End: 2023-05-30
Payer: COMMERCIAL

## 2023-05-30 DIAGNOSIS — J01.40 ACUTE NON-RECURRENT PANSINUSITIS: Primary | ICD-10-CM

## 2023-05-30 DIAGNOSIS — E26.09 BENIGN SECONDARY HYPERTENSION DUE TO PRIMARY ALDOSTERONISM (HCC): ICD-10-CM

## 2023-05-30 DIAGNOSIS — I15.2 BENIGN SECONDARY HYPERTENSION DUE TO PRIMARY ALDOSTERONISM (HCC): ICD-10-CM

## 2023-05-30 PROBLEM — I50.31 ACUTE DIASTOLIC (CONGESTIVE) HEART FAILURE (HCC): Status: ACTIVE | Noted: 2018-06-26

## 2023-05-30 PROBLEM — I48.0 PAF (PAROXYSMAL ATRIAL FIBRILLATION) (HCC): Status: ACTIVE | Noted: 2021-11-09

## 2023-05-30 PROCEDURE — 99214 OFFICE O/P EST MOD 30 MIN: CPT

## 2023-05-30 RX ORDER — PREDNISONE 20 MG/1
40 TABLET ORAL DAILY
Qty: 14 TABLET | Refills: 0 | Status: SHIPPED | OUTPATIENT
Start: 2023-05-30 | End: 2023-06-06

## 2023-05-30 RX ORDER — AMOXICILLIN AND CLAVULANATE POTASSIUM 875; 125 MG/1; MG/1
1 TABLET, FILM COATED ORAL 2 TIMES DAILY
Qty: 20 TABLET | Refills: 0 | Status: SHIPPED | OUTPATIENT
Start: 2023-05-30 | End: 2023-06-09

## 2023-05-30 RX ORDER — EPLERENONE 50 MG/1
TABLET, FILM COATED ORAL
COMMUNITY
Start: 2023-05-24

## 2023-05-30 NOTE — TELEPHONE ENCOUNTER
From: Allie Bush  To: Glover Goldberg, PA-C  Sent: 5/30/2023 4:25 AM CDT  Subject: Sinus Infection     I have a sinus infection on my left side. It effects my left eye and ear. I have pain in my ear, eye and noise. My eye is tearing up. I have pain when I turn my head to the left and look left. Starting to get draining in my throat. Blowing my noise. Can I get a Z-pack or do I need an appointment. If I need an appointment can we do a phone one. When I look left it makes me dizzy.

## 2023-06-16 DIAGNOSIS — E11.65 TYPE 2 DIABETES MELLITUS WITH HYPERGLYCEMIA, WITHOUT LONG-TERM CURRENT USE OF INSULIN (HCC): Chronic | ICD-10-CM

## 2023-06-16 RX ORDER — TIRZEPATIDE 2.5 MG/.5ML
2.5 INJECTION, SOLUTION SUBCUTANEOUS WEEKLY
Qty: 2 ML | Refills: 0 | Status: SHIPPED | OUTPATIENT
Start: 2023-06-16

## 2023-06-30 PROCEDURE — 3044F HG A1C LEVEL LT 7.0%: CPT | Performed by: PHYSICIAN ASSISTANT

## 2023-07-01 LAB
ALBUMIN/GLOBULIN RATIO: 1.9 (CALC) (ref 1–2.5)
ALBUMIN: 4.5 G/DL (ref 3.6–5.1)
ALKALINE PHOSPHATASE: 81 U/L (ref 35–144)
ALT: 42 U/L (ref 9–46)
APPEARANCE: CLEAR
AST: 27 U/L (ref 10–35)
BILIRUBIN, TOTAL: 0.4 MG/DL (ref 0.2–1.2)
BILIRUBIN: NEGATIVE
BUN: 11 MG/DL (ref 7–25)
CALCIUM: 9.8 MG/DL (ref 8.6–10.3)
CARBON DIOXIDE: 29 MMOL/L (ref 20–32)
CHLORIDE: 104 MMOL/L (ref 98–110)
CHOL/HDLC RATIO: 7.8 (CALC)
CHOLESTEROL, TOTAL: 219 MG/DL
COLOR: YELLOW
CREATININE: 1.3 MG/DL (ref 0.7–1.3)
EGFR: 64 ML/MIN/1.73M2
GLOBULIN: 2.4 G/DL (CALC) (ref 1.9–3.7)
GLUCOSE: 86 MG/DL (ref 65–99)
GLUCOSE: NEGATIVE
HDL CHOLESTEROL: 28 MG/DL
HEMOGLOBIN A1C: 5.3 % OF TOTAL HGB
KETONES: NEGATIVE
LEUKOCYTE ESTERASE: NEGATIVE
NITRITE: NEGATIVE
NON-HDL CHOLESTEROL: 191 MG/DL (CALC)
OCCULT BLOOD: NEGATIVE
PH: 5.5 (ref 5–8)
POTASSIUM: 4.3 MMOL/L (ref 3.5–5.3)
PROTEIN, TOTAL: 6.9 G/DL (ref 6.1–8.1)
PROTEIN: NEGATIVE
SODIUM: 141 MMOL/L (ref 135–146)
SPECIFIC GRAVITY: 1.02 (ref 1–1.03)
TRIGLYCERIDES: 872 MG/DL

## 2023-07-19 DIAGNOSIS — F41.1 ANXIETY, GENERALIZED: ICD-10-CM

## 2023-07-19 DIAGNOSIS — E78.2 ELEVATED TRIGLYCERIDES WITH HIGH CHOLESTEROL: ICD-10-CM

## 2023-07-19 RX ORDER — DILTIAZEM HYDROCHLORIDE 120 MG/1
120 CAPSULE, EXTENDED RELEASE ORAL DAILY
Qty: 90 CAPSULE | Refills: 0 | Status: SHIPPED | OUTPATIENT
Start: 2023-07-19

## 2023-07-19 RX ORDER — ESCITALOPRAM OXALATE 20 MG/1
20 TABLET ORAL DAILY
Qty: 90 TABLET | Refills: 0 | Status: SHIPPED | OUTPATIENT
Start: 2023-07-19

## 2023-07-19 RX ORDER — ATORVASTATIN CALCIUM 80 MG/1
80 TABLET, FILM COATED ORAL NIGHTLY
Qty: 90 TABLET | Refills: 0 | Status: SHIPPED | OUTPATIENT
Start: 2023-07-19

## 2023-07-19 NOTE — TELEPHONE ENCOUNTER
Escitalopram 20 MG  Last time medication was refilled 04/21/2023  Quantity and # of refills 90 w/ 0  Last OV 05/30/2023  Next OV   Future Appointments   Date Time Provider Sindy Cisneros   8/25/2023 10:30 AM Nathalie Giles PA-C [facility] [address]         Atorvastatin 80 MG   Last time medication was refilled 04/21/2023  Quantity and # of refills 90 w/ 0   Last OV 05/30/2023  Next OV   Future Appointments   Date Time Provider Sindy Cisneros   8/25/2023 10:30 AM Nathalie Giles PA-C [facility] [address]         Diltiazem  MG   Last time medication was refilled 04/21/2023  Quantity and # of refills 90 w/ 0  Last OV 05/30/2023  Next OV   Future Appointments   Date Time Provider Sindy Cisneros   8/25/2023 10:30 AM Nathalie Giles PA-C [facility] [address]     Escitalopram not on protocol. Sent to [facility] for approval.    Atorvastatin and Diltiazem refilled per protocol.

## 2023-08-21 ENCOUNTER — OFFICE VISIT (OUTPATIENT)
Dept: INTERNAL MEDICINE CLINIC | Facility: CLINIC | Age: 58
End: 2023-08-21
Payer: COMMERCIAL

## 2023-08-21 VITALS
WEIGHT: 173 LBS | DIASTOLIC BLOOD PRESSURE: 84 MMHG | BODY MASS INDEX: 24.22 KG/M2 | TEMPERATURE: 97 F | SYSTOLIC BLOOD PRESSURE: 136 MMHG | HEIGHT: 71 IN | HEART RATE: 67 BPM | RESPIRATION RATE: 16 BRPM | OXYGEN SATURATION: 96 %

## 2023-08-21 DIAGNOSIS — F41.1 ANXIETY, GENERALIZED: ICD-10-CM

## 2023-08-21 DIAGNOSIS — Z00.00 ROUTINE PHYSICAL EXAMINATION: Primary | ICD-10-CM

## 2023-08-21 DIAGNOSIS — G47.33 OBSTRUCTIVE SLEEP APNEA SYNDROME: ICD-10-CM

## 2023-08-21 DIAGNOSIS — E66.01 SEVERE OBESITY (BMI 35.0-39.9) WITH COMORBIDITY (HCC): ICD-10-CM

## 2023-08-21 DIAGNOSIS — E26.09 BENIGN SECONDARY HYPERTENSION DUE TO PRIMARY ALDOSTERONISM (HCC): ICD-10-CM

## 2023-08-21 DIAGNOSIS — E78.2 MIXED HYPERLIPIDEMIA: ICD-10-CM

## 2023-08-21 DIAGNOSIS — J45.40 MODERATE PERSISTENT ASTHMA WITHOUT COMPLICATION: ICD-10-CM

## 2023-08-21 DIAGNOSIS — I15.2 BENIGN SECONDARY HYPERTENSION DUE TO PRIMARY ALDOSTERONISM (HCC): ICD-10-CM

## 2023-08-21 DIAGNOSIS — E11.65 TYPE 2 DIABETES MELLITUS WITH HYPERGLYCEMIA, WITHOUT LONG-TERM CURRENT USE OF INSULIN (HCC): Chronic | ICD-10-CM

## 2023-08-21 PROBLEM — I50.31 ACUTE DIASTOLIC (CONGESTIVE) HEART FAILURE (HCC): Status: RESOLVED | Noted: 2018-06-26 | Resolved: 2023-08-21

## 2023-08-21 PROCEDURE — 3075F SYST BP GE 130 - 139MM HG: CPT | Performed by: PHYSICIAN ASSISTANT

## 2023-08-21 PROCEDURE — 99396 PREV VISIT EST AGE 40-64: CPT | Performed by: PHYSICIAN ASSISTANT

## 2023-08-21 PROCEDURE — 3008F BODY MASS INDEX DOCD: CPT | Performed by: PHYSICIAN ASSISTANT

## 2023-08-21 PROCEDURE — 3079F DIAST BP 80-89 MM HG: CPT | Performed by: PHYSICIAN ASSISTANT

## 2023-08-21 RX ORDER — TIRZEPATIDE 2.5 MG/.5ML
2.5 INJECTION, SOLUTION SUBCUTANEOUS WEEKLY
Qty: 2 ML | Refills: 2 | Status: SHIPPED | OUTPATIENT
Start: 2023-08-21

## 2023-08-21 RX ORDER — ROSUVASTATIN CALCIUM 40 MG/1
40 TABLET, COATED ORAL NIGHTLY
Qty: 90 TABLET | Refills: 1 | Status: SHIPPED | OUTPATIENT
Start: 2023-08-21

## 2023-08-21 NOTE — PATIENT INSTRUCTIONS
Colonoscopy due in January Dr [facility]ond Aid   Flu shot this fall  Try knee-high compression socks at work    Continue fenofibrate.  Switch from atorvastatin to rosuvastatin

## 2023-09-13 NOTE — LETTER
ASTHMA ACTION PLAN for [facility]     : 1965     Date: 3/25/2021  Provider:  REINA Sung  Phone for doctor or clinic: [address] [place] [address] 2, [facility] 2, [address] [address]  [phone] SUBJECTIVE:    Chief Complaint   Patient presents with   • Office Visit     6 month f/u       HPI:  Patient ID: Frank is a 69 year old male with  pmhx of HTN, GERD, BPH, DM2, disthymic disorder, presents for follow up .  His most recent a1c was 6.2 on 9/8/23, stable.  His diabetes is diet controlled. Patient  Is compliant with  His blood pressure medication.  Takes Effexor few times a week. Tried to go off it; noticed mood changes. He takes it every other day with  Improvement.   Patient has just completed EGD and colonoscopy. EGD was compatible with Izaguirre's. Patient  Takes omeprazole. Repeat colonoscopy and EGD is recommended in 3  Years.  Patient  Reports pain and swelling in R hand for the past week.. Pain mostly in the thumb. Patient  Reports pain with  Mowing the lawn. He is left handed. Denies numbness or tingling.   no other acute concerns/complains  10 point ROS reviewed and negative unless otherwise stated      Patient Active Problem List   Diagnosis   • Benign prostatic hyperplasia with urinary obstruction   • Dysthymic disorder   • Essential hypertension   • Hyperlipemia   • Hypermetropia   • Presbyopia   • Other male erectile dysfunction   • Gastroesophageal reflux disease with esophagitis   • DM (diabetes mellitus) (CMD)     Past Surgical History:   Procedure Laterality Date   • Hernia repair     • Varicose vein surgery       ALLERGIES:  No Known Allergies  Current Outpatient Medications   Medication Sig Dispense Refill   • metoPROLOL tartrate (LOPRESSOR) 25 MG tablet Take 1 tablet by mouth in the morning and 1 tablet in the evening. 30 tablet 11   • venlafaxine XR (EFFEXOR XR) 37.5 MG 24 hr capsule Take 1 capsule by mouth every other day. 45 capsule 3   • Sodium Sulfate-Mag Sulfate-KCl 9817-172-262 MG Tab Take 12 tablets by mouth as directed. See Colonoscopy Instructions. 24 tablet 0   • sildenafil (VIAGRA) 50 MG tablet Take 1 tablet by mouth as needed for Erectile Dysfunction. 10 tablet 6   •  omeprazole (PrilOSEC) 20 MG capsule Take 1 capsule by mouth daily. As needed for heartburn 90 capsule 3   • econazole (SPECTAZOLE) 1 % cream Apply  topically 2 (two) times daily. Apply To The Affected Area As Directed. - Topical     • NYSTATIN EX Apply bid for up to 10 days at a time       No current facility-administered medications for this visit.     Facility-Administered Medications Ordered in Other Visits   Medication   • dextrose (GLUTOSE) 40 % gel 30 g   • dextrose 50 % injection 25 g   • insulin regular (human) (HumuLIN R, NovoLIN R) sliding scale injection   • sodium chloride 0.9 % flush bag 25 mL   • sodium chloride (PF) 0.9 % injection 2 mL   • lactated ringers infusion   • sodium chloride 0.9% infusion   • dextrose 5 % infusion     Social History     Socioeconomic History   • Marital status: /Civil Union     Spouse name: Not on file   • Number of children: Not on file   • Years of education: Not on file   • Highest education level: Not on file   Occupational History   • Not on file   Tobacco Use   • Smoking status: Never   • Smokeless tobacco: Never   Vaping Use   • Vaping Use: never used   Substance and Sexual Activity   • Alcohol use: Not Currently   • Drug use: Never   • Sexual activity: Yes     Partners: Male   Other Topics Concern   • Not on file   Social History Narrative   • Not on file     Social Determinants of Health     Financial Resource Strain: Not on file   Food Insecurity: Not on file   Transportation Needs: Not on file   Physical Activity: Not on file   Stress: Not on file   Social Connections: Not on file   Intimate Partner Violence: Not on file     Family History   Problem Relation Age of Onset   • COPD Mother 74   • Cancer, Esophageal Father 66   • Cancer, Kidney Brother    • Cancer, Prostate Brother        Review of Systems   Respiratory: Negative for shortness of breath.    Cardiovascular: Negative for chest pain.   Musculoskeletal: Positive for arthralgias.   All other  systems reviewed and are negative.      OBJECTIVE:  Visit Vitals  /72   Temp 97.9 °F (36.6 °C) (Tympanic)   Resp 20   Wt 96.3 kg (212 lb 4.8 oz)   BMI 32.28 kg/m²     Physical Exam  Vitals and nursing note reviewed.   Constitutional:       Appearance: Normal appearance.   HENT:      Head: Normocephalic.      Right Ear: Tympanic membrane, ear canal and external ear normal.      Left Ear: Tympanic membrane, ear canal and external ear normal.      Nose: Nose normal.      Mouth/Throat:      Mouth: Mucous membranes are moist.      Pharynx: Oropharynx is clear.   Eyes:      Conjunctiva/sclera: Conjunctivae normal.      Pupils: Pupils are equal, round, and reactive to light.   Cardiovascular:      Rate and Rhythm: Normal rate and regular rhythm.      Pulses: Normal pulses.      Heart sounds: Normal heart sounds.   Pulmonary:      Effort: Pulmonary effort is normal.      Breath sounds: Normal breath sounds.   Musculoskeletal:         General: Tenderness present.      Right hand: Tenderness present.      Comments: Positive Finkelstein test on the R thumb  Tenderness to palpation with  Lump sensation of R ulnar palm   Skin:     General: Skin is warm and dry.   Neurological:      General: No focal deficit present.      Mental Status: He is alert and oriented to person, place, and time. Mental status is at baseline.   Psychiatric:         Mood and Affect: Mood normal.         Thought Content: Thought content normal.         Judgment: Judgment normal.           ASSESSMENT & PLAN:  Encounter for long-term (current) use of medications  - Lipid Panel With Reflex; Future  - CBC with Automated Differential; Future  - Glycohemoglobin; Future  - Thyroid Stimulating Hormone Reflex; Future  - Urinalysis & Reflex Microscopy With Culture If Indicated; Future  - Comprehensive Metabolic Panel; Future    Right hand pain  - SERVICE TO HAND SURGERY  Encouraged to follow up  With  Hand specialist    Need for vaccination  - INFLUENZA  QUADRIVALENT HIGH DOSE PRES FREE 0.7 ML VACC,IM    Need for hepatitis B vaccination  - HEP B VACC ADULT 2 DOSE, IM     Type 2 diabetes mellitus without complication, without long-term current use of insulin (CMS/HCC)  a1c 6.2 on 9/8/23  Diet controlled  Repeat a1c in 6 months  Encouraged to follow up  With  Eye doctor       Moderate episode of recurrent major depressive disorder (CMS/HCC)  Continue Effexor     Essential hypertension  Stable  CMP  DASH diet     Benign prostatic hyperplasia with urinary obstruction  No longer taking flomax  Declined further evaluation at this time  Monitor     Gastroesophageal reflux disease with esophagitis without hemorrhage  Izaguirre's esophagus  S/p EGD  Continue prilosec  GI following    Electronically signed by: Shivam Kessler DO  9/14/2023

## 2023-10-13 DIAGNOSIS — F41.1 ANXIETY, GENERALIZED: ICD-10-CM

## 2023-10-13 RX ORDER — ESCITALOPRAM OXALATE 20 MG/1
20 TABLET ORAL DAILY
Qty: 90 TABLET | Refills: 0 | Status: SHIPPED | OUTPATIENT
Start: 2023-10-13

## 2023-10-13 NOTE — TELEPHONE ENCOUNTER
Last time medication was refilled 07/19/2023  Quantity and # of refills 90 w 0  Last OV 08/21/2023  Next OV 02/12/2024    Sent to [employer] [facility] for approval.

## 2023-10-18 ENCOUNTER — OFFICE VISIT (OUTPATIENT)
Dept: INTERNAL MEDICINE CLINIC | Facility: CLINIC | Age: 58
End: 2023-10-18
Payer: COMMERCIAL

## 2023-10-18 DIAGNOSIS — Z86.79 H/O THORACIC AORTIC ANEURYSM REPAIR: Primary | ICD-10-CM

## 2023-10-18 DIAGNOSIS — J01.00 ACUTE NON-RECURRENT MAXILLARY SINUSITIS: ICD-10-CM

## 2023-10-18 DIAGNOSIS — I71.21 ANEURYSM OF ASCENDING AORTA WITHOUT RUPTURE (HCC): ICD-10-CM

## 2023-10-18 DIAGNOSIS — Z98.890 H/O THORACIC AORTIC ANEURYSM REPAIR: Primary | ICD-10-CM

## 2023-10-18 PROCEDURE — 3080F DIAST BP >= 90 MM HG: CPT | Performed by: PHYSICIAN ASSISTANT

## 2023-10-18 PROCEDURE — 99214 OFFICE O/P EST MOD 30 MIN: CPT | Performed by: PHYSICIAN ASSISTANT

## 2023-10-18 PROCEDURE — 3077F SYST BP >= 140 MM HG: CPT | Performed by: PHYSICIAN ASSISTANT

## 2023-10-18 PROCEDURE — 3008F BODY MASS INDEX DOCD: CPT | Performed by: PHYSICIAN ASSISTANT

## 2023-10-18 RX ORDER — AMOXICILLIN AND CLAVULANATE POTASSIUM 875; 125 MG/1; MG/1
1 TABLET, FILM COATED ORAL 2 TIMES DAILY
Qty: 20 TABLET | Refills: 0 | Status: SHIPPED | OUTPATIENT
Start: 2023-10-18 | End: 2023-10-28

## 2023-10-19 VITALS
WEIGHT: 266 LBS | RESPIRATION RATE: 14 BRPM | HEART RATE: 86 BPM | DIASTOLIC BLOOD PRESSURE: 90 MMHG | SYSTOLIC BLOOD PRESSURE: 150 MMHG | HEIGHT: 71 IN | BODY MASS INDEX: 37.24 KG/M2 | OXYGEN SATURATION: 98 % | TEMPERATURE: 97 F

## 2023-10-29 ENCOUNTER — HOSPITAL ENCOUNTER (OUTPATIENT)
Dept: CT IMAGING | Age: 58
End: 2023-10-29
Attending: PHYSICIAN ASSISTANT
Payer: COMMERCIAL

## 2023-10-29 ENCOUNTER — HOSPITAL ENCOUNTER (OUTPATIENT)
Dept: CT IMAGING | Age: 58
Discharge: HOME | End: 2023-10-29
Attending: PHYSICIAN ASSISTANT
Payer: COMMERCIAL

## 2023-10-29 DIAGNOSIS — I71.21 ANEURYSM OF ASCENDING AORTA WITHOUT RUPTURE (HCC): ICD-10-CM

## 2023-10-29 DIAGNOSIS — Z86.79 H/O THORACIC AORTIC ANEURYSM REPAIR: ICD-10-CM

## 2023-10-29 DIAGNOSIS — Z98.890 H/O THORACIC AORTIC ANEURYSM REPAIR: ICD-10-CM

## 2023-10-29 LAB
CREAT BLD-MCNC: 1.4 MG/DL
EGFRCR SERPLBLD CKD-EPI 2021: 58 ML/MIN/1.73M2 (ref 60–?)

## 2023-10-29 PROCEDURE — 82565 ASSAY OF CREATININE: CPT

## 2023-10-29 PROCEDURE — 71275 CT ANGIOGRAPHY CHEST: CPT | Performed by: PHYSICIAN ASSISTANT

## 2023-10-29 RX ORDER — IOHEXOL 350 MG/ML
105 INJECTION, SOLUTION INTRAVENOUS
Status: COMPLETED | OUTPATIENT
Start: 2023-10-29 | End: 2023-10-29

## 2023-10-29 RX ADMIN — IOHEXOL 105 ML: 350 INJECTION, SOLUTION INTRAVENOUS at 10:20:00

## 2023-11-16 RX ORDER — DILTIAZEM HYDROCHLORIDE 120 MG/1
120 CAPSULE, EXTENDED RELEASE ORAL DAILY
Qty: 90 CAPSULE | Refills: 0 | Status: SHIPPED | OUTPATIENT
Start: 2023-11-16

## 2023-11-16 NOTE — TELEPHONE ENCOUNTER
Last time medication was refilled 7/19/23  Quantity and # of refills 90 w 0  Last OV 10/18/23  Next OV 11/29/23  Passed protocol, Rx sent.

## 2023-11-28 LAB
CHOL/HDLC RATIO: 4.6 (CALC)
CHOLESTEROL, TOTAL: 176 MG/DL
HDL CHOLESTEROL: 38 MG/DL
LDL-CHOLESTEROL: 91 MG/DL (CALC)
NON-HDL CHOLESTEROL: 138 MG/DL (CALC)
TRIGLYCERIDES: 351 MG/DL

## 2023-11-29 ENCOUNTER — OFFICE VISIT (OUTPATIENT)
Dept: INTERNAL MEDICINE CLINIC | Facility: CLINIC | Age: 58
End: 2023-11-29
Payer: COMMERCIAL

## 2023-11-29 VITALS
OXYGEN SATURATION: 97 % | RESPIRATION RATE: 16 BRPM | SYSTOLIC BLOOD PRESSURE: 150 MMHG | HEART RATE: 94 BPM | HEIGHT: 71 IN | TEMPERATURE: 97 F | WEIGHT: 272 LBS | DIASTOLIC BLOOD PRESSURE: 74 MMHG | BODY MASS INDEX: 38.08 KG/M2

## 2023-11-29 DIAGNOSIS — K29.30 CHRONIC SUPERFICIAL GASTRITIS WITHOUT BLEEDING: ICD-10-CM

## 2023-11-29 DIAGNOSIS — K80.20 CALCULUS OF GALLBLADDER WITHOUT CHOLECYSTITIS WITHOUT OBSTRUCTION: Primary | ICD-10-CM

## 2023-11-29 DIAGNOSIS — J45.40 MODERATE PERSISTENT ASTHMA WITHOUT COMPLICATION: ICD-10-CM

## 2023-11-29 DIAGNOSIS — S76.312A STRAIN OF LEFT HAMSTRING, INITIAL ENCOUNTER: ICD-10-CM

## 2023-11-29 DIAGNOSIS — F41.1 ANXIETY, GENERALIZED: ICD-10-CM

## 2023-11-29 DIAGNOSIS — E78.2 MIXED HYPERLIPIDEMIA: ICD-10-CM

## 2023-11-29 DIAGNOSIS — E11.65 TYPE 2 DIABETES MELLITUS WITH HYPERGLYCEMIA, WITHOUT LONG-TERM CURRENT USE OF INSULIN (HCC): Chronic | ICD-10-CM

## 2023-11-29 PROCEDURE — 3008F BODY MASS INDEX DOCD: CPT | Performed by: PHYSICIAN ASSISTANT

## 2023-11-29 PROCEDURE — 99214 OFFICE O/P EST MOD 30 MIN: CPT | Performed by: PHYSICIAN ASSISTANT

## 2023-11-29 PROCEDURE — 3078F DIAST BP <80 MM HG: CPT | Performed by: PHYSICIAN ASSISTANT

## 2023-11-29 PROCEDURE — 90471 IMMUNIZATION ADMIN: CPT | Performed by: PHYSICIAN ASSISTANT

## 2023-11-29 PROCEDURE — 3077F SYST BP >= 140 MM HG: CPT | Performed by: PHYSICIAN ASSISTANT

## 2023-11-29 PROCEDURE — 90686 IIV4 VACC NO PRSV 0.5 ML IM: CPT | Performed by: PHYSICIAN ASSISTANT

## 2023-11-29 RX ORDER — ALBUTEROL SULFATE 90 UG/1
2 AEROSOL, METERED RESPIRATORY (INHALATION) EVERY 6 HOURS PRN
Qty: 54 G | Refills: 1 | Status: SHIPPED | OUTPATIENT
Start: 2023-11-29

## 2023-11-29 RX ORDER — PANTOPRAZOLE SODIUM 40 MG/1
40 TABLET, DELAYED RELEASE ORAL
Qty: 14 TABLET | Refills: 0 | Status: SHIPPED | OUTPATIENT
Start: 2023-11-29 | End: 2023-12-13

## 2023-11-29 NOTE — PATIENT INSTRUCTIONS
See electrophysiologist as planned  Establish with general surgeon for gallstones  Schedule annual eye exam   Colonoscopy due early next year

## 2023-12-03 DIAGNOSIS — E78.2 ELEVATED TRIGLYCERIDES WITH HIGH CHOLESTEROL: ICD-10-CM

## 2023-12-04 RX ORDER — FENOFIBRATE 145 MG/1
145 TABLET, COATED ORAL DAILY
Qty: 90 TABLET | Refills: 0 | Status: SHIPPED | OUTPATIENT
Start: 2023-12-04

## 2023-12-04 NOTE — TELEPHONE ENCOUNTER
Last time medication was refilled 04/24/2023  Quantity and # of refills 90 w 1  Last OV 11/29/2023  Next OV 02/28/2024    Passed protocol, Rx sent.

## 2023-12-08 DIAGNOSIS — I1A.0 RESISTANT HYPERTENSION: ICD-10-CM

## 2023-12-08 DIAGNOSIS — I48.20 CHRONIC ATRIAL FIBRILLATION (HCC): ICD-10-CM

## 2023-12-08 RX ORDER — METOPROLOL SUCCINATE 200 MG/1
200 TABLET, EXTENDED RELEASE ORAL 2 TIMES DAILY
Qty: 180 TABLET | Refills: 0 | Status: SHIPPED | OUTPATIENT
Start: 2023-12-08

## 2023-12-08 NOTE — TELEPHONE ENCOUNTER
Last time medication was refilled 4/21/23  Quantity and # of refills 180 w 1  Last OV 11/29/23  Next OV 12/16/23  Passed protocol, Rx sent.

## 2023-12-08 NOTE — TELEPHONE ENCOUNTER
Crystal from [employer] regarding botox PA
Went over information to approve the Botox    Approval #[id number] - 2 visits from 3/19/20-9/19/20    Geri Clemons is processing RX and will call patient for consent then call us to ship
[phone]

## 2023-12-08 NOTE — TELEPHONE ENCOUNTER
Last time medication was refilled 4/24/23  Quantity and # of refills 90  w 1  Last OV 11/29/23  Next OV 12/16/23  Sent to Dr. Rayshawn Mathew for approval.

## 2023-12-11 DIAGNOSIS — K29.30 CHRONIC SUPERFICIAL GASTRITIS WITHOUT BLEEDING: ICD-10-CM

## 2023-12-11 RX ORDER — PANTOPRAZOLE SODIUM 40 MG/1
40 TABLET, DELAYED RELEASE ORAL
Qty: 14 TABLET | Refills: 0 | OUTPATIENT
Start: 2023-12-11

## 2023-12-15 NOTE — LETTER
[address]  [address]    Consent for Anesthesia    I, Rivera Bush agree to be cared for by a physician anesthesiologist alone and/or with a nurse anesthetist, who is specially trained to monitor me and give me medicine to put me to sleep or keep me comfortable during my procedure    I understand that my anesthesiologist and/or anesthetist is not an employee or agent of [facility] or [employer]. He or she works for [employer].    As the patient asking for anesthesia services, I agree to:  Allow the anesthesiologist (anesthesia doctor) to give me medicine and do additional procedures as necessary. Some examples are: Starting or using an “IV” to give me medicine, fluids or blood during my procedure, and having a breathing tube placed to help me breathe when I’m asleep (intubation). In the event that my heart stops working properly, I understand that my anesthesiologist will make every effort to sustain my life, unless otherwise directed by [facility] Do Not Resuscitate documents.  Tell my anesthesia doctor before my procedure:   If I am pregnant.   The last time that I ate or drank.  iii. All of the medicines I take (including prescriptions, herbal supplements, and pills I can buy without a prescription (including street drugs/illegal medications). Failure to inform my anesthesiologist about these medicines may increase my risk of anesthetic complications.  Iv.If I am allergic to anything or have had a reaction to anesthesia before.  I understand how the anesthesia medicine will help me (benefits).  I understand that with any type of anesthesia medicine there are risks:  The most common risks are: nausea, vomiting, sore throat, muscle soreness, damage to my eyes, mouth, or teeth (from breathing tube placement).  Rare risks include: remembering what happened during my procedure, allergic reactions to medications, injury to my airway,  heart, lungs, vision, nerves, or muscles and in extremely rare instances death.  My doctor has explained to me other choices available to me for my care (alternatives).  Pregnant Patients (“epidural”):  I understand that the risks of having an epidural (medicine given into my back to help control pain during labor), include itching, low blood pressure, difficulty urinating, headache or slowing of the baby’s heart. Very rare risks include infection, bleeding, seizure, irregular heart rhythms and nerve injury.  Regional Anesthesia (“spinal”, “epidural”, & “nerve blocks”):  I understand that rare but potential complications include headache, bleeding, infection, seizure, irregular heart rhythms, and nerve injury.    _____________________________________________________________________________  Patient (or Representative) Signature/Relationship to Patient  Date   Time    _____________________________________________________________________________   Name (if used)    Language/Organization   Time    _____________________________________________________________________________  Nurse Anesthetist Signature     Date   Time    ______________________________________________________________________________  Anesthesiologist Signature     Date   Time  I have discussed the procedure and information above with the patient (or patient’s representative) and answered their questions. The patient or their representative has agreed to have anesthesia services.    _____________________________________________________________________________  Witness       Date   Time  I have verified that the signature is that of the patient or patient’s representative, and that it was signed before the procedure    Patient Name: Rivera Bush     : 1965                 Printed: 2025 at 9:55 AM    Medical Record #: [id number]                                            Page 1 of 1   yes

## 2023-12-19 NOTE — PAT NURSING NOTE
PAT nursing note: denies missing any Xarelto within last 30 days; verbalized understanding of all instructions. Pre-Procedure Instructions for Friday, 12/22/23        Visitor Instructions     -One visitor can accompany you the day of procedure. PreOp Instructions     You are scheduled for: a Cardioversion with Dr. Rajni Ogden        Date of Procedure: Friday, 12/22/23        Diet Instructions:     -Do not eat or drink anything after midnight        Medications:      CONTINUE to take your prescribed medications as directed EXCEPT:     -Take your morning medications with a sip of water.  -Continue to take your Xarelto as directed. You stated you have not missed a dose within the last 30 days.  -Use your morning Symbicort inhaler. Medications to Stop:      -The last dose of herbal supplements, supplements, herbal products, vitamins, Potassium, Furosemide, and Eplereone will be Thursday, 12/21 morning dose. -The last dose of Metformin will be Thursday, 12/21 morning dose. Diabetic Instructions:      -The last dose of Metformin will be Thursday morning.   -If you wear a CGM (continuous glucose monitor), you will need to remove the entire device (sensor/transmitter) and leave at home prior to your procedure. Sleep Apnea:      -If you have sleep apnea, please bring your mask and tubing        Arrival Time:      -You will receive a call the afternoon before your procedure after 3 pm on what time you should arrive the day of your procedure. Driving After Procedure:      -If sedation is given, you WILL NOT be able to drive home. You will need a responsible adult  to drive you home.         Discharge Teaching:      -Your nurse will give you specific instructions before discharge.  -Most people can resume normal activities in 2-3 days.  -Any questions, please call the physician's office.     -Complimentary [employer] parking is available starting at 6:00 am.  -[facility] the [facility] at [address] in at the [place] reception desk.   -Our  will be there to check you in for your procedure.   -Please bring your insurance cards and ID with you. Please DO NOT respond to this message, the inbasket is not monitored for messages. For any questions, please call the physician's office.

## 2023-12-22 NOTE — PROCEDURES
Electrophysiology Procedure Note    Mami Paulino Location: Cath Lab   CSN [id number] MRN [id number]   Admission Date 12/22/2023  Operation Date 12/22/23    Attending Physician Kaylie Fontana MD Operating Physician Kaylie Fontana MD     Pre-Operative Diagnosis: Afib    Post-Operative Diagnosis: Same as above  PROCEDURE(S) PERFORMED:    1. Cardioversion. 2.     Sedation      :  Kaylie Fontana MD     ANESTHESIA:  IV sedation. Moderate conscious sedation for this procedure was administered and personally monitored. Brevital 40 +20mg  Sedation time: 10 minutes     INDICATION:  Persistent Atrial Fibrillation     COMPLICATIONS:  None. METHODS:  The patient was brought to the outpatient cardiac telemetry unit in a fasting and nonsedated state after providing informed consent. IV sedation was administered during continuous ECG, pulse oximeter and noninvasive hemodynamic monitoring. After administering IV Brevital in intermittent boluses, the desired level of sedation was achieved. Cardioversion Energy:  200J    Pad Position: Anterior-Posterior  Pre- Cardioversion Rhythm- AFL  Post Cardioversion Rhythm - NSR    CONCLUSIONS:  1.     Successful cardioversion       Plan:  1- Rhythm/Rate Control Meds: No changes  2) Anticoagulation- No changes  3) Follow Up- 1 month                    Kaylie Fontana MD     Cardiac Electrophysiololgy  [address] [place] [address]

## 2023-12-22 NOTE — PROGRESS NOTES
Pt s/p successful synchronized cardioversion. Post procedure EKG done. VSS. Neurologically intact. Tolerated PO intake and ambulation in room. IV d/c'd. Discharge instructions given-pt verbalized understanding. Pt to lobby via REBEKA James and mom to drive home.

## 2023-12-22 NOTE — DISCHARGE INSTRUCTIONS
Home Care Instructions Following Cardioversion or ICD Evaluation    Activity  -DO NOT drive after the procedure. You may resume driving after 24 hours  -DO NOT operate any machinery (including kitchen appliances or power tools)  -Avoid drinking alcohol for 24 hours  -You may resume your normal activity after 24 hours    What is normal  -Your skin may be red where the patches were placed  -The redness may last 2 to 3 days and feel like \"sunburn\"  -You may treat the area with an over-the-counter cream that is used for sunburned skin    Special Instructions  -If you were taking the medication Pradaxa, Coumadin, Eliquis, or Xarelto it is very important to continue taking it until your follow-up appointment with your physician    When you should NOTIFY [address]  -If you have an ICD:       ~Any time your ICD device fires       ~If you feel that you have returned to an irregular rhythm    Other  -You may resume your present diet, unless otherwise specified by your physician  -You may resume all of your medications as prescribed, unless otherwise directed by your physician  -A list of your medications was provided to you at discharge  -Please call your physician's office for a follow-up appointment.  You should be seen in 2 weeks

## 2023-12-26 NOTE — TELEPHONE ENCOUNTER
Last time medication was refilled historical  Quantity and # of refills n/a  Last OV 11/29/23  Next OV 2/28/24  Passed protocol, Rx sent.

## 2024-01-09 NOTE — PATIENT INSTRUCTIONS
Complete blood test today  Continue current medications   Follow up with cardiologist and pulmonologist as planned  Call with refills to send to mail order when needed Left vm to let pt's parents call back to schedule surgery.

## 2024-01-12 NOTE — TELEPHONE ENCOUNTER
PT hurt his back shoveling snow and he is requesting Meds     Ph: [phone]     [id number] - [place], [place] - [address] [facility] [address] [facility] [phone], [phone] [address] [facility] [address] Phone: [phone] Fax: [phone] Hours: Not open 24 hours

## 2024-01-17 NOTE — TELEPHONE ENCOUNTER
Last time medication was refilled 12/26/2023  Quantity and # of refills 30 w 0  Last OV 11/29/2023  Next OV   Future Appointments   Date Time Provider Department Center   2/13/2024  4:15 PM Nikki Boyle APRN [facility] [place]   2/24/2024  8:00 AM Nathalie Giles PA-C [facility] [address] [facility]   2/26/2024  8:00 AM Evelyn Armstrong APRN [facility]   2/28/2024  4:00 PM May Preciado APRN [facility]       Passed protocol    Drug-Drug Interaction please review     Sent to Dr. Davis for approval

## 2024-01-18 NOTE — TELEPHONE ENCOUNTER
Needs 90 day supply maintenance if has more two fills of med per insurance    Requesting verbal ok

## 2024-02-06 NOTE — TELEPHONE ENCOUNTER
Pt agreed to see RD pended referral please sign so we can attach to appt     Future Appointments   Date Time Provider Department Center   2/24/2024  8:00 AM Nathalie Giles PA-C [facility] [address] [facility]   2/26/2024  8:00 AM Evelyn Armstrong APRN [facility]   2/28/2024  4:00 PM May Preciado APRN [facility]   3/18/2024  8:30 AM Maria Elena Cohen RD [facility]

## 2024-02-06 NOTE — TELEPHONE ENCOUNTER
Last A1c value was 5.3% done 6/30/2023.  Only on Metformin rx  In past was seeing [facility] - no previous DM appt w [facility] DM MIAN  Ok to continue DM care with PCP office however if he feels he wants diabetes management support, recommend an appt W SHALINI CONTRERAS or DONY CONTRERAS

## 2024-02-06 NOTE — TELEPHONE ENCOUNTER
Patient has appt with CB coming up. Last A1c value was 5.3% done 6/30/2023 CB thinks its ok for patient to continue seeing PCP contacted patient to inform about appt coming up not needed. Patient states he was [facility] someone before [facility] patient was seeing weight loss clinic Dr Nava who was managing weight /Diabetes last 9/1/2020 and Tabitha PEÑA 10/19/2020    Told patient I would inform CB ok to Cx appt still? patient wants appt to get make sure he is still doing everything ok informed he could see an educator for something like that but that based on A1C DM provider would still refer him back to PCP most likely patient asked if I could call him back after CB is aware

## 2024-02-12 NOTE — TELEPHONE ENCOUNTER
DILTIAZEM HCL  MG Oral Capsule SR 24 Hr   Last time medication was refilled 11/16/2023  Quantity and # of refills 90 w 0  Last OV 11/29/2023  Next OV   Future Appointments   Date Time Provider Department Center   2/24/2024  8:00 AM Nathalie Giles PA-C [facility] [address] [facility]   2/26/2024  8:00 AM Last, Evelyn, APRN [facility]   3/18/2024  8:30 AM Maria Elena Cohen RD [facility]   4/26/2024 10:00 AM  IVS RM 2 EP  [facility]       Passed protocol, Rx sent.    rosuvastatin 40 MG Oral Tab   Last time medication was refilled 08/21/2023  Quantity and # of refills 90 w 1  Last OV 11/29/2023  Next OV   Future Appointments   Date Time Provider Department Center   2/24/2024  8:00 AM Nathalie Giles PA-C [facility] [address] [facility]   2/26/2024  8:00 AM Last, Evelyn, APRN [facility]   3/18/2024  8:30 AM Maria Elena Cohen RD [facility]   4/26/2024 10:00 AM  IVS RM 2 EP  [facility]         Passed protocol, Rx sent.

## 2024-02-24 NOTE — PROGRESS NOTES
Rivera Bush is a 58 year old male.  Here for DM check, f/up  HPI:   C/o muscle aches, numbness down L arm and L thigh; improves with stretching;   Seeing chiropractor, tried massage, home exercises,   In the past tried epidural steroid injection which improved symptoms for 5 years.    afib causing SOB, chest pains over months: seeing EP. Cardioversion lasted 6 wks before afib returned; in that time, while in normal rhythm, he felt significant improvement in daily SOB, energy level was much better.  Plan is for ablation 4/26.    Asthma control is poor; feels short of breath but mostly due to afib, no wheezing or cough.     Current Outpatient Medications   Medication Sig Dispense Refill   • Tirzepatide (MOUNJARO) 2.5 MG/0.5ML Subcutaneous Solution Pen-injector Inject 2.5 mg into the skin once a week for 28 days. 2 mL 0   • ROSUVASTATIN 40 MG Oral Tab Take 1 tablet (40 mg total) by mouth nightly 90 tablet 0   • DILTIAZEM HCL  MG Oral Capsule SR 24 Hr TAKE 1 CAPSULE BY MOUTH ONE TIME DAILY 90 capsule 0   • eplerenone 50 MG Oral Tab Take 1 tablet (50 mg total) by mouth daily. 90 tablet 1   • rivaroxaban (XARELTO) 20 MG Oral Tab Take 1 tablet (20 mg total) by mouth daily. 90 tablet 0   • METOPROLOL SUCCINATE  MG Oral Tablet 24 Hr TAKE ONE TABLET BY MOUTH TWICE DAILY 180 tablet 0   • FENOFIBRATE 145 MG Oral Tab TAKE 1 TABLET BY MOUTH ONE TIME DAILY 90 tablet 0   • albuterol (VENTOLIN HFA) 108 (90 Base) MCG/ACT Inhalation Aero Soln Inhale 2 puffs into the lungs every 6 (six) hours as needed for Shortness of Breath. 54 g 1   • Budesonide-Formoterol Fumarate (SYMBICORT) 160-4.5 MCG/ACT Inhalation Aerosol Inhale 2 puffs into the lungs 2 (two) times daily. 1 each 3   • flecainide 100 MG Oral Tab Take 1 tablet (100 mg total) by mouth 2 (two) times daily. 180 tablet 3   • potassium chloride 20 MEQ Oral Tab CR TAKE 1 TABLET BY MOUTH IN  THE MORNING AND 1 TABLET BY MOUTH IN THE EVENING (Patient taking  differently: Take 1 tablet (20 mEq total) by mouth 2 (two) times daily. TAKE 1 TABLET BY MOUTH IN  THE MORNING AND 1 TABLET BY MOUTH IN THE EVENING) 180 tablet 3   • metFORMIN  MG Oral Tablet 24 Hr Take 2 tablets (1,500 mg total) by mouth daily. 180 tablet 3   • losartan 100 MG Oral Tab Take 1 tablet (100 mg total) by mouth daily. 90 tablet 3   • hydrALAZINE 25 MG Oral Tab Take 1 tablet (25 mg total) by mouth in the morning and 1 tablet (25 mg total) before bedtime. 180 tablet 3   • Glucose Blood (ONETOUCH VERIO) In Vitro Strip TEST BLOOD GLUCOSE LEVELS ONCE DAILY 100 strip 0   • AZELASTINE  MCG/SPRAY Nasal Solution INHALE 1 SPRAY INTO EACH NOSTRIL 2 TIMES DAILY (Patient taking differently: 1 spray by Nasal route in the morning and 1 spray before bedtime.) 30 mL 2   • meclizine 25 MG Oral Tab Take 1 tablet (25 mg total) by mouth 3 (three) times daily as needed. 30 tablet 0   • Rizatriptan Benzoate (MAXALT) 10 MG Oral Tab Take 1 tablet (10 mg total) by mouth as needed for Migraine. Do not exceed 3 tablets in 24 hours 9 tablet 1   • vitamin E 1000 UNITS Oral Cap Take 1 capsule (1,000 Units total) by mouth daily.     • FUROSEMIDE 20 MG Oral Tab TAKE 1 TABLET BY MOUTH  DAILY (Patient taking differently: Take 1 tablet (20 mg total) by mouth daily.) 90 tablet 3   • CLONIDINE HCL 0.1 MG Oral Tab TAKE ONE TABLET BY MOUTH DAILY AS NEEDED  (Patient taking differently: Take 1 tablet (0.1 mg total) by mouth daily as needed.) 90 tablet 0   • magnesium oxide 400 MG Oral Tab Take 1 tablet (400 mg total) by mouth daily.     • ONETOUCH DELICA PLUS [id number] Does not apply Misc USE TO TEST BLOOD SUGAR  ONCE DAILY AS DIRECTED 100 each 2   • Vitamin D3 2000 units Oral Cap Take 1 capsule (2,000 Units total) by mouth 2 (two) times daily.        Past Medical History:   Diagnosis Date   • Acute cystitis with hematuria 02/27/2017   • Acute diastolic (congestive) heart failure (HCC) 06/26/2018   • Anxiety disorder    • Arthritis     • Back pain    • Blood in the stool    • Blurred vision    • Brachial neuritis or radiculitis NOS     C4,C5, nerve roots   • Calculus of kidney     Last year do to medication i was taking   • Chronic atrial fibrillation (HCC) 10/14/2014    post op from [facility]   • Community acquired pneumonia of left lower lobe of lung 06/18/2018   • Decorative tattoo    • Depression 09/24/2015   • Diabetes (HCC)    • Diarrhea, unspecified     I believe from my medication   • Feeling lonely    • Flatulence/gas pain/belching    • Food intolerance    • Headache disorder     migraines   • Heart palpitations    • Hemorrhoids    • High blood pressure    • High cholesterol    • History of depression    • Hyperlipidemia    • Indigestion 02/07/2019    not normal   • Kidney stone 02/27/2017   • Leg swelling    • Loss of appetite    • 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, or unspecified(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.00) 06/29/2012   • Migraines    • Nausea     on and off   • Night sweats    • Obesity, unspecified    • Obsessive-compulsive disorders 09/24/2015   • ISAAC (obstructive sleep apnea) 3-3-14-PSG/TX-8/19/16    AHI-16, O2 alyce-71%/CPAP-10cwp   • Other specified cardiac dysrhythmias(427.89)    • Other testicular hypofunction    • Pain in joints    • Pneumonia, organism unspecified(486)    • Sleep disturbance    • Stress    • Suicidal thoughts 09/24/2015   • Thoracic or lumbosacral neuritis or radiculitis, unspecified     L3,L4   • Thrombocytopenia (HCC)    • Uncomfortable fullness after meals    • Unspecified essential hypertension    • Unspecified sleep apnea     wears CPAP   • Visual impairment    • Wears glasses    • Weight gain    • Weight loss       Social History:  Social History     Socioeconomic History   • Marital status:    Tobacco Use   • Smoking status: Former     Packs/day: 0.50     Years: 28.00     Additional pack years: 0.00     Total pack years: 14.00      Types: Cigarettes     Quit date: 2004     Years since quittin.6   • Smokeless tobacco: Never   Vaping Use   • Vaping Use: Never used   Substance and Sexual Activity   • Alcohol use: Yes     Comment: occ   • Drug use: No   Other Topics Concern   • Caffeine Concern Yes     Comment: 1 cup of coffee daily   • Exercise Yes     Comment: walking        REVIEW OF SYSTEMS:   GENERAL HEALTH: feels well otherwise. Denies fever, chills, unintentional weight change  SKIN: denies any unusual skin lesions or rashes  RESPIRATORY: denies shortness of breath with exertion, denies cough or wheezing  CARDIOVASCULAR: denies chest pain or palpitations, denies leg swelling  GI: denies abdominal pain and denies heartburn. Denies nausea, vomiting, diarrhea, constipation  NEURO: denies headaches, dizziness, weakness, syncope    EXAM:   /80   Pulse 78   Temp 97.8 °F (36.6 °C)   Resp 14   Ht 6' (1.829 m)   Wt 279 lb (126.6 kg)   SpO2 98%   BMI 37.84 kg/m²   GENERAL: well developed, well nourished,in no apparent distress  SKIN: no rashes,no suspicious lesions, warm and dry  HEENT: atraumatic, normocephalic,ears and throat are clear  NECK: supple,no adenopathy, no thyromegaly  LUNGS: clear to auscultation b/l no W/R/R  CARDIO: irregularly irregular rhythm, rate 64, without murmur  GI: good BS's,no masses, HSM, distension or tenderness  EXTREMITIES: no cyanosis, clubbing or edema  MUSCULOSKELETAL: FROM, no joint swelling or bony tenderness  NEURO: a/ox3, no focal deficits  DTR's diminished, +1 patellar reflexes b/l.  Gait is stable.  PSYCH: mood and affect normal  Bilateral barefoot skin diabetic exam is normal, visualized feet and the appearance is normal.  Bilateral monofilament/sensation of both feet is normal.  Pulsation pedal pulse exam of both lower legs/feet is normal as well.         ASSESSMENT AND PLAN:   1. Type 2 diabetes mellitus with hyperglycemia, without long-term current use of insulin (HCC) (Primary)  -      Microalb/Creat Ratio, Random Urine    Restart mounjaro for wt loss, cardiac benefit, DM control.  -     Mounjaro; Inject 2.5 mg into the skin once a week for 28 days.  Dispense: 2 mL; Refill: 0  2. Mixed hyperlipidemia- continue statin and fenofibrate  3. Screening for prostate cancer  -     PSA Total, Diagnostic  4. Encounter for routine laboratory testing  -     CBC With Differential With Platelet  -     Comp Metabolic Panel (14)  -     Lipid Panel  -     TSH W Reflex To Free T4  -     Hemoglobin A1C  -     Urinalysis, Routine  5. Cervical radiculopathy  Recommend MRI cervical spine dt chronic pain, arthritis, radicular pain that has failed conservative treatment  -     Pain Management Referral - In [facility] [employer] MRI SPINE CERVICAL (CPT=72141); Future; Expected date: 02/24/2024  -      [employer] MRI SPINE CERVICAL (CPT=72141)  6. Chronic neck pain  -     Pain Management Referral - In [facility] [employer] MRI SPINE CERVICAL (CPT=72141); Future; Expected date: 02/24/2024  -      [employer] MRI SPINE CERVICAL (CPT=72141)  7. Lumbar radiculopathy  Recommend MRI lumbar spine due to chronic pain, radicular pain, that has failed conservative treatment  -     Pain Management Referral - In [facility] [employer] MRI SPINE LUMBAR (CPT=72148); Future; Expected date: 02/24/2024  -     [employer] MRI SPINE LUMBAR (CPT=72148)      pt is scheduled with GI will plan for repeat colonoscopy this year  The patient indicates understanding of these issues and agrees to the plan.  Return in about 2 months (around 5/7/2024) for follow-up, or sooner as needed.

## 2024-03-06 NOTE — PROGRESS NOTES
Subjective:   Patient ID: Rivera Bush is a 58 year old male.    HPI    History/Other:   Review of Systems  Current Outpatient Medications   Medication Sig Dispense Refill   • METOPROLOL SUCCINATE  MG Oral Tablet 24 Hr TAKE ONE TABLET BY MOUTH TWICE DAILY 180 tablet 0   • XARELTO 20 MG Oral Tab Take 1 tablet (20 mg total) by mouth daily 90 tablet 0   • fenofibrate 145 MG Oral Tab TAKE 1 TABLET BY MOUTH ONE TIME DAILY 90 tablet 1   • PEG 3350-KCl-Na Bicarb-NaCl 420 g Oral Recon Soln Take 4,000 mL by mouth As Directed. 1 each 0   • Tirzepatide (MOUNJARO) 2.5 MG/0.5ML Subcutaneous Solution Pen-injector Inject 2.5 mg into the skin once a week for 28 days. 2 mL 0   • ROSUVASTATIN 40 MG Oral Tab Take 1 tablet (40 mg total) by mouth nightly 90 tablet 0   • DILTIAZEM HCL  MG Oral Capsule SR 24 Hr TAKE 1 CAPSULE BY MOUTH ONE TIME DAILY 90 capsule 0   • eplerenone 50 MG Oral Tab Take 1 tablet (50 mg total) by mouth daily. 90 tablet 1   • albuterol (VENTOLIN HFA) 108 (90 Base) MCG/ACT Inhalation Aero Soln Inhale 2 puffs into the lungs every 6 (six) hours as needed for Shortness of Breath. 54 g 1   • Budesonide-Formoterol Fumarate (SYMBICORT) 160-4.5 MCG/ACT Inhalation Aerosol Inhale 2 puffs into the lungs 2 (two) times daily. 1 each 3   • flecainide 100 MG Oral Tab Take 1 tablet (100 mg total) by mouth 2 (two) times daily. 180 tablet 3   • potassium chloride 20 MEQ Oral Tab CR TAKE 1 TABLET BY MOUTH IN  THE MORNING AND 1 TABLET BY MOUTH IN THE EVENING (Patient taking differently: Take 1 tablet (20 mEq total) by mouth 2 (two) times daily. TAKE 1 TABLET BY MOUTH IN  THE MORNING AND 1 TABLET BY MOUTH IN THE EVENING) 180 tablet 3   • metFORMIN  MG Oral Tablet 24 Hr Take 2 tablets (1,500 mg total) by mouth daily. 180 tablet 3   • losartan 100 MG Oral Tab Take 1 tablet (100 mg total) by mouth daily. 90 tablet 3   • hydrALAZINE 25 MG Oral Tab Take 1 tablet (25 mg total) by mouth in the morning and 1 tablet (25  mg total) before bedtime. 180 tablet 3   • Glucose Blood (ONETOUCH VERIO) In Vitro Strip TEST BLOOD GLUCOSE LEVELS ONCE DAILY 100 strip 0   • AZELASTINE  MCG/SPRAY Nasal Solution INHALE 1 SPRAY INTO EACH NOSTRIL 2 TIMES DAILY (Patient taking differently: 1 spray by Nasal route in the morning and 1 spray before bedtime.) 30 mL 2   • meclizine 25 MG Oral Tab Take 1 tablet (25 mg total) by mouth 3 (three) times daily as needed. 30 tablet 0   • Rizatriptan Benzoate (MAXALT) 10 MG Oral Tab Take 1 tablet (10 mg total) by mouth as needed for Migraine. Do not exceed 3 tablets in 24 hours 9 tablet 1   • vitamin E 1000 UNITS Oral Cap Take 1 capsule (1,000 Units total) by mouth daily.     • FUROSEMIDE 20 MG Oral Tab TAKE 1 TABLET BY MOUTH  DAILY (Patient taking differently: Take 1 tablet (20 mg total) by mouth daily.) 90 tablet 3   • CLONIDINE HCL 0.1 MG Oral Tab TAKE ONE TABLET BY MOUTH DAILY AS NEEDED  (Patient taking differently: Take 1 tablet (0.1 mg total) by mouth daily as needed.) 90 tablet 0   • magnesium oxide 400 MG Oral Tab Take 1 tablet (400 mg total) by mouth daily.     • ONETOUCH DELICA PLUS [id number] Does not apply Misc USE TO TEST BLOOD SUGAR  ONCE DAILY AS DIRECTED 100 each 2   • Vitamin D3 2000 units Oral Cap Take 1 capsule (2,000 Units total) by mouth 2 (two) times daily.       Allergies:  Allergies   Allergen Reactions   • Jardiance [Empagliflozin] SWELLING and DIZZINESS       Objective:   Physical Exam  Constitutional:          Assessment & Plan:   No diagnosis found.    No orders of the defined types were placed in this encounter.      Meds This Visit:  Requested Prescriptions      No prescriptions requested or ordered in this encounter       Imaging & Referrals:  None    Location of Pain: cervical and lumbar spine    Date Pain Began: 15 yrs          Work Related:   No        Receiving Work Comp/Disability:   No    Numeric Rating Scale:  Pain at Present:  7                                                                                                             (No Pain) 0  to  10 (Worst Pain)                 Minimum Pain:   5  Maximum Pain  8    Distribution of Pain:    left    Quality of Pain:   numbness, sharp/stabbing, and tingling    Origin of Pain:    Degenerative    Aggravating Factors:    Sitting and Other laying down    Past Treatments for Current Pain Condition:   Physical Therapy and Other injections    Prior diagnostic testing for your pain:  nothing recent

## 2024-03-06 NOTE — PROGRESS NOTES
Patient: Rivera Bush  Medical Record Number: [id number]  Site: [facility]  Referring Physician:  Nathalie Giles  PCP: Dr. Davis    Dear Dr. Giles:    Thank you very much for requesting this consultation. I had the opportunity to evaluate and initiate care for your patient today, as per your request.    HISTORY OF CHIEF COMPLAINT:      Rivera Bush is a 58 year old male, who complains of neck and left UE pain.    Patient is here today with pain in above-described distribution having last been seen by this clinic on 6/5/2019.  At that time, had undergone third cervical epidural steroid injection, having had first on 4/3/2019 and second 4/24/2019.  States that he had excellent relief, and for a number of years, was pleased with his response.      Over the past 2 years, pain has begun to return in similar distribution  initially, tried traction and chiropractic care, to good relief, though as pain progressed, this became ineffective.  Massage therapy was ineffective.  He has been diligent with HEP learned in PT, without resolution.  He was evaluated by PCP, and sent for updated MRI, and here for consideration of repeat injection.      VAS Pain Score:  7/10    Hand Dominance: right  Loss of dexterity: No  Dropping things: No    Aggravating Factors: Relieving Factors:   Increased activity  ROM C spine (leaning R, away from affected side)     Past Treatment Attempted/Patient’s Response:  As above     Past Medical History:   Diagnosis Date    Abdominal pain 1/1/2024    above the belly button    Acute cystitis with hematuria 02/27/2017    Acute diastolic (congestive) heart failure (HCC) 06/26/2018    Anxiety disorder     Arthritis     Back pain     Bloating 1/1/30204    most days    Blood in the stool     Blurred vision     Brachial neuritis or radiculitis NOS     C4,C5, nerve roots    Calculus of kidney     Last year do to medication i was taking    Chronic atrial fibrillation (HCC)  10/14/2014    post op from AAR    Chronic cough 1/1/2024    medicine causes symptoms    Community acquired pneumonia of left lower lobe of lung 06/18/2018    Decorative tattoo     Depression 09/24/2015    Diabetes (HCC)     Diarrhea, unspecified     I believe from my medication    Dizziness 12/23/20    Fatigue 1/1/2024    daily    Feeling lonely     Flatulence/gas pain/belching     Food intolerance     Headache disorder     migraines    Heart palpitations     Heartburn 1/12024    at least one a week    Hemorrhoids     High blood pressure     High cholesterol     History of depression     Hyperlipidemia     Indigestion 02/07/2019    not normal    Kidney stone 02/27/2017    Leg swelling     Loss of appetite     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, or unspecified(404.00) 06/29/2012    Migraines     Nausea     on and off    Night sweats     Obesity, unspecified     Obsessive-compulsive disorders 09/24/2015    ISAAC (obstructive sleep apnea) 3-3-14-PSG/TX-8/19/16    AHI-16, O2 aylce-71%/CPAP-10cwp    Other specified cardiac dysrhythmias(427.89)     Other testicular hypofunction     Pain in joints     Pneumonia, organism unspecified(486)     Problems with swallowing 1/1/2024    Shortness of breath 1/1/2024    with simple movements    Sleep disturbance     Stress     Suicidal thoughts 09/24/2015    Thoracic or lumbosacral neuritis or radiculitis, unspecified     L3,L4    Thrombocytopenia (HCC)     Uncomfortable fullness after meals     Unspecified essential hypertension     Unspecified sleep apnea     wears CPAP    Visual impairment     Vomiting 1/1/2024    3 times after heartburn    Wears glasses     Weight gain     Weight loss       Past Surgical History:   Procedure Laterality Date    ANGIOGRAM  7/15/14    no cad noted    ENDOVAS REPAIR, INFRARENL ABDOM AORTIC ANEURYSM/DISSECT      EPIDUROGRAPHY, RADIOLOGICAL S & I  4/18/2012     Procedure: CERVICAL EPIDURAL;  Surgeon: Sekou Solorzano MD;  Location: [facility]    FLUOR GID & LOCLZJ NDL/CATH SPI DX/THER NJX  2012    Procedure: CERVICAL EPIDURAL;  Surgeon: Edil Alcocer MD;  Location: [facility]    INJECTION, W/WO CONTRAST, DX/THERAPEUTIC SUBSTANCE, EPIDURAL/SUBARACHNOID; CERVICAL/THORACIC  2012    Procedure: CERVICAL EPIDURAL;  Surgeon: Sekou Solorzano MD;  Location: [facility]    INJECTION, W/WO CONTRAST, DX/THERAPEUTIC SUBSTANCE, EPIDURAL/SUBARACHNOID; CERVICAL/THORACIC  2012    Procedure: CERVICAL EPIDURAL;  Surgeon: Edil Alcocer MD;  Location: [facility]    M-SEDAJ BY SM PHYS PERFRMG SVC 5+ YR  2012    Procedure: CERVICAL EPIDURAL;  Surgeon: Sekou Solorzano MD;  Location: [facility]    M-SEDAJ BY SM PHYS PERFRMG SVC 5+ YR  2012    Procedure: CERVICAL EPIDURAL;  Surgeon: Edil Alcocer MD;  Location: [facility]    SYMP AAA URGENT REPAIR  10/14/2014    [place]; ascending aorta    TONSILLECTOMY        Family History   Problem Relation Age of Onset    Hypertension Father     Lipids Father     Other (Other[other]) Paternal Grandfather     Heart Disorder Paternal Grandfather     Hypertension Paternal Grandfather     Stroke Paternal Grandfather     Hypertension Sister     Lipids Sister     Hypertension Brother     Lipids Brother     Psychiatric Brother     Hypertension Mother     Lipids Mother     Psychiatric Mother     Hypertension Maternal Grandfather     Heart Attack Maternal Grandfather     Stroke Maternal Grandfather       Social History     Socioeconomic History    Marital status:    Tobacco Use    Smoking status: Former     Packs/day: 0.50     Years: 28.00     Additional pack years: 0.00     Total pack years: 14.00     Types: Cigarettes     Quit date: 2004     Years since quittin.6    Smokeless tobacco: Never   Vaping  Use    Vaping Use: Never used   Substance and Sexual Activity    Alcohol use: Not Currently     Alcohol/week: 1.0 standard drink of alcohol     Types: 1 Glasses of wine per week     Comment: Wine club once a month    Drug use: No   Other Topics Concern    Caffeine Concern Yes     Comment: 1 cup of coffee daily    Exercise Yes     Comment: walking      Current Medications:  Current Outpatient Medications   Medication Sig Dispense Refill    METOPROLOL SUCCINATE  MG Oral Tablet 24 Hr TAKE ONE TABLET BY MOUTH TWICE DAILY 180 tablet 0    XARELTO 20 MG Oral Tab Take 1 tablet (20 mg total) by mouth daily 90 tablet 0    fenofibrate 145 MG Oral Tab TAKE 1 TABLET BY MOUTH ONE TIME DAILY 90 tablet 1    PEG 3350-KCl-Na Bicarb-NaCl 420 g Oral Recon Soln Take 4,000 mL by mouth As Directed. 1 each 0    Tirzepatide (MOUNJARO) 2.5 MG/0.5ML Subcutaneous Solution Pen-injector Inject 2.5 mg into the skin once a week for 28 days. 2 mL 0    ROSUVASTATIN 40 MG Oral Tab Take 1 tablet (40 mg total) by mouth nightly 90 tablet 0    DILTIAZEM HCL  MG Oral Capsule SR 24 Hr TAKE 1 CAPSULE BY MOUTH ONE TIME DAILY 90 capsule 0    eplerenone 50 MG Oral Tab Take 1 tablet (50 mg total) by mouth daily. 90 tablet 1    albuterol (VENTOLIN HFA) 108 (90 Base) MCG/ACT Inhalation Aero Soln Inhale 2 puffs into the lungs every 6 (six) hours as needed for Shortness of Breath. 54 g 1    Budesonide-Formoterol Fumarate (SYMBICORT) 160-4.5 MCG/ACT Inhalation Aerosol Inhale 2 puffs into the lungs 2 (two) times daily. 1 each 3    flecainide 100 MG Oral Tab Take 1 tablet (100 mg total) by mouth 2 (two) times daily. 180 tablet 3    potassium chloride 20 MEQ Oral Tab CR TAKE 1 TABLET BY MOUTH IN  THE MORNING AND 1 TABLET BY MOUTH IN THE EVENING (Patient taking differently: Take 1 tablet (20 mEq total) by mouth 2 (two) times daily. TAKE 1 TABLET BY MOUTH IN  THE MORNING AND 1 TABLET BY MOUTH IN THE EVENING) 180 tablet 3    metFORMIN  MG Oral Tablet 24  Hr Take 2 tablets (1,500 mg total) by mouth daily. 180 tablet 3    losartan 100 MG Oral Tab Take 1 tablet (100 mg total) by mouth daily. 90 tablet 3    hydrALAZINE 25 MG Oral Tab Take 1 tablet (25 mg total) by mouth in the morning and 1 tablet (25 mg total) before bedtime. 180 tablet 3    Glucose Blood (ONETOUCH VERIO) In Vitro Strip TEST BLOOD GLUCOSE LEVELS ONCE DAILY 100 strip 0    AZELASTINE  MCG/SPRAY Nasal Solution INHALE 1 SPRAY INTO EACH NOSTRIL 2 TIMES DAILY (Patient taking differently: 1 spray by Nasal route in the morning and 1 spray before bedtime.) 30 mL 2    meclizine 25 MG Oral Tab Take 1 tablet (25 mg total) by mouth 3 (three) times daily as needed. 30 tablet 0    Rizatriptan Benzoate (MAXALT) 10 MG Oral Tab Take 1 tablet (10 mg total) by mouth as needed for Migraine. Do not exceed 3 tablets in 24 hours 9 tablet 1    vitamin E 1000 UNITS Oral Cap Take 1 capsule (1,000 Units total) by mouth daily.      FUROSEMIDE 20 MG Oral Tab TAKE 1 TABLET BY MOUTH  DAILY (Patient taking differently: Take 1 tablet (20 mg total) by mouth daily.) 90 tablet 3    CLONIDINE HCL 0.1 MG Oral Tab TAKE ONE TABLET BY MOUTH DAILY AS NEEDED  (Patient taking differently: Take 1 tablet (0.1 mg total) by mouth daily as needed.) 90 tablet 0    magnesium oxide 400 MG Oral Tab Take 1 tablet (400 mg total) by mouth daily.      ONETOUCH DELICA PLUS [id number] Does not apply Misc USE TO TEST BLOOD SUGAR  ONCE DAILY AS DIRECTED 100 each 2    Vitamin D3 2000 units Oral Cap Take 1 capsule (2,000 Units total) by mouth 2 (two) times daily.          Functional Assessment: Patient reports that they are able to complete all of their ADL's such as eating, bathing, using the toilet, dressing and getting up from a bed or a chair independently.    Work History:  The patient currently works full time as manager.      REVIEW OF SYSTEMS:   10 point review of systems is otherwise negative,unless otherwise in HPI.      Radiology/Lab Test Reviewed:   MRI C spine 2/4/19:    C2-C3:  Posterior disc osteophyte complex without spinal canal or neural foraminal stenosis.   C3-C4:  Posterior disc osteophyte complex without spinal canal or neural foraminal stenosis.   C4-C5:  Posterior disc osteophyte complex with mild right facet hypertrophic change and moderate to severe bilateral neural foraminal stenosis and mild spinal canal stenosis.   C5-C6:  Posterior disc osteophyte complex is causing severe left neural foraminal stenosis and mild to moderate spinal canal stenosis.   C6-C7:  Left paracentral uncal disc osteophyte complexes causing mild left neural foraminal stenosis.  No spinal canal stenosis.   C7-T1:  Left paracentral disc bulge without spinal canal or neural foraminal stenosis.     CBC:    Lab Results   Component Value Date    WBC 8.9 02/26/2024    WBC 6.3 02/15/2023    WBC 7.2 05/09/2022     Lab Results   Component Value Date    HEMOGLOBIN 13.5 06/18/2018     Lab Results   Component Value Date     02/26/2024     02/15/2023     05/09/2022         PHYSICAL EXAMIMATION   PHYSICAL EXAMINATION: Rivera Bush is a 58 year old male who is observed sitting comfortably on a chair in the exam room alert and oriented times three. He looks consistent with his stated age.    Ht Readings from Last 1 Encounters:   02/26/24 72\"     Wt Readings from Last 1 Encounters:   03/06/24 286 lb (129.7 kg)     The patient is well developed, well nourished, normal body habitus, well muscled. He moves independently from sitting to standing with ease.       Coordination:  Well coordinated; able to engage in rapid alternating movements bilateral upper extremities    Tandem Walk: Able    ROM Cervical Spine:  See chart below:  Motion Right (+ or -) Left (+ or -)   Cervical flexion - -   Cervical extension - +   Cervical lateral bending - +   Cervical rotation - +     Integument:  Skin over area of cervical spine intact; no erythema, rashes, excoriations, lesions  noted    Palpation:  See chart below:  Palpation of Right (+ or -) Left (+ or -)   Cervical Facets - -   Thoracic Facets - -   Paraspinal - -   Trapezius - -   Scapula - -   Occipital - -     Strength:  Strength deficits noted:  diminished str L  (4/5); 5/5 otherwise     Sensation:  Normal sensation noted to light touch and pressure throughout bilateral upper extremities.    Tests:  Test Right (+ or -) Left (+ or -)   Spurling - +   Bustos’s Test     Clonus       HEAD/NECK: Head is normocephalic, neck supple  EYES: EOMI, TEN  LYMPH EXAM: There is no lymph edema in either lower extremity.  VASCULAR EXAM: Radial pulses are normal bilaterally, with good distal perfusion. No clubbing or cyanosis.  HEART: normal, regular, S1 and S2  LUNGS: CTA  MUSCULOSKELETAL: Smooth, pain-free ROM to bilateral shoulders,elbows, wrists and digits.    Do you have any known blood/bleeding disorders?  No  Does patient currently take blood thinners?   None  Does patient currently take any antibiotics?   No    Patient is currently on pain medications:  No  Reason pain medications are prescribed: N/A  Pain medications are prescribed by: N/A  [place] Prescription Monitoring review: N/A  DIRE: N/A  Treatment decision: N/A    MEDICAL DECISION MAKING:   Impression: Severe left foraminal stenosis, left cervical radiculopathy    Return of neck and radicular left upper extremity symptoms in the setting of MRI evidence from 2019 of severe left C4-5 and C5-6 foraminal stenosis, in keeping with his complaints.  Following series of 3 cervical epidural steroid injections completed on 6/5/2019, he had excellent relief of pain for approximately 3 years, after which, symptoms have begun to return.  Has had some relief initially with traction and chiropractic care, though his symptoms continued to progress this became ineffective.  He has been stay diligent with home exercise program, without resolution of pain.  On exam, reproduction of pain with  range of motion cervical spine, with positive left Spurling's.  In addition, weakness with left  (4/5).  He has been evaluated by his primary care physician, and is awaiting updated MRI.  In the meantime, would like to consider repeating cervical epidural steroid injection.    While we are certainly happy to repeat SUELLEN, would agree with his primary that an updated MRI would be reasonable, as his last one is approximately 5 years old.  After completion of updated imaging, contact clinic and we will review imaging.  If amenable, we will proceed with repeat SUELLEN.  In the meantime, was given round of tapered oral steroid.    Plan: Update MRI cervical spine, and if appropriate, we will proceed with a left biased SUELLEN.  Tapered p.o. steroid.    The patient indicates understanding of these issues and agrees to the plan.      Thank you very much.     Respectfully yours,    MARIANA López

## 2024-03-06 NOTE — PATIENT INSTRUCTIONS
Refill policies:    Allow 2-3 business days for refills; controlled substances may take longer.  Contact your pharmacy at least 5 days prior to running out of medication and have them send an electronic request or submit request through the “request refill” option in your [employer] account.  Refills are not addressed on weekends; covering physicians do not authorize routine medications on weekends.  No narcotics or controlled substances are refilled after noon on Fridays or by on call physicians.  By law, narcotics must be electronically prescribed.  A 30 day supply with no refills is the maximum allowed.  If your prescription is due for a refill, you may be due for a follow up appointment.  To best provide you care, patients receiving routine medications need to be seen at least once a year.  Patients receiving narcotic/controlled substance medications need to be seen at least once every 3 months.  In the event that your preferred pharmacy does not have the requested medication in stock (e.g. Backordered), it is your responsibility to find another pharmacy that has the requested medication available.  We will gladly send a new prescription to that pharmacy at your request.    Scheduling Tests:    If your physician has ordered radiology tests such as MRI or CT scans, please contact Central Scheduling at [phone] right away to schedule the test.  Once scheduled, the [facility] Centralized Referral Team will work with your insurance carrier to obtain pre-certification or prior authorization.  Depending on your insurance carrier, approval may take 3-10 days.  It is highly recommended patients assure they have received an authorization before having a test performed.  If test is done without insurance authorization, patient may be responsible for the entire amount billed.      Precertification and Prior Authorizations:  If your physician has recommended that you have a procedure or additional testing performed the [facility]  Centralized Referral Team will contact your insurance carrier to obtain pre-certification or prior authorization.    You are strongly encouraged to contact your insurance carrier to verify that your procedure/test has been approved and is a COVERED benefit.  Although the [facility] Centralized Referral Team does its due diligence, the insurance carrier gives the disclaimer that \"Although the procedure is authorized, this does not guarantee payment.\"    Ultimately the patient is responsible for payment.   Thank you for your understanding in this matter.  Paperwork Completion:  If you require FMLA or disability paperwork for your recovery, please make sure to either drop it off or have it faxed to our office at [phone]. Be sure the form has your name and date of birth on it.  The form will be faxed to our Forms Department and they will complete it for you.  There is a 25$ fee for all forms that need to be filled out.  Please be aware there is a 10-14 day turnaround time.  You will need to sign a release of information (ALVINO) form if your paperwork does not come with one.  You may call the Forms Department with any questions at [phone].  Their fax number is [phone].

## 2024-03-18 NOTE — PROGRESS NOTES
Rivera Bush 1965 was seen for individual Diabetic Medical Nutrition Therapy- an initial consult:    Date: 3/18/2024   Referral: Nathalie Giles PA-C Start time 8:40 am  End time: 9:40 am    58 year old male presents for medical nutrition therapy for type 2 diabetes. States his weight has been fluctuating. History of afib. Notes he is to have his gallbladder removed. States he gets winded more easily and has increased occurrence of acid reflux. Notes he used to run, but was finding it more difficult to do. He has instead been completing cardio exercises including walking, elliptical, and stationary bike as well as resistance training/weight machines.       Anthropometrics:  Wt Readings from Last 6 Encounters:   24 286 lb   24 280 lb   24 279 lb   23 280 lb   23 272 lb   10/18/23 266 lb         Current diabetes medications:  Oral:  Metformin  mg twice daily     Injectable:  Mounjaro 2.5 mg once weekly      Labs:  Lab Results   Component Value Date    A1C 5.5 2024    A1C 5.3 2023    CHOLEST 116 2024    CHOLEST 176 2023    LDL 54 2024    LDL 91 2023    HDL 35 (L) 2024    HDL 38 (L) 2023    NONHDLC 81 2024    NONHDLC 138 (H) 2023    TRIG 194 (H) 2024    TRIG 351 (H) 2023    BUN 16 2024    BUN 11 2023    CREATSERUM 1.31 (H) 2024    CREATSERUM 1.30 2023    GFRNAA 79 2022    GFRNAA 78 2021    GFRAA 92 2022    GFRAA 90 2021       Lab Results   Component Value Date    A1C 5.5 2024    A1C 5.3 2023    A1C 6.2 (H) 02/15/2023     Glucose testing at home:     Currently checking BG: Yes, sometimes checks before or after a meal (after biggest meal). Reports readings before meal: 130-140, after meal: no > 300, mainly 200's.    Feels he needs a new glucose meter, keeps receiving an error message, has checked test strips and are not .      Provided One Touch Verio Reflect sample glucose meter. Recommended doing a couple of blood sugar checks (can be a couple of times/week) at various times - fasting and 2 hours after a meal to help see patterns.   Discussed goal blood sugar ranges:  Less than 130 mg/dl in the morning (fasting) and less than 180 mg/dl 2 hours after meals.      Diet History:  Usually eats 1 meal and about 2 snacks a day (on busier days). Notes he feels more full when eating 3 meals/day. Notes he has been trying to eat yogurt, a lighter meal in the evening. Has small portion of dark chocolate (60-70% cocoa) about once/day. He has been incorporating more fish, does not eat red meat. Feels he is not getting enough fiber.    Lately has 1 apple and peanut butter. Snacks can include apple and peanut butter or banana, crackers and peanut butter, tuna with crackers.    (Beverages) Water (4-16 oz bottles/day), coffee, no sugar, has cut out soda, Powerade (zero sugar)-about every other day. Sometimes has honey in his tea. Very occasionally has alcohol (wine).      Physical Activity: Walking, Biking/ stationary bike, Resistance training, and Elliptical  Works out 2-3 times/week at the gym (uses upper and lower body weight machines, doing mainly toning exercises-more reps with lighter weights - has been working towards changing weight and rep amounts; also uses exercise bike, elliptical, or walking). Also walks a lot at work (~10 miles/day), bowls, and stretches 3 times/day.      Nutrition Diagnosis  PES: Inadequate fiber intake and occurrence of acid reflux related to knowledge deficit as evidenced by fiber intake less than estimated needs and symptoms of acid reflux    Patient appears to have knowledge of importance of lifestyle changes to assist with weight loss. He has been having increased occurrence of heartburn which has been limiting his ability to include other forms of exercise, like running. He has also been including foods with  limited amounts of fiber. Main focus at this time is to start gradually increasing amount of fiber.      Intervention  -Comprehensive Nutrition Education Provided:  Discussed ways to gradually start increasing fiber intake.  Discussed food and drink sources that can potentially increase symptoms of acid reflux.    Provided handouts to assist with fiber intake and reducing symptoms of acid reflux: 5 Sample Menus for Gradually Increasing Fiber, High Fiber Nutrition Therapy, Cooking Tips to Get More Fiber, and GERD Nutrition Therapy.     -Education on Increased Physical Activity:  discussed how increased physical activity improves insulin resistance, blood glucose control, and heart health      Monitoring/Evaluation  Diet modification/understanding  Blood sugars  Weight trends  Physical activity      Recommendations  Gradually work towards increasing fiber intake.  Work towards limiting foods/drinks that may increase symptoms of acid reflux.  Start tracking meals and snacks in food gorge.  Start checking blood sugars a few times/week at varying times (fasting and 2 hours after a meal) to help see patterns.  Continue to be active as able.  Follow up with the dietitian in 2 months.      Patient Specific Goals:  Gradually work towards increasing your fiber intake.     Use the handouts I gave you to assist with a starting point for adding fiber and also sources of food with fiber.     Also use the GERD Nutrition Therapy handout to assist with helping to minimize your symptoms of acid reflux.     Look into using a phone gorge to track your meals/snacks (My Fitness Pal, Lose It, My Net Diary).           Follow up: 6/5/2024 Maria Elena Cohen, SHALINI Cohen, SHALININ, LDN, [facility]

## 2024-03-18 NOTE — PATIENT INSTRUCTIONS
Goals to work towards:    Gradually work towards increasing your fiber intake.    Use the handouts I gave you to assist with a starting point for adding fiber and also sources of food with fiber.    Also use the GERD Nutrition Therapy handout to assist with helping to minimize your symptoms of acid reflux.    Look into using a phone gorge to track your meals/snacks (My Fitness Pal, Lose It, My Net Diary).    Follow up with the dietitian in 2 months.

## 2024-03-18 NOTE — TELEPHONE ENCOUNTER
Sample One Touch Verio Reflect glucose meter provided during visit today. Pt needs test strips and lancets.

## 2024-03-19 NOTE — TELEPHONE ENCOUNTER
Patient is calling to book an appointment with Dr. Crabtree for middle and lower back pain.   Patient had MRI's completed yesterday and are in [facility].     Referred from Dr. Davis.    Please advise if anything additional is needed.    Thank youu!     Future Appointments   Date Time Provider Department Center   3/21/2024  1:20 PM Kentrell Crabtree MD [facility] 75 [facility]   4/13/2024  9:15 AM [facility] US RM1 [facility] US EM [place]   4/26/2024  9:30 AM [facility] IVS RM 2 EP [facility]S [facility]   5/18/2024 10:00 AM Nathalie Giles PA-C [facility] [address]   6/5/2024  2:00 PM Maria Elena Cohen RD [facility]

## 2024-03-21 NOTE — H&P
[facility] - ORTHOPEDICS  [address], [address], [place] [address]  [address] [place] [id number]  [phone]     NEW PATIENT VISIT - HISTORY AND PHYSICAL EXAMINATION     Name: Rivera Bush   MRN: [id number]  Date: 03/21/24       CC: Back and leg pain    REFERRED BY: Won Davis MD    HPI:   Rivera Bush is a very pleasant 58 year old male who presents today for evaluation of back and leg pain. The distribution of symptoms are: 20% backpain and 90% leg pain. The symptoms began many year(s) ago without any significant injury. Since the onset, the symptoms have become slowly worse over time. Patient feels pain is aggravated by walking, standing and improved by rest. The patient reports  numbness and  weakness.  The symptom characteristics are as follows: Patient is a 58-year-old male presenting with back pain radiating predominantly down left lower extremity including posterior lateral thigh.  Symptoms have gradually gotten worse.  Patient cannot walk from parking lot to the office without numbness and pain in the left leg.  Patient also feels that he cannot stand upright and also relies on shopping cart in grocery stores..     Prior spine surgery: none.    Bowel and bladder symptoms: absent.    The patient has not had issues with balance and/or hand dexterity problems such as changes in penmanship or the use of buttons or zippers.    Treatment up to this time has included:    Evaluation: PCP and other [facility] provider  NSAIDS: have not been helpful  Narcotic use: None  Physical therapy: None  Spinal injections: None  Others:       PMH:   Past Medical History:   Diagnosis Date    Abdominal pain 1/1/2024    above the belly button    Acute cystitis with hematuria 02/27/2017    Acute diastolic (congestive) heart failure (HCC) 06/26/2018    Anxiety disorder     Arthritis     Back pain     Bloating 1/1/30204    most days    Blood in the stool     Blurred vision      Brachial neuritis or radiculitis NOS     C4,C5, nerve roots    Calculus of kidney     Last year do to medication i was taking    Chronic atrial fibrillation (HCC) 10/14/2014    post op from AAR    Chronic cough 1/1/2024    medicine causes symptoms    Community acquired pneumonia of left lower lobe of lung 06/18/2018    Decorative tattoo     Depression 09/24/2015    Diabetes (HCC)     Diarrhea, unspecified     I believe from my medication    Dizziness 12/23/20    Fatigue 1/1/2024    daily    Feeling lonely     Flatulence/gas pain/belching     Food intolerance     Headache disorder     migraines    Heart palpitations     Heartburn 1/12024    at least one a week    Hemorrhoids     High blood pressure     High cholesterol     History of depression     Hyperlipidemia     Indigestion 02/07/2019    not normal    Kidney stone 02/27/2017    Leg swelling     Loss of appetite     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, or unspecified(404.00) 06/29/2012    Migraines     Nausea     on and off    Night sweats     Obesity, unspecified     Obsessive-compulsive disorders 09/24/2015    ISAAC (obstructive sleep apnea) 3-3-14-PSG/TX-8/19/16    AHI-16, O2 alyce-71%/CPAP-10cwp    Other specified cardiac dysrhythmias(427.89)     Other testicular hypofunction     Pain in joints     Pneumonia, organism unspecified(486)     Problems with swallowing 1/1/2024    Shortness of breath 1/1/2024    with simple movements    Sleep disturbance     Stress     Suicidal thoughts 09/24/2015    Thoracic or lumbosacral neuritis or radiculitis, unspecified     L3,L4    Thrombocytopenia (HCC)     Uncomfortable fullness after meals     Unspecified essential hypertension     Unspecified sleep apnea     wears CPAP    Visual impairment     Vomiting 1/1/2024    3 times after heartburn    Wears glasses     Weight gain     Weight loss        PAST SURGICAL HX:  Past Surgical  History:   Procedure Laterality Date    ANGIOGRAM  7/15/14    no cad noted    ENDOVAS REPAIR, INFRARENL ABDOM AORTIC ANEURYSM/DISSECT      EPIDUROGRAPHY, RADIOLOGICAL S & I  4/18/2012    Procedure: CERVICAL EPIDURAL;  Surgeon: Sekou Solorzano MD;  Location: [facility]    FLUOR GID & LOCLZJ NDL/CATH SPI DX/THER NJX  5/11/2012    Procedure: CERVICAL EPIDURAL;  Surgeon: Edil Alcocer MD;  Location: [facility]    INJECTION, W/WO CONTRAST, DX/THERAPEUTIC SUBSTANCE, EPIDURAL/SUBARACHNOID; CERVICAL/THORACIC  4/18/2012    Procedure: CERVICAL EPIDURAL;  Surgeon: Sekou Solorzano MD;  Location: [facility]    INJECTION, W/WO CONTRAST, DX/THERAPEUTIC SUBSTANCE, EPIDURAL/SUBARACHNOID; CERVICAL/THORACIC  5/11/2012    Procedure: CERVICAL EPIDURAL;  Surgeon: Edil Alcocer MD;  Location: [facility]    M-SEDAJ BY SM PHYS PERFRMG SVC 5+ YR  4/18/2012    Procedure: CERVICAL EPIDURAL;  Surgeon: Sekou Solorzano MD;  Location: [facility]    M-SEDAJ BY  PHYS PERFRMG SVC 5+ YR  5/11/2012    Procedure: CERVICAL EPIDURAL;  Surgeon: Edil Alcocer MD;  Location: [facility]    SYMP AAA URGENT REPAIR  10/14/2014    [place]; ascending aorta    TONSILLECTOMY         FAMILY HX:  Family History   Problem Relation Age of Onset    Hypertension Father     Lipids Father     Other (Other[other]) Paternal Grandfather     Heart Disorder Paternal Grandfather     Hypertension Paternal Grandfather     Stroke Paternal Grandfather     Hypertension Sister     Lipids Sister     Hypertension Brother     Lipids Brother     Psychiatric Brother     Hypertension Mother     Lipids Mother     Psychiatric Mother     Hypertension Maternal Grandfather     Heart Attack Maternal Grandfather     Stroke Maternal Grandfather        ALLERGIES:  Jardiance [empagliflozin]    MEDICATIONS:   Current Outpatient Medications   Medication Sig Dispense Refill     Glucose Blood (ONETOUCH VERIO) In Vitro Strip Check blood sugars once daily. 100 strip 3    OneTouch Delica Lancets 30G Does not apply Misc 1 Lancet by Finger stick route daily. 100 each 3    methylPREDNISolone (MEDROL) 4 MG Oral Tablet Therapy Pack Take as directed 21 tablet 0    METOPROLOL SUCCINATE  MG Oral Tablet 24 Hr TAKE ONE TABLET BY MOUTH TWICE DAILY 180 tablet 0    XARELTO 20 MG Oral Tab Take 1 tablet (20 mg total) by mouth daily 90 tablet 0    fenofibrate 145 MG Oral Tab TAKE 1 TABLET BY MOUTH ONE TIME DAILY 90 tablet 1    PEG 3350-KCl-Na Bicarb-NaCl 420 g Oral Recon Soln Take 4,000 mL by mouth As Directed. 1 each 0    Tirzepatide (MOUNJARO) 2.5 MG/0.5ML Subcutaneous Solution Pen-injector Inject 2.5 mg into the skin once a week for 28 days. 2 mL 0    ROSUVASTATIN 40 MG Oral Tab Take 1 tablet (40 mg total) by mouth nightly 90 tablet 0    DILTIAZEM HCL  MG Oral Capsule SR 24 Hr TAKE 1 CAPSULE BY MOUTH ONE TIME DAILY 90 capsule 0    eplerenone 50 MG Oral Tab Take 1 tablet (50 mg total) by mouth daily. 90 tablet 1    albuterol (VENTOLIN HFA) 108 (90 Base) MCG/ACT Inhalation Aero Soln Inhale 2 puffs into the lungs every 6 (six) hours as needed for Shortness of Breath. 54 g 1    Budesonide-Formoterol Fumarate (SYMBICORT) 160-4.5 MCG/ACT Inhalation Aerosol Inhale 2 puffs into the lungs 2 (two) times daily. 1 each 3    flecainide 100 MG Oral Tab Take 1 tablet (100 mg total) by mouth 2 (two) times daily. 180 tablet 3    potassium chloride 20 MEQ Oral Tab CR TAKE 1 TABLET BY MOUTH IN  THE MORNING AND 1 TABLET BY MOUTH IN THE EVENING (Patient taking differently: Take 1 tablet (20 mEq total) by mouth 2 (two) times daily. TAKE 1 TABLET BY MOUTH IN  THE MORNING AND 1 TABLET BY MOUTH IN THE EVENING) 180 tablet 3    metFORMIN  MG Oral Tablet 24 Hr Take 2 tablets (1,500 mg total) by mouth daily. 180 tablet 3    losartan 100 MG Oral Tab Take 1 tablet (100 mg total) by mouth daily. 90 tablet 3     hydrALAZINE 25 MG Oral Tab Take 1 tablet (25 mg total) by mouth in the morning and 1 tablet (25 mg total) before bedtime. 180 tablet 3    Glucose Blood (ONETOUCH VERIO) In Vitro Strip TEST BLOOD GLUCOSE LEVELS ONCE DAILY 100 strip 0    AZELASTINE  MCG/SPRAY Nasal Solution INHALE 1 SPRAY INTO EACH NOSTRIL 2 TIMES DAILY (Patient taking differently: 1 spray by Nasal route in the morning and 1 spray before bedtime.) 30 mL 2    meclizine 25 MG Oral Tab Take 1 tablet (25 mg total) by mouth 3 (three) times daily as needed. 30 tablet 0    Rizatriptan Benzoate (MAXALT) 10 MG Oral Tab Take 1 tablet (10 mg total) by mouth as needed for Migraine. Do not exceed 3 tablets in 24 hours 9 tablet 1    vitamin E 1000 UNITS Oral Cap Take 1 capsule (1,000 Units total) by mouth daily.      FUROSEMIDE 20 MG Oral Tab TAKE 1 TABLET BY MOUTH  DAILY (Patient taking differently: Take 1 tablet (20 mg total) by mouth daily.) 90 tablet 3    CLONIDINE HCL 0.1 MG Oral Tab TAKE ONE TABLET BY MOUTH DAILY AS NEEDED  (Patient taking differently: Take 1 tablet (0.1 mg total) by mouth daily as needed.) 90 tablet 0    magnesium oxide 400 MG Oral Tab Take 1 tablet (400 mg total) by mouth daily.      ONETOUCH DELICA PLUS [id number] Does not apply Misc USE TO TEST BLOOD SUGAR  ONCE DAILY AS DIRECTED 100 each 2    Vitamin D3 2000 units Oral Cap Take 1 capsule (2,000 Units total) by mouth 2 (two) times daily.         ROS: A comprehensive 14 point review of systems was performed and was negative aside from the aforementioned per history of present illness.    SOCIAL HX:  Social History     Tobacco Use    Smoking status: Former     Packs/day: 0.50     Years: 28.00     Additional pack years: 0.00     Total pack years: 14.00     Types: Cigarettes     Quit date: 2004     Years since quittin.7    Smokeless tobacco: Never   Substance Use Topics    Alcohol use: Not Currently     Alcohol/week: 1.0 standard drink of alcohol     Types: 1 Glasses of wine  per week     Comment: Wine club once a month         PE:   Vitals:    03/21/24 1330   Weight: 285 lb (129.3 kg)   Height: 5' 11\" (1.803 m)     Estimated body mass index is 39.75 kg/m² as calculated from the following:    Height as of this encounter: 5' 11\" (1.803 m).    Weight as of this encounter: 285 lb (129.3 kg).    Physical Exam  Constitutional:       Appearance: Normal appearance.   HENT:      Head: Normocephalic and atraumatic.   Eyes:      Extraocular Movements: Extraocular movements intact.   Cardiovascular:      Pulses: Normal pulses. Skin warm and well perfused.  Pulmonary:      Effort: Pulmonary effort is normal. No respiratory distress.   Skin:     General: Skin is warm.   Psychiatric:         Mood and Affect: Mood normal.     Spine Exam:    Normal gait without difficulty  Able to heel, toe, tandem gait without difficulty  Level shoulders and hips in even stance    Restricted L-spine ROM    No tenderness to palpation of L-spine    Straight leg raise test: negative    Sustained clonus: negative    LE Strength: 5/5 IP QUAD TA EHL GSC  LE Sensation: normal in L2-S1 distribution  LE reflexes: normal    Radiographic Examination/Diagnostics:  XR and MRI personally viewed, independently interpreted and radiology report was reviewed.  X-ray of the lumbar spine demonstrates degenerative disc disease L4-5  MRI demonstrates severe spinal stenosis at L4-5      IMPRESSION: Rivera Bush is a 58 year old male with severe lumbar stenosis and neurogenic claudication    PLAN:     We reviewed the patients history, symptoms, exam findings, and imaging today.  We had a detailed discussion outlining the etiology, anatomy, pathophysiology, and natural history of lumbar stenosis. The typical management of this condition may include lifestyle modification, NSAIDs, physical therapy, oral steroids, epidural injections, neuromodulatory medications, and sometimes pain medications.    -Discussed with patient operative and  nonoperative options including possible laminectomy  -Based on our discussion today we would like to have the patient initiate our recommendations for continued conservative therapy in the treatment of their condition noted in the assessment section.     -Referred to Physical Therapy: home exercise program, aerobic exercises, core strengthening and conditioning, possible manipulative therapy,  and modalities as indicated  -Provided HEP  -Referred patient to Dr. Murray for GRISEL    FOLLOW-UP:  We will see him back in follow-up in 6 weeks, or sooner if any problems arise. Patient understands and agrees with plan.      Kentrell Crabtree MD  Orthopedic Spine Surgeon  [facility]   [address] [place] [address].org  [email or web address]  t: [phone]   f: [phone]        This note was dictated using Dragon software.  While it was briefly proofread prior to completion, some grammatical, spelling, and word choice errors due to dictation may still occur.

## 2024-03-21 NOTE — PROGRESS NOTES
HPI:    Patient ID: Rivera Bush is a 58 year old male.    Chief Complaint   Patient presents with    Migraine     X 3 days        Completed MRI 3 days ago which triggered a migraine. Reports headache, nausea, light sensitivity. No improvement with OTC medications and sleep. He has hx of migraines in the past. Blood pressure was borderline elevated at home. He has been off work the past 3 days d/t pain.         Review of Systems   Constitutional: Negative.    Eyes:  Positive for photophobia. Negative for visual disturbance.   Respiratory: Negative.     Cardiovascular: Negative.    Genitourinary: Negative.    Neurological:  Positive for headaches. Negative for dizziness and light-headedness.         Past Medical History:   Diagnosis Date    Abdominal pain 1/1/2024    above the belly button    Acute cystitis with hematuria 02/27/2017    Acute diastolic (congestive) heart failure (HCC) 06/26/2018    Anxiety disorder     Arthritis     Back pain     Bloating 1/1/30204    most days    Blood in the stool     Blurred vision     Brachial neuritis or radiculitis NOS     C4,C5, nerve roots    Calculus of kidney     Last year do to medication i was taking    Chronic atrial fibrillation (HCC) 10/14/2014    post op from [facility]    Chronic cough 1/1/2024    medicine causes symptoms    Community acquired pneumonia of left lower lobe of lung 06/18/2018    Decorative tattoo     Depression 09/24/2015    Diabetes (HCC)     Diarrhea, unspecified     I believe from my medication    Dizziness 12/23/20    Fatigue 1/1/2024    daily    Feeling lonely     Flatulence/gas pain/belching     Food intolerance     Headache disorder     migraines    Heart palpitations     Heartburn 1/12024    at least one a week    Hemorrhoids     High blood pressure     High cholesterol     History of depression     Hyperlipidemia     Indigestion 02/07/2019    not normal    Kidney stone 02/27/2017    Leg swelling     Loss of appetite     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, or unspecified(404.00) 06/29/2012    Migraines     Nausea     on and off    Night sweats     Obesity, unspecified     Obsessive-compulsive disorders 09/24/2015    ISAAC (obstructive sleep apnea) 3-3-14-PSG/TX-8/19/16    AHI-16, O2 alyce-71%/CPAP-10cwp    Other specified cardiac dysrhythmias(427.89)     Other testicular hypofunction     Pain in joints     Pneumonia, organism unspecified(486)     Problems with swallowing 1/1/2024    Shortness of breath 1/1/2024    with simple movements    Sleep disturbance     Stress     Suicidal thoughts 09/24/2015    Thoracic or lumbosacral neuritis or radiculitis, unspecified     L3,L4    Thrombocytopenia (HCC)     Uncomfortable fullness after meals     Unspecified essential hypertension     Unspecified sleep apnea     wears CPAP    Visual impairment     Vomiting 1/1/2024    3 times after heartburn    Wears glasses     Weight gain     Weight loss      Past Surgical History:   Procedure Laterality Date    ANGIOGRAM  7/15/14    no cad noted    ENDOVAS REPAIR, INFRARENL ABDOM AORTIC ANEURYSM/DISSECT      EPIDUROGRAPHY, RADIOLOGICAL S & I  4/18/2012    Procedure: CERVICAL EPIDURAL;  Surgeon: Sekou Solorzano MD;  Location: [facility] FOR PAIN MANAGEMENT    FLUOR GID & LOCLZJ NDL/CATH SPI DX/THER NJX  5/11/2012    Procedure: CERVICAL EPIDURAL;  Surgeon: Edil Aclocer MD;  Location: [facility] FOR PAIN [facility]    INJECTION, W/WO CONTRAST, DX/THERAPEUTIC SUBSTANCE, EPIDURAL/SUBARACHNOID; CERVICAL/THORACIC  4/18/2012    Procedure: CERVICAL EPIDURAL;  Surgeon: Sekou Solorzano MD;  Location: [facility] PAIN [facility]    INJECTION, W/WO CONTRAST, DX/THERAPEUTIC SUBSTANCE, EPIDURAL/SUBARACHNOID; CERVICAL/THORACIC  5/11/2012    Procedure: CERVICAL EPIDURAL;  Surgeon: Edil Alcocer MD;  Location: [facility] FOR PAIN [facility]NA[facility]    M-SEDAJ BY  PHYS PERFRMG SVC 5+ YR  4/18/2012     Procedure: CERVICAL EPIDURAL;  Surgeon: Sekou Solorzano MD;  Location: [facility]    M-SEDAJ BY SM PHYS PERFRMG SVC 5+ YR  2012    Procedure: CERVICAL EPIDURAL;  Surgeon: Edil Alcocer MD;  Location: [facility]IN [facility]    SYMP AAA URGENT REPAIR  10/14/2014    [place]; ascending aorta    TONSILLECTOMY       Family History   Problem Relation Age of Onset    Hypertension Father     Lipids Father     Other (Other[other]) Paternal Grandfather     Heart Disorder Paternal Grandfather     Hypertension Paternal Grandfather     Stroke Paternal Grandfather     Hypertension Sister     Lipids Sister     Hypertension Brother     Lipids Brother     Psychiatric Brother     Hypertension Mother     Lipids Mother     Psychiatric Mother     Hypertension Maternal Grandfather     Heart Attack Maternal Grandfather     Stroke Maternal Grandfather      Social History     Socioeconomic History    Marital status:    Tobacco Use    Smoking status: Former     Packs/day: 0.50     Years: 28.00     Additional pack years: 0.00     Total pack years: 14.00     Types: Cigarettes     Quit date: 2004     Years since quittin.7    Smokeless tobacco: Never   Vaping Use    Vaping Use: Never used   Substance and Sexual Activity    Alcohol use: Yes     Alcohol/week: 1.0 standard drink of alcohol     Types: 1 Glasses of wine per week     Comment: Wine club once a month    Drug use: No   Other Topics Concern    Caffeine Concern Yes     Comment: 1 cup of coffee daily    Exercise Yes     Comment: walking          Current Outpatient Medications   Medication Sig Dispense Refill    Butalbital-APAP-Caffeine -40 MG Oral Cap Take 1 capsule by mouth every 6 (six) hours as needed for Pain or Headaches. 20 capsule 0    ondansetron (ZOFRAN) 4 mg tablet Take 1 tablet (4 mg total) by mouth every 8 (eight) hours as needed for Nausea. 20 tablet 0    ketoconazole 2 % External Cream Apply 1 Application topically  daily. 1 each 2    Glucose Blood (ONETOUCH VERIO) In Vitro Strip Check blood sugars once daily. 100 strip 3    OneTouch Delica Lancets 30G Does not apply Misc 1 Lancet by Finger stick route daily. 100 each 3    METOPROLOL SUCCINATE  MG Oral Tablet 24 Hr TAKE ONE TABLET BY MOUTH TWICE DAILY 180 tablet 0    XARELTO 20 MG Oral Tab Take 1 tablet (20 mg total) by mouth daily 90 tablet 0    fenofibrate 145 MG Oral Tab TAKE 1 TABLET BY MOUTH ONE TIME DAILY 90 tablet 1    PEG 3350-KCl-Na Bicarb-NaCl 420 g Oral Recon Soln Take 4,000 mL by mouth As Directed. 1 each 0    Tirzepatide (MOUNJARO) 2.5 MG/0.5ML Subcutaneous Solution Pen-injector Inject 2.5 mg into the skin once a week for 28 days. 2 mL 0    ROSUVASTATIN 40 MG Oral Tab Take 1 tablet (40 mg total) by mouth nightly 90 tablet 0    DILTIAZEM HCL  MG Oral Capsule SR 24 Hr TAKE 1 CAPSULE BY MOUTH ONE TIME DAILY 90 capsule 0    eplerenone 50 MG Oral Tab Take 1 tablet (50 mg total) by mouth daily. 90 tablet 1    albuterol (VENTOLIN HFA) 108 (90 Base) MCG/ACT Inhalation Aero Soln Inhale 2 puffs into the lungs every 6 (six) hours as needed for Shortness of Breath. 54 g 1    Budesonide-Formoterol Fumarate (SYMBICORT) 160-4.5 MCG/ACT Inhalation Aerosol Inhale 2 puffs into the lungs 2 (two) times daily. 1 each 3    flecainide 100 MG Oral Tab Take 1 tablet (100 mg total) by mouth 2 (two) times daily. 180 tablet 3    potassium chloride 20 MEQ Oral Tab CR TAKE 1 TABLET BY MOUTH IN  THE MORNING AND 1 TABLET BY MOUTH IN THE EVENING (Patient taking differently: Take 1 tablet (20 mEq total) by mouth 2 (two) times daily. TAKE 1 TABLET BY MOUTH IN  THE MORNING AND 1 TABLET BY MOUTH IN THE EVENING) 180 tablet 3    metFORMIN  MG Oral Tablet 24 Hr Take 2 tablets (1,500 mg total) by mouth daily. 180 tablet 3    losartan 100 MG Oral Tab Take 1 tablet (100 mg total) by mouth daily. 90 tablet 3    hydrALAZINE 25 MG Oral Tab Take 1 tablet (25 mg total) by mouth in the morning  and 1 tablet (25 mg total) before bedtime. 180 tablet 3    Glucose Blood (ONETOUCH VERIO) In Vitro Strip TEST BLOOD GLUCOSE LEVELS ONCE DAILY 100 strip 0    AZELASTINE  MCG/SPRAY Nasal Solution INHALE 1 SPRAY INTO EACH NOSTRIL 2 TIMES DAILY (Patient taking differently: 1 spray by Nasal route in the morning and 1 spray before bedtime.) 30 mL 2    meclizine 25 MG Oral Tab Take 1 tablet (25 mg total) by mouth 3 (three) times daily as needed. 30 tablet 0    Rizatriptan Benzoate (MAXALT) 10 MG Oral Tab Take 1 tablet (10 mg total) by mouth as needed for Migraine. Do not exceed 3 tablets in 24 hours 9 tablet 1    vitamin E 1000 UNITS Oral Cap Take 1 capsule (1,000 Units total) by mouth daily.      FUROSEMIDE 20 MG Oral Tab TAKE 1 TABLET BY MOUTH  DAILY (Patient taking differently: Take 1 tablet (20 mg total) by mouth daily.) 90 tablet 3    CLONIDINE HCL 0.1 MG Oral Tab TAKE ONE TABLET BY MOUTH DAILY AS NEEDED  (Patient taking differently: Take 1 tablet (0.1 mg total) by mouth daily as needed.) 90 tablet 0    magnesium oxide 400 MG Oral Tab Take 1 tablet (400 mg total) by mouth daily.      ONETOUCH DELICA PLUS [id number] Does not apply Misc USE TO TEST BLOOD SUGAR  ONCE DAILY AS DIRECTED 100 each 2    Vitamin D3 2000 units Oral Cap Take 1 capsule (2,000 Units total) by mouth 2 (two) times daily.       Allergies:  Allergies   Allergen Reactions    Jardiance [Empagliflozin] SWELLING and DIZZINESS       Lab Results   Component Value Date    GLU 86 02/26/2024    BUN 16 02/26/2024    BUNCREA 12 02/26/2024    CREATSERUM 1.31 (H) 02/26/2024    ANIONGAP 1 11/06/2021    GFR 80 12/26/2017    GFRNAA 79 05/09/2022    GFRAA 92 05/09/2022    CA 9.2 02/26/2024    OSMOCALC 294 11/06/2021    ALKPHO 95 02/26/2024    AST 21 02/26/2024    ALT 21 02/26/2024    BILT 0.6 02/26/2024    TP 6.3 02/26/2024    ALB 4.3 02/26/2024    GLOBULIN 2.0 02/26/2024    AGRATIO 2.2 02/26/2024     02/26/2024    K 4.4 02/26/2024     02/26/2024     CO2 23 02/26/2024     Lab Results   Component Value Date    WBC 8.9 02/26/2024    RBC 4.92 02/26/2024    HGB 14.5 02/26/2024    HCT 44.7 02/26/2024    MCV 90.9 02/26/2024    MCH 29.5 02/26/2024    MCHC 32.4 02/26/2024    RDW 13.1 02/26/2024     02/26/2024    MPV 11.2 (H) 10/27/2011     Lab Results   Component Value Date    CHOLEST 116 02/26/2024    TRIG 194 (H) 02/26/2024    HDL 35 (L) 02/26/2024    LDL 54 02/26/2024    VLDL 37 (H) 02/15/2019    TCHDLRATIO 3.3 02/26/2024    NONHDLC 81 02/26/2024     Lab Results   Component Value Date     11/05/2021    A1C 5.5 02/26/2024     Lab Results   Component Value Date    T4F 1.0 08/06/2013    TSH 1.170 02/15/2019    TSHT4 1.59 02/26/2024     Lab Results   Component Value Date    VITD 34.2 02/15/2019     Lab Results   Component Value Date    JUDIE 70.7 05/23/2019     Lab Results   Component Value Date    FOLIC 11.5 11/07/2018     Lab Results   Component Value Date    IRON 67 05/23/2019     Lab Results   Component Value Date    B12 359 11/07/2018     No results found for: \"PHOS\", \"PHOSPHORUS\"  Lab Results   Component Value Date    MG 2.2 11/06/2021        PHYSICAL EXAM:   BP (!) 162/94   Pulse 68   Temp 97.4 °F (36.3 °C)   Resp 16   Ht 5' 11\" (1.803 m)   Wt 278 lb (126.1 kg)   SpO2 96%   BMI 38.77 kg/m²   Physical Exam  Vitals and nursing note reviewed.   Constitutional:       Appearance: Normal appearance.   Cardiovascular:      Rate and Rhythm: Normal rate. Rhythm irregular.      Pulses: Normal pulses.   Pulmonary:      Effort: Pulmonary effort is normal. No respiratory distress.      Breath sounds: Normal breath sounds.   Neurological:      Mental Status: He is alert and oriented to person, place, and time.      Motor: No weakness.              ASSESSMENT/PLAN:   Diagnoses and all orders for this visit:    Complicated migraine with status migrainosus  -     Butalbital-APAP-Caffeine -40 MG Oral Cap; Take 1 capsule by mouth every 6 (six) hours as  needed for Pain or Headaches.  -     ondansetron (ZOFRAN) 4 mg tablet; Take 1 tablet (4 mg total) by mouth every 8 (eight) hours as needed for Nausea.    Fungal skin infection  -     ketoconazole 2 % External Cream; Apply 1 Application topically daily.          May Preciado, APRN

## 2024-04-03 NOTE — TELEPHONE ENCOUNTER
Last time medication was refilled  01/18/2024  Last OV 03/21/2024  Next OV due/scheduled   Future Appointments   Date Time Provider Department Center   4/13/2024  9:15 AM [facility] US RM1 [facility] US [facility] [place]   4/26/2024  9:30 AM [facility] IVS RM 2 EP [facility]   5/18/2024 10:00 AM Nathalie Giles PA-C [facility] [address] [address]   6/5/2024  2:00 PM Maria Elena Cohen RD [facility]       Sent to Dr. Davis for approval

## 2024-04-05 NOTE — PROGRESS NOTES
Rivera Bush is a 58 year old female.  HPI:   This visit is conducted using Telemedicine with live, interactive video and audio.     Patient consents to video visit today to discuss ongoing rash, very itchy, b/l on belt line, groin, under arms. Flared up 2 weeks ago, minimal relief with topical ketoconazole.   Has not checked his blood sugar recently but has been eating more carbs than usual.    Doing well on mounjaro so far, taking 2.5mg, notices his appetite is decreased, feels mccain. Denies side effects.    Current Outpatient Medications   Medication Sig Dispense Refill   • fluconazole 150 MG Oral Tab Take 1 tablet (150 mg total) by mouth once a week. 4 tablet 0   • EPLERENONE 50 MG Oral Tab TAKE 1 TABLET BY MOUTH ONE TIME DAILY 90 tablet 0   • Tirzepatide (MOUNJARO) 2.5 MG/0.5ML Subcutaneous Solution Pen-injector Inject 2.5 mg into the skin once a week. 2 mL 0   • ondansetron (ZOFRAN) 4 mg tablet Take 1 tablet (4 mg total) by mouth every 8 (eight) hours as needed for Nausea. 20 tablet 0   • ketoconazole 2 % External Cream Apply 1 Application topically daily. 1 each 2   • Glucose Blood (ONETOUCH VERIO) In Vitro Strip Check blood sugars once daily. 100 strip 3   • OneTouch Delica Lancets 30G Does not apply Misc 1 Lancet by Finger stick route daily. 100 each 3   • METOPROLOL SUCCINATE  MG Oral Tablet 24 Hr TAKE ONE TABLET BY MOUTH TWICE DAILY 180 tablet 0   • XARELTO 20 MG Oral Tab Take 1 tablet (20 mg total) by mouth daily 90 tablet 0   • fenofibrate 145 MG Oral Tab TAKE 1 TABLET BY MOUTH ONE TIME DAILY 90 tablet 1   • PEG 3350-KCl-Na Bicarb-NaCl 420 g Oral Recon Soln Take 4,000 mL by mouth As Directed. 1 each 0   • ROSUVASTATIN 40 MG Oral Tab Take 1 tablet (40 mg total) by mouth nightly 90 tablet 0   • DILTIAZEM HCL  MG Oral Capsule SR 24 Hr TAKE 1 CAPSULE BY MOUTH ONE TIME DAILY 90 capsule 0   • albuterol (VENTOLIN HFA) 108 (90 Base) MCG/ACT Inhalation Aero Soln Inhale 2 puffs into the lungs  every 6 (six) hours as needed for Shortness of Breath. 54 g 1   • Budesonide-Formoterol Fumarate (SYMBICORT) 160-4.5 MCG/ACT Inhalation Aerosol Inhale 2 puffs into the lungs 2 (two) times daily. 1 each 3   • flecainide 100 MG Oral Tab Take 1 tablet (100 mg total) by mouth 2 (two) times daily. 180 tablet 3   • potassium chloride 20 MEQ Oral Tab CR TAKE 1 TABLET BY MOUTH IN  THE MORNING AND 1 TABLET BY MOUTH IN THE EVENING (Patient taking differently: Take 1 tablet (20 mEq total) by mouth 2 (two) times daily. TAKE 1 TABLET BY MOUTH IN  THE MORNING AND 1 TABLET BY MOUTH IN THE EVENING) 180 tablet 3   • metFORMIN  MG Oral Tablet 24 Hr Take 2 tablets (1,500 mg total) by mouth daily. 180 tablet 3   • losartan 100 MG Oral Tab Take 1 tablet (100 mg total) by mouth daily. 90 tablet 3   • hydrALAZINE 25 MG Oral Tab Take 1 tablet (25 mg total) by mouth in the morning and 1 tablet (25 mg total) before bedtime. 180 tablet 3   • Glucose Blood (ONETOUCH VERIO) In Vitro Strip TEST BLOOD GLUCOSE LEVELS ONCE DAILY 100 strip 0   • AZELASTINE  MCG/SPRAY Nasal Solution INHALE 1 SPRAY INTO EACH NOSTRIL 2 TIMES DAILY (Patient taking differently: 1 spray by Nasal route in the morning and 1 spray before bedtime.) 30 mL 2   • meclizine 25 MG Oral Tab Take 1 tablet (25 mg total) by mouth 3 (three) times daily as needed. 30 tablet 0   • Rizatriptan Benzoate (MAXALT) 10 MG Oral Tab Take 1 tablet (10 mg total) by mouth as needed for Migraine. Do not exceed 3 tablets in 24 hours 9 tablet 1   • vitamin E 1000 UNITS Oral Cap Take 1 capsule (1,000 Units total) by mouth daily.     • FUROSEMIDE 20 MG Oral Tab TAKE 1 TABLET BY MOUTH  DAILY (Patient taking differently: Take 1 tablet (20 mg total) by mouth daily.) 90 tablet 3   • CLONIDINE HCL 0.1 MG Oral Tab TAKE ONE TABLET BY MOUTH DAILY AS NEEDED  (Patient taking differently: Take 1 tablet (0.1 mg total) by mouth daily as needed.) 90 tablet 0   • magnesium oxide 400 MG Oral Tab Take 1  tablet (400 mg total) by mouth daily.     • ONETOUCH DELICA PLUS [id number] Does not apply Misc USE TO TEST BLOOD SUGAR  ONCE DAILY AS DIRECTED 100 each 2   • Vitamin D3 2000 units Oral Cap Take 1 capsule (2,000 Units total) by mouth 2 (two) times daily.        Past Medical History:   Diagnosis Date   • Abdominal pain 1/1/2024    above the belly button   • Acute cystitis with hematuria 02/27/2017   • Acute diastolic (congestive) heart failure (HCC) 06/26/2018   • Anxiety disorder    • Arthritis    • Back pain    • Bloating 1/1/30204    most days   • Blood in the stool    • Blurred vision    • Brachial neuritis or radiculitis NOS     C4,C5, nerve roots   • Calculus of kidney     Last year do to medication i was taking   • Chronic atrial fibrillation (HCC) 10/14/2014    post op from [facility]   • Chronic cough 1/1/2024    medicine causes symptoms   • Community acquired pneumonia of left lower lobe of lung 06/18/2018   • Decorative tattoo    • Depression 09/24/2015   • Diabetes ([facility])    • Diarrhea, unspecified     I believe from my medication   • Dizziness 12/23/20   • Fatigue 1/1/2024    daily   • Feeling lonely    • Flatulence/gas pain/belching    • Food intolerance    • Headache disorder     migraines   • Heart palpitations    • Heartburn 1/12024    at least one a week   • Hemorrhoids    • High blood pressure    • High cholesterol    • History of depression    • Hyperlipidemia    • Indigestion 02/07/2019    not normal   • Kidney stone 02/27/2017   • Leg swelling    • Loss of appetite    • 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, or unspecified(404.00) 06/29/2012   • Migraines    • Nausea     on and off   • Night sweats    • Obesity, unspecified    • Obsessive-compulsive disorders 09/24/2015   • ISAAC (obstructive sleep apnea) 3-3-14-PSG/TX-8/19/16    AHI-16, O2 alyce-71%/CPAP-10cwp   • Other specified cardiac dysrhythmias(427.89)    •  Other testicular hypofunction    • Pain in joints    • Pneumonia, organism unspecified(486)    • Problems with swallowing 2024   • Shortness of breath 2024    with simple movements   • Sleep disturbance    • Stress    • Suicidal thoughts 2015   • Thoracic or lumbosacral neuritis or radiculitis, unspecified     L3,L4   • Thrombocytopenia (HCC)    • Uncomfortable fullness after meals    • Unspecified essential hypertension    • Unspecified sleep apnea     wears CPAP   • Visual impairment    • Vomiting 2024    3 times after heartburn   • Wears glasses    • Weight gain    • Weight loss       Social History:  Social History     Socioeconomic History   • Marital status:    Tobacco Use   • Smoking status: Former     Packs/day: 0.50     Years: 28.00     Additional pack years: 0.00     Total pack years: 14.00     Types: Cigarettes     Quit date: 2004     Years since quittin.7   • Smokeless tobacco: Never   Vaping Use   • Vaping Use: Never used   Substance and Sexual Activity   • Alcohol use: Yes     Alcohol/week: 1.0 standard drink of alcohol     Types: 1 Glasses of wine per week     Comment: Wine club once a month   • Drug use: No   Other Topics Concern   • Caffeine Concern Yes     Comment: 1 cup of coffee daily   • Exercise Yes     Comment: walking        REVIEW OF SYSTEMS:   GENERAL HEALTH: feels well otherwise. Denies fever, chills, unintentional weight change  SKIN: denies any unusual skin lesions or rashes  RESPIRATORY: denies hemoptysis, shortness of breath with exertion, wheezing or cough  CARDIOVASCULAR: denies chest pain or palpitations, denies leg swelling  GI: denies abdominal pain and denies heartburn. Denies nausea, vomiting, diarrhea, constipation  NEURO: denies headaches, dizziness, weakness, syncope    EXAM:   No vitals for video visit  Physical Exam  - well appearing via video visit  - erythematous, symmetric rash under skin folds b/l without vesicle or drainage   - no  respiratory distress or cough observed  - able to speak in full sentences  - alert and oriented        ASSESSMENT AND PLAN:   1. Tinea corporis (Primary)  -     Fluconazole; Take 1 tablet (150 mg total) by mouth once a week.  Dispense: 4 tablet; Refill: 0  2. Type 2 diabetes mellitus with hyperglycemia, without long-term current use of insulin (HCC)   -continue mounjaro, increase to 5mg weekly next month     The patient indicates understanding of these issues and agrees to the plan.  Return if symptoms worsen or fail to improve.

## 2024-04-09 NOTE — TELEPHONE ENCOUNTER
-150/90-93 range  Pulse rate  during inactive moments and light headed     With medication    Blood sugar after eating 220  after pasta it takes over 8 hrs to go down    Please call to discuss
Spoke with pt, reported fluctuation in his heart rate and blood sugar after eating certain food is noticed more frequnest than before  Pt stated his HR goes up w/o any activity but then comes down in few seconds and pt feels flushed at tmes, pt also reported being stresses and Lexapro is not helping  Advised to come in to get evaluated for medication check  Advised to go to IC if his HR goes above 140 and remains elevated for more than few seconds  Pt v/u  Appointment given
I have personally seen and examined the patient. I have collaborated with and supervised the

## 2024-04-12 NOTE — TELEPHONE ENCOUNTER
Received medical records request from [employer] for DOS 12/01/2023-04/11/2024. Original sent to [employer] copy sent to scanning.

## 2024-04-15 NOTE — TELEPHONE ENCOUNTER
Last time medication was refilled 04/21/2023  Last OV 04/05/2024  Next OV due/scheduled   Future Appointments   Date Time Provider Department Center   4/26/2024 10:30 AM [facility] IVS [facility] 2 EP [facility]   5/18/2024 10:00 AM Nathalie Giles PA-C [facility] [address]   6/5/2024  2:00 PM Maria Elena Cohen RD [facility]       Sent to ANDREAS Arceo for approval.

## 2024-04-23 NOTE — TELEPHONE ENCOUNTER
Patient has been on Mounjaro 2.5 mg starting dosage since 2/24. Discussed with Dr. Davis as he would like to know how patient is tolerating and may increase dosage. [employer] message sent.

## 2024-04-23 NOTE — TELEPHONE ENCOUNTER
Won Davis MD   to [facility] Clinical Staff       4/23/24  9:07 AM  Dose is not appropriate because mounjaro dose refill request is starting dose  Please confirm    [facility]

## 2024-04-23 NOTE — TELEPHONE ENCOUNTER
Last time medication was refilled 03/30/2024  Last OV 04/05/2024  Next OV due/scheduled   Future Appointments   Date Time Provider Department Center   4/26/2024  8:00 AM  IVS [facility] 2 EP [facility]   5/18/2024 10:00 AM Nathalie Giles PA-C [facility] [address] [facility]   6/5/2024  2:00 PM Maria Elena Cohen RD [facility]     Medication not on protocol.

## 2024-04-25 NOTE — PAT NURSING NOTE
Per PAT encounter/Gnzot message sent to pt:    PreOp Instructions     You are scheduled for: a Cardiac Procedure     Date of Procedure: 04/26/24 Friday     Diet Instructions: Do not eat or drink anything after midnight     Medications: Medications you are allowed to take can be taken with a sip of water the morning of your procedure     Do not take the following Blood Thinner(s): Do NOT take your Xarelto dose Friday morning 4/26.     Medications to Stop: Hold herbal supplements and vitamins morning of the procedure 4/26.     Other Medications: Do NOT take your doses of Eplerenone, Lasix, and Losartan, on Friday morning 4/26. Do NOT take any doses of Fioricet on Friday morning 4/26.    Diabetic Instructions: Metformin needs to be held 24 hours prior to procedure, your last dose should be the morning dose the day before procedure (Do NOT take your doses Thursday evening 4/25 and Friday morning 4/26).     Sleep Apnea: If you have sleep apnea, please bring your mask and tubing     Skin Prep: Shower with antibacterial soap using a clean washcloth, prior to procedure     Arrival Time: Today prior to your procedure you will receive a phone call before 6:00 pm with your arrival time. If you haven't received a phone call, please check your voicemail messages., If you did not receive a voice mail and it is after 6:00 pm, please call the nursing supervisor at [phone].    Driving After Procedure: If sedation is given, you WILL NOT be able to drive home. You will need a responsible adult  to drive you home., Cannot take uber or cab unless approved by physician     Discharge Teaching: Your nurse will give you specific instructions before discharge, Most people can resume normal activities in 2-3 days, Any questions, please call the physician's office      parking is available starting at 6 am or park in the [facility] parking garage [facility]. Check in at the [facility] reception desk. Our customer   will be there to check you in for your procedure. Please bring your insurance cards and ID with you.                                                                                                                                      Please DO NOT respond to this message, the inbasket is not monitored for messages. For any questions, please call the physician's office.

## 2024-04-26 NOTE — H&P
[facility]  Pre Procedure History and Physical    Rivera Bush Patient Status:  Outpatient    1965 MRN [id number]   Location [facility] INTERVENTIONAL SUITES Attending Chris River MD   Hosp Day # 0 PCP Won Davis MD     Consults      History of Present Illness:  Rivera Bush is a a(n) 59 year old male here for Atrial Flutter ablation      Prior H/P Reviewed with cm    History:  Past Medical History:    Abdominal pain    above the belly button    Acute cystitis with hematuria    Acute diastolic (congestive) heart failure (HCC)    Anxiety disorder    Arthritis    Back pain    Bloating    most days    Blood in the stool    Blurred vision    Brachial neuritis or radiculitis NOS    C4,C5, nerve roots    Calculus of kidney    Last year do to medication i was taking    Chronic atrial fibrillation (HCC)    post op from [facility]    Chronic cough    medicine causes symptoms    Community acquired pneumonia of left lower lobe of lung    Decorative tattoo    Depression    Diabetes (HCC)    Diarrhea, unspecified    I believe from my medication    Dizziness    Fatigue    daily    Feeling lonely    Flatulence/gas pain/belching    Food intolerance    Headache disorder    migraines    Heart palpitations    Heartburn    at least one a week    Hemorrhoids    High blood pressure    High cholesterol    History of depression    Hyperlipidemia    Indigestion    not normal    Kidney stone    Leg swelling    Loss of appetite    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, or unspecified(404.00)    Migraines    Nausea    on and off    Night sweats    Obesity, unspecified    Obsessive-compulsive disorders    ISAAC (obstructive sleep apnea)    AHI-16, O2 alyce-71%/CPAP-10cwp    Other specified cardiac dysrhythmias(427.89)    Other testicular hypofunction    Pain in joints    Pneumonia, organism unspecified(486)    Problems  with swallowing    Shortness of breath    with simple movements    Sleep disturbance    Stress    Suicidal thoughts    Thoracic or lumbosacral neuritis or radiculitis, unspecified    L3,L4    Thrombocytopenia (HCC)    Uncomfortable fullness after meals    Unspecified essential hypertension    Unspecified sleep apnea    wears CPAP    Visual impairment    Vomiting    3 times after heartburn    Wears glasses    Weight gain    Weight loss     Past Surgical History:   Procedure Laterality Date    Angiogram  7/15/14    no cad noted    Endovas repair, infrarenl abdom aortic aneurysm/dissect      Epidurography, radiological s & i  4/18/2012    Procedure: CERVICAL EPIDURAL;  Surgeon: Sekou Solorzano MD;  Location: [facility]    Fluor gid & loclzj ndl/cath spi dx/ther njx  5/11/2012    Procedure: CERVICAL EPIDURAL;  Surgeon: Eidl Alcocer MD;  Location: [facility]    Injection, w/wo contrast, dx/therapeutic substance, epidural/subarachnoid; cervical/thoracic  4/18/2012    Procedure: CERVICAL EPIDURAL;  Surgeon: Sekou Solorzano MD;  Location: [facility]    Injection, w/wo contrast, dx/therapeutic substance, epidural/subarachnoid; cervical/thoracic  5/11/2012    Procedure: CERVICAL EPIDURAL;  Surgeon: Edil Alcocer MD;  Location: [facility]    M-sedaj by  phys perfrmg svc 5+ yr  4/18/2012    Procedure: CERVICAL EPIDURAL;  Surgeon: Sekou Solorzano MD;  Location: [facility]    M-sedaj by  phys perfrmg svc 5+ yr  5/11/2012    Procedure: CERVICAL EPIDURAL;  Surgeon: Edil Alcocer MD;  Location: [facility]    Symp aaa urgent repair  10/14/2014    [place]; ascending aorta    Tonsillectomy       Family History   Problem Relation Age of Onset    Hypertension Father     Lipids Father     Other (Other[other]) Paternal Grandfather     Heart Disorder Paternal Grandfather     Hypertension Paternal  Grandfather     Stroke Paternal Grandfather     Hypertension Sister     Lipids Sister     Hypertension Brother     Lipids Brother     Psychiatric Brother     Hypertension Mother     Lipids Mother     Psychiatric Mother     Hypertension Maternal Grandfather     Heart Attack Maternal Grandfather     Stroke Maternal Grandfather       reports that he quit smoking about 19 years ago. His smoking use included cigarettes. He started smoking about 47 years ago. He has a 14 pack-year smoking history. He has never used smokeless tobacco. He reports current alcohol use of about 1.0 standard drink of alcohol per week. He reports that he does not use drugs.    Allergies:  Allergies   Allergen Reactions    Jardiance [Empagliflozin] SWELLING and DIZZINESS       Medications:  No current facility-administered medications for this encounter.    OBJECTIVE      Physical Exam:  Physical Exam   Height 5' 11\" (1.803 m), weight 283 lb (128.4 kg).  No data recorded.    Wt Readings from Last 3 Encounters:   04/25/24 283 lb (128.4 kg)   03/21/24 278 lb (126.1 kg)   03/21/24 285 lb (129.3 kg)       NAD  PERRLA/EOMI  Neck veins not elevated  Carotids- no bruits  CTA bilaterally  Cardiac- irreg irreg  Abdomen- Soft,Nontender, normal BS  Extremities- pulses normal  Edema-   Mood /Affect Congruent  Skin- no lesions          Results:   No results for input(s): \"GLU\", \"BUN\", \"CREATSERUM\", \"GFRAA\", \"GFRNAA\", \"EGFRCR\", \"CA\", \"NA\", \"K\", \"CL\", \"CO2\" in the last 168 hours.  No results for input(s): \"RBC\", \"HGB\", \"HCT\", \"MCV\", \"MCH\", \"MCHC\", \"RDW\", \"NEPRELIM\", \"WBC\", \"PLT\" in the last 168 hours.      [unfilled]  No results for input(s): \"BNP\" in the last 168 hours.  Lab Results   Component Value Date    PT 13.7 07/14/2014    PT 13.8 02/07/2011    INR 1.23 (H) 11/05/2021    INR 1.0 12/30/2020    INR 1.0 11/19/2020     Lab Results   Component Value Date    TROP <0.045 11/06/2021    TROP <0.045 11/05/2021    TROP <0.045 11/05/2021         No results found.        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 Reverified on the day of the procedure    Procedure Planned: RF Flutter ablation    Sedation Plan: per anesthesia- Mac or GETA    Discharge Plan: same day      Chris River MD  Cardiac Electrophysiology  [facility]  4/26/2024  8:01 AM

## 2024-04-26 NOTE — PROGRESS NOTES
Pt was not instructed to stop Mounjaro for the requisite number of days.    Pt had a cousin that  from aspiration from not stopping Mounjaro.    Will cancel the procedure and reschedule.    D/W Dr. Haddad and the patient         _______________________________________  Chris River MD  Cardiac Electrophysiololgy  [facility]  2024

## 2024-04-26 NOTE — PROGRESS NOTES
Ptocedure cx'dat. Pt took Mounjaro on Monday. Anesthesia and Dr River spoke with pt. To be rescheduled. IVx2 removed with catheter intact.

## 2024-05-01 NOTE — H&P
Chris River MD  Physician  Cardiology     H&P     Signed     Date of Service: 2024 10:00 AM     Signed       [facility]  Pre Procedure History and Physical           Rivera Bush Patient Status:  Outpatient    1965 MRN [id number]   [facility] INTERVENTIONAL SUITES Attending Chris River MD   Hosp Day # 0 PCP Won Davis MD      Consults        History of Present Illness:  Rivera Bush is a a(n) 59 year old male here for Atrial Flutter ablation        Prior H/P Reviewed with cm     History:  Past Medical History       Past Medical History:    Abdominal pain     above the belly button    Acute cystitis with hematuria    Acute diastolic (congestive) heart failure (HCC)    Anxiety disorder    Arthritis    Back pain    Bloating     most days    Blood in the stool    Blurred vision    Brachial neuritis or radiculitis NOS     C4,C5, nerve roots    Calculus of kidney     Last year do to medication i was taking    Chronic atrial fibrillation (HCC)     post op from [facility]    Chronic cough     medicine causes symptoms    Community acquired pneumonia of left lower lobe of lung    Decorative tattoo    Depression    Diabetes (HCC)    Diarrhea, unspecified     I believe from my medication    Dizziness    Fatigue     daily    Feeling lonely    Flatulence/gas pain/belching    Food intolerance    Headache disorder     migraines    Heart palpitations    Heartburn     at least one a week    Hemorrhoids    High blood pressure    High cholesterol    History of depression    Hyperlipidemia    Indigestion     not normal    Kidney stone    Leg swelling    Loss of appetite    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, or unspecified(404.00)    Migraines    Nausea     on and off    Night sweats    Obesity, unspecified    Obsessive-compulsive disorders    ISAAC (obstructive  sleep apnea)     AHI-16, O2 alyce-71%/CPAP-10cwp    Other specified cardiac dysrhythmias(427.89)    Other testicular hypofunction    Pain in joints    Pneumonia, organism unspecified(486)    Problems with swallowing    Shortness of breath     with simple movements    Sleep disturbance    Stress    Suicidal thoughts    Thoracic or lumbosacral neuritis or radiculitis, unspecified     L3,L4    Thrombocytopenia (HCC)    Uncomfortable fullness after meals    Unspecified essential hypertension    Unspecified sleep apnea     wears CPAP    Visual impairment    Vomiting     3 times after heartburn    Wears glasses    Weight gain    Weight loss         Past Surgical History         Past Surgical History:   Procedure Laterality Date    Angiogram   7/15/14     no cad noted    Endovas repair, infrarenl abdom aortic aneurysm/dissect        Epidurography, radiological s & i   4/18/2012     Procedure: CERVICAL EPIDURAL;  Surgeon: Sekou Solorzano MD;  Location: [facility]    Fluor gid & loclzj ndl/cath spi dx/ther njx   5/11/2012     Procedure: CERVICAL EPIDURAL;  Surgeon: Edil Alcocer MD;  Location: [facility]    Injection, w/wo contrast, dx/therapeutic substance, epidural/subarachnoid; cervical/thoracic   4/18/2012     Procedure: CERVICAL EPIDURAL;  Surgeon: Sekou Solorzano MD;  Location: [facility]    Injection, w/wo contrast, dx/therapeutic substance, epidural/subarachnoid; cervical/thoracic   5/11/2012     Procedure: CERVICAL EPIDURAL;  Surgeon: Edil Alcocer MD;  Location: [facility]    M-sedaj by  phys perfrmg svc 5+ yr   4/18/2012     Procedure: CERVICAL EPIDURAL;  Surgeon: Sekou Solorzano MD;  Location: [facility]    M-sedaj by  phys perfrmg svc 5+ yr   5/11/2012     Procedure: CERVICAL EPIDURAL;  Surgeon: Edil Alcocer MD;  Location: [facility]    Symp aaa urgent repair   10/14/2014      [place]; ascending aorta    Tonsillectomy             Family History         Family History   Problem Relation Age of Onset    Hypertension Father      Lipids Father      Other (Other[other]) Paternal Grandfather      Heart Disorder Paternal Grandfather      Hypertension Paternal Grandfather      Stroke Paternal Grandfather      Hypertension Sister      Lipids Sister      Hypertension Brother      Lipids Brother      Psychiatric Brother      Hypertension Mother      Lipids Mother      Psychiatric Mother      Hypertension Maternal Grandfather      Heart Attack Maternal Grandfather      Stroke Maternal Grandfather            reports that he quit smoking about 19 years ago. His smoking use included cigarettes. He started smoking about 47 years ago. He has a 14 pack-year smoking history. He has never used smokeless tobacco. He reports current alcohol use of about 1.0 standard drink of alcohol per week. He reports that he does not use drugs.     Allergies:  Allergies        Allergies   Allergen Reactions    Jardiance [Empagliflozin] SWELLING and DIZZINESS            Medications:  Current Hospital Medications   No current facility-administered medications for this encounter.        OBJECTIVE       Physical Exam:  Physical Exam   Height 5' 11\" (1.803 m), weight 283 lb (128.4 kg).  No data recorded.         Wt Readings from Last 3 Encounters:   04/25/24 283 lb (128.4 kg)   03/21/24 278 lb (126.1 kg)   03/21/24 285 lb (129.3 kg)         NAD  PERRLA/EOMI  Neck veins not elevated  Carotids- no bruits  CTA bilaterally  Cardiac- irreg irreg  Abdomen- Soft,Nontender, normal BS  Extremities- pulses normal  Edema-   Mood /Affect Congruent  Skin- no lesions              Results:   No results for input(s): \"GLU\", \"BUN\", \"CREATSERUM\", \"GFRAA\", \"GFRNAA\", \"EGFRCR\", \"CA\", \"NA\", \"K\", \"CL\", \"CO2\" in the last 168 hours.  No results for input(s): \"RBC\", \"HGB\", \"HCT\", \"MCV\", \"MCH\", \"MCHC\", \"RDW\", \"NEPRELIM\", \"WBC\", \"PLT\" in the last 168  hours.        [unfilled]  No results for input(s): \"BNP\" in the last 168 hours.        Lab Results   Component Value Date     PT 13.7 07/14/2014     PT 13.8 02/07/2011     INR 1.23 (H) 11/05/2021     INR 1.0 12/30/2020     INR 1.0 11/19/2020            Lab Results   Component Value Date     TROP <0.045 11/06/2021     TROP <0.045 11/05/2021     TROP <0.045 11/05/2021            No results found.         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 Reverified on the day of the procedure     Procedure Planned: RF Flutter ablation     Sedation Plan: per anesthesia- Mac or GETA     Discharge Plan: same day        Chris River MD  Cardiac Electrophysiology  [facility]  4/26/2024  8:01 AM               Electronically signed by Chris River MD at 4/26/2024  8:01 AM         Admission (Discharged) from  INTERVENTIONAL SUITES on 4/26/2024            Detailed Report          Note shared with patient  Chart Review: Note Routing History    No routi

## 2024-05-02 NOTE — PAT NURSING NOTE
PreOp Instructions     You are scheduled for: a Cardiac Procedure     Date of Procedure: 05/03/24. CHECK IN AT 6:30 AM     Diet Instructions: Do not eat or drink anything for 8 hours before the procedure. NO FOOD AFTER 10:00 PM     Medications: Medications you are allowed to take can be taken with a sip of water the morning of your procedure. TAKE ONLY DILTIAZEM,FLECAINIDE, HYDRALAZINE AND METOPROLOL.     Sleep Apnea: If you have sleep apnea, please bring your mask and tubing     Skin Prep: Shower with antibacterial soap using a clean washcloth, prior to procedure     Driving After Procedure: If sedation is given, you WILL NOT be able to drive home. You will need a responsible adult  to drive you home.     Discharge Teaching: Your nurse will give you specific instructions before discharge, Most people can resume normal activities in 2-3 days, Any questions, please call the physician's office

## 2024-05-03 NOTE — PROCEDURES
Electrophysiology Procedure Note    Rivera Bush Location: Cath Lab   CSN [id number] MRN [id number]   Admission Date 5/3/2024  Operation Date 05/03/24    Attending Physician Chris River MD Operating Physician Chris River MD     Pre-Operative Diagnosis: Atrial Flutter    Post-Operative Diagnosis: Same as above    Procedure Performed:  1. Comprehensive electrophysiology study.   2. Coronary sinus catheter placement to record left atrial electrograms and pace the left atrium. .    3. Three-dimensional intracardiac mapping.   4. Ablation of Typical Counterclockwise Right atrial Flutter  5. Additional Arrhythmias- None  6. Vascular closure with Vascade  7. IV drug infusion- none    Indication: Symptomatic Atrial Flutter    EBL: Minimal    Summary of Case: The patient was brought to the EP lab in a fasting and   nonsedated state after providing informed consent. . The right and left groins were prepped   and draped in a sterile fashion.     Sedation: Per Anesthesia    Access:  Access was achieved with ultrasound guidance  Left Femoral Vein     Right Femoral Vein - x3  - 8 [place]- upsized to [facility]igo/Ablation cathter  - 7 [place]- CS catheter  - 10 FR- Intracardiac Echo cathter    Electrophysiology Study  Baseline measurements made. Pacing from the HRA/CS/RV/his bundle performed. 3-D mapping used to tag the AV node/His Region    ELECTROPHYSIOLOGY STUDY FINDINGS:   Baseline Rhythm:  Atrial Flutter- Atrial  ms  Post Ablation Rhythm-  NSR    Arrhythmia Induction  Arrhythmia Typical AFL  Induction method Spontaneous  TCL - 260 ms  Termination- Ablation of the CTI  Maneuvers/Evidence - Entrainment of the atrial flutter from the CTI showed a PPI-TCL of 0    Ablation  RF lesions were appled from the ventricular end of the CTI to the IVC.  Bidirectional block was demonstrated by differential pacing on both sides of the line of RF ablation after a 30 minute waiting period.      CONCLUSIONS:   1. Sinus node  function normal.   2. Atrioventricular node function normal.   3. His-Purkinje function normal.   4. Successful ablation of the CTI for right atrial flutter with bidirectional block  5. Vascade Venous Closure      Complications:  None      Plan:    1)Meds: - Continue all meds except discontinue Diltiazem  2) Bedrest - 2 hours  3) Follow up - 1 month              Chris River MD    Cardiac Electrophysiololgy  [facility]  5/3/2024

## 2024-05-03 NOTE — ANESTHESIA POSTPROCEDURE EVALUATION
[facility]    Rivera Bush Patient Status:  Outpatient   Age/Gender 59 year old male MRN [id number]   Location [facility] INTERVENTIONAL SUITES Attending Chris River MD   Hosp Day # 0 PCP Won Davis MD       Anesthesia Post-op Note        Procedure Summary       Date: 05/03/24 Room / Location: [facility] Interventional Suites    Anesthesia Start: 0811 Anesthesia Stop: 1002    Procedure:Atrial Flutter Ablation Diagnosis:   Atrial Flutter    Scheduled Providers: Leo Hale MD Anesthesiologist: Leo Hale MD    Anesthesia Type: general ASA Status: Not recorded            Anesthesia Type: general    Vitals Value Taken Time   /71 05/03/24 1009   Temp 98 05/03/24 1009   Pulse 81 05/03/24 1009   Resp 20 05/03/24 1009   SpO2 95 05/03/24 1009       Patient Location: PACU    Anesthesia Type: general    Airway Patency: patent    Postop Pain Control: adequate    Mental Status: mildly sedated but able to meaningfully participate in the post-anesthesia evaluation    Nausea/Vomiting: none    Cardiopulmonary/Hydration status: stable euvolemic    Complications: no apparent anesthesia related complications    Postop vital signs: stable    Dental Exam: Unchanged from Preop    Patient to be discharged from PACU when criteria met.

## 2024-05-03 NOTE — ANESTHESIA PROCEDURE NOTES
Airway  Date/Time: 5/3/2024 8:16 AM  Urgency: elective      General Information and Staff    Patient location during procedure: cath lab  Anesthesiologist: Leo Hale MD  Performed: anesthesiologist   Performed by: Leo Hale MD  Authorized by: Leo Hale MD      Indications and Patient Condition  Indications for airway management: anesthesia  Spontaneous Ventilation: absent  Sedation level: deep  Preoxygenated: yes  Patient position: sniffing  Mask difficulty assessment: 1 - vent by mask    Final Airway Details  Final airway type: endotracheal airway      Successful airway: ETT  Cuffed: yes   Successful intubation technique: exchange catheter  Facilitating devices/methods: cricoid pressure  Endotracheal tube insertion site: oral  Blade: GlideScope  Blade size: #3  ETT size (mm): 8.0    Placement verified by: capnometry   Measured from: lips  ETT to lips (cm): 23  Number of attempts at approach: 1  Number of other approaches attempted: 2    Other Attempts  Unsuccessful attempted airways: endotracheal tube and oropharyngeal  Unsuccessful attempted endotracheal techniques: video laryngoscopy

## 2024-05-03 NOTE — ANESTHESIA PREPROCEDURE EVALUATION
PRE-OP EVALUATION    Patient Name: Rivera Bush    Admit Diagnosis: PAF (paroxysmal atrial fibrillation) ([facility]) [I48.0]    Pre-op Diagnosis: * No pre-op diagnosis entered *        Anesthesia Procedure: CATH EP    * No surgeons found in log *    Pre-op vitals reviewed.  Temp: 98 °F (36.7 °C)  Pulse: 80  Resp: 16  BP: 128/86  SpO2: 98 %  There is no height or weight on file to calculate BMI.    Current medications reviewed.  Hospital Medications:   [START ON 5/4/2024] chlorhexidine (Hibiclens) 4 % external liquid 30 mL  30 mL Topical On Call    [START ON 5/4/2024] sodium chloride 0.9% infusion   Intravenous On Call    [COMPLETED] heparin (Porcine) 5000 UNIT/ML injection        [COMPLETED] lidocaine PF (Xylocaine-MPF) 1 % injection        [COMPLETED] heparin (Porcine) 1000 UNIT/ML injection           Outpatient Medications:     Medications Prior to Admission   Medication Sig Dispense Refill Last Dose    flecainide 100 MG Oral Tab Take 1 tablet (100 mg total) by mouth 2 (two) times daily. 180 tablet 0 5/2/2024 at pm    METOPROLOL SUCCINATE  MG Oral Tablet 24 Hr TAKE ONE TABLET BY MOUTH TWICE DAILY 180 tablet 0 5/2/2024 at pm    DILTIAZEM HCL  MG Oral Capsule SR 24 Hr TAKE 1 CAPSULE BY MOUTH ONE TIME DAILY 90 capsule 0 5/2/2024 at pm    Budesonide-Formoterol Fumarate (SYMBICORT) 160-4.5 MCG/ACT Inhalation Aerosol Inhale 2 puffs into the lungs 2 (two) times daily. (Patient taking differently: Inhale 2 puffs into the lungs as needed.) 1 each 3 Past Month    hydrALAZINE 25 MG Oral Tab Take 1 tablet (25 mg total) by mouth in the morning and 1 tablet (25 mg total) before bedtime. 180 tablet 3 5/2/2024 at pm    AZELASTINE  MCG/SPRAY Nasal Solution INHALE 1 SPRAY INTO EACH NOSTRIL 2 TIMES DAILY (Patient taking differently: 1 spray by Nasal route in the morning and 1 spray before bedtime.) 30 mL 2 Past Month    Tirzepatide (MOUNJARO) 5 MG/0.5ML Subcutaneous Solution Pen-injector Inject 5 mg into the skin  once a week for 4 doses. (Patient taking differently: Inject 5 mg into the skin once a week. Monday) 2 mL 0 4/22/24    EPLERENONE 50 MG Oral Tab TAKE 1 TABLET BY MOUTH ONE TIME DAILY 90 tablet 0 4/30/2024    ondansetron (ZOFRAN) 4 mg tablet Take 1 tablet (4 mg total) by mouth every 8 (eight) hours as needed for Nausea. 20 tablet 0 4/30/2024    ketoconazole 2 % External Cream Apply 1 Application topically daily. (Patient taking differently: Apply 1 Application topically as needed.) 1 each 2 Unknown    Glucose Blood (ONETOUCH VERIO) In Vitro Strip Check blood sugars once daily. 100 strip 3     OneTouch Delica Lancets 30G Does not apply Misc 1 Lancet by Finger stick route daily. 100 each 3     XARELTO 20 MG Oral Tab Take 1 tablet (20 mg total) by mouth daily 90 tablet 0 4/30/2024    fenofibrate 145 MG Oral Tab TAKE 1 TABLET BY MOUTH ONE TIME DAILY 90 tablet 1 4/30/2024    ROSUVASTATIN 40 MG Oral Tab Take 1 tablet (40 mg total) by mouth nightly 90 tablet 0 4/30/2024    albuterol (VENTOLIN HFA) 108 (90 Base) MCG/ACT Inhalation Aero Soln Inhale 2 puffs into the lungs every 6 (six) hours as needed for Shortness of Breath. 54 g 1     potassium chloride 20 MEQ Oral Tab CR TAKE 1 TABLET BY MOUTH IN  THE MORNING AND 1 TABLET BY MOUTH IN THE EVENING (Patient taking differently: Take 1 tablet (20 mEq total) by mouth 2 (two) times daily. TAKE 1 TABLET BY MOUTH IN  THE MORNING AND 1 TABLET BY MOUTH IN THE EVENING) 180 tablet 3 4/30/2024    metFORMIN  MG Oral Tablet 24 Hr Take 2 tablets (1,500 mg total) by mouth daily. (Patient taking differently: Take 1 tablet (750 mg total) by mouth 2 (two) times daily with meals.) 180 tablet 3 4/30/2024    losartan 100 MG Oral Tab Take 1 tablet (100 mg total) by mouth daily. 90 tablet 3 4/30/2024    Glucose Blood (ONETOUCH VERIO) In Vitro Strip TEST BLOOD GLUCOSE LEVELS ONCE DAILY 100 strip 0     meclizine 25 MG Oral Tab Take 1 tablet (25 mg total) by mouth 3 (three) times daily as needed.  30 tablet 0 More than a month    vitamin E 1000 UNITS Oral Cap Take 1 capsule (1,000 Units total) by mouth daily.   4/30/2024    FUROSEMIDE 20 MG Oral Tab TAKE 1 TABLET BY MOUTH  DAILY (Patient taking differently: Take 1 tablet (20 mg total) by mouth daily.) 90 tablet 3 4/30/2024    CLONIDINE HCL 0.1 MG Oral Tab TAKE ONE TABLET BY MOUTH DAILY AS NEEDED  (Patient taking differently: Take 1 tablet (0.1 mg total) by mouth daily as needed.) 90 tablet 0 More than a month    magnesium oxide 400 MG Oral Tab Take 1 tablet (400 mg total) by mouth daily.   4/30/2024    ONETOUCH DELICA PLUS [id number] Does not apply Misc USE TO TEST BLOOD SUGAR  ONCE DAILY AS DIRECTED 100 each 2     Vitamin D3 2000 units Oral Cap Take 1 capsule (2,000 Units total) by mouth 2 (two) times daily.   4/30/2024       Allergies: Jardiance [empagliflozin]      Anesthesia Evaluation    Patient summary reviewed.    Anesthetic Complications           GI/Hepatic/Renal                                 Cardiovascular                  (+) hypertension     (+) CAD                (+) CHF                Endo/Other      (+) diabetes                            Pulmonary      (+) asthma         (+) shortness of breath     (+) sleep apnea       Neuro/Psych      (+) depression           (+) neuromuscular disease             Patient Active Problem List:     Hyperlipidemia     Thoracic aortic aneurysm (HCC)     Obstructive sleep apnea syndrome     Anxiety, generalized     Type 2 diabetes mellitus (HCC)     Foraminal stenosis of cervical region     CAD in native artery     S/P ascending aortic aneurysm repair     Moderate persistent asthma without complication (HCC)     Longstanding persistent atrial fibrillation (HCC)     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)     Migraine     Carotid artery stenosis     Severe obesity (BMI 35.0-39.9) with comorbidity (HCC)            Past Surgical History:   Procedure Laterality Date    Angiogram  7/15/14     no cad noted    Endovas repair, infrarenl abdom aortic aneurysm/dissect      Epidurography, radiological s & i  2012    Procedure: CERVICAL EPIDURAL;  Surgeon: Sekou Solorzano MD;  Location: [facility]    Fluor gid & loclzj ndl/cath spi dx/ther njx  2012    Procedure: CERVICAL EPIDURAL;  Surgeon: Edil Alcocer MD;  Location: [facility]    Injection, w/wo contrast, dx/therapeutic substance, epidural/subarachnoid; cervical/thoracic  2012    Procedure: CERVICAL EPIDURAL;  Surgeon: Sekou Solorzano MD;  Location: [facility]    Injection, w/wo contrast, dx/therapeutic substance, epidural/subarachnoid; cervical/thoracic  2012    Procedure: CERVICAL EPIDURAL;  Surgeon: Edil Alcocer MD;  Location: [facility]    M-sedaj by sm phys perfrmg svc 5+ yr  2012    Procedure: CERVICAL EPIDURAL;  Surgeon: Sekou Solorzano MD;  Location: [facility]    M-sedaj by  phys perfrmg svc 5+ yr  2012    Procedure: CERVICAL EPIDURAL;  Surgeon: Edil Alcocer MD;  Location: [facility]    Symp aaa urgent repair  10/14/2014    [place]; ascending aorta    Tonsillectomy       Social History     Socioeconomic History    Marital status:    Tobacco Use    Smoking status: Former     Current packs/day: 0.00     Average packs/day: 0.5 packs/day for 28.0 years (14.0 ttl pk-yrs)     Types: Cigarettes     Start date: 1976     Quit date: 2004     Years since quittin.8    Smokeless tobacco: Never   Vaping Use    Vaping status: Never Used   Substance and Sexual Activity    Alcohol use: Yes     Alcohol/week: 1.0 standard drink of alcohol     Types: 1 Glasses of wine per week     Comment: Wine club once a month    Drug use: No   Other Topics Concern    Caffeine Concern Yes     Comment: 1 cup of coffee daily    Exercise Yes     Comment: walking     History   Drug Use No     Available  pre-op labs reviewed.  Lab Results   Component Value Date    WBC 7.4 04/26/2024    WBC 8.9 02/26/2024    RBC 5.06 04/26/2024    RBC 4.92 02/26/2024    HGB 15.0 04/26/2024    HGB 14.5 02/26/2024    HCT 44.8 04/26/2024    HCT 44.7 02/26/2024    MCV 88.5 04/26/2024    MCV 90.9 02/26/2024    MCH 29.6 04/26/2024    MCH 29.5 02/26/2024    MCHC 33.5 04/26/2024    MCHC 32.4 02/26/2024    RDW 13.4 04/26/2024    RDW 13.1 02/26/2024    .0 04/26/2024     02/26/2024     Lab Results   Component Value Date     04/26/2024     02/26/2024    K 4.2 04/26/2024    K 4.4 02/26/2024     04/26/2024     02/26/2024    CO2 25.0 04/26/2024    CO2 23 02/26/2024    BUN 18 04/26/2024    BUN 16 02/26/2024    CREATSERUM 1.46 (H) 04/26/2024    CREATSERUM 1.31 (H) 02/26/2024    GLU 92 04/26/2024    GLU 86 02/26/2024    CA 9.1 04/26/2024    CA 9.2 02/26/2024            Airway      Mallampati: III  Mouth opening: >3 FB  TM distance: > 6 cm  Neck ROM: limited Cardiovascular    Cardiovascular exam normal.         Dental    Dentition appears grossly intact         Pulmonary    Pulmonary exam normal.                 Other findings              ASA: 3   Plan: general  NPO status verified and patient meets guidelines.        Comment:  I explained intrinsic risks of general anesthesia, including nausea, dental damage, sore throat, mouth injury,and hoarseness from airway management.  All questions were answered and understanding was demonstrated of risks.  Informed permission was obtained to proceed as documented in the signed consent form.        Plan/risks discussed with: patient                Present on Admission:  **None**

## 2024-05-03 NOTE — PROGRESS NOTES
Pt received from PACU a/o tolerating po well.  1200 rt groin remains c/d/I soft. Hob elevated at 1100.tolerated well.discharge instructions reviewed w pt.no questions. IV d/cd   1220 pt discharged to home in stable condition via wheelchair.

## 2024-05-03 NOTE — ANESTHESIA PROCEDURE NOTES
Arterial Line    Performed by: Leo Hale MD  Authorized by: Leo Hale MD    General Information and Staff    Procedure Start:   Anesthesiologist:  Leo Hale MD  Performed By:  Anesthesiologist  Patient Location:  OR  Indication: continuous blood pressure monitoring and blood sampling needed    Site Identification: real time ultrasound guided    Preanesthetic Checklist: 2 patient identifiers, IV checked, risks and benefits discussed, monitors and equipment checked, pre-op evaluation, timeout performed, anesthesia consent and sterile technique used    Procedure Details    Catheter Size:  20 G  Catheter Length:  1 and 3/4 inch  Catheter Type:  Arrow  Seldinger Technique?: Yes    Laterality:  Left  Site:  Radial artery  Site Prep: chlorhexidine    Line Secured:  Tape and Tegaderm    Assessment    Events: patient tolerated procedure well with no complications      Medications      Additional Comments

## 2024-05-03 NOTE — H&P
[facility]  Pre Procedure History and Physical    Rivera Bush Patient Status:  Outpatient    1965 MRN [id number]   Location [facility] INTERVENTIONAL SUITES Attending Chris River MD   Hosp Day # 0 PCP Won Davis MD     Consults      History of Present Illness:  Rivera Bush is a a(n) 59 year old male here for Atrial Flutter Ablation      Prior H/P Reviewed with no changes    History:  Past Medical History:    Abdominal pain    above the belly button    Acute cystitis with hematuria    Acute diastolic (congestive) heart failure (HCC)    Anxiety disorder    Arthritis    Back pain    Bloating    most days    Blood in the stool    Blurred vision    Brachial neuritis or radiculitis NOS    C4,C5, nerve roots    Calculus of kidney    Last year do to medication i was taking    Chronic atrial fibrillation (HCC)    post op from [facility]    Chronic cough    medicine causes symptoms    Community acquired pneumonia of left lower lobe of lung    Decorative tattoo    Depression    Diabetes (HCC)    Diarrhea, unspecified    I believe from my medication    Dizziness    Fatigue    daily    Feeling lonely    Flatulence/gas pain/belching    Food intolerance    Headache disorder    migraines    Heart palpitations    Heartburn    at least one a week    Hemorrhoids    High blood pressure    High cholesterol    History of depression    Hyperlipidemia    Indigestion    not normal    Kidney stone    Leg swelling    Loss of appetite    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, or unspecified(404.00)    Migraines    Nausea    on and off    Night sweats    Obesity, unspecified    Obsessive-compulsive disorders    ISAAC (obstructive sleep apnea)    AHI-16, O2 alyce-71%/CPAP-10cwp    Other specified cardiac dysrhythmias(427.89)    Other testicular hypofunction    Pain in joints    Pneumonia, organism unspecified(486)    Problems  with swallowing    Shortness of breath    with simple movements    Sleep disturbance    Stress    Suicidal thoughts    Thoracic or lumbosacral neuritis or radiculitis, unspecified    L3,L4    Thrombocytopenia (HCC)    Uncomfortable fullness after meals    Unspecified essential hypertension    Unspecified sleep apnea    wears CPAP    Visual impairment    Vomiting    3 times after heartburn    Wears glasses    Weight gain    Weight loss     Past Surgical History:   Procedure Laterality Date    Angiogram  7/15/14    no cad noted    Endovas repair, infrarenl abdom aortic aneurysm/dissect      Epidurography, radiological s & i  4/18/2012    Procedure: CERVICAL EPIDURAL;  Surgeon: Sekou Solorzano MD;  Location: [facility]    Fluor gid & loclzj ndl/cath spi dx/ther njx  5/11/2012    Procedure: CERVICAL EPIDURAL;  Surgeon: Edil Alcocer MD;  Location: [facility]    Injection, w/wo contrast, dx/therapeutic substance, epidural/subarachnoid; cervical/thoracic  4/18/2012    Procedure: CERVICAL EPIDURAL;  Surgeon: Sekou Solorzano MD;  Location: [facility]    Injection, w/wo contrast, dx/therapeutic substance, epidural/subarachnoid; cervical/thoracic  5/11/2012    Procedure: CERVICAL EPIDURAL;  Surgeon: Edil Alcocer MD;  Location: [facility]    M-sedaj by  phys perfrmg svc 5+ yr  4/18/2012    Procedure: CERVICAL EPIDURAL;  Surgeon: Sekou Solorzano MD;  Location: [facility]    M-sedaj by  phys perfrmg svc 5+ yr  5/11/2012    Procedure: CERVICAL EPIDURAL;  Surgeon: Edil Alcocer MD;  Location: [facility]    Symp aaa urgent repair  10/14/2014    [place]; ascending aorta    Tonsillectomy       Family History   Problem Relation Age of Onset    Hypertension Father     Lipids Father     Other (Other[other]) Paternal Grandfather     Heart Disorder Paternal Grandfather     Hypertension Paternal  Grandfather     Stroke Paternal Grandfather     Hypertension Sister     Lipids Sister     Hypertension Brother     Lipids Brother     Psychiatric Brother     Hypertension Mother     Lipids Mother     Psychiatric Mother     Hypertension Maternal Grandfather     Heart Attack Maternal Grandfather     Stroke Maternal Grandfather       reports that he quit smoking about 19 years ago. His smoking use included cigarettes. He started smoking about 47 years ago. He has a 14 pack-year smoking history. He has never used smokeless tobacco. He reports current alcohol use of about 1.0 standard drink of alcohol per week. He reports that he does not use drugs.    Allergies:  Allergies   Allergen Reactions    Jardiance [Empagliflozin] SWELLING and DIZZINESS       Medications:    Current Facility-Administered Medications:     [START ON 2024] chlorhexidine (Hibiclens) 4 % external liquid 30 mL, 30 mL, Topical, On Call    [START ON 2024] sodium chloride 0.9% infusion, , Intravenous, On Call    OBJECTIVE      Physical Exam:  Physical Exam   Blood pressure 128/86, pulse 80, temperature 98 °F (36.7 °C), temperature source Temporal, resp. rate 16, SpO2 98%.  Temp (24hrs), Av °F (36.7 °C), Min:98 °F (36.7 °C), Max:98 °F (36.7 °C)    Wt Readings from Last 3 Encounters:   24 283 lb (128.4 kg)   24 278 lb (126.1 kg)   24 285 lb (129.3 kg)       NAD  PERRLA/EOMI  Neck veins not elevated  Carotids- no bruits  CTA bilaterally  Cardiac- RRR  Abdomen- Soft,Nontender, normal BS  Extremities- pulses normal  Edema- no edema  Mood /Affect Congruent  Skin- no lesions          Results:   Recent Labs   Lab 24  0917   GLU 92   BUN 18   CREATSERUM 1.46*   EGFRCR 55*   CA 9.1      K 4.2      CO2 25.0     Recent Labs   Lab 24  0917   RBC 5.06   HGB 15.0   HCT 44.8   MCV 88.5   MCH 29.6   MCHC 33.5   RDW 13.4   WBC 7.4   .0         [unfilled]  No results for input(s): \"BNP\" in the last 168 hours.  Lab  Results   Component Value Date    PT 13.7 07/14/2014    PT 13.8 02/07/2011    INR 1.23 (H) 11/05/2021    INR 1.0 12/30/2020    INR 1.0 11/19/2020     Lab Results   Component Value Date    TROP <0.045 11/06/2021    TROP <0.045 11/05/2021    TROP <0.045 11/05/2021         No results found.       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 Reverified on the day of the procedure    Procedure Planned: Flutter ablation    Sedation Plan: GETA    Discharge Plan: same day      Chris River MD  Cardiac Electrophysiology  [facility]  5/3/2024  8:24 AM

## 2024-05-15 NOTE — TELEPHONE ENCOUNTER
Fax received from [facility] requesting new RX for: HydrALAZINE 100 MG. New rx request due to note on prescription for HYDRALAZINE 25MG sating patient should take 25 MG in addition to 100 MG dose and [facility] does not have 100MG rx on file. Feeling well.  Nips today.

## 2024-05-22 NOTE — PROGRESS NOTES
Rivera Bush is a 59 year old male.  HPI:   Here for f/up  Since last visit pt had Cardiac ablation for afib, has been sinus rhythm so far, continues on flecainide, metoprolol, xarelto  At cardiology f/up: hydralazine was increased due to high BP.     Home BP very labile, hydralazine 50mg tid does not control BP consistently, can go from 110's to 170's in a few hours.    Seeing dermatologist 6/12 for ongoing rash. C/o itchy red rash on abdomen, groin. He felt like fluconazole helped minimally but it did not resolve. He recently restarted mounjaro in January, before the rash began.    Current Outpatient Medications   Medication Sig Dispense Refill    hydrALAZINE 50 MG Oral Tab Take 1 tablet (50 mg total) by mouth 3 (three) times daily.      predniSONE 20 MG Oral Tab Take 2 tablets (40 mg total) by mouth daily for 5 days. 10 tablet 0    cloNIDine 0.1 MG Oral Tab Take 1 tablet (0.1 mg total) by mouth 2 (two) times daily.      flecainide 100 MG Oral Tab Take 1 tablet (100 mg total) by mouth 2 (two) times daily. 180 tablet 0    EPLERENONE 50 MG Oral Tab TAKE 1 TABLET BY MOUTH ONE TIME DAILY 90 tablet 0    Glucose Blood (ONETOUCH VERIO) In Vitro Strip Check blood sugars once daily. 100 strip 3    OneTouch Delica Lancets 30G Does not apply Misc 1 Lancet by Finger stick route daily. 100 each 3    METOPROLOL SUCCINATE  MG Oral Tablet 24 Hr TAKE ONE TABLET BY MOUTH TWICE DAILY 180 tablet 0    XARELTO 20 MG Oral Tab Take 1 tablet (20 mg total) by mouth daily 90 tablet 0    fenofibrate 145 MG Oral Tab TAKE 1 TABLET BY MOUTH ONE TIME DAILY 90 tablet 1    ROSUVASTATIN 40 MG Oral Tab Take 1 tablet (40 mg total) by mouth nightly 90 tablet 0    albuterol (VENTOLIN HFA) 108 (90 Base) MCG/ACT Inhalation Aero Soln Inhale 2 puffs into the lungs every 6 (six) hours as needed for Shortness of Breath. 54 g 1    Budesonide-Formoterol Fumarate (SYMBICORT) 160-4.5 MCG/ACT Inhalation Aerosol Inhale 2 puffs into the lungs 2 (two)  times daily. (Patient taking differently: Inhale 2 puffs into the lungs as needed.) 1 each 3    potassium chloride 20 MEQ Oral Tab CR TAKE 1 TABLET BY MOUTH IN  THE MORNING AND 1 TABLET BY MOUTH IN THE EVENING (Patient taking differently: Take 1 tablet (20 mEq total) by mouth 2 (two) times daily. TAKE 1 TABLET BY MOUTH IN  THE MORNING AND 1 TABLET BY MOUTH IN THE EVENING) 180 tablet 3    metFORMIN  MG Oral Tablet 24 Hr Take 2 tablets (1,500 mg total) by mouth daily. (Patient taking differently: Take 1 tablet (750 mg total) by mouth 2 (two) times daily with meals.) 180 tablet 3    losartan 100 MG Oral Tab Take 1 tablet (100 mg total) by mouth daily. 90 tablet 3    Glucose Blood (ONETOUCH VERIO) In Vitro Strip TEST BLOOD GLUCOSE LEVELS ONCE DAILY 100 strip 0    AZELASTINE  MCG/SPRAY Nasal Solution INHALE 1 SPRAY INTO EACH NOSTRIL 2 TIMES DAILY (Patient taking differently: 1 spray by Nasal route in the morning and 1 spray before bedtime.) 30 mL 2    vitamin E 1000 UNITS Oral Cap Take 1 capsule (1,000 Units total) by mouth daily.      FUROSEMIDE 20 MG Oral Tab TAKE 1 TABLET BY MOUTH  DAILY (Patient taking differently: Take 1 tablet (20 mg total) by mouth daily.) 90 tablet 3    magnesium oxide 400 MG Oral Tab Take 1 tablet (400 mg total) by mouth daily.      ONETOUCH DELICA PLUS [id number] Does not apply Misc USE TO TEST BLOOD SUGAR  ONCE DAILY AS DIRECTED 100 each 2    Vitamin D3 2000 units Oral Cap Take 1 capsule (2,000 Units total) by mouth 2 (two) times daily.      ondansetron (ZOFRAN) 4 mg tablet Take 1 tablet (4 mg total) by mouth every 8 (eight) hours as needed for Nausea. (Patient not taking: Reported on 5/22/2024) 20 tablet 0    ketoconazole 2 % External Cream Apply 1 Application topically daily. (Patient not taking: Reported on 5/22/2024) 1 each 2    hydrALAZINE 25 MG Oral Tab Take 1 tablet (25 mg total) by mouth in the morning and 1 tablet (25 mg total) before bedtime. (Patient not taking: Reported  on 5/22/2024) 180 tablet 3    meclizine 25 MG Oral Tab Take 1 tablet (25 mg total) by mouth 3 (three) times daily as needed. (Patient not taking: Reported on 5/22/2024) 30 tablet 0      Past Medical History:    Abdominal pain    above the belly button    Acute cystitis with hematuria    Acute diastolic (congestive) heart failure (HCC)    Anxiety disorder    Arthritis    Back pain    Bloating    most days    Blood in the stool    Blurred vision    Brachial neuritis or radiculitis NOS    C4,C5, nerve roots    Calculus of kidney    Last year do to medication i was taking    Chronic atrial fibrillation (HCC)    [facility] op from [facility]    Chronic cough    medicine causes symptoms    Community acquired pneumonia of left lower lobe of lung    Decorative tattoo    Depression    Diabetes (HCC)    Diarrhea, unspecified    I believe from my medication    Dizziness    Fatigue    daily    Feeling lonely    Flatulence/gas pain/belching    Food intolerance    Headache disorder    migraines    Heart palpitations    Heartburn    at least one a week    Hemorrhoids    High blood pressure    High cholesterol    History of depression    Hyperlipidemia    Indigestion    not normal    Kidney stone    Leg swelling    Loss of appetite    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, or unspecified(404.00)    Migraines    Nausea    on and off    Night sweats    Obesity, unspecified    Obsessive-compulsive disorders    ISAAC (obstructive sleep apnea)    AHI-16, O2 alyce-71%/CPAP-10cwp    Other specified cardiac dysrhythmias(427.89)    Other testicular hypofunction    Pain in joints    Pneumonia, organism unspecified(486)    Problems with swallowing    Shortness of breath    with simple movements    Sleep disturbance    Stress    Suicidal thoughts    Thoracic or lumbosacral neuritis or radiculitis, unspecified    L3,L4    Thrombocytopenia (HCC)    Uncomfortable fullness  after meals    Unspecified essential hypertension    Unspecified sleep apnea    wears CPAP    Visual impairment    Vomiting    3 times after heartburn    Wears glasses    Weight gain    Weight loss      Social History:  Social History     Socioeconomic History    Marital status:    Tobacco Use    Smoking status: Former     Current packs/day: 0.00     Average packs/day: 0.5 packs/day for 28.0 years (14.0 ttl pk-yrs)     Types: Cigarettes     Start date: 1976     Quit date: 2004     Years since quittin.8    Smokeless tobacco: Never   Vaping Use    Vaping status: Never Used   Substance and Sexual Activity    Alcohol use: Yes     Alcohol/week: 1.0 standard drink of alcohol     Types: 1 Glasses of wine per week     Comment: Wine club once a month    Drug use: No   Other Topics Concern    Caffeine Concern Yes     Comment: 1 cup of coffee daily    Exercise Yes     Comment: walking     Social Determinants of Health     Financial Resource Strain: Low Risk  (2021)    [facility]    Overall Financial Resource Strain (CARDIA)     Difficulty of Paying Living Expenses: Not hard at all   Transportation Needs: No Transportation Needs (2021)    [facility]    PRAPARE - Transportation     Lack of Transportation (Medical): No     Lack of Transportation (Non-Medical): No        REVIEW OF SYSTEMS:   GENERAL HEALTH: feels well otherwise. Denies fever, chills, unintentional weight change  SKIN: denies any unusual skin lesions or rashes  RESPIRATORY: denies shortness of breath with exertion, denies cough or wheezing  CARDIOVASCULAR: denies chest pain or palpitations, denies leg swelling  GI: denies abdominal pain and denies heartburn. Denies nausea, vomiting, diarrhea, constipation  NEURO: denies headaches, dizziness, weakness, syncope    EXAM:   BP (!) 164/94   Pulse 72   Temp 97.3 °F (36.3 °C) (Temporal)   Resp 20    Ht 5' 11\" (1.803 m)   Wt 269 lb 12.8 oz (122.4 kg)   SpO2 97%   BMI 37.63 kg/m²   GENERAL: well developed, well nourished,in no apparent distress  SKIN: no rashes,no suspicious lesions, warm and dry  HEENT: atraumatic, normocephalic,ears and throat are clear  NECK: supple,no adenopathy, no thyromegaly  LUNGS: clear to auscultation b/l no W/R/R  CARDIO: RRR without murmur  GI: good BS's,no masses, HSM, distension or tenderness  EXTREMITIES: no cyanosis, clubbing or edema  MUSCULOSKELETAL: FROM, no joint swelling or bony tenderness  NEURO: a/ox3, no focal deficits  PSYCH: mood and affect normal    ASSESSMENT AND PLAN:   1. 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 Increase clonidine to 0.1mg bid. Monitor, send readings in 1 week  2. Vitamin D deficiency  -     Vitamin D; Future; Expected date: 05/22/2024  3. Allergic dermatitis  Possible drug reaction from mounjaro. DM is well controlled. Stop mounjaro for now and complete prednisone rx. See derm next month as planned  -     predniSONE; Take 2 tablets (40 mg total) by mouth daily for 5 days.  Dispense: 10 tablet; Refill: 0  4. Anxiety   Stable, sx improved after cardiac ablation; monitor     The patient indicates understanding of these issues and agrees to the plan.  Return in about 6 weeks (around 7/3/2024).

## 2024-05-31 NOTE — TELEPHONE ENCOUNTER
Last time medication was refilled 03/03/2024  Last office visit  05/22/2024  Next office visit due/scheduled 06/05/2024    Medication protocol failed, please review. Routed to Chidi Giles Physician Assistant.

## 2024-05-31 NOTE — TELEPHONE ENCOUNTER
Last time medication was refilled 03/03/2024  Last office visit  05/22/2024  Next office visit due/scheduled   Future Appointments   Date Time Provider Department Center   6/5/2024  2:00 PM Maria Elena Cohen RD [facility]   6/5/2024  4:00 PM Nathalie Giles PA-C [facility] [address] [address]   7/8/2024  3:30 PM Nathalie Giles PA-C [facility] [address]       Sent to Doctor Davis for approval

## 2024-06-05 NOTE — H&P
Rivera Bush is a 59 year old year old male who presents for a pre-operative physical exam.  Patient is to have EGD and colonoscopy, to be done by [facility] on date TBD.    HPI:    Patient is electing to undergo EGD/c-scope for colon cancer screening and ongoing symptoms of GERD.   Functional capacity: the patient exercises regularly and denies any recent chest pain or shortness of breath.  He underwent cardiac ablation on 5/3 for afib, has been primarily in sinus rhythm since then. Still on xarelto; will see cardiology for follow-up next week.   States he is able to walk up 2 flights of stairs carrying heavy groceries without difficulty, shortness of breath, or chest pain.  Today, he c/o RUQ pain lingering past few days. Has known gallstones. Denies fever, vomiting, diarrhea. Had been eating more red meat past week.  Medications:   Current Outpatient Medications:     METOPROLOL SUCCINATE  MG Oral Tablet 24 Hr, TAKE ONE TABLET BY MOUTH TWICE DAILY, Disp: 180 tablet, Rfl: 0    XARELTO 20 MG Oral Tab, Take 1 tablet (20 mg total) by mouth daily, Disp: 90 tablet, Rfl: 0    MOUNJARO 5 MG/0.5ML Subcutaneous Solution Pen-injector, Inject 5 mg into the skin once a week for 4 doses., Disp: 2 mL, Rfl: 0    hydrALAZINE 50 MG Oral Tab, Take 1 tablet (50 mg total) by mouth 3 (three) times daily., Disp: , Rfl:     cloNIDine 0.1 MG Oral Tab, Take 0.5 tablets (0.05 mg total) by mouth daily., Disp: , Rfl:     flecainide 100 MG Oral Tab, Take 1 tablet (100 mg total) by mouth 2 (two) times daily., Disp: 180 tablet, Rfl: 0    EPLERENONE 50 MG Oral Tab, TAKE 1 TABLET BY MOUTH ONE TIME DAILY, Disp: 90 tablet, Rfl: 0    ondansetron (ZOFRAN) 4 mg tablet, Take 1 tablet (4 mg total) by mouth every 8 (eight) hours as needed for Nausea., Disp: 20 tablet, Rfl: 0    ketoconazole 2 % External Cream, Apply 1 Application topically daily., Disp: 1 each, Rfl: 2    Glucose Blood (ONETOUCH VERIO) In Vitro  Strip, Check blood sugars once daily., Disp: 100 strip, Rfl: 3    OneTouch Delica Lancets 30G Does not apply Misc, 1 Lancet by Finger stick route daily., Disp: 100 each, Rfl: 3    fenofibrate 145 MG Oral Tab, TAKE 1 TABLET BY MOUTH ONE TIME DAILY, Disp: 90 tablet, Rfl: 1    ROSUVASTATIN 40 MG Oral Tab, Take 1 tablet (40 mg total) by mouth nightly, Disp: 90 tablet, Rfl: 0    albuterol (VENTOLIN HFA) 108 (90 Base) MCG/ACT Inhalation Aero Soln, Inhale 2 puffs into the lungs every 6 (six) hours as needed for Shortness of Breath., Disp: 54 g, Rfl: 1    Budesonide-Formoterol Fumarate (SYMBICORT) 160-4.5 MCG/ACT Inhalation Aerosol, Inhale 2 puffs into the lungs 2 (two) times daily. (Patient taking differently: Inhale 2 puffs into the lungs as needed.), Disp: 1 each, Rfl: 3    potassium chloride 20 MEQ Oral Tab CR, TAKE 1 TABLET BY MOUTH IN  THE MORNING AND 1 TABLET BY MOUTH IN THE EVENING (Patient taking differently: Take 1 tablet (20 mEq total) by mouth 2 (two) times daily. TAKE 1 TABLET BY MOUTH IN  THE MORNING AND 1 TABLET BY MOUTH IN THE EVENING), Disp: 180 tablet, Rfl: 3    metFORMIN  MG Oral Tablet 24 Hr, Take 2 tablets (1,500 mg total) by mouth daily. (Patient taking differently: Take 1 tablet (750 mg total) by mouth 2 (two) times daily with meals.), Disp: 180 tablet, Rfl: 3    losartan 100 MG Oral Tab, Take 1 tablet (100 mg total) by mouth daily., Disp: 90 tablet, Rfl: 3    hydrALAZINE 25 MG Oral Tab, Take 1 tablet (25 mg total) by mouth in the morning and 1 tablet (25 mg total) before bedtime., Disp: 180 tablet, Rfl: 3    Glucose Blood (ONETOUCH VERIO) In Vitro Strip, TEST BLOOD GLUCOSE LEVELS ONCE DAILY, Disp: 100 strip, Rfl: 0    AZELASTINE  MCG/SPRAY Nasal Solution, INHALE 1 SPRAY INTO EACH NOSTRIL 2 TIMES DAILY (Patient taking differently: 1 spray by Nasal route in the morning and 1 spray before bedtime.), Disp: 30 mL, Rfl: 2    meclizine 25 MG Oral Tab, Take 1 tablet (25 mg total) by mouth 3  (three) times daily as needed., Disp: 30 tablet, Rfl: 0    vitamin E 1000 UNITS Oral Cap, Take 1 capsule (1,000 Units total) by mouth daily., Disp: , Rfl:     FUROSEMIDE 20 MG Oral Tab, TAKE 1 TABLET BY MOUTH  DAILY (Patient taking differently: Take 1 tablet (20 mg total) by mouth daily.), Disp: 90 tablet, Rfl: 3    magnesium oxide 400 MG Oral Tab, Take 1 tablet (400 mg total) by mouth daily., Disp: , Rfl:     ONETOUCH DELICA PLUS [id number] Does not apply Misc, USE TO TEST BLOOD SUGAR  ONCE DAILY AS DIRECTED, Disp: 100 each, Rfl: 2    Vitamin D3 2000 units Oral Cap, Take 1 capsule (2,000 Units total) by mouth 2 (two) times daily., Disp: , Rfl:   Allergies:   Allergies   Allergen Reactions    Jardiance [Empagliflozin] SWELLING and DIZZINESS     PMH:  has a past medical history of Abdominal pain (1/1/2024), Acute cystitis with hematuria (02/27/2017), Acute diastolic (congestive) heart failure (HCC) (06/26/2018), Anxiety disorder, Arthritis, Back pain, Bloating (1/1/30204), Blood in the stool, Blurred vision, Brachial neuritis or radiculitis NOS, Calculus of kidney, Chronic atrial fibrillation (HCC) (10/14/2014), Chronic cough (1/1/2024), Community acquired pneumonia of left lower lobe of lung (06/18/2018), Decorative tattoo, Depression (09/24/2015), Diabetes ([facility]), Diarrhea, unspecified, Dizziness (12/23/20), Fatigue (1/1/2024), Feeling lonely, Flatulence/gas pain/belching, Food intolerance, Headache disorder, Heart palpitations, Heartburn (1/12024), Hemorrhoids, High blood pressure, High cholesterol, History of depression, Hyperlipidemia, Indigestion (02/07/2019), Kidney stone (02/27/2017), Leg swelling, Loss of appetite, 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, or unspecified(404.00) (06/29/2012), Migraines, Nausea, Night sweats, Obesity, unspecified, Obsessive-compulsive disorders (09/24/2015), ISAAC (obstructive sleep apnea)  (3-3-14-PSG/TX-8/19/16), Other specified cardiac dysrhythmias(427.89), Other testicular hypofunction, Pain in joints, Pneumonia, organism unspecified(486), Problems with swallowing (1/1/2024), Shortness of breath (1/1/2024), Sleep disturbance, Stress, Suicidal thoughts (09/24/2015), Thoracic or lumbosacral neuritis or radiculitis, unspecified, Thrombocytopenia (HCC), Uncomfortable fullness after meals, Unspecified essential hypertension, Unspecified sleep apnea, Visual impairment, Vomiting (1/1/2024), Wears glasses, Weight gain, and Weight loss.    He has no past medical history of Anesthesia complication, Difficult intubation, Family history of malignant hyperthermia, History of adverse reaction to anesthesia, Malignant hyperthermia, or PONV (postoperative nausea and vomiting).  Surgical Hx:  has a past surgical history that includes tonsillectomy; injection, w/wo contrast, dx/therapeutic substance, epidural/subarachnoid; cervical/thoracic (4/18/2012); epidurography, radiological s & i (4/18/2012); m-sedaj by  phys perfrmg svc 5+ yr (4/18/2012); injection, w/wo contrast, dx/therapeutic substance, epidural/subarachnoid; cervical/thoracic (5/11/2012); m-sedaj by  phys perfrmg svc 5+ yr (5/11/2012); fluor gid & loclzj ndl/cath spi dx/ther njx (5/11/2012); angiogram (7/15/14); symp aaa urgent repair (10/14/2014); and endovas repair, infrarenl abdom aortic aneurysm/dissect.  Family Hx: family history includes Heart Attack in his maternal grandfather; Heart Disorder in his paternal grandfather; Hypertension in his brother, father, maternal grandfather, mother, paternal grandfather, and sister; Lipids in his brother, father, mother, and sister; Other in his paternal grandfather; Psychiatric in his brother and mother; Stroke in his maternal grandfather and paternal grandfather.  Social Hx:   Social History     Socioeconomic History    Marital status:    Tobacco Use    Smoking status: Former     Current  packs/day: 0.00     Average packs/day: 0.5 packs/day for 28.0 years (14.0 ttl pk-yrs)     Types: Cigarettes     Start date: 1976     Quit date: 2004     Years since quittin.9    Smokeless tobacco: Never   Vaping Use    Vaping status: Never Used   Substance and Sexual Activity    Alcohol use: Yes     Alcohol/week: 1.0 standard drink of alcohol     Types: 1 Glasses of wine per week     Comment: Wine club once a month    Drug use: No   Other Topics Concern    Caffeine Concern Yes     Comment: 1 cup of coffee daily    Exercise Yes     Comment: walking     Social Determinants of Health     Financial Resource Strain: Low Risk  (2021)    [facility]    Overall Financial Resource Strain (CARDIA)     Difficulty of Paying Living Expenses: Not hard at all   Transportation Needs: No Transportation Needs (2021)    [facility]    PRAPARE - Transportation     Lack of Transportation (Medical): No     Lack of Transportation (Non-Medical): No      REVIEW OF SYSTEMS:    GENERAL: feels well otherwise  SKIN: denies any unusual skin lesions  EYES:denies blurred vision or double vision  HEENT: denies nasal congestion, sinus pain or sore throat  LUNGS: denies shortness of breath with exertion. Denies history of COPD. +ISAAC on CPAP  CARDIOVASCULAR: denies chest pain on exertion  GI: denies n/v/c/d  : denies dysuria, hematuria or flank pain  MUSCULOSKELETAL: denies joint swelling  NEURO: denies headaches, dizziness, fainting  PSYCH: denies depression or anxiety  HEMATOLOGIC: denies hx of anemia, bleeding or clotting disorders  ENDOCRINE: denies thyroid history. Diabetes is well controlled.  ALL/ASTHMA: denies hx of allergy or asthma. Denies hx of adverse reaction to anesthesia or analgesics.  EXAM:    /80   Pulse 80   Resp 14   Ht 5' 11\" (1.803 m)   Wt 276 lb (125.2 kg)   SpO2 98%   BMI 38.49 kg/m²   GENERAL: well  developed, well nourished, in no apparent distress  SKIN: no rashes, no suspicious lesions.  Warm and dry.  HEENT: atraumatic, normocephalic, ears and throat are clear  EYES: PERRLA, EOMI, normal optic disk, conjunctiva are clear  NECK: supple, no adenopathy, no bruits b/l  CHEST: no chest tenderness  LUNGS: clear to auscultation b/l  CARDIO: RRR without murmur  GI: good BS's, no masses, HSM or tenderness  MUSCULOSKELETAL: No CVA tenderness, FROM of the back  EXTREMITIES: no cyanosis, clubbing or edema  NEURO: Oriented times three, cranial nerves are intact, motor and sensory are grossly intact. DTRs +2 and symmetric b/l.    ASSESSMENT AND PLAN:    Rivera Bush is a 59 year old male who presents for cardiac risk assessment prior to his EGD and colonoscopy, [facility] on date TBD.    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 He is cleared to discontinue his xarelto 2 days prior to procedure, with plan to resume it afterward, as long as cardiology is in agreement. He has an appointment with cardiology next week.     Patient has intermittent RUQ pain currently in the setting of known gallstones. He is off of mounjaro now. He will schedule with general surgery to discuss cholecystectomy; for now he will avoid  fatty foods. He agrees to go to ER if severe abdominal pain, fever, vomiting.     The patient has a perioperative risk of major adverse cardiac event that is less than 1% based on combined clinical and surgical risk.  Therefore according to ACC/AHA guidelines, he is cleared for EGD and colonoscopy with low cardiovascular risk.      This consult was sent back to the referring provider, REINA Becerra.

## 2024-06-05 NOTE — PATIENT INSTRUCTIONS
Goals to continue to work towards:    Continue to make sure you are getting some protein with your meals and snacks (especially with taking the Mounjaro). It is okay to include an Ensure to get a source of protein.     Continue to include the fiber in your diet.      Continue to check your blood sugars at various times - fasting and 2 hours after a meal    Blood Glucose Goals  Blood sugar goals: Between  mg/dl in the morning (fasting) and less than 180 mg/dl 2 hours after meals.  Keep glucose tabs or fast acting carbohydrates with you for treatment of lows; for blood glucose levels less than 70 mg/dl, take 15 grams of fast acting carbohydrates (3-4 glucose tabs, 4 ounces of juice, or as discussed). Retest in 15 min. If not above 70 mg/dl, retreat.      Follow up with the dietitian in 3-4 months.

## 2024-06-05 NOTE — PROGRESS NOTES
Rivera Bush 1965 was seen for individual Diabetic Medical Nutrition Therapy- a follow up visit:    Date: 2024   Referral: Nathalie Giles PA-C Start time 1:45 pm  End time: 2:30 pm    59 year old male presents for follow up medical nutrition therapy for type 2 diabetes. States weight had previously been 262 lbs, but has just finished course of steroids. His goal is to be at 250 lbs by the end of the year. Notes poor sleep quality.      Anthropometrics:  Wt Readings from Last 6 Encounters:   24 277 lb   24 269 lb 12.8 oz   24 283 lb   24 278 lb   24 285 lb   24 282 lb         Current diabetes medications:  Oral:  Metformin  mg twice daily     Injectable:  Mounjaro 2.5 mg once weekly  Dose has been increased to 5 mg (for about 1 month), no side effects so far       Labs:  Lab Results   Component Value Date    A1C 5.5 2024    A1C 5.3 2023    CHOLEST 116 2024    CHOLEST 176 2023    LDL 54 2024    LDL 91 2023    HDL 35 (L) 2024    HDL 38 (L) 2023    NONHDLC 81 2024    NONHDLC 138 (H) 2023    TRIG 194 (H) 2024    TRIG 351 (H) 2023    BUN 18 2024    BUN 16 2024    CREATSERUM 1.46 (H) 2024    CREATSERUM 1.31 (H) 2024    GFRNAA 79 2022    GFRNAA 78 2021    GFRAA 92 2022    GFRAA 90 2021       Lab Results   Component Value Date    A1C 5.5 2024    A1C 5.3 2023    A1C 6.2 (H) 02/15/2023       Glucose testing at home:     Currently checking BG: Yes,  every other day, fasting and after meals      BG results: per patient report   FB mg/dl         After Meals: 100-220 mg/dl    Notes no occurrence of low blood sugars.      Diet History:  Usually eats 1 meal and 2 snacks a day. He has been including more fruit, avocados, woo seeds, ground flaxseeds. Has fish w/avocado (meal or snack). May add benefiber if not getting enough fiber.  Still has some occurrence of acid reflux. Has been increasing fiber intake which has helped some with symptoms.    Main meal can include source of protein with salad and/or soup. Snacks can include fish w/avocado, peanut butter with crackers or apples. Beverages are mainly water, Propel, Powerade Zero, no soda. May have wine about every 2-3 weeks, 2-3 glasses.      Physical Activity:   Continues to stay active with walking, biking/stationary bike, resistance training, and elliptical. Is also active with walking at work (~10 miles/day). Has tried to do longer walks but continues to have pain.      Nutrition Reassessment  He has been increasing his fiber intake and has had some improvement in symptoms of acid reflux.    Discussed importance of including source of protein with meals/snacks (especially when taking Mounjaro). Also discussed okay to include Ensure as source of protein, especially when appetite may be low).      Intervention  -Comprehensive Nutrition Education Provided:  Okay to include Ensure as a source of protein, especially when appetite is low  Continue to include fiber in diet to assist with symptoms of acid reflux and also to assist with glucose management      -Education on Increased Physical Activity:  discussed how increased physical activity improves insulin resistance, blood glucose control, and heart health  encouraged to continue to remain active as able      Monitoring/Evaluation  Diet modification/understanding  Blood sugars  Hemoglobin A1c  Weight trends  Physical activity      Recommendations  Continue to include sources of fiber in diet.  Include source of protein with meals/snacks.  Continue to check blood sugars are various times (fasting and 2 hours after a meal) - working towards blood glucose goals per ADA guidelines.      Patient Specific Goals:  Continue to make sure you are getting some protein with your meals and snacks (especially with taking the Mounjaro). It is okay to  include an Ensure to get a source of protein.     Continue to include the fiber in your diet.    Continue to check your blood sugars at various times - fasting and 2 hours after a meal      Blood Glucose Goals  Blood sugar goals: Between  mg/dl in the morning (fasting) and less than 180 mg/dl 2 hours after meals.  Keep glucose tabs or fast acting carbohydrates with you for treatment of lows; for blood glucose levels less than 70 mg/dl, take 15 grams of fast acting carbohydrates (3-4 glucose tabs, 4 ounces of juice, or as discussed). Retest in 15 min. If not above 70 mg/dl, retreat.      Follow up: 9/16/2024 Maria Elena Cohen, SHALINI Cohen, SHALININ, LDN, [facility]

## 2024-07-08 NOTE — PATIENT INSTRUCTIONS
Take second dose of clonidine if elevated blood pressure  Use permethrin cream once as directed  Have labs drawn, no fasting needed    For skin: switch to cerave or cetaphil bar soap; eucerin cream for eczema after shower on wet skin; use All Free & Clear or similar laundry detergent and avoid fabric softener    Take hydroxyzine at bedtime if needed for itching. Caution, will cause drowsiness

## 2024-07-08 NOTE — PROGRESS NOTES
Rivera Bush is a 59 year old male.  HPI:   BP has been running lower at home, has been 125/78. Increases when pt suffers from insomnia or eats too much salt  C/o increased itchiness, worsened rash. Saw derm who believes it is eczema. Prednisone taper helped temporarily. Triamcinolone cream provides minimal relief  Current Outpatient Medications   Medication Sig Dispense Refill    triamcinolone 0.1 % External Cream Apply to affected area twice day for 2 weeks as needed      hydrOXYzine 25 MG Oral Tab Take 1 tablet (25 mg total) by mouth nightly as needed for Itching. 20 tablet 0    METOPROLOL SUCCINATE  MG Oral Tablet 24 Hr TAKE ONE TABLET BY MOUTH TWICE DAILY 180 tablet 0    XARELTO 20 MG Oral Tab Take 1 tablet (20 mg total) by mouth daily 90 tablet 0    hydrALAZINE 50 MG Oral Tab Take 1 tablet (50 mg total) by mouth 3 (three) times daily.      cloNIDine 0.1 MG Oral Tab Take 0.5 tablets (0.05 mg total) by mouth daily.      flecainide 100 MG Oral Tab Take 1 tablet (100 mg total) by mouth 2 (two) times daily. 180 tablet 0    EPLERENONE 50 MG Oral Tab TAKE 1 TABLET BY MOUTH ONE TIME DAILY 90 tablet 0    ondansetron (ZOFRAN) 4 mg tablet Take 1 tablet (4 mg total) by mouth every 8 (eight) hours as needed for Nausea. 20 tablet 0    ketoconazole 2 % External Cream Apply 1 Application topically daily. 1 each 2    Glucose Blood (ONETOUCH VERIO) In Vitro Strip Check blood sugars once daily. 100 strip 3    OneTouch Delica Lancets 30G Does not apply Misc 1 Lancet by Finger stick route daily. 100 each 3    fenofibrate 145 MG Oral Tab TAKE 1 TABLET BY MOUTH ONE TIME DAILY 90 tablet 1    ROSUVASTATIN 40 MG Oral Tab Take 1 tablet (40 mg total) by mouth nightly 90 tablet 0    albuterol (VENTOLIN HFA) 108 (90 Base) MCG/ACT Inhalation Aero Soln Inhale 2 puffs into the lungs every 6 (six) hours as needed for Shortness of Breath. 54 g 1    Budesonide-Formoterol Fumarate (SYMBICORT) 160-4.5 MCG/ACT Inhalation Aerosol Inhale  2 puffs into the lungs 2 (two) times daily. (Patient taking differently: Inhale 2 puffs into the lungs as needed.) 1 each 3    potassium chloride 20 MEQ Oral Tab CR TAKE 1 TABLET BY MOUTH IN  THE MORNING AND 1 TABLET BY MOUTH IN THE EVENING (Patient taking differently: Take 1 tablet (20 mEq total) by mouth 2 (two) times daily. TAKE 1 TABLET BY MOUTH IN  THE MORNING AND 1 TABLET BY MOUTH IN THE EVENING) 180 tablet 3    metFORMIN  MG Oral Tablet 24 Hr Take 2 tablets (1,500 mg total) by mouth daily. (Patient taking differently: Take 1 tablet (750 mg total) by mouth 2 (two) times daily with meals.) 180 tablet 3    losartan 100 MG Oral Tab Take 1 tablet (100 mg total) by mouth daily. 90 tablet 3    Glucose Blood (ONETOUCH VERIO) In Vitro Strip TEST BLOOD GLUCOSE LEVELS ONCE DAILY 100 strip 0    AZELASTINE  MCG/SPRAY Nasal Solution INHALE 1 SPRAY INTO EACH NOSTRIL 2 TIMES DAILY (Patient taking differently: 1 spray by Nasal route in the morning and 1 spray before bedtime.) 30 mL 2    meclizine 25 MG Oral Tab Take 1 tablet (25 mg total) by mouth 3 (three) times daily as needed. 30 tablet 0    vitamin E 1000 UNITS Oral Cap Take 1 capsule (1,000 Units total) by mouth daily.      FUROSEMIDE 20 MG Oral Tab TAKE 1 TABLET BY MOUTH  DAILY (Patient taking differently: Take 1 tablet (20 mg total) by mouth daily.) 90 tablet 3    magnesium oxide 400 MG Oral Tab Take 1 tablet (400 mg total) by mouth daily.      ONETOUCH DELICA PLUS [id number] Does not apply Misc USE TO TEST BLOOD SUGAR  ONCE DAILY AS DIRECTED 100 each 2    Vitamin D3 2000 units Oral Cap Take 1 capsule (2,000 Units total) by mouth 2 (two) times daily.      hydrALAZINE 25 MG Oral Tab Take 1 tablet (25 mg total) by mouth in the morning and 1 tablet (25 mg total) before bedtime. (Patient not taking: Reported on 7/8/2024) 180 tablet 3      Past Medical History:    Abdominal pain    above the belly button    Acute cystitis with hematuria    Acute diastolic  (congestive) heart failure (HCC)    Anxiety disorder    Arthritis    Back pain    Bloating    most days    Blood in the stool    Blurred vision    Brachial neuritis or radiculitis NOS    C4,C5, nerve roots    Calculus of kidney    Last year do to medication i was taking    Chronic atrial fibrillation (HCC)    post op from [facility]    Chronic cough    medicine causes symptoms    Community acquired pneumonia of left lower lobe of lung    Decorative tattoo    Depression    Diabetes (HCC)    Diarrhea, unspecified    I believe from my medication    Dizziness    Fatigue    daily    Feeling lonely    Flatulence/gas pain/belching    Food intolerance    Headache disorder    migraines    Heart palpitations    Heartburn    at least one a week    Hemorrhoids    High blood pressure    High cholesterol    History of depression    Hyperlipidemia    Indigestion    not normal    Kidney stone    Leg swelling    Loss of appetite    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, or unspecified(404.00)    Migraines    Nausea    on and off    Night sweats    Obesity, unspecified    Obsessive-compulsive disorders    ISAAC (obstructive sleep apnea)    AHI-16, O2 alyce-71%/CPAP-10cwp    Other specified cardiac dysrhythmias(427.89)    Other testicular hypofunction    Pain in joints    Pneumonia, organism unspecified(486)    Problems with swallowing    Shortness of breath    with simple movements    Sleep disturbance    Stress    Suicidal thoughts    Thoracic or lumbosacral neuritis or radiculitis, unspecified    L3,L4    Thrombocytopenia (HCC)    Uncomfortable fullness after meals    Unspecified essential hypertension    Unspecified sleep apnea    wears CPAP    Visual impairment    Vomiting    3 times after heartburn    Wears glasses    Weight gain    Weight loss      Social History:  Social History     Socioeconomic History    Marital status:    Tobacco Use     Smoking status: Former     Current packs/day: 0.00     Average packs/day: 0.5 packs/day for 28.0 years (14.0 ttl pk-yrs)     Types: Cigarettes     Start date: 1976     Quit date: 2004     Years since quittin.0    Smokeless tobacco: Never   Vaping Use    Vaping status: Never Used   Substance and Sexual Activity    Alcohol use: Yes     Alcohol/week: 1.0 standard drink of alcohol     Types: 1 Glasses of wine per week     Comment: Wine club once a month    Drug use: No   Other Topics Concern    Caffeine Concern Yes     Comment: 1 cup of coffee daily    Exercise Yes     Comment: walking     Social Determinants of Health     Financial Resource Strain: Low Risk  (2021)    [facility]    Overall Financial Resource Strain (CARDIA)     Difficulty of Paying Living Expenses: Not hard at all   Transportation Needs: No Transportation Needs (2021)    [facility]    PRAPARE - Transportation     Lack of Transportation (Medical): No     Lack of Transportation (Non-Medical): No        REVIEW OF SYSTEMS:   GENERAL HEALTH: feels well otherwise. Denies fever, chills, unintentional weight change  SKIN: denies any unusual skin lesions or rashes  RESPIRATORY: denies shortness of breath with exertion, denies cough or wheezing  CARDIOVASCULAR: denies chest pain or palpitations, denies leg swelling  GI: denies abdominal pain and denies heartburn. Denies nausea, vomiting, diarrhea, constipation  NEURO: denies headaches, dizziness, weakness, syncope    EXAM:   /80   Pulse 65   Temp 97.2 °F (36.2 °C) (Temporal)   Resp 18   Ht 5' 11\" (1.803 m)   Wt 270 lb 3.2 oz (122.6 kg)   SpO2 98%   BMI 37.69 kg/m²   GENERAL: well developed, well nourished,in no apparent distress  SKIN: no rashes,no suspicious lesions, warm and dry  HEENT: atraumatic, normocephalic,ears and throat are clear  NECK: supple,no adenopathy, no  thyromegaly  LUNGS: clear to auscultation b/l no W/R/R  CARDIO: RRR without murmur  GI: good BS's,no masses, HSM, distension or tenderness  EXTREMITIES: no cyanosis, clubbing or edema  MUSCULOSKELETAL: FROM, no joint swelling or bony tenderness  NEURO: a/ox3, no focal deficits  PSYCH: mood and affect normal    ASSESSMENT AND PLAN:   1. Dermatitis (Primary)  Gradually worsening x months. Worse appearance today; pt today notes he slept on a different mattress in the weeks before symptoms began. Will cover for scabies with permethrin cream x 1 treatment. Hydroxyzine prn at night; check TTG r/o dermatitis herpetiformis; continue topical steroid cream  -     hydrOXYzine HCl; Take 1 tablet (25 mg total) by mouth nightly as needed for Itching.  Dispense: 20 tablet; Refill: 0  -     Immunoglobulin A, Quant  -     Tissue Transglutaminase Ab, IgA  -     Permethrin; Apply 60 g topically once for 1 dose. Apply to all areas of the body from the neck to soles of feet before bed; leave on for 8 to 14 hours before removing by washing (shower or bath).  Dispense: 60 g; Refill: 0  -     Fluocinonide; Apply to affected area twice a day  Dispense: 60 g; Refill: 1  2. Type 2 diabetes mellitus with hyperglycemia, without long-term current use of insulin (HCC)  -     Comp Metabolic Panel (14)  -     Hemoglobin A1C  Pt thinking about restarting mounjaro, will hold off till rash improves  3. 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 Vitamin D deficiency  -     Vitamin D, 25-Hydroxy        The patient indicates understanding of these issues and agrees to the plan.  Return in about 6 weeks (around 8/22/2024) for routine physical, or sooner as needed.

## 2024-07-15 NOTE — TELEPHONE ENCOUNTER
Last time medication was refilled: 4/15/24  Next office visit due/scheduled: 8/24/24  Last office visit: 7/8/24  Last Labs: 7/11/24

## 2024-08-24 NOTE — PROGRESS NOTES
Wellness Exam    CC: Patient is presenting for a wellness exam    HPI:   Current Complaints: right neck pain, x 1 day. Feels like pinched nerve, radiates down R arm.   Rash last visit - now resolved. He used elimite cream as directed.   HTN - systolic ~ 150's, diastolic low 80's  No recurrence of afib or atrial flutter   C/o some abdominal pressure, discomfort, small/incomplete bowel movements x weeks.   Compliant with CPAP, but still has snoring. Cannot remember last time his machine was checked  Diet general  Exercise not regular    Colon cancer screening:    Health Maintenance   Topic Date Due    Colorectal Cancer Screening  07/19/2034      Prostate cancer screening:    Health Maintenance   Topic Date Due    PSA  02/26/2026        Pertinent Family History:   Family History   Problem Relation Age of Onset    Hypertension Father     Lipids Father     Prostate Cancer Father     Other (Other[other]) Paternal Grandfather     Heart Disorder Paternal Grandfather     Hypertension Paternal Grandfather     Stroke Paternal Grandfather     Hypertension Sister     Lipids Sister     Hypertension Brother     Lipids Brother     Psychiatric Brother     Hypertension Mother     Lipids Mother     Psychiatric Mother     Hypertension Maternal Grandfather     Heart Attack Maternal Grandfather     Stroke Maternal Grandfather       Past Medical History:    Abdominal pain    above the belly button    Acute cystitis with hematuria    Acute diastolic (congestive) heart failure (HCC)    Anxiety disorder    Arthritis    Back pain    Bloating    most days    Blood in the stool    Blurred vision    Brachial neuritis or radiculitis NOS    C4,C5, nerve roots    Calculus of kidney    Last year do to medication i was taking    Chronic atrial fibrillation (HCC)    post op from [facility]    Chronic cough    medicine causes symptoms    Community acquired pneumonia of left lower lobe of lung    Decorative tattoo    Depression    Diabetes (HCC)    Diarrhea,  unspecified    I believe from my medication    Dizziness    Fatigue    daily    Feeling lonely    Flatulence/gas pain/belching    Food intolerance    Headache disorder    migraines    Heart palpitations    Heartburn    at least one a week    Hemorrhoids    High blood pressure    High cholesterol    History of depression    Hyperlipidemia    Indigestion    not normal    Kidney stone    Leg swelling    Loss of appetite    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, or unspecified(404.00)    Migraines    Nausea    on and off    Night sweats    Obesity, unspecified    Obsessive-compulsive disorders    ISAAC (obstructive sleep apnea)    AHI-16, O2 alyce-71%/CPAP-10cwp    Other specified cardiac dysrhythmias(427.89)    Other testicular hypofunction    Pain in joints    Pneumonia, organism unspecified(486)    Problems with swallowing    Shortness of breath    with simple movements    Sleep disturbance    Stress    Suicidal thoughts    Thoracic or lumbosacral neuritis or radiculitis, unspecified    L3,L4    Thrombocytopenia (HCC)    Uncomfortable fullness after meals    Unspecified essential hypertension    Unspecified sleep apnea    wears CPAP    Visual impairment    Vomiting    3 times after heartburn    Wears glasses    Weight gain    Weight loss     Past Surgical History:   Procedure Laterality Date    Angiogram  7/15/14    no cad noted    Endovas repair, infrarenl abdom aortic aneurysm/dissect      Epidurography, radiological s & i  4/18/2012    Procedure: CERVICAL EPIDURAL;  Surgeon: Sekou Solorzano MD;  Location: [facility]    Fluor gid & loclzj ndl/cath spi dx/ther njx  5/11/2012    Procedure: CERVICAL EPIDURAL;  Surgeon: Edil Alcocer MD;  [facility]    Injection, w/wo contrast, dx/therapeutic substance, epidural/subarachnoid; cervical/thoracic  4/18/2012    Procedure: CERVICAL  EPIDURAL;  Surgeon: Sekou Solorzano MD;  Location: [facility]    Injection, w/wo contrast, dx/therapeutic substance, epidural/subarachnoid; cervical/thoracic  2012    Procedure: CERVICAL EPIDURAL;  Surgeon: Edil Alcocer MD;  Location: [facility]    M-sedaj by  phys perfrmg svc 5+ yr  2012    Procedure: CERVICAL EPIDURAL;  Surgeon: Sekou Solorzano MD;  Location: [facility]    M-sedaj by  phys perfrmg svc 5+ yr  2012    Procedure: CERVICAL EPIDURAL;  Surgeon: Edil Alcocer MD;  Location: [facility]    Symp aaa urgent repair  10/14/2014    [place]; ascending aorta    Tonsillectomy       Social History     Socioeconomic History    Marital status:    Tobacco Use    Smoking status: Former     Current packs/day: 0.00     Average packs/day: 0.5 packs/day for 28.0 years (14.0 ttl pk-yrs)     Types: Cigarettes     Start date: 1976     Quit date: 2004     Years since quittin.1    Smokeless tobacco: Never   Vaping Use    Vaping status: Never Used   Substance and Sexual Activity    Alcohol use: Not Currently     Alcohol/week: 1.0 standard drink of alcohol     Types: 1 Glasses of wine per week     Comment: Wine club once a month    Drug use: No   Other Topics Concern    Caffeine Concern Yes     Comment: 1 cup of coffee daily    Exercise Yes     Comment: walking     Social Determinants of Health     Financial Resource Strain: Low Risk  (2021)    Received from [facility]    Overall Financial Resource Strain (CARDIA)     Difficulty of Paying Living Expenses: Not hard at all   Transportation Needs: No Transportation Needs (2021)    Received from [facility]    PRAPARE - Transportation     Lack of Transportation (Medical): No     Lack of Transportation (Non-Medical): No     Current Outpatient Medications on File Prior to Visit    Medication Sig Dispense Refill    omeprazole 20 MG Oral Capsule Delayed Release Take one capsule (20 mg total) by mouth once daily, 30 minutes prior to breakfast. 30 capsule 1    FLECAINIDE 100 MG Oral Tab Take 1 tablet (100 mg total) by mouth 2 (two) times daily. 180 tablet 0    fluocinonide 0.05 % External Cream Apply to affected area twice a day 60 g 1    triamcinolone 0.1 % External Cream Apply to affected area twice day for 2 weeks as needed      hydrOXYzine 25 MG Oral Tab Take 1 tablet (25 mg total) by mouth nightly as needed for Itching. 20 tablet 0    METOPROLOL SUCCINATE  MG Oral Tablet 24 Hr TAKE ONE TABLET BY MOUTH TWICE DAILY 180 tablet 0    XARELTO 20 MG Oral Tab Take 1 tablet (20 mg total) by mouth daily 90 tablet 0    hydrALAZINE 50 MG Oral Tab Take 1 tablet (50 mg total) by mouth 3 (three) times daily.      cloNIDine 0.1 MG Oral Tab Take 1 tablet (0.1 mg total) by mouth 2 (two) times daily.      EPLERENONE 50 MG Oral Tab TAKE 1 TABLET BY MOUTH ONE TIME DAILY 90 tablet 0    ondansetron (ZOFRAN) 4 mg tablet Take 1 tablet (4 mg total) by mouth every 8 (eight) hours as needed for Nausea. 20 tablet 0    ketoconazole 2 % External Cream Apply 1 Application topically daily. 1 each 2    Glucose Blood (ONETOUCH VERIO) In Vitro Strip Check blood sugars once daily. 100 strip 3    OneTouch Delica Lancets 30G Does not apply Misc 1 Lancet by Finger stick route daily. 100 each 3    fenofibrate 145 MG Oral Tab TAKE 1 TABLET BY MOUTH ONE TIME DAILY 90 tablet 1    ROSUVASTATIN 40 MG Oral Tab Take 1 tablet (40 mg total) by mouth nightly 90 tablet 0    albuterol (VENTOLIN HFA) 108 (90 Base) MCG/ACT Inhalation Aero Soln Inhale 2 puffs into the lungs every 6 (six) hours as needed for Shortness of Breath. 54 g 1    Budesonide-Formoterol Fumarate (SYMBICORT) 160-4.5 MCG/ACT Inhalation Aerosol Inhale 2 puffs into the lungs 2 (two) times daily. (Patient taking differently: Inhale 2 puffs into the lungs as needed.) 1  each 3    potassium chloride 20 MEQ Oral Tab CR TAKE 1 TABLET BY MOUTH IN  THE MORNING AND 1 TABLET BY MOUTH IN THE EVENING (Patient taking differently: Take 1 tablet (20 mEq total) by mouth 2 (two) times daily. TAKE 1 TABLET BY MOUTH IN  THE MORNING AND 1 TABLET BY MOUTH IN THE EVENING) 180 tablet 3    metFORMIN  MG Oral Tablet 24 Hr Take 2 tablets (1,500 mg total) by mouth daily. (Patient taking differently: Take 1 tablet (750 mg total) by mouth 2 (two) times daily with meals.) 180 tablet 3    losartan 100 MG Oral Tab Take 1 tablet (100 mg total) by mouth daily. 90 tablet 3    Glucose Blood (ONETOUCH VERIO) In Vitro Strip TEST BLOOD GLUCOSE LEVELS ONCE DAILY 100 strip 0    AZELASTINE  MCG/SPRAY Nasal Solution INHALE 1 SPRAY INTO EACH NOSTRIL 2 TIMES DAILY (Patient taking differently: 1 spray by Nasal route in the morning and 1 spray before bedtime.) 30 mL 2    meclizine 25 MG Oral Tab Take 1 tablet (25 mg total) by mouth 3 (three) times daily as needed. 30 tablet 0    vitamin E 1000 UNITS Oral Cap Take 1 capsule (1,000 Units total) by mouth daily.      FUROSEMIDE 20 MG Oral Tab TAKE 1 TABLET BY MOUTH  DAILY (Patient taking differently: Take 1 tablet (20 mg total) by mouth daily.) 90 tablet 3    magnesium oxide 400 MG Oral Tab Take 1 tablet (400 mg total) by mouth daily.      ONETOUCH DELICA PLUS [id number] Does not apply Misc USE TO TEST BLOOD SUGAR  ONCE DAILY AS DIRECTED 100 each 2    Vitamin D3 2000 units Oral Cap Take 1 capsule (2,000 Units total) by mouth 2 (two) times daily.       No current facility-administered medications on file prior to visit.     Tobacco:  He smoked tobacco in the past but quit greater than 12 months ago.  Social History     Tobacco Use   Smoking Status Former    Current packs/day: 0.00    Average packs/day: 0.5 packs/day for 28.0 years (14.0 ttl pk-yrs)    Types: Cigarettes    Start date: 1976    Quit date: 2004    Years since quittin.1   Smokeless Tobacco  Never           Review of Systems   Constitutional: Negative for fever, chills and fatigue.   HENT: Negative for hearing loss, congestion, sore throat and neck pain.    Eyes: Negative for pain and visual disturbance.   Respiratory: Negative for cough and shortness of breath.    Cardiovascular: Negative for chest pain and palpitations.   Gastrointestinal: Negative for nausea, vomiting, abdominal pain and diarrhea.   Genitourinary: Negative for urgency, frequency and difficulty urinating.   Musculoskeletal: Negative for arthralgias and gait problem.   Skin: Negative for color change and rash.   Neurological: Negative for tremors, weakness and numbness.   Hematological: Negative for adenopathy. Does not bruise/bleed easily.   Psychiatric/Behavioral: Negative for confusion and agitation. The patient is not nervous/anxious.      /82   Pulse 70   Temp 98.2 °F (36.8 °C)   Resp 14   Ht 5' 11\" (1.803 m)   Wt 277 lb (125.6 kg)   SpO2 98%   BMI 38.63 kg/m²   Physical Exam   Constitutional: He is oriented to person, place, and time. He appears well-developed. No distress.   HENT: Normocephalic and atraumatic. Nose normal. TMs pearly gray, + light reflex.  Mucous membranes moist, dentition normal.  Oropharynx without erythema, exudate or tonsillar hypertrophy  Eyes: EOM are normal. Pupils are equal, round, and reactive to light. No scleral icterus.   Neck: Normal range of motion. No thyromegaly present.   Cardiovascular: Normal rate, regular rhythm and normal heart sounds.  Exam reveals no friction rub, no murmur heard.  Pulmonary/Chest: Effort normal and breath sounds normal b/l. He has no wheezes or rales.   Abdominal: Soft. Bowel sounds are normal. Generalized tenderness without guarding   Musculoskeletal: Normal range of motion. He exhibits no edema.   Lymphadenopathy: He has no cervical or supraclavicular adenopathy.   Neurological: He is alert and oriented to person, place, and time.  DTRs +2 and symmetric  b/l.   Skin: Skin is warm. No rash noted. No erythema, pallor or jaundice.   Psychiatric: He has a normal mood and affect. His behavior is normal.       Assessment and Plan:  Rivera Bush is a 59 year old male here for a wellness exam  Age appropriate cancer screening, labs, safety, immunizations were discussed with the patient and ordered as follows:  1. Routine physical examination (Primary)  2. Type 2 diabetes mellitus without complication, without long-term current use of insulin (HCC)  At goal continue metformin. Off of mounjaro right now dt rash, DM at goal. Eye exam due December.  3. Moderate persistent asthma without complication (HCC)  Well controlled continue symbicort, albuterol  4. ISAAC on CPAP  Still with snoring on CPAP: schedule titration study  -     Titration Sleep Study  -     General sleep study; Future; Expected date: 09/24/2024  5. Benign prostatic hyperplasia with incomplete bladder emptying  -     Urology Referral - In Network  6. Spasm of right trapezius muscle  -     Cyclobenzaprine HCl; Take 1 tablet (10 mg total) by mouth nightly.  Dispense: 10 tablet; Refill: 0  7. Constipation, unspecified constipation type   Take miralax otc x 5 days then switch to stool softener.   8. Hypertension  Not at goal. Increase hydralazine to 75mg tid and monitor.    Return in about 1 month (around 9/24/2024).   Patient/Caregiver Education:  Patient/Caregiver Education: There are no barriers to learning. Medical education done.  Outcome: Patient verbalizes understanding.      Educated by: CODI

## 2024-08-24 NOTE — PATIENT INSTRUCTIONS
Miralax once daily for 5 days, then stop miralax and start with stool softener.   Schedule CPAP titration study  See urologist

## 2024-08-25 NOTE — TELEPHONE ENCOUNTER
Last time medication was refilled: 3/2/24  Next office visit due/scheduled: 9/28/24  Last office visit: 8/24/24  Last Labs: 7/11/24

## 2024-08-25 NOTE — TELEPHONE ENCOUNTER
Last time medication was refilled: 5/31/24  Next office visit due/scheduled: 9/28/24  Last office visit: 8/24/24  Last Labs: 7/11/24

## 2024-09-10 NOTE — PROGRESS NOTES
[facility] MEDICAL [facility], [facility]    Urology Consult Note    History of Present Illness:   Patient is a(n) 59 year old male with hx of CHF, anxiety, depression, chronic back pain, afib on xarelto, obesity, migraines, ISAAC on CPAP, and kidney stones who presents for BPH/LUTS..    Pt c/o increasing weak intermittent stream and frequency/urgency in the past couple yrs.     AUA score: I: 4/5 F: 3/5 I: 4/5 U: 3/5 W: 5/5 S: 3/5 N: 2/5 total: 24/35, mixed feelings. Mostly bothered by having to strain to void due to incomplete emptying sensation. PVR 0mL today.     Also has concerns about ED. Issues achieving and maintaining. Occurs with masturbation and intercourse. No morning erections. Generally feels libido is also low. Has tried viagra in the past without any results. Feels there is a nodule on the penis. C/o intermittent chronic right testicular pain as well.     Admits to low energy and fatigue despite getting adequate night sleep. Naps daily. Uses CPAP religiously. Supposed to get re-evaluated soon for this.     UA today 30 protein, otherwise neg. Chronic constipation.     Of note CT A/P 3/21/23 with tiny nonobstructing renal stones bilaterally. No hydro.     HISTORY:  Past Medical History:    Abdominal pain    above the belly button    Acute cystitis with hematuria    Acute diastolic (congestive) heart failure (HCC)    Anxiety disorder    Arthritis    Back pain    Bloating    most days    Blood in the stool    Blurred vision    Brachial neuritis or radiculitis NOS    C4,C5, nerve roots    Calculus of kidney    Last year do to medication i was taking    Chronic atrial fibrillation (HCC)    post op from [facility]    Chronic cough    medicine causes symptoms    Community acquired pneumonia of left lower lobe of lung    Decorative tattoo    Depression    Diabetes (HCC)    Diarrhea, unspecified    I believe from my medication    Dizziness    Fatigue    daily    Feeling lonely    Flatulence/gas  pain/belching    Food intolerance    Headache disorder    migraines    Heart palpitations    Heartburn    at least one a week    Hemorrhoids    High blood pressure    High cholesterol    History of depression    Hyperlipidemia    Indigestion    not normal    Kidney stone    Leg swelling    Loss of appetite    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, or unspecified(404.00)    Migraines    Nausea    on and off    Night sweats    Obesity, unspecified    Obsessive-compulsive disorders    ISAAC (obstructive sleep apnea)    AHI-16, O2 alyce-71%/CPAP-10cwp    Other specified cardiac dysrhythmias(427.89)    Other testicular hypofunction    Pain in joints    Pneumonia, organism unspecified(486)    Problems with swallowing    Shortness of breath    with simple movements    Sleep disturbance    Stress    Suicidal thoughts    Thoracic or lumbosacral neuritis or radiculitis, unspecified    L3,L4    Thrombocytopenia (HCC)    Uncomfortable fullness after meals    Unspecified essential hypertension    Unspecified sleep apnea    wears CPAP    Visual impairment    Vomiting    3 times after heartburn    Wears glasses    Weight gain    Weight loss      Past Surgical History:   Procedure Laterality Date    Angiogram  7/15/14    no cad noted    Endovas repair, infrarenl abdom aortic aneurysm/dissect      Epidurography, radiological s & i  4/18/2012    Procedure: CERVICAL EPIDURAL;  Surgeon: Sekou Solorzano MD;  Location: [facility]    Fluor gid & loclzj ndl/cath spi dx/ther njx  5/11/2012    Procedure: CERVICAL EPIDURAL;  Surgeon: Edil Alcocer MD;  Location: [facility]    Injection, w/wo contrast, dx/therapeutic substance, epidural/subarachnoid; cervical/thoracic  4/18/2012    Procedure: CERVICAL EPIDURAL;  Surgeon: Sekou Solorzano MD;  Location: [facility]    Injection, w/wo contrast,  dx/therapeutic substance, epidural/subarachnoid; cervical/thoracic  2012    Procedure: CERVICAL EPIDURAL;  Surgeon: Edil Alcocer MD;  Location: [facility]    M-sedaj by  phys perfrmg svc 5+ yr  2012    Procedure: CERVICAL EPIDURAL;  Surgeon: Sekou Solorzano MD;  Location: [facility]    M-sedaj by  phys perfrmg svc 5+ yr  2012    Procedure: CERVICAL EPIDURAL;  Surgeon: Edil Alcocer MD;  Location: [facility]    Symp aaa urgent repair  10/14/2014    [place]; ascending aorta    Tonsillectomy        Family History   Problem Relation Age of Onset    Hypertension Father     Lipids Father     Prostate Cancer Father     Other (Other[other]) Paternal Grandfather     Heart Disorder Paternal Grandfather     Hypertension Paternal Grandfather     Stroke Paternal Grandfather     Hypertension Sister     Lipids Sister     Hypertension Brother     Lipids Brother     Psychiatric Brother     Hypertension Mother     Lipids Mother     Psychiatric Mother     Hypertension Maternal Grandfather     Heart Attack Maternal Grandfather     Stroke Maternal Grandfather       Social History:   Social History     Socioeconomic History    Marital status:    Tobacco Use    Smoking status: Former     Current packs/day: 0.00     Average packs/day: 0.5 packs/day for 28.0 years (14.0 ttl pk-yrs)     Types: Cigarettes     Start date: 1976     Quit date: 2004     Years since quittin.1    Smokeless tobacco: Never   Vaping Use    Vaping status: Never Used   Substance and Sexual Activity    Alcohol use: Not Currently     Alcohol/week: 1.0 standard drink of alcohol     Types: 1 Glasses of wine per week     Comment: Wine club once a month    Drug use: No   Other Topics Concern    Caffeine Concern Yes     Comment: 1 cup of coffee daily    Exercise Yes     Comment: walking     Social Determinants of Health     Financial Resource Strain: Low Risk   (11/9/2021)    [facility]    Overall Financial Resource Strain (CARDIA)     Difficulty of Paying Living Expenses: Not hard at all   Transportation Needs: No Transportation Needs (11/9/2021)    [facility]    PRAPARE - Transportation     Lack of Transportation (Medical): No     Lack of Transportation (Non-Medical): No        Allergies  Allergies   Allergen Reactions    Jardiance [Empagliflozin] SWELLING and DIZZINESS       Review of Systems:   A 10-point review of systems was completed and is negative other than as noted above.    Physical Exam:   There were no vitals taken for this visit.    GENERAL APPEARANCE: no acute distress  NEUROLOGIC: converses appropriately  HEAD: atraumatic, normocephalic  LUNGS: non-labored breathing  ABDOMEN: soft, nontender, non-distended  BACK: no CVA tenderness  PSYCH: appropriate affect and mood  INGUINAL CANALS: no hernias  PENILE MEATUS: open and narrowed, no visible stricture  PENIS: normal  SCROTUM: normal, no varicocele  TESTES: normal anatomy  EPIDIDYMIS: normal anatomy, mild swelling on right side vs. Possible small hydrocele, tender to palpation  JOANN: approx 40g, smooth, symmetric, without nodule or induration    Results:     Laboratory Data:  Lab Results   Component Value Date    WBC 7.4 04/26/2024    HGB 15.0 04/26/2024    .0 04/26/2024     Lab Results   Component Value Date     07/11/2024    K 4.3 07/11/2024     07/11/2024    CO2 31 07/11/2024    BUN 15 07/11/2024    GLU 76 07/11/2024    GFRAA 92 05/09/2022    AST 22 07/11/2024    ALT 27 07/11/2024    TP 6.3 07/11/2024    ALB 4.2 07/11/2024    CA 9.3 07/11/2024    MG 2.2 11/06/2021       Urinalysis Results (last 3 years):  Recent Labs     05/09/22  1025 02/15/23  1327 06/30/23  1203 02/26/24  0900 09/10/24  1345   COLORUR YELLOW YELLOW YELLOW YELLOW  --    CLARITY CLEAR TURBID* CLEAR CLEAR  --    SPECGRAVITY 1.018  1.019 1.016 1.020 1.025   PHURINE 5.5 5.5 5.5 6.0 6.5   PROUR NEGATIVE NEGATIVE NEGATIVE NEGATIVE  --    GLUUR NEGATIVE NEGATIVE NEGATIVE NEGATIVE  --    KETUR NEGATIVE NEGATIVE NEGATIVE NEGATIVE  --    BILUR NEGATIVE NEGATIVE NEGATIVE NEGATIVE  --    NITRITE NEGATIVE NEGATIVE NEGATIVE NEGATIVE Negative   LEUUR NEGATIVE NEGATIVE NEGATIVE TRACE*  --    WBCUR NONE SEEN NONE SEEN NONE SEEN NONE SEEN  --    RBCUR NONE SEEN NONE SEEN NONE SEEN NONE SEEN  --    BACUR NONE SEEN NONE SEEN NONE SEEN NONE SEEN  --        Urine Culture Results (last 3 years):  Lab Results   Component Value Date    URINECUL No Growth at 18-24 hrs. 04/23/2019    URINECUL No Growth 1 Day 02/27/2017    URINECUL <10,000 CFU/ML Gram positive alli 11/06/2016    URINECUL No Growth 1 Day 02/02/2015       Imaging  No results found.      Impression:   Recommendations:  BPH/LUTS  - flomax 0.4mg daily  - RTC in 1mo and potentially add on OAB med vs cysto eval for possible dilation  - discussed finasteride but given low libido and ED, would prefer to hold off on this    ED  - will try cialis 20mg; begin with 1/2 tab and increase to full tab if no effect  - discussed trimix briefly  - will also get low T panel     Thank you very much for this consult. Please call if there are any questions or concerns.     Samantha Horton PA-C  Urology  [facility]  Phone: [phone]    Date: 9/10/2024  Time: 2:27 PM

## 2024-09-13 NOTE — PROGRESS NOTES
Medical Nutrition Therapy Assessment    Rivera Bush 4/17/1965 was seen for individual Diabetic Medical Nutrition Therapy- a follow up visit:    Date: 9/13/2024   Referral: Nathalie Giles PA-C   Start time 10:05 am  End time: 11:10 am    Assessment  59 year old male presents for follow up medical nutrition therapy for type 2 diabetes. Pt notes he has hernia and gallbladder surgery pending, with an appointment next month to further discuss surgery. Reflux has improved since last visit since starting omprazole.    Anthropometrics:  Wt Readings from Last 6 Encounters:   08/24/24 277 lb   07/08/24 270 lb 3.2 oz   06/05/24 276 lb   06/05/24 277 lb   05/22/24 269 lb 12.8 oz   04/25/24 283 lb         Current diabetes medications:  Oral medication: Metformin  mg, twice daily   Monjuaro - May start up again, stopped due to possible allergic reaction.    Taking correctly: yes    Labs:  Lab Results   Component Value Date    A1C 5.6 07/11/2024    A1C 5.5 02/26/2024    CHOLEST 116 02/26/2024    CHOLEST 176 11/27/2023    LDL 54 02/26/2024    LDL 91 11/27/2023    HDL 35 (L) 02/26/2024    HDL 38 (L) 11/27/2023    NONHDLC 81 02/26/2024    NONHDLC 138 (H) 11/27/2023    TRIG 194 (H) 02/26/2024    TRIG 351 (H) 11/27/2023    BUN 15 07/11/2024    BUN 18 04/26/2024    CREATSERUM 1.25 07/11/2024    CREATSERUM 1.46 (H) 04/26/2024    GFRNAA 79 05/09/2022    GFRNAA 78 12/03/2021    GFRAA 92 05/09/2022    GFRAA 90 12/03/2021       Lab Results   Component Value Date    A1C 5.6 07/11/2024    A1C 5.5 02/26/2024    A1C 5.3 06/30/2023       Glucose testing at home:     Blood Glucose Meter:  Currently testing blood glucose: Yes, Biweekly  BG results: per patient report   FBG: under 100 mg/dl        Diet History:  Usually eats 2-3 meals and 1-2 snacks a day (if missed meals)  (a.m.) 5am or 8am Activa yogurt drink, Kentrell marcus breakfast sandwich, english muffin with egg, oatmeal with woo/flax and cranberries  (mid-day) 11am -12 pm 1/2  a sandwich- cheese, mustered, roast beef, honey. Corn beef, beef jerky emerson  (mostly skips lunch if work is busy)  (p.m.) 5-7pm Salad w/3 oz of tuna, vinegar and oil and Mrs dash for dressing, tomatoes, walnuts, apple slices, olives. Sub sandwich 12 in or long hotdog, with toppings.   (snacks) Beef Jerky, Protein supplement, chips, cookies, blueberries, little banana, avocado  (Beverages) Fairlife 30 gm protein, Ensure 30gm, water, zero sugar Powerade, Propel    Usual Schedule:  Up at 3:30 am, in bed by 9-10 pm  6 am starts work, leaves by 2:30 pm   1 hr drive to work and 1.5 hrs home.   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   Dating/See's his girlfriend weekly- 1 dinner out per week and weekends    Physical Activity: Walking at work. 10 minutes on vibration plate. Bowling 2-3 times a week.  Stretches daily 3x a day.       Nutrition Diagnosis  PES: Food and nutrition related knowledge deficit related to lifestyle as evidenced by carbohydrate intake greater than estimated needs, increased stress , unintentional weight gain, and physical inactivity    Nutrition Diagnosis  PES: Inadequate fiber intake and occurrence of acid reflux related to knowledge deficit as evidenced by fiber intake less than estimated needs and symptoms of acid reflux     Patient appears to have knowledge of importance of lifestyle changes to assist with weight loss. He has been having increased occurrence of heartburn which has been limiting his ability to include other forms of exercise, like running. He has also been including foods with limited amounts of fiber. Main focus at this time is to start gradually increasing amount of fiber.    Intervention  -Comprehensive Nutrition Education Provided:  Recommended carbohydrate targets of 45 grams at meals and 15-20 grams at snacks.  Provided suggestions for carbohydrate controlled snacks.  Discussed behavior modification to achieve and  maintain weight loss of 5% or more.  Reviewed the MyPlate method with focus on balanced macronutrient consumption; identifying foods that are carbohydrates, lean protein, non-starchy vegetables, and heart healthy fats.      -Education on Increased Physical Activity:  discussed how increased physical activity improves insulin resistance, blood glucose control, and heart health      Monitoring/Evaluation  Diet modification/understanding  Hemoglobin A1c  Weight trends  Physical activity      Recommendations  Practice healthful eating patterns, emphasizing a variety of nutrient dense foods in appropriate portion sizes.    Monitor carbohydrate intake with the MyPlate method   Practice carbohydrate counting and label reading  Increase or continue physical activity of at least 150 mins, over at least 3 days a week or per doctors recommendations.   Achieve and maintain weight loss goals.     Patient Specific Goals:  Activity - look into a gym or community center, try some group classes, try a yoga class, look into pickle ball/racquet ball. If [employer] is your top choice, sign up in the next 2 weeks.      Sleep- Increase to 6-7 hrs/night, Eliminate distractions like electronics - Add a do not disturb setting after 7pm.     Continue to include the fiber in your diet.   - Add more vegetables  - Add BeneFiber to your water daily  -Limit bread intake but keep high fiber or whole grain as your main bread source    Previous Specific Goals:  Continue to make sure you are getting some protein with your meals and snacks (especially with taking the Mounjaro). It is okay to include an Ensure to get a source of protein. - Eating protein with each meal, -Goal Completed     Continue to check your blood sugars at various times - fasting and 2 hours after a meal - Completed      Blood Glucose Goals  Blood sugar goals: less than 130 mg/dl in the morning and less than 180 mg/dl 2 hours after meals.  Keep glucose tabs or fast acting  carbohydrates with you for treatment of lows; for blood glucose levels less than 70 mg/dl, take 15 grams of fast acting carbohydrates (3-4 glucose tabs, 4 ounces of juice, or as discussed). Retest in 15 min. If not above 70 mg/dl, retreat.    Follow up: Recommended in 3-4 months  Relevant handouts provided.  Pt has no further questions at this time.     Susan Seymour, SHALININ, LDN, CDCES  Certified Diabetes Care and   Registered Dietitian

## 2024-09-14 NOTE — PATIENT INSTRUCTIONS
Rivera, it was a pleasure meeting you today. Below is a summary of our visit.       Recommendations  Practice healthful eating patterns, emphasizing a variety of nutrient dense foods in appropriate portion sizes.    Monitor carbohydrate intake with the MyPlate method   Practice carbohydrate counting and label reading  Increase or continue physical activity of at least 150 mins, over at least 3 days a week or per doctors recommendations.   Achieve and maintain weight loss goals.     Patient Specific Goals:  Activity - look into a gym or community center, try some group classes, try a yoga class, look into pickle ball/racquet ball. If [employer] is your top choice, sign up in the next 2 weeks.      Sleep- Increase to 6-7 hrs/night, Eliminate distractions like electronics - Add a do not disturb setting after 7pm.     Continue to include the fiber in your diet.   - Add more vegetables  - Add BeneFiber to your water daily  -Limit bread intake but keep high fiber or whole grain as your main bread source    Previous Specific Goals:  Continue to make sure you are getting some protein with your meals and snacks (especially with taking the Mounjaro). It is okay to include an Ensure to get a source of protein. - Eating protein with each meal, -Goal Completed     Continue to check your blood sugars at various times - fasting and 2 hours after a meal - Completed      Blood Glucose Goals  Blood sugar goals: less than 130 mg/dl in the morning and less than 180 mg/dl 2 hours after meals.  Keep glucose tabs or fast acting carbohydrates with you for treatment of lows; for blood glucose levels less than 70 mg/dl, take 15 grams of fast acting carbohydrates (3-4 glucose tabs, 4 ounces of juice, or as discussed). Retest in 15 min. If not above 70 mg/dl, retreat.    Follow up: Recommended in 3-4 months    Please call the [facility] with any questions or concerns [phone].    Susan Seymour, RDN, LDN, CDCES  Certified Diabetes Care  and   Registered Dietitian

## 2024-09-23 NOTE — TELEPHONE ENCOUNTER
Symbicort  Last time medication was refilled 4/21/23  Last office visit  8/24/24  Next office visit due/scheduled   Future Appointments   Date Time Provider Department Center   9/28/2024  8:00 AM Nathalie Giles PA-C [facility] [address]      [id number]      [id number]      [facility]   Passed protocol, Medication sent.    Metformin  Last time medication was refilled 4/21/23  Last office visit  8/24/24  Next office visit due/scheduled 9/28/24  Passed protocol, Medication sent.    Losartan  Last time medication was refilled 4/21/23  Last office visit  8/24/24  Next office visit due  Next office visit due/scheduled 9/28/24  Medication failed protocol    Potassium  Last time medication was refilled 4/21/23  Last office visit  8/24/24  Next office visit due/scheduled 9/28/24  Medication failed protocol

## 2024-09-28 NOTE — PATIENT INSTRUCTIONS
Schedule with therapist   Schedule physical therapy  Increase hydralazine dose   Take medrol dose pack  Schedule follow-up with Dr Napoles nephrology for resistant hypertension  COVID booster in 1-2 weeks

## 2024-09-28 NOTE — PROGRESS NOTES
Rivera Bush is a 59 year old male.  HPI:   Pt following up on BP   Last visit increased hydralazine to 75mg TID  Reports BP's high 130's-140/70's.   C/o irritability, increased stress.    C/o b/l LBP shooting down legs past 3 wks. Worse when standing up or stretching. Sometimes numbness or weakness in the legs. Also associated with chronic neck pain.     Also c/o allergies, L eye and face feel itchy, swollen, taking flonase and zyrtec or allegra  Current Outpatient Medications   Medication Sig Dispense Refill    methylPREDNISolone (MEDROL) 4 MG Oral Tablet Therapy Pack As directed. 21 each 0    hydrALAZINE 50 MG Oral Tab Take 2 tablets (100 mg total) by mouth every morning AND 2 tablets (100 mg total) Noon AND 1.5 tablets (75 mg total) every evening.      LOSARTAN 100 MG Oral Tab TAKE 1 TABLET BY MOUTH ONE TIME DAILY 90 tablet 0    metFORMIN  MG Oral Tablet 24 Hr TAKE TWO TABLETS BY MOUTH ONCE DAILY 180 tablet 0    POTASSIUM CHLORIDE 20 MEQ Oral Tab CR TAKE 1 TABLET BY MOUTH IN  THE MORNING AND 1 TABLET BY MOUTH IN THE EVENING. 180 tablet 0    SYMBICORT 160-4.5 MCG/ACT Inhalation Aerosol INHALE TWO PUFFS BY MOUTH TWICE DAILY 30.6 g 0    omeprazole 20 MG Oral Capsule Delayed Release Take one capsule (20 mg total) by mouth once daily, 30 minutes prior to breakfast 30 capsule 11    tamsulosin 0.4 MG Oral Cap Take 1 capsule (0.4 mg total) by mouth every evening. 90 capsule 3    Tadalafil (CIALIS) 20 MG Oral Tab Take 1 tablet (20 mg total) by mouth daily as needed for Erectile Dysfunction. 30 tablet 5    FENOFIBRATE 145 MG Oral Tab TAKE 1 TABLET BY MOUTH ONE TIME DAILY 90 tablet 0    XARELTO 20 MG Oral Tab Take 1 tablet (20 mg total) by mouth daily 90 tablet 0    cyclobenzaprine 10 MG Oral Tab Take 1 tablet (10 mg total) by mouth nightly. 10 tablet 0    FLECAINIDE 100 MG Oral Tab Take 1 tablet (100 mg total) by mouth 2 (two) times daily. 180 tablet 0    fluocinonide 0.05 % External Cream Apply to affected  area twice a day 60 g 1    triamcinolone 0.1 % External Cream Apply to affected area twice day for 2 weeks as needed      hydrOXYzine 25 MG Oral Tab Take 1 tablet (25 mg total) by mouth nightly as needed for Itching. 20 tablet 0    METOPROLOL SUCCINATE  MG Oral Tablet 24 Hr TAKE ONE TABLET BY MOUTH TWICE DAILY 180 tablet 0    cloNIDine 0.1 MG Oral Tab Take 1 tablet (0.1 mg total) by mouth 2 (two) times daily.      EPLERENONE 50 MG Oral Tab TAKE 1 TABLET BY MOUTH ONE TIME DAILY 90 tablet 0    ketoconazole 2 % External Cream Apply 1 Application topically daily. 1 each 2    Glucose Blood (ONETOUCH VERIO) In Vitro Strip Check blood sugars once daily. 100 strip 3    OneTouch Delica Lancets 30G Does not apply Misc 1 Lancet by Finger stick route daily. 100 each 3    ROSUVASTATIN 40 MG Oral Tab Take 1 tablet (40 mg total) by mouth nightly 90 tablet 0    albuterol (VENTOLIN HFA) 108 (90 Base) MCG/ACT Inhalation Aero Soln Inhale 2 puffs into the lungs every 6 (six) hours as needed for Shortness of Breath. 54 g 1    Glucose Blood (ONETOUCH VERIO) In Vitro Strip TEST BLOOD GLUCOSE LEVELS ONCE DAILY 100 strip 0    AZELASTINE  MCG/SPRAY Nasal Solution INHALE 1 SPRAY INTO EACH NOSTRIL 2 TIMES DAILY (Patient taking differently: 1 spray by Nasal route in the morning and 1 spray before bedtime.) 30 mL 2    meclizine 25 MG Oral Tab Take 1 tablet (25 mg total) by mouth 3 (three) times daily as needed. 30 tablet 0    vitamin E 1000 UNITS Oral Cap Take 1 capsule (1,000 Units total) by mouth daily.      FUROSEMIDE 20 MG Oral Tab TAKE 1 TABLET BY MOUTH  DAILY (Patient taking differently: Take 1 tablet (20 mg total) by mouth daily.) 90 tablet 3    magnesium oxide 400 MG Oral Tab Take 1 tablet (400 mg total) by mouth daily.      ONETOUCH DELICA PLUS [id number] Does not apply Misc USE TO TEST BLOOD SUGAR  ONCE DAILY AS DIRECTED 100 each 2    Vitamin D3 2000 units Oral Cap Take 1 capsule (2,000 Units total) by mouth 2 (two) times  daily.        Past Medical History:    Abdominal pain    above the belly button    Acute cystitis with hematuria    Acute diastolic (congestive) heart failure (HCC)    Anxiety disorder    Arthritis    Back pain    Bloating    most days    Blood in the stool    Blurred vision    Brachial neuritis or radiculitis NOS    C4,C5, nerve roots    Calculus of kidney    Last year do to medication i was taking    Chronic atrial fibrillation (HCC)    post op from [facility]    Chronic cough    medicine causes symptoms    Community acquired pneumonia of left lower lobe of lung    Decorative tattoo    Depression    Diabetes (HCC)    Diarrhea, unspecified    I believe from my medication    Dizziness    Fatigue    daily    Feeling lonely    Flatulence/gas pain/belching    Food intolerance    Headache disorder    migraines    Heart palpitations    Heartburn    at least one a week    Hemorrhoids    High blood pressure    High cholesterol    History of depression    Hyperlipidemia    Indigestion    not normal    Kidney stone    Leg swelling    Loss of appetite    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, or unspecified(404.00)    Migraines    Nausea    on and off    Night sweats    Obesity, unspecified    Obsessive-compulsive disorders    ISAAC (obstructive sleep apnea)    AHI-16, O2 alyce-71%/CPAP-10cwp    Other specified cardiac dysrhythmias(427.89)    Other testicular hypofunction    Pain in joints    Pneumonia, organism unspecified(486)    Problems with swallowing    Shortness of breath    with simple movements    Sleep disturbance    Stress    Suicidal thoughts    Thoracic or lumbosacral neuritis or radiculitis, unspecified    L3,L4    Thrombocytopenia (HCC)    Uncomfortable fullness after meals    Unspecified essential hypertension    Unspecified sleep apnea    wears CPAP    Visual impairment    Vomiting    3 times after heartburn    Wears glasses     Weight gain    Weight loss      Social History:  Social History     Socioeconomic History    Marital status:    Tobacco Use    Smoking status: Former     Current packs/day: 0.00     Average packs/day: 0.5 packs/day for 28.0 years (14.0 ttl pk-yrs)     Types: Cigarettes     Start date: 1976     Quit date: 2004     Years since quittin.2    Smokeless tobacco: Never   Vaping Use    Vaping status: Never Used   Substance and Sexual Activity    Alcohol use: Not Currently     Alcohol/week: 1.0 standard drink of alcohol     Types: 1 Glasses of wine per week     Comment: Wine club once a month    Drug use: No   Other Topics Concern    Caffeine Concern Yes     Comment: 1 cup of coffee daily    Exercise Yes     Comment: walking     Social Determinants of Health     Financial Resource Strain: Low Risk  (2021)    [facility]    Overall Financial Resource Strain (CARDIA)     Difficulty of Paying Living Expenses: Not hard at all   Transportation Needs: No Transportation Needs (2021)    [facility]    PRAPARE - Transportation     Lack of Transportation (Medical): No     Lack of Transportation (Non-Medical): No        REVIEW OF SYSTEMS:   GENERAL HEALTH: feels well otherwise. Denies fever, chills, unintentional weight change  SKIN: denies any unusual skin lesions or rashes  RESPIRATORY: denies shortness of breath with exertion, denies cough or wheezing  CARDIOVASCULAR: denies chest pain or palpitations, denies leg swelling  GI: denies abdominal pain and denies heartburn. Denies nausea, vomiting, diarrhea, constipation  NEURO: denies headaches, dizziness, weakness, syncope    EXAM:   /80   Pulse 78   Temp 98.2 °F (36.8 °C)   Resp 16   Ht 5' 11\" (1.803 m)   Wt 277 lb (125.6 kg)   SpO2 98%   BMI 38.63 kg/m²   GENERAL: well developed, well nourished,in no apparent distress  SKIN: no rashes,no suspicious  lesions, warm and dry  HEENT: atraumatic, normocephalic,ears and throat are clear  NECK: supple,no adenopathy, no thyromegaly  LUNGS: clear to auscultation b/l no W/R/R  CARDIO: RRR without murmur  GI: good BS's,no masses, HSM, distension or tenderness  EXTREMITIES: no cyanosis, clubbing or edema  MUSCULOSKELETAL: FROM, no joint swelling or bony tenderness  NEURO: a/ox3, no focal deficits. Patellar DTR's diminished today. Leg strength intact, gait is normal, + reproduction of back pain with extension and rotation. + straight leg raise.   PSYCH: mood and affect normal    ASSESSMENT AND PLAN:   1. Chronic bilateral low back pain with bilateral sciatica (Primary)  Medrol dose pack and start PT  -     methylPREDNISolone; As directed.  Dispense: 21 each; Refill: 0  -     OP REFERRAL TO [facility]  2. Chronic neck pain  -     OP REFERRAL TO [facility]   4. 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 F/up with nephrology   5. Resistant hypertension  -     Nephrology Referral - In Network  Other orders  -     Fluzone trivalent vaccine, PF 0.5mL, 6mo+ ([id number])        The patient indicates understanding of these issues and agrees to the plan.  Return in about 3 months (around 12/28/2024).

## 2024-10-01 NOTE — PROGRESS NOTES
HPI:   Patient is a(n) 59 year old male with hx of CHF, anxiety, depression, chronic back pain, afib on xarelto, obesity, migraines, ISAAC on CPAP, and kidney stones who presents for follow up.    I last saw pt on 9/10/24 for incomplete emptying sensation despite PVR 0mL and we started on flomax 0.4mg daily. AUA from 24 to 10. Happy with sx. Urgency and frequency improved, especially nocturia. Having side effects of dry ejac.     We also started cialis 20mg. He has taken full tab and feels moderate improvement. Not completely at goal but better than before.     Ongoing fatigue despite [employer] CPAP use. Will get T labs drawn soon. Re-eval for CPAP soon as well.     UA today neg.     HISTORY:  Past Medical History:    Abdominal pain    above the belly button    Acute cystitis with hematuria    Acute diastolic (congestive) heart failure (HCC)    Anxiety disorder    Arthritis    Back pain    Bloating    most days    Blood in the stool    Blurred vision    Brachial neuritis or radiculitis NOS    C4,C5, nerve roots    Calculus of kidney    Last year do to medication i was taking    Chronic atrial fibrillation (HCC)    post op from [facility]    Chronic cough    medicine causes symptoms    Community acquired pneumonia of left lower lobe of lung    Decorative tattoo    Depression    Diabetes (HCC)    Diarrhea, unspecified    I believe from my medication    Dizziness    Fatigue    daily    Feeling lonely    Flatulence/gas pain/belching    Food intolerance    Headache disorder    migraines    Heart palpitations    Heartburn    at least one a week    Hemorrhoids    High blood pressure    High cholesterol    History of depression    Hyperlipidemia    Indigestion    not normal    Kidney stone    Leg swelling    Loss of appetite    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, or unspecified(404.00)    Migraines    Nausea    on and off    Night sweats     Obesity, unspecified    Obsessive-compulsive disorders    ISAAC (obstructive sleep apnea)    AHI-16, O2 alyce-71%/CPAP-10cwp    Other specified cardiac dysrhythmias(427.89)    Other testicular hypofunction    Pain in joints    Pneumonia, organism unspecified(486)    Problems with swallowing    Shortness of breath    with simple movements    Sleep disturbance    Stress    Suicidal thoughts    Thoracic or lumbosacral neuritis or radiculitis, unspecified    L3,L4    Thrombocytopenia (HCC)    Uncomfortable fullness after meals    Unspecified essential hypertension    Unspecified sleep apnea    wears CPAP    Visual impairment    Vomiting    3 times after heartburn    Wears glasses    Weight gain    Weight loss      Past Surgical History:   Procedure Laterality Date    Angiogram  7/15/14    no cad noted    Endovas repair, infrarenl abdom aortic aneurysm/dissect      Epidurography, radiological s & i  4/18/2012    Procedure: CERVICAL EPIDURAL;  Surgeon: Sekou Solorzano MD;  Location: [facility]    Fluor gid & loclzj ndl/cath spi dx/ther njx  5/11/2012    Procedure: CERVICAL EPIDURAL;  Surgeon: Edil Alcocer MD;  Location: [facility]    Injection, w/wo contrast, dx/therapeutic substance, epidural/subarachnoid; cervical/thoracic  4/18/2012    Procedure: CERVICAL EPIDURAL;  Surgeon: Sekou Solorzano MD;  Location: [facility]    Injection, w/wo contrast, dx/therapeutic substance, epidural/subarachnoid; cervical/thoracic  5/11/2012    Procedure: CERVICAL EPIDURAL;  Surgeon: Edil Alcocer MD;  Location: [facility]    M-sedaj by  phys perfrmg svc 5+ yr  4/18/2012    Procedure: CERVICAL EPIDURAL;  Surgeon: Sekou Solorzaon MD;  Location: [facility]    M-sedaj by  phys perfrmg svc 5+ yr  5/11/2012    Procedure: CERVICAL EPIDURAL;  Surgeon: Edil Alcocer MD;  Location: [facility]    Symp aaa urgent  repair  10/14/2014    Stronach; ascending aorta    Tonsillectomy        Family History   Problem Relation Age of Onset    Hypertension Father     Lipids Father     Prostate Cancer Father     Other (Other[other]) Paternal Grandfather     Heart Disorder Paternal Grandfather     Hypertension Paternal Grandfather     Stroke Paternal Grandfather     Hypertension Sister     Lipids Sister     Hypertension Brother     Lipids Brother     Psychiatric Brother     Hypertension Mother     Lipids Mother     Psychiatric Mother     Hypertension Maternal Grandfather     Heart Attack Maternal Grandfather     Stroke Maternal Grandfather       Social History:   Social History     Socioeconomic History    Marital status:    Tobacco Use    Smoking status: Former     Current packs/day: 0.00     Average packs/day: 0.5 packs/day for 28.0 years (14.0 ttl pk-yrs)     Types: Cigarettes     Start date: 1976     Quit date: 2004     Years since quittin.2    Smokeless tobacco: Never   Vaping Use    Vaping status: Never Used   Substance and Sexual Activity    Alcohol use: Not Currently     Alcohol/week: 1.0 standard drink of alcohol     Types: 1 Glasses of wine per week     Comment: Wine club once a month    Drug use: No   Other Topics Concern    Caffeine Concern Yes     Comment: 1 cup of coffee daily    Exercise Yes     Comment: walking     Social Determinants of Health     Financial Resource Strain: Low Risk  (2021)    Received from [facility]    Overall Financial Resource Strain (CARDIA)     Difficulty of Paying Living Expenses: Not hard at all   Transportation Needs: No Transportation Needs (2021)    [facility]    PRAPARE - Transportation     Lack of Transportation (Medical): No     Lack of Transportation (Non-Medical): No        Medications (Active prior to today's visit):  Current Outpatient Medications   Medication Sig Dispense Refill     methylPREDNISolone (MEDROL) 4 MG Oral Tablet Therapy Pack As directed. 21 each 0    hydrALAZINE 50 MG Oral Tab Take 2 tablets (100 mg total) by mouth every morning AND 2 tablets (100 mg total) Noon AND 1.5 tablets (75 mg total) every evening.      LOSARTAN 100 MG Oral Tab TAKE 1 TABLET BY MOUTH ONE TIME DAILY 90 tablet 0    metFORMIN  MG Oral Tablet 24 Hr TAKE TWO TABLETS BY MOUTH ONCE DAILY 180 tablet 0    POTASSIUM CHLORIDE 20 MEQ Oral Tab CR TAKE 1 TABLET BY MOUTH IN  THE MORNING AND 1 TABLET BY MOUTH IN THE EVENING. 180 tablet 0    SYMBICORT 160-4.5 MCG/ACT Inhalation Aerosol INHALE TWO PUFFS BY MOUTH TWICE DAILY 30.6 g 0    omeprazole 20 MG Oral Capsule Delayed Release Take one capsule (20 mg total) by mouth once daily, 30 minutes prior to breakfast 30 capsule 11    tamsulosin 0.4 MG Oral Cap Take 1 capsule (0.4 mg total) by mouth every evening. 90 capsule 3    Tadalafil (CIALIS) 20 MG Oral Tab Take 1 tablet (20 mg total) by mouth daily as needed for Erectile Dysfunction. 30 tablet 5    FENOFIBRATE 145 MG Oral Tab TAKE 1 TABLET BY MOUTH ONE TIME DAILY 90 tablet 0    XARELTO 20 MG Oral Tab Take 1 tablet (20 mg total) by mouth daily 90 tablet 0    cyclobenzaprine 10 MG Oral Tab Take 1 tablet (10 mg total) by mouth nightly. 10 tablet 0    FLECAINIDE 100 MG Oral Tab Take 1 tablet (100 mg total) by mouth 2 (two) times daily. 180 tablet 0    fluocinonide 0.05 % External Cream Apply to affected area twice a day 60 g 1    triamcinolone 0.1 % External Cream Apply to affected area twice day for 2 weeks as needed      hydrOXYzine 25 MG Oral Tab Take 1 tablet (25 mg total) by mouth nightly as needed for Itching. 20 tablet 0    METOPROLOL SUCCINATE  MG Oral Tablet 24 Hr TAKE ONE TABLET BY MOUTH TWICE DAILY 180 tablet 0    cloNIDine 0.1 MG Oral Tab Take 1 tablet (0.1 mg total) by mouth 2 (two) times daily.      EPLERENONE 50 MG Oral Tab TAKE 1 TABLET BY MOUTH ONE TIME DAILY 90 tablet 0    ketoconazole 2 %  External Cream Apply 1 Application topically daily. 1 each 2    Glucose Blood (ONETOUCH VERIO) In Vitro Strip Check blood sugars once daily. 100 strip 3    OneTouch Delica Lancets 30G Does not apply Misc 1 Lancet by Finger stick route daily. 100 each 3    ROSUVASTATIN 40 MG Oral Tab Take 1 tablet (40 mg total) by mouth nightly 90 tablet 0    albuterol (VENTOLIN HFA) 108 (90 Base) MCG/ACT Inhalation Aero Soln Inhale 2 puffs into the lungs every 6 (six) hours as needed for Shortness of Breath. 54 g 1    Glucose Blood (ONETOUCH VERIO) In Vitro Strip TEST BLOOD GLUCOSE LEVELS ONCE DAILY 100 strip 0    AZELASTINE  MCG/SPRAY Nasal Solution INHALE 1 SPRAY INTO EACH NOSTRIL 2 TIMES DAILY (Patient taking differently: 1 spray by Nasal route in the morning and 1 spray before bedtime.) 30 mL 2    meclizine 25 MG Oral Tab Take 1 tablet (25 mg total) by mouth 3 (three) times daily as needed. 30 tablet 0    vitamin E 1000 UNITS Oral Cap Take 1 capsule (1,000 Units total) by mouth daily.      FUROSEMIDE 20 MG Oral Tab TAKE 1 TABLET BY MOUTH  DAILY (Patient taking differently: Take 1 tablet (20 mg total) by mouth daily.) 90 tablet 3    magnesium oxide 400 MG Oral Tab Take 1 tablet (400 mg total) by mouth daily.      ONETOUCH DELICA PLUS [id number] Does not apply Misc USE TO TEST BLOOD SUGAR  ONCE DAILY AS DIRECTED 100 each 2    Vitamin D3 2000 units Oral Cap Take 1 capsule (2,000 Units total) by mouth 2 (two) times daily.         Allergies:  Allergies   Allergen Reactions    Jardiance [Empagliflozin] SWELLING and DIZZINESS       ROS:     A comprehensive 10 point review of systems was completed.  Pertinent positives and negatives noted in the the HPI.    PHYSICAL EXAM:     GENERAL APPEARANCE: well, developed, well nourished, in no acute distress  EARS: hearing intact  LUNGS: nonlabored breathing  ABDOMEN: soft, no obvious masses or tenderness, no CVA tenderness     ASSESSMENT/PLAN:   BPH/LUTS  - discussed switching to alfuzosin  for dry ejac but not a priority right now  - continue flomax 0.4mg daily  - RTC in 1yr or sooner prn    ED  - continue cialis  - may consider trimix in future; has tried viagra in past without results    Fatigue  - low T labs before 10am    The above assessment and plan were discussed in detail with the patient who verbalized understanding and all questions were answered.    Samantha Horton PA-C  Urology  [facility]  Office: [phone]

## 2024-10-01 NOTE — PROGRESS NOTES
New Patient Visit Note       Active Problems      1. Biliary colic    2. Umbilical hernia without obstruction and without gangrene        Chief Complaint   Chief Complaint   Patient presents with    New Patient     NP- Gallbladder/Gallstones, US Abd on 4/13/24- reports abdominal pain   NP- Umbilical Hernia- reports bulging around umbilicus        History of Present Illness   The patient presents in consultation of Nathalie Giles PA-C for evaluation of several issues.    The patient states for approximately the past 7 months, he has been experiencing intermittent epigastric and right upper quadrant abdominal pain.  He describes the pain as feeling like a cramping pain and inflammation.  He states the pain will typically last for several days.  He states the pain is worse with heavier foods such as pasta and pizza.  He states he does not have an improvement in pain with bowel movements.    The patient has known cholelithiasis.  Abdominal ultrasound from 4/13/2024 revealed cholelithiasis.  No sonographic manifestations of acute cholecystitis.  No biliary ductal dilation.  He has seen a general surgeon, Dr. Concepcion who recommended surgery.  The patient wants a second opinion prior to scheduling an operation.    Additionally, the patient has noted a change in bowel habits over the last 7 months.  He states his bowel movements have been \"softer.\"  He also states his bowel movements have been a \"cottage cheese\" consistency.    The patient is up-to-date with colonoscopy and EGD.  He underwent scopes with Dr. Stone on 7/19/2024.  EGD revealed gastritis for which she was started on omeprazole.  Colonoscopy revealed a polyp and internal hemorrhoids.  Repeat colonoscopy was recommended in 10 years.    Lastly, the patient has an umbilical hernia that he would like to have evaluated.  This has been present for several years.  The patient states he thinks it has increased in size.  He reports several instances where the  hernia got \"stuck out.\"  He also reports the hernia increasing in size with Valsalva and standing.  He denies any strangulation events.    CT scan of the abdomen and pelvis from 3/21/2023 does note a fat and bowel containing umbilical hernia.    The patient has a past medical history significant for type 2 diabetes mellitus, hyperlipidemia, coronary artery disease, A-fib status post ablation on Xarelto.  He does have a history of open heart surgery for repair of an ascending aortic aneurysm in October 2014.  He follows with Dr. Perez as his cardiologist.  He also has a past medical history significant for obstructive sleep apnea.    The patient has no significant past abdominal surgeries.      Allergies  Rivera is allergic to jardiance [empagliflozin].    Past Medical / Surgical / Social / Family History    The past medical and past surgical history have been reviewed by me today.    Past Medical History:    Abdominal pain    above the belly button    Acute cystitis with hematuria    Acute diastolic (congestive) heart failure (HCC)    Anxiety disorder    Arthritis    Back pain    Bloating    most days    Blood in the stool    Blurred vision    Brachial neuritis or radiculitis NOS    C4,C5, nerve roots    Calculus of kidney    Last year do to medication i was taking    Chronic atrial fibrillation (HCC)    post op from [facility]    Chronic cough    medicine causes symptoms    Community acquired pneumonia of left lower lobe of lung    Decorative tattoo    Depression    Diabetes (HCC)    Diarrhea, unspecified    I believe from my medication    Dizziness    Fatigue    daily    Feeling lonely    Flatulence/gas pain/belching    Food intolerance    Headache disorder    migraines    Heart palpitations    Heartburn    at least one a week    Hemorrhoids    High blood pressure    High cholesterol    History of depression    Hyperlipidemia    Indigestion    not normal    Kidney stone    Leg swelling    Loss of appetite    Malignant  hypertensive 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 and with chronic kidney disease stage I through stage IV, or unspecified(404.00)    Migraines    Nausea    on and off    Night sweats    Obesity, unspecified    Obsessive-compulsive disorders    ISAAC (obstructive sleep apnea)    AHI-16, O2 alyce-71%/CPAP-10cwp    Other specified cardiac dysrhythmias(427.89)    Other testicular hypofunction    Pain in joints    Pneumonia, organism unspecified(486)    Problems with swallowing    Shortness of breath    with simple movements    Sleep disturbance    Stress    Suicidal thoughts    Thoracic or lumbosacral neuritis or radiculitis, unspecified    L3,L4    Thrombocytopenia (HCC)    Uncomfortable fullness after meals    Unspecified essential hypertension    Unspecified sleep apnea    wears CPAP    Visual impairment    Vomiting    3 times after heartburn    Wears glasses    Weight gain    Weight loss     Past Surgical History:   Procedure Laterality Date    Angiogram  7/15/14    no cad noted    Endovas repair, infrarenl abdom aortic aneurysm/dissect      Epidurography, radiological s & i  4/18/2012    Procedure: CERVICAL EPIDURAL;  Surgeon: Sekou Solorzano MD;  Location: [facility]    Fluor gid & loclzj ndl/cath spi dx/ther njx  5/11/2012    Procedure: CERVICAL EPIDURAL;  Surgeon: Edil Alcocer MD;  Location: [facility]    Injection, w/wo contrast, dx/therapeutic substance, epidural/subarachnoid; cervical/thoracic  4/18/2012    Procedure: CERVICAL EPIDURAL;  Surgeon: Sekou Solorzano MD;  Location: [facility]    Injection, w/wo contrast, dx/therapeutic substance, epidural/subarachnoid; cervical/thoracic  5/11/2012    Procedure: CERVICAL EPIDURAL;  Surgeon: Edil Alcocer MD;  Location: [facility]    M-sedaj by  phys perfrmg svc 5+ yr  4/18/2012    Procedure: CERVICAL EPIDURAL;  Surgeon: Sekou Solorzano MD;  Location: [facility]    M-sedaj by jadyn phys perfrmg svc 5+ yr  2012    Procedure: CERVICAL EPIDURAL;  Surgeon: Edil Alcocer MD;  Location: [facility]    Symp aaa urgent repair  10/14/2014    [place]; ascending aorta    Tonsillectomy         The family history and social history have been reviewed by me today.    Family History   Problem Relation Age of Onset    Hypertension Father     Lipids Father     Prostate Cancer Father     Other (Other[other]) Paternal Grandfather     Heart Disorder Paternal Grandfather     Hypertension Paternal Grandfather     Stroke Paternal Grandfather     Hypertension Sister     Lipids Sister     Hypertension Brother     Lipids Brother     Psychiatric Brother     Hypertension Mother     Lipids Mother     Psychiatric Mother     Hypertension Maternal Grandfather     Heart Attack Maternal Grandfather     Stroke Maternal Grandfather      Social History     Socioeconomic History    Marital status:    Tobacco Use    Smoking status: Former     Current packs/day: 0.00     Average packs/day: 0.5 packs/day for 28.0 years (14.0 ttl pk-yrs)     Types: Cigarettes     Start date: 1976     Quit date: 2004     Years since quittin.2    Smokeless tobacco: Never   Vaping Use    Vaping status: Never Used   Substance and Sexual Activity    Alcohol use: Not Currently     Alcohol/week: 1.0 standard drink of alcohol     Types: 1 Glasses of wine per week     Comment: Wine club once a month    Drug use: No   Other Topics Concern    Caffeine Concern Yes     Comment: 1 cup of coffee daily    Exercise Yes     Comment: walking        Current Outpatient Medications:     methylPREDNISolone (MEDROL) 4 MG Oral Tablet Therapy Pack, As directed., Disp: 21 each, Rfl: 0    hydrALAZINE 50 MG Oral Tab, Take 2 tablets (100 mg total) by mouth every morning AND 2 tablets (100 mg total) Noon AND 1.5 tablets (75 mg total) every evening., Disp: , Rfl:     LOSARTAN 100 MG Oral Tab, TAKE 1  TABLET BY MOUTH ONE TIME DAILY, Disp: 90 tablet, Rfl: 0    metFORMIN  MG Oral Tablet 24 Hr, TAKE TWO TABLETS BY MOUTH ONCE DAILY, Disp: 180 tablet, Rfl: 0    POTASSIUM CHLORIDE 20 MEQ Oral Tab CR, TAKE 1 TABLET BY MOUTH IN  THE MORNING AND 1 TABLET BY MOUTH IN THE EVENING., Disp: 180 tablet, Rfl: 0    SYMBICORT 160-4.5 MCG/ACT Inhalation Aerosol, INHALE TWO PUFFS BY MOUTH TWICE DAILY, Disp: 30.6 g, Rfl: 0    omeprazole 20 MG Oral Capsule Delayed Release, Take one capsule (20 mg total) by mouth once daily, 30 minutes prior to breakfast, Disp: 30 capsule, Rfl: 11    tamsulosin 0.4 MG Oral Cap, Take 1 capsule (0.4 mg total) by mouth every evening., Disp: 90 capsule, Rfl: 3    Tadalafil (CIALIS) 20 MG Oral Tab, Take 1 tablet (20 mg total) by mouth daily as needed for Erectile Dysfunction., Disp: 30 tablet, Rfl: 5    FENOFIBRATE 145 MG Oral Tab, TAKE 1 TABLET BY MOUTH ONE TIME DAILY, Disp: 90 tablet, Rfl: 0    XARELTO 20 MG Oral Tab, Take 1 tablet (20 mg total) by mouth daily, Disp: 90 tablet, Rfl: 0    cyclobenzaprine 10 MG Oral Tab, Take 1 tablet (10 mg total) by mouth nightly., Disp: 10 tablet, Rfl: 0    FLECAINIDE 100 MG Oral Tab, Take 1 tablet (100 mg total) by mouth 2 (two) times daily., Disp: 180 tablet, Rfl: 0    fluocinonide 0.05 % External Cream, Apply to affected area twice a day, Disp: 60 g, Rfl: 1    triamcinolone 0.1 % External Cream, Apply to affected area twice day for 2 weeks as needed, Disp: , Rfl:     hydrOXYzine 25 MG Oral Tab, Take 1 tablet (25 mg total) by mouth nightly as needed for Itching., Disp: 20 tablet, Rfl: 0    METOPROLOL SUCCINATE  MG Oral Tablet 24 Hr, TAKE ONE TABLET BY MOUTH TWICE DAILY, Disp: 180 tablet, Rfl: 0    cloNIDine 0.1 MG Oral Tab, Take 1 tablet (0.1 mg total) by mouth 2 (two) times daily., Disp: , Rfl:     EPLERENONE 50 MG Oral Tab, TAKE 1 TABLET BY MOUTH ONE TIME DAILY, Disp: 90 tablet, Rfl: 0    ketoconazole 2 % External Cream, Apply 1 Application topically  daily., Disp: 1 each, Rfl: 2    Glucose Blood (ONETOUCH VERIO) In Vitro Strip, Check blood sugars once daily., Disp: 100 strip, Rfl: 3    OneTouch Delica Lancets 30G Does not apply Misc, 1 Lancet by Finger stick route daily., Disp: 100 each, Rfl: 3    ROSUVASTATIN 40 MG Oral Tab, Take 1 tablet (40 mg total) by mouth nightly, Disp: 90 tablet, Rfl: 0    albuterol (VENTOLIN HFA) 108 (90 Base) MCG/ACT Inhalation Aero Soln, Inhale 2 puffs into the lungs every 6 (six) hours as needed for Shortness of Breath., Disp: 54 g, Rfl: 1    Glucose Blood (ONETOUCH VERIO) In Vitro Strip, TEST BLOOD GLUCOSE LEVELS ONCE DAILY, Disp: 100 strip, Rfl: 0    AZELASTINE  MCG/SPRAY Nasal Solution, INHALE 1 SPRAY INTO EACH NOSTRIL 2 TIMES DAILY (Patient taking differently: 1 spray by Nasal route in the morning and 1 spray before bedtime.), Disp: 30 mL, Rfl: 2    meclizine 25 MG Oral Tab, Take 1 tablet (25 mg total) by mouth 3 (three) times daily as needed., Disp: 30 tablet, Rfl: 0    vitamin E 1000 UNITS Oral Cap, Take 1 capsule (1,000 Units total) by mouth daily., Disp: , Rfl:     FUROSEMIDE 20 MG Oral Tab, TAKE 1 TABLET BY MOUTH  DAILY (Patient taking differently: Take 1 tablet (20 mg total) by mouth daily.), Disp: 90 tablet, Rfl: 3    magnesium oxide 400 MG Oral Tab, Take 1 tablet (400 mg total) by mouth daily., Disp: , Rfl:     ONETOUCH DELICA PLUS [id number] Does not apply Misc, USE TO TEST BLOOD SUGAR  ONCE DAILY AS DIRECTED, Disp: 100 each, Rfl: 2    Vitamin D3 2000 units Oral Cap, Take 1 capsule (2,000 Units total) by mouth 2 (two) times daily., Disp: , Rfl:       Review of Systems  A 10 point review of systems was performed and negative unless otherwise documented per HPI.    Physical Findings   BP (!) 194/95 (BP Location: Left arm, Patient Position: Sitting)   Pulse 58   Temp 97.2 °F (36.2 °C) (Temporal)   Resp 20   SpO2 98%   Physical Exam  Vitals and nursing note reviewed.   Constitutional:       General: He is not in  acute distress.     Appearance: Normal appearance. He is obese.   HENT:      Head: Normocephalic and atraumatic.      Right Ear: External ear normal.      Left Ear: External ear normal.      Nose: Nose normal.   Eyes:      General: No scleral icterus.     Conjunctiva/sclera: Conjunctivae normal.   Abdominal:      General: Abdomen is flat. There is no distension.      Palpations: Abdomen is soft. There is no mass.      Tenderness: There is abdominal tenderness (At umbilical hernia site).      Hernia: A hernia is present. Hernia is present in the umbilical area.   Musculoskeletal:      Cervical back: Normal range of motion and neck supple.   Neurological:      Mental Status: He is alert.   Psychiatric:         Mood and Affect: Mood normal.         Behavior: Behavior normal.         Thought Content: Thought content normal.             Assessment   1. Biliary colic    2. Umbilical hernia without obstruction and without gangrene        Rivera Bush is a very nice 59 year old male who was referred to me with concern for symptomatic cholelithiasis and a symptomatic umbilical hernia. Abdominal ultrasound from 4/13/2024 revealed cholelithiasis.  Exam today confirms the presence of a small, reducible umbilical hernia with diastases recti.  Patient also had a CT scan on 3/21/2023 which revealed a small umbilical hernia and cholelithiasis.  Patient's symptoms do seem consistent with symptomatic cholelithiasis.      Plan  I recommend scheduling laparoscopic cholecystectomy with ICG cholangiography, possible open with concomitant primary suture repair of umbilical hernia repair.  The details of this procedure were discussed including the expected recovery time, risks, benefits and alternatives.  Patient expressed understanding and wished to schedule surgery.  Surgery has been scheduled for 12/13/2024.    In the meantime, patient should maintain a low-fat diet.  He should contact me or present to the ER with any new or  worsening symptoms.    Patient did have persistently elevated blood pressure in clinic today.  I advised him to follow-up with his PCP for further management of this issue.     No orders of the defined types were placed in this encounter.      Imaging & Referrals   None    Follow Up  No follow-ups on file.    Leon Mujica MD

## 2024-10-08 NOTE — TELEPHONE ENCOUNTER
Last time medication was refilled 07/15/2024  Last office visit  09/28/2024  Next office visit due/scheduled   Future Appointments   Date Time Provider Department Center   11/1/2024  9:00 PM SCHEDULE [facility]   1/25/2025  8:00 AM Nathalie Giles PA-C [facility] [address]     Medication not on protocol, routed to cathy Giles Physician Assistant.

## 2024-11-07 NOTE — TELEPHONE ENCOUNTER
Last time medication was refilled: 8/25/24  Next office visit due/scheduled: 1/25/25  Last office visit: 9/28/24  Last Labs: 7/11/24

## 2024-11-20 NOTE — PROGRESS NOTES
Rivera Bush is a 59 year old male.  HPI:   This visit is conducted using Telemedicine with live, interactive video and audio.     Patient consents to video visit today to discuss upper, central abdominal pain that he experiences with activity x 2 wks. The frequency and intensity of the pain is increasing. The pain is sever where it takes his breath away. Pt does note doming in the upper abdominal area. When he applies pressure in the area and sits the pain decreases.   Denies N/V, diarrhea, constipation, blood in  stools.   Pt is scheduled for cholecystectomy and umbilical hernia repair in December.   Pt states when the pain was severe his systolic was 198 and diastolic 120. Today his blood pressure is controlled ~130/80's.  Current Outpatient Medications   Medication Sig Dispense Refill    XARELTO 20 MG Oral Tab Take 1 tablet (20 mg total) by mouth daily 90 tablet 0    FENOFIBRATE 145 MG Oral Tab TAKE 1 TABLET BY MOUTH ONE TIME DAILY 90 tablet 0    FLECAINIDE 100 MG Oral Tab Take 1 tablet (100 mg total) by mouth 2 (two) times daily. 180 tablet 0    methylPREDNISolone (MEDROL) 4 MG Oral Tablet Therapy Pack As directed. 21 each 0    hydrALAZINE 50 MG Oral Tab Take 2 tablets (100 mg total) by mouth every morning AND 2 tablets (100 mg total) Noon AND 1.5 tablets (75 mg total) every evening.      LOSARTAN 100 MG Oral Tab TAKE 1 TABLET BY MOUTH ONE TIME DAILY 90 tablet 0    metFORMIN  MG Oral Tablet 24 Hr TAKE TWO TABLETS BY MOUTH ONCE DAILY 180 tablet 0    POTASSIUM CHLORIDE 20 MEQ Oral Tab CR TAKE 1 TABLET BY MOUTH IN  THE MORNING AND 1 TABLET BY MOUTH IN THE EVENING. 180 tablet 0    SYMBICORT 160-4.5 MCG/ACT Inhalation Aerosol INHALE TWO PUFFS BY MOUTH TWICE DAILY 30.6 g 0    omeprazole 20 MG Oral Capsule Delayed Release Take one capsule (20 mg total) by mouth once daily, 30 minutes prior to breakfast 30 capsule 11    tamsulosin 0.4 MG Oral Cap Take 1 capsule (0.4 mg total) by mouth every evening. 90  capsule 3    Tadalafil (CIALIS) 20 MG Oral Tab Take 1 tablet (20 mg total) by mouth daily as needed for Erectile Dysfunction. 30 tablet 5    cyclobenzaprine 10 MG Oral Tab Take 1 tablet (10 mg total) by mouth nightly. 10 tablet 0    fluocinonide 0.05 % External Cream Apply to affected area twice a day 60 g 1    triamcinolone 0.1 % External Cream Apply to affected area twice day for 2 weeks as needed      hydrOXYzine 25 MG Oral Tab Take 1 tablet (25 mg total) by mouth nightly as needed for Itching. 20 tablet 0    METOPROLOL SUCCINATE  MG Oral Tablet 24 Hr TAKE ONE TABLET BY MOUTH TWICE DAILY 180 tablet 0    cloNIDine 0.1 MG Oral Tab Take 1 tablet (0.1 mg total) by mouth 2 (two) times daily.      EPLERENONE 50 MG Oral Tab TAKE 1 TABLET BY MOUTH ONE TIME DAILY 90 tablet 0    ketoconazole 2 % External Cream Apply 1 Application topically daily. 1 each 2    Glucose Blood (ONETOUCH VERIO) In Vitro Strip Check blood sugars once daily. 100 strip 3    OneTouch Delica Lancets 30G Does not apply Misc 1 Lancet by Finger stick route daily. 100 each 3    ROSUVASTATIN 40 MG Oral Tab Take 1 tablet (40 mg total) by mouth nightly 90 tablet 0    albuterol (VENTOLIN HFA) 108 (90 Base) MCG/ACT Inhalation Aero Soln Inhale 2 puffs into the lungs every 6 (six) hours as needed for Shortness of Breath. 54 g 1    Glucose Blood (ONETOUCH VERIO) In Vitro Strip TEST BLOOD GLUCOSE LEVELS ONCE DAILY 100 strip 0    AZELASTINE  MCG/SPRAY Nasal Solution INHALE 1 SPRAY INTO EACH NOSTRIL 2 TIMES DAILY (Patient taking differently: 1 spray by Nasal route in the morning and 1 spray before bedtime.) 30 mL 2    meclizine 25 MG Oral Tab Take 1 tablet (25 mg total) by mouth 3 (three) times daily as needed. 30 tablet 0    vitamin E 1000 UNITS Oral Cap Take 1 capsule (1,000 Units total) by mouth daily.      FUROSEMIDE 20 MG Oral Tab TAKE 1 TABLET BY MOUTH  DAILY (Patient taking differently: Take 1 tablet (20 mg total) by mouth daily.) 90 tablet 3     magnesium oxide 400 MG Oral Tab Take 1 tablet (400 mg total) by mouth daily.      ONETOUCH DELICA PLUS [id number] Does not apply Misc USE TO TEST BLOOD SUGAR  ONCE DAILY AS DIRECTED 100 each 2    Vitamin D3 2000 units Oral Cap Take 1 capsule (2,000 Units total) by mouth 2 (two) times daily.        Past Medical History:    Abdominal pain    above the belly button    Acute cystitis with hematuria    Acute diastolic (congestive) heart failure (HCC)    Anxiety disorder    Arthritis    Back pain    Bloating    most days    Blood in the stool    Blurred vision    Brachial neuritis or radiculitis NOS    C4,C5, nerve roots    Calculus of kidney    Last year do to medication i was taking    Chronic atrial fibrillation (HCC)    post op from [facility]    Chronic cough    medicine causes symptoms    Community acquired pneumonia of left lower lobe of lung    Decorative tattoo    Depression    Diabetes (HCC)    Diarrhea, unspecified    I believe from my medication    Dizziness    Fatigue    daily    Feeling lonely    Flatulence/gas pain/belching    Food intolerance    Headache disorder    migraines    Heart palpitations    Heartburn    at least one a week    Hemorrhoids    High blood pressure    High cholesterol    History of depression    Hyperlipidemia    Indigestion    not normal    Kidney stone    Leg swelling    Loss of appetite    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, or unspecified(404.00)    Migraines    Nausea    on and off    Night sweats    Obesity, unspecified    Obsessive-compulsive disorders    ISAAC (obstructive sleep apnea)    AHI-16, O2 alyce-71%/CPAP-10cwp    Other specified cardiac dysrhythmias(427.89)    Other testicular hypofunction    Pain in joints    Pneumonia, organism unspecified(486)    Problems with swallowing    Shortness of breath    with simple movements    Sleep disturbance    Stress    Suicidal thoughts    Thoracic or  lumbosacral neuritis or radiculitis, unspecified    L3,L4    Thrombocytopenia (HCC)    Uncomfortable fullness after meals    Unspecified essential hypertension    Unspecified sleep apnea    wears CPAP    Visual impairment    Vomiting    3 times after heartburn    Wears glasses    Weight gain    Weight loss      Social History:  Social History     Socioeconomic History    Marital status:    Tobacco Use    Smoking status: Former     Current packs/day: 0.00     Average packs/day: 0.5 packs/day for 28.0 years (14.0 ttl pk-yrs)     Types: Cigarettes     Start date: 1976     Quit date: 2004     Years since quittin.3    Smokeless tobacco: Never   Vaping Use    Vaping status: Never Used   Substance and Sexual Activity    Alcohol use: Not Currently     Alcohol/week: 1.0 standard drink of alcohol     Types: 1 Glasses of wine per week     Comment: Wine club once a month    Drug use: No   Other Topics Concern    Caffeine Concern Yes     Comment: 1 cup of coffee daily    Exercise Yes     Comment: walking     Social Drivers of Health     Financial Resource Strain: Low Risk  (2021)    Received from [facility]    Overall Financial Resource Strain (CARDIA)     Difficulty of Paying Living Expenses: Not hard at all   Transportation Needs: No Transportation Needs (2021)    [facility]    PRAPARE - Transportation     Lack of Transportation (Medical): No     Lack of Transportation (Non-Medical): No        REVIEW OF SYSTEMS:   GENERAL HEALTH: feels well otherwise. Denies fever, chills, unintentional weight change  SKIN: denies any unusual skin lesions or rashes  RESPIRATORY: denies hemoptysis, shortness of breath with exertion, wheezing or cough  CARDIOVASCULAR: denies chest pain or palpitations, denies leg swelling  GI denies heartburn. Denies nausea, vomiting, diarrhea, constipation. +abdominal pain  NEURO: denies headaches,  dizziness, weakness, syncope    EXAM:   No vitals for video visit  Physical Exam  - well appearing via video visit  - no visible rash or diaphoresis  - no respiratory distress or cough observed  - able to speak in full sentences  - alert and oriented        ASSESSMENT AND PLAN:     Encounter Diagnoses   Name Primary?    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 Offered outpatient stat imaging but patient is in the area and would like to proceed to ED. Pt will be proceeding to ED for further evaluation.  Requested Prescriptions      No prescriptions requested or ordered in this encounter         The patient indicates understanding of these issues and agrees to the plan.  Rivera Bush understands phone evaluation is not a substitute for face-to-face examination or emergency care. Patient advised to go to ER or call 911 for worsening symptoms or acute distress.

## 2024-11-20 NOTE — ED INITIAL ASSESSMENT (HPI)
59YM c/c of abd pain Pt state that for the last two week he been having centralized upper abd pain that better when he press on the area. Pt denies any NVD or urinary symptoms. Pt state that grease food make the pain worse

## 2024-11-20 NOTE — H&P
[facility]     Rivera Bush Patient Status:  Emergency    1965 MRN [id number]   Location [facility] EMERGENCY DEPARTMENT Attending Leeann Walker MD   Hosp Day # 0 PCP Won Davis MD     Chief Complaint: Weight gain and abdominal pain    Subjective:    History of Present Illness:     Rivera Bush is a 59 year old male with a past medical history of CHF, hypertension, hyperlipidemia, ISAAC, obesity who presents emerged apartment with abdominal pain.  Patient states he is gained about 15 pounds in the last 2 weeks.  He feels like is all sitting in his stomach.  Been having distention and pain for the last couple of days.  He also endorses exertional shortness of breath.  Difficulty taking a deep breath due to his enlarged abdomen.  Denies any recent fevers, no chills, denies chest pain, no diarrhea or constipation.  Has no other complaints at this time.    History/Other:    Past Medical History:  Past Medical History:    Abdominal pain    above the belly button    Acute cystitis with hematuria    Acute diastolic (congestive) heart failure (HCC)    Anxiety disorder    Arthritis    Back pain    Bloating    most days    Blood in the stool    Blurred vision    Brachial neuritis or radiculitis NOS    C4,C5, nerve roots    Calculus of kidney    Last year do to medication i was taking    Chronic atrial fibrillation (HCC)    post op from [facility]    Chronic cough    medicine causes symptoms    Community acquired pneumonia of left lower lobe of lung    Decorative tattoo    Depression    Diabetes (HCC)    Diarrhea, unspecified    I believe from my medication    Dizziness    Fatigue    daily    Feeling lonely    Flatulence/gas pain/belching    Food intolerance    Headache disorder    migraines    Heart palpitations    Heartburn    at least one a week    Hemorrhoids    High blood pressure    High cholesterol    History of depression    Hyperlipidemia    Indigestion    not normal     Kidney stone    Leg swelling    Loss of appetite    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, or unspecified(404.00)    Migraines    Nausea    on and off    Night sweats    Obesity, unspecified    Obsessive-compulsive disorders    ISAAC (obstructive sleep apnea)    AHI-16, O2 alyce-71%/CPAP-10cwp    Other specified cardiac dysrhythmias(427.89)    Other testicular hypofunction    Pain in joints    Pneumonia, organism unspecified(486)    Problems with swallowing    Shortness of breath    with simple movements    Sleep disturbance    Stress    Suicidal thoughts    Thoracic or lumbosacral neuritis or radiculitis, unspecified    L3,L4    Thrombocytopenia (HCC)    Uncomfortable fullness after meals    Unspecified essential hypertension    Unspecified sleep apnea    wears CPAP    Visual impairment    Vomiting    3 times after heartburn    Wears glasses    Weight gain    Weight loss     Past Surgical History:   Past Surgical History:   Procedure Laterality Date    Angiogram  7/15/14    no cad noted    Endovas repair, infrarenl abdom aortic aneurysm/dissect      Epidurography, radiological s & i  4/18/2012    Procedure: CERVICAL EPIDURAL;  Surgeon: Sekou Solorzano MD;  Location: [facility]    Fluor gid & loclzj ndl/cath spi dx/ther njx  5/11/2012    Procedure: CERVICAL EPIDURAL;  Surgeon: Edil Alcocer MD;  Location: [facility]    Injection, w/wo contrast, dx/therapeutic substance, epidural/subarachnoid; cervical/thoracic  4/18/2012    Procedure: CERVICAL EPIDURAL;  Surgeon: Sekou Solorzano MD;  Location: [facility]    Injection, w/wo contrast, dx/therapeutic substance, epidural/subarachnoid; cervical/thoracic  5/11/2012    Procedure: CERVICAL EPIDURAL;  Surgeon: Edil Alcocer MD;  Location: [facility]    M-sedaj by sm phys perfrmg svc 5+ yr  4/18/2012     Procedure: CERVICAL EPIDURAL;  Surgeon: Sekou Solorzano MD;  Location: [facility]    M-sedaj by jadyn phys perfrmg svc 5+ yr  5/11/2012    Procedure: CERVICAL EPIDURAL;  Surgeon: Edil Alcocer MD;  Location: [facility]    Symp aaa urgent repair  10/14/2014    [place]; ascending aorta    Tonsillectomy        Family History:   Family History   Problem Relation Age of Onset    Hypertension Father     Lipids Father     Prostate Cancer Father     Other (Other[other]) Paternal Grandfather     Heart Disorder Paternal Grandfather     Hypertension Paternal Grandfather     Stroke Paternal Grandfather     Hypertension Sister     Lipids Sister     Hypertension Brother     Lipids Brother     Psychiatric Brother     Hypertension Mother     Lipids Mother     Psychiatric Mother     Hypertension Maternal Grandfather     Heart Attack Maternal Grandfather     Stroke Maternal Grandfather      Social History:    reports that he quit smoking about 20 years ago. His smoking use included cigarettes. He started smoking about 48 years ago. He has a 14 pack-year smoking history. He has never used smokeless tobacco. He reports that he does not currently use alcohol after a past usage of about 1.0 standard drink of alcohol per week. He reports that he does not use drugs.     Allergies: Allergies[1]    Medications:  Medications Ordered Prior to Encounter[2]    Review of Systems:   A comprehensive review of systems was completed.    Pertinent positives and negatives noted in the HPI.    Objective:   Physical Exam:    BP (!) 169/95   Pulse 61   Temp 97.3 °F (36.3 °C) (Temporal)   Resp 17   Wt 285 lb (129.3 kg)   SpO2 96%   BMI 39.75 kg/m²   General: No acute distress, Alert, pleasant   Respiratory: Bibasilar crackles   Cardiovascular: S1, S2. Regular rate and rhythm  Abdomen: Soft, Non-tender, non-distended, positive bowel sounds  Neuro: No new focal deficits  Extremities: 3+ b/l LE pitting  edema    Results:    Labs:      Labs Last 24 Hours:    Recent Labs   Lab 11/20/24  1435   RBC 4.86   HGB 14.4   HCT 43.8   MCV 90.1   MCH 29.6   MCHC 32.9   RDW 13.6   NEPRELIM 5.85   WBC 7.6   .0       Recent Labs   Lab 11/20/24  1435   *   BUN 14   CREATSERUM 1.54*   EGFRCR 52*   CA 9.5   ALB 4.4      K 4.8      CO2 26.0   ALKPHO 95   AST 37*   ALT 24   BILT 0.6   TP 7.4       Lab Results   Component Value Date    PT 13.7 07/14/2014    PT 13.8 02/07/2011    INR 1.23 (H) 11/05/2021    INR 1.0 12/30/2020    INR 1.0 11/19/2020       Recent Labs   Lab 11/20/24  1435   TROPHS 9       Recent Labs   Lab 11/20/24  1435   PBNP 465*       No results for input(s): \"PCT\" in the last 168 hours.    Imaging: Imaging data reviewed in Epic.    Assessment & Plan:      #HFrEF - acute exacerbation   EKG sinus, TWI in lateral leads  CXR with fluid  CTA c/a/p - congestive findings, interstital edema  Trop 9,   Lasix 40mg IV BID, I/O's, daily weight  Echo  Resume home ARB, Eplerenone  Cardiology consult     #Intractable abdominal pain  Ct noted duodenal inflammation/ulcer  IV PPI  GI consult   Hg wnl    #Accelerated HTN  #Essential HTN  Resume home meds: Hydralazine, metoprolol, clonidine    #Paroxysmal a. Fib  Flecainide  Xarelto for AC    #HLD   Statin    #DM Type 2  Degludec, ISS    #BPH  Flomax    #Pagets disease of bone  Noted on CT    #CKD 3A  Cr close to baseline     #Aortic aneurysm repair   2014      Plan of care discussed with patient, er physician, yuni Camargo MD    Supplementary Documentation:     The 21st Century Cures Act makes medical notes like these available to patients in the interest of transparency. Please be advised this is a medical document. Medical documents are intended to carry relevant information, facts as evident, and the clinical opinion of the practitioner. The medical note is intended as peer to peer communication and may appear blunt or direct. It is written in  medical language and may contain abbreviations or verbiage that are unfamiliar.                                       [1]   Allergies  Allergen Reactions    Jardiance [Empagliflozin] SWELLING and DIZZINESS   [2]   No current facility-administered medications on file prior to encounter.     Current Outpatient Medications on File Prior to Encounter   Medication Sig Dispense Refill    XARELTO 20 MG Oral Tab Take 1 tablet (20 mg total) by mouth daily 90 tablet 0    FENOFIBRATE 145 MG Oral Tab TAKE 1 TABLET BY MOUTH ONE TIME DAILY 90 tablet 0    FLECAINIDE 100 MG Oral Tab Take 1 tablet (100 mg total) by mouth 2 (two) times daily. 180 tablet 0    methylPREDNISolone (MEDROL) 4 MG Oral Tablet Therapy Pack As directed. 21 each 0    hydrALAZINE 50 MG Oral Tab Take 2 tablets (100 mg total) by mouth every morning AND 2 tablets (100 mg total) Noon AND 1.5 tablets (75 mg total) every evening.      LOSARTAN 100 MG Oral Tab TAKE 1 TABLET BY MOUTH ONE TIME DAILY 90 tablet 0    metFORMIN  MG Oral Tablet 24 Hr TAKE TWO TABLETS BY MOUTH ONCE DAILY 180 tablet 0    POTASSIUM CHLORIDE 20 MEQ Oral Tab CR TAKE 1 TABLET BY MOUTH IN  THE MORNING AND 1 TABLET BY MOUTH IN THE EVENING. 180 tablet 0    SYMBICORT 160-4.5 MCG/ACT Inhalation Aerosol INHALE TWO PUFFS BY MOUTH TWICE DAILY 30.6 g 0    omeprazole 20 MG Oral Capsule Delayed Release Take one capsule (20 mg total) by mouth once daily, 30 minutes prior to breakfast 30 capsule 11    tamsulosin 0.4 MG Oral Cap Take 1 capsule (0.4 mg total) by mouth every evening. 90 capsule 3    Tadalafil (CIALIS) 20 MG Oral Tab Take 1 tablet (20 mg total) by mouth daily as needed for Erectile Dysfunction. 30 tablet 5    cyclobenzaprine 10 MG Oral Tab Take 1 tablet (10 mg total) by mouth nightly. 10 tablet 0    fluocinonide 0.05 % External Cream Apply to affected area twice a day 60 g 1    triamcinolone 0.1 % External Cream Apply to affected area twice day for 2 weeks as needed      hydrOXYzine 25 MG  Oral Tab Take 1 tablet (25 mg total) by mouth nightly as needed for Itching. 20 tablet 0    METOPROLOL SUCCINATE  MG Oral Tablet 24 Hr TAKE ONE TABLET BY MOUTH TWICE DAILY 180 tablet 0    cloNIDine 0.1 MG Oral Tab Take 1 tablet (0.1 mg total) by mouth 2 (two) times daily.      EPLERENONE 50 MG Oral Tab TAKE 1 TABLET BY MOUTH ONE TIME DAILY 90 tablet 0    ketoconazole 2 % External Cream Apply 1 Application topically daily. 1 each 2    Glucose Blood (ONETOUCH VERIO) In Vitro Strip Check blood sugars once daily. 100 strip 3    OneTouch Delica Lancets 30G Does not apply Misc 1 Lancet by Finger stick route daily. 100 each 3    ROSUVASTATIN 40 MG Oral Tab Take 1 tablet (40 mg total) by mouth nightly 90 tablet 0    albuterol (VENTOLIN HFA) 108 (90 Base) MCG/ACT Inhalation Aero Soln Inhale 2 puffs into the lungs every 6 (six) hours as needed for Shortness of Breath. 54 g 1    Glucose Blood (ONETOUCH VERIO) In Vitro Strip TEST BLOOD GLUCOSE LEVELS ONCE DAILY 100 strip 0    AZELASTINE  MCG/SPRAY Nasal Solution INHALE 1 SPRAY INTO EACH NOSTRIL 2 TIMES DAILY (Patient taking differently: 1 spray by Nasal route in the morning and 1 spray before bedtime.) 30 mL 2    meclizine 25 MG Oral Tab Take 1 tablet (25 mg total) by mouth 3 (three) times daily as needed. 30 tablet 0    vitamin E 1000 UNITS Oral Cap Take 1 capsule (1,000 Units total) by mouth daily.      FUROSEMIDE 20 MG Oral Tab TAKE 1 TABLET BY MOUTH  DAILY (Patient taking differently: Take 1 tablet (20 mg total) by mouth daily.) 90 tablet 3    magnesium oxide 400 MG Oral Tab Take 1 tablet (400 mg total) by mouth daily.      ONETOUCH DELICA PLUS [id number] Does not apply Misc USE TO TEST BLOOD SUGAR  ONCE DAILY AS DIRECTED 100 each 2    Vitamin D3 2000 units Oral Cap Take 1 capsule (2,000 Units total) by mouth 2 (two) times daily.

## 2024-11-20 NOTE — ED PROVIDER NOTES
Patient Seen in: [facility] Emergency Department      History     Chief Complaint   Patient presents with    Abdomen/Flank Pain     Stated Complaint: CP for a few weeks, worse when moving around.  Hx of stomach ulcer.    Subjective:   HPI      Pt present with epigastric pain for weeks, worse over the last 2-3 weeks. Pain is worse with activity and also associated with sob and lightheadedness. He denies association with food/meals. He is scheduled for gallbladder removal. He denies nausea, vomiting or back pain. No urinary sxs. Pt had bm earlier today.     Objective:     Past Medical History:    Abdominal pain    above the belly button    Acute cystitis with hematuria    Acute diastolic (congestive) heart failure (HCC)    Anxiety disorder    Arthritis    Back pain    Bloating    most days    Blood in the stool    Blurred vision    Brachial neuritis or radiculitis NOS    C4,C5, nerve roots    Calculus of kidney    Last year do to medication i was taking    Chronic atrial fibrillation (HCC)    post op from [facility]    Chronic cough    medicine causes symptoms    Community acquired pneumonia of left lower lobe of lung    Decorative tattoo    Depression    Diabetes (HCC)    Diarrhea, unspecified    I believe from my medication    Dizziness    Fatigue    daily    Feeling lonely    Flatulence/gas pain/belching    Food intolerance    Headache disorder    migraines    Heart palpitations    Heartburn    at least one a week    Hemorrhoids    High blood pressure    High cholesterol    History of depression    Hyperlipidemia    Indigestion    not normal    Kidney stone    Leg swelling    Loss of appetite    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, or unspecified(404.00)    Migraines    Nausea    on and off    Night sweats    Obesity, unspecified    Obsessive-compulsive disorders    ISAAC (obstructive sleep apnea)    AHI-16, O2  alyce-71%/CPAP-10cwp    Other specified cardiac dysrhythmias(427.89)    Other testicular hypofunction    Pain in joints    Pneumonia, organism unspecified(486)    Problems with swallowing    Shortness of breath    with simple movements    Sleep disturbance    Stress    Suicidal thoughts    Thoracic or lumbosacral neuritis or radiculitis, unspecified    L3,L4    Thrombocytopenia (HCC)    Uncomfortable fullness after meals    Unspecified essential hypertension    Unspecified sleep apnea    wears CPAP    Visual impairment    Vomiting    3 times after heartburn    Wears glasses    Weight gain    Weight loss              Past Surgical History:   Procedure Laterality Date    Angiogram  7/15/14    no cad noted    Endovas repair, infrarenl abdom aortic aneurysm/dissect      Epidurography, radiological s & i  4/18/2012    Procedure: CERVICAL EPIDURAL;  Surgeon: Sekou Solorzano MD;  Location: [facility]    Fluor gid & loclzj ndl/cath spi dx/ther njx  5/11/2012    Procedure: CERVICAL EPIDURAL;  Surgeon: Edil Alcocer MD;  Location: [facility]    Injection, w/wo contrast, dx/therapeutic substance, epidural/subarachnoid; cervical/thoracic  4/18/2012    Procedure: CERVICAL EPIDURAL;  Surgeon: Sekou Solorzano MD;  Location: [facility]    Injection, w/wo contrast, dx/therapeutic substance, epidural/subarachnoid; cervical/thoracic  5/11/2012    Procedure: CERVICAL EPIDURAL;  Surgeon: Edil Alcocer MD;  Location: [facility]    M-sedaj by  phys perfrmg svc 5+ yr  4/18/2012    Procedure: CERVICAL EPIDURAL;  Surgeon: Sekou Solorzano MD;  Location: [facility]    M-sedaj by  phys perfrmg svc 5+ yr  5/11/2012    Procedure: CERVICAL EPIDURAL;  Surgeon: Edil Alcocer MD;  Location: [facility]    Symp aaa urgent repair  10/14/2014    [place]; ascending aorta    Tonsillectomy                  Social History      Socioeconomic History    Marital status:    Tobacco Use    Smoking status: Former     Current packs/day: 0.00     Average packs/day: 0.5 packs/day for 28.0 years (14.0 ttl pk-yrs)     Types: Cigarettes     Start date: 1976     Quit date: 2004     Years since quittin.3    Smokeless tobacco: Never   Vaping Use    Vaping status: Never Used   Substance and Sexual Activity    Alcohol use: Not Currently     Alcohol/week: 1.0 standard drink of alcohol     Types: 1 Glasses of wine per week     Comment: Wine club once a month    Drug use: No   Other Topics Concern    Caffeine Concern Yes     Comment: 1 cup of coffee daily    Exercise Yes     Comment: walking     Social Drivers of Health     Financial Resource Strain: Low Risk  (2021)    [facility]    [facility] Resource Strain ([facility])     Difficulty of Paying Living Expenses: Not hard at all   Food Insecurity: No Food Insecurity (2024)    Food Insecurity     Food Insecurity: Never true   Transportation Needs: No Transportation Needs (2024)    Transportation Needs     Lack of Transportation: No   Housing Stability: Low Risk  (2024)    Housing Stability     Housing Instability: No                  Physical Exam     ED Triage Vitals [24 1351]   BP (!) 209/125   Pulse 65   Resp 18   Temp 97.3 °F (36.3 °C)   Temp src Temporal   SpO2 95 %   O2 Device None (Room air)       Current Vitals:   Vital Signs  BP: (!) 175/114  Pulse: 56  Resp: 14  Temp: 98.1 °F (36.7 °C)  Temp src: Oral  MAP (mmHg): (!) 132    Oxygen Therapy  SpO2: 95 %  O2 Device: None (Room air)  Mode: AutoPAP  O2 Flow Rate (L/min): 0 L/min  Pulse Oximetry Type: Continuous  Oximetry Probe Site Changed: No  Pulse Ox Probe Location: Left hand        Physical Exam  Vitals and nursing note reviewed.   Constitutional:       General: He is not in acute distress.     Appearance: Normal appearance.   HENT:      Head:  Normocephalic.      Nose: Nose normal.      Mouth/Throat:      Mouth: Mucous membranes are moist.   Eyes:      Extraocular Movements: Extraocular movements intact.      Pupils: Pupils are equal, round, and reactive to light.   Cardiovascular:      Rate and Rhythm: Normal rate and regular rhythm.      Pulses: Normal pulses.   Pulmonary:      Effort: Pulmonary effort is normal.   Abdominal:      General: Abdomen is flat. Bowel sounds are normal. There is no distension.      Palpations: Abdomen is soft.      Tenderness: There is abdominal tenderness in the epigastric area. There is no right CVA tenderness, left CVA tenderness, guarding or rebound.      Hernia: No hernia is present.   Musculoskeletal:         General: No swelling or tenderness. Normal range of motion.      Cervical back: Normal range of motion.   Skin:     General: Skin is warm and dry.   Neurological:      Mental Status: He is alert and oriented to person, place, and time. Mental status is at baseline.   Psychiatric:         Mood and Affect: Mood normal.             ED Course     Labs Reviewed   CBC WITH DIFFERENTIAL WITH PLATELET - Abnormal; Notable for the following components:       Result Value    Lymphocyte Absolute 0.88 (*)     All other components within normal limits   COMP METABOLIC PANEL (14) - Abnormal; Notable for the following components:    Glucose 111 (*)     Creatinine 1.54 (*)     eGFR-Cr 52 (*)     AST 37 (*)     All other components within normal limits   LIPASE - Abnormal; Notable for the following components:    Lipase 63 (*)     All other components within normal limits   PRO BETA NATRIURETIC PEPTIDE - Abnormal; Notable for the following components:    Pro-Beta Natriuretic Peptide 465 (*)     All other components within normal limits   BASIC METABOLIC PANEL (8) - Abnormal; Notable for the following components:    Creatinine 1.59 (*)     eGFR-Cr 50 (*)     All other components within normal limits   CBC WITH DIFFERENTIAL WITH  PLATELET - Abnormal; Notable for the following components:    Lymphocyte Absolute 0.98 (*)     All other components within normal limits   TROPONIN I HIGH SENSITIVITY - Normal   HEMOGLOBIN A1C - Normal   MAGNESIUM - Normal   POCT GLUCOSE - Normal   POCT GLUCOSE - Normal     EKG    Rate, intervals and axes as noted on EKG Report.  Rate: 64  Rhythm: Sinus Rhythm  Reading: T wave inversions lateral leads                CTA CHEST CTA ABDOMEN CTA PELVIS (CPT=71275/21310)    Result Date: 11/20/2024  CONCLUSION:  1. Congestive heart failure and interstitial edema. 2. Marilyn duodenal fat stranding suggestive of duodenal inflammation/ulcer. 3. Generalized bladder wall thickening suggestive of cystitis. 4. Mild mediastinal lymphadenopathy. 5. Increased splenomegaly. 6. Cholelithiasis. 7. 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 8. Small fat containing umbilical hernia.   LOCATION:  [id number]   Dictated by (CST): Sekou Rodriguez MD on 11/20/2024 at 4:41 PM     Finalized by (CST): Sekou Rodriguez MD on 11/20/2024 at 4:56 PM       XR CHEST AP PORTABLE  (CPT=71045)    Result Date: 11/20/2024  CONCLUSION:  Congestive heart failure.   LOCATION:  [id number]      Dictated by (CST): Sekou Rodriguez MD on 11/20/2024 at 3:27 PM     Finalized by (CST): Sekou Rodriguez MD on 11/20/2024 at 3:27 PM            [facility]      Medical Decision Making    The differential includes the following  Aortic dissection or aneurysmal rupture which is a significant life threat, PE which is also a significant life threat, CHF exacerbation, cholecystitis    Pertinent comorbidities include  As listed above    Pertinent social history includes  As listed above    Labs  Lipase 63, proBNP 465, troponin 9    Imaging studies  I reviewed the images and my independent interpreation after review is evidence of pulmonary edema is present.     External data reviewed      Discussion of management with external providers  hospitalist    ER course  On  arrival to the emergency room patient severely hypertensive with blood pressure of 205/100.  Patient's O2 sat 91 to 93%.  Patient has some signs of pitting edema though he states this is not too unusual for him.  Given his history of aortic aneurysm repair and reports of his severe pain with lightheadedness and shortness of breath in conjunction with his high blood pressure readings a CTA of the chest abdomen pelvis was obtained which was ultimately negative for aortic dissection or rupture of aneurysm.  Patient does have findings of pulmonary edema therefore has been given IV Lasix.  Upon reassessment he reports feeling better.  Patient CT also with signs of inflammation of the duodenum therefore has been given pantoprazole.  [facility]    Clinical Impression:  1. Acute on chronic congestive heart failure, unspecified heart failure type (HCC)         Disposition:  Admit  11/21/2024 10:47 am    Follow-up:  No follow-up provider specified.        Medications Prescribed:  Current Discharge Medication List              Supplementary Documentation:         Hospital Problems       Present on Admission  Date Reviewed: 11/20/2024            ICD-10-CM Noted POA    * (Principal) Acute on chronic congestive heart failure, unspecified heart failure type (HCC) I50.9 11/20/2024 Unknown

## 2024-11-20 NOTE — ED QUICK NOTES
Orders for admission, patient is aware of plan and ready to go upstairs. Any questions, please call ED DONY Aparicio at extension [phone].     Patient Covid vaccination status: Fully vaccinated     COVID Test Ordered in ED: None    COVID Suspicion at Admission: N/A    Running Infusions:  None    Mental Status/LOC at time of transport: x4    Other pertinent information:   CIWA score: N/A   NIH score:  N/A

## 2024-11-21 NOTE — IMAGING NOTE
12/7/2023                  [facility] [place]  Outside Information  Continuity of Care Document  12/6/2023  Rivera Bush - 59 y.o. Male; born [birth year]Trans Thoracic Echocardiogram, generated on Nov. 21, 2024November 21, 2024   Findings    Findings - Right Atrium  Right atrial diameter is 4.7 cms. Volume index=34.4ml/m². RA measures at the upper limit of normal.   Findings - Left Ventricle  The left ventricle is normal in size. Left ventricular diastolic dimension is 5.3 cms. Left ventricular systolic dimension is 3.6 cms. Left ventricular diastolic septal thickness is 1.4 cms. Left ventricular diastolic postero basal free wall thickness is 1.3 cms. Global left ventricular systolic function is normal. The left ventricular fractional shortening is 32.7%. The left ventricular ejection fraction is 61%. Left ventricular diastolic function is indeterminate. Noted left ventricular hypertrophy. It is mild. Left ventricular outflow tract diameter is 2.1 cms. Left ventricular outflow tract VTI is 19.5 cms. Left ventricular outflow tract peak velocity is 1.1 m/s. Left ventricular peak gradient is 5 mmHg. Left ventricular outflow tract mean velocity is 0.7 m/s. Left ventricular mean gradient is 2 mmHg. No regional wall motion abnormality is noted.   Findings - Right Ventricle  The right ventricle is normal in size. Right ventricular systolic function is normal. Right ventricular diastolic dimension is 3.2 cms. TAPSE=1.99cm   Findings - Atrial Septum  The atrial septum is normal.    Findings - Ventricular Septum  The ventricular septum is normal.    Findings - Pulmonary Artery  The estimated pulmonary artery systolic pressure is 31 mmHg assuming a right atrial pressure of 3 mmHg.    Findings - Pulmonary Vein  The pulmonary vein appears normal.    Findings - IVC  The inferior vena cava is normal in size. The inferior vena cava changes greater than 50 percent during respiration. IVC diameter  measures 1.99cm.   Findings - Pulmonic Valve  Good excursion of the pulmonic valve cusps is noted. Evidence of pulmonic regurgitation is noted. Trace pulmonic regurgitation. The peak velocity is 1.0 m/s. The mean velocity is 0.7 m/s. The peak trans pulmonic gradient is 4.0 mmHg. The mean trans pulmonic gradient is 2.0 mmHg. The pulmonic valve appears normal.   Findings - Tricuspid Valve  Evidence of tricuspid regurgitation is present. Trace tricuspid regurgitation. The peak tricuspid regurgitant velocity is 2.7 m/s. The peak trans tricuspid gradient is 28.0 mmHg. The tricuspid valve appears normal.   Findings - Mitral Valve  Mobility of the anterior mitral leaflet is normal. Mobility of the posterior mitral leaflet is normal. The area by pressure half time is 3.5 cm². The mean trans mitral gradient is 2.0 mmHg. Mitral regurgitation is noted. Trace mitral regurgitation. The pressure half time is 63 ms. The deceleration time is 186 ms. The isovolumic relaxation time is 66 ms. The peak mitral gradient is 6 mmHg. The E velocity is 1.2 m/s. The E/E' is 12.8. Pulmonary venous flow demonstrates systolic flow reversal. The mitral valve appears normal.   Findings - Aortic Valve  The aortic valve is tricuspid. Diffuse thickening of the aortic valve cusps is noted with normal cuspal excursion. Aortic cusp separation measures 2.3 cms . Aortic valve area continuity equation is 2.2 cm². Noted evidence of aortic regurgitation. Trace aortic regurgitation. The trans-aortic peak velocity is 1.6 m/s. The trans-aortic peak gradient is 11 mmHg. The trans-aortic mean velocity is 1.2 m/s. The trans-aortic mean gradient is 6.0 mmHg. Aortic valve VTI measures 30.5 cms. LVOT Diameter is 2.1 cms.    Findings - Aorta  Aortic root diameter is 4.1 cms. Ascending aorta diameter is 2.9 cms. The aortic arch is normal. Aortic arch diameter is 3.7 cms.    Findings - Pericardium  The pericardium is normal in appearance with no pericardial effusion  noted..    Impressions    Impression - TTE  The left ventricle is normal in size with mild left ventricular hypertrophy and normal global left ventricular systolic function. The left ventricular ejection fraction is 50-55%.    Impression - TTE  Left atrium is moderately enlarged.    Impression - TTE  The patient was in atrial flutter during the study.   Patient Demographics    Patient Demographics  Patient Address Patient Name Communication   [address] [place] [address] [facility] [phone] (Home)     Patient Demographics  Language Race / Ethnicity Marital Status   Unknown Unknown / Unknown Unknown     Document Information    Service Providers Document Coverage Dates   Johanna PEÑA  [phone] (Work)  [address] [place] [id number] Dec. 07, 2023[facility], 2023

## 2024-11-21 NOTE — DISCHARGE INSTRUCTIONS
Going Home Instructions  In this section you will find the tools which will guide you through the first few days after you leave the hospital. Continued use of these tools will help you develop the skills necessary to keep your heart failure under control.     Home Care Instructions Following Heart Failure - the most important things to do every day include:   Weigh yourself and review the “Self-Check Plan” sheet every morning.   Call your cardiologist office if you are in the “Pay Attention-Use Caution” (yellow zone) or “Medical Alert-Warning!” (red zone) as outlined in the Self-Check Plan sheet.  Take your medicines as prescribed.  Limit your sodium (salt) intake.  Know when to call your cardiologist, primary doctor, or nurse.  Know when to seek emergency care.      Things for You to Remember:   1. See your doctor or healthcare provider as written on your discharge instructions.  It is important that you attend this appointment to make sure your symptoms are under control.     2. Your recommended sodium intake is 4747-4511 mg daily.    3.  Weigh yourself every day.    4. Some exercise and activity is important to help keep your heart functioning and strong. Unless instructed not to exercise, you may walk at a slow to moderate pace for 10-15 minutes 2-3 days per week to start. Pace your activity to prevent shortness of breath or fatigue. Stop exercising if you develop chest pain, lightheadedness, or significant shortness of breath.       Call Your Cardiologist If:   You gain 2-3 pounds in one day or 5 pounds in one week.  You have more difficulty breathing.  You are getting more tired with normal activity.  You are more short of breath lying down, or awaken at night short of breath.  You have swelling of your feet or legs.  You urinate less often during the day and more often at night.  You have cramps in your legs.  You have blurred vision or see yellowish-green halos around objects of lights.    Go to the  Emergency Room If:   You have pain or tightness in your chest  You are extremely short of breath  You are coughing up pink-frothy mucus  You are traveling and develop symptoms of worsening heart failure      ** Please follow up with your cardiologist or Advanced Practice Provider as written on your discharge instructions. If you are not provided with an appointment, let your nurse know so you can get an appointment**  Heart Healthy - Reduced Sodium Nutrition Therapy    A heart-healthy diet is recommended to reduce your unhealthy blood cholesterol levels, manage high blood pressure, and lower your risk for heart disease.  To follow a heart-healthy diet,   Eat a balanced diet with whole grains, fruits and vegetables, and lean protein sources.  Achieve and maintain a healthy weight.  Choose heart-healthy unsaturated fats. Limit saturated fats, trans fats, and cholesterol intake. Eat more plant-based or vegetarian meals using beans and soy foods for protein.  Eat whole, unprocessed foods to limit the amount of sodium (salt) you eat.  Limit refined carbohydrates especially sugar, sweets and sugar-sweetened beverages.  If you drink alcohol, do so in moderation: one serving per day (women) and two servings per day (men).  One serving is equivalent to 12 ounces beer, 5 ounces wine, or 1.5 ounces distilled spirits    Tips  Tips for Choosing Heart-Healthy Fats  Choose lean protein and low-fat dairy foods to reduce saturated fat intake.  Saturated fat is usually found in animal-based protein and is associated with certain health risks. Saturated fat is the biggest contributor to raised low-density lipoprotein (LDL) cholesterol levels in the diet. Research shows that limiting saturated fat lowers unhealthy cholesterol levels.  Eat no more than 7% of your total calories each day from saturated fat.  Ask your RDN to help you determine how much saturated fat is right for you.  There are many foods that do not contain large amounts  of saturated fats. Swapping these foods to replace foods high in saturated fats will help you limit the saturated fat you eat and improve your cholesterol levels. You can also try eating more plant-based or vegetarian meals.      Instead of…  Try...   Whole milk, cheese, yogurt, and ice  cream 1%, ½%, or skim milk, low-fat cheese, non-fat  yogurt, and low-fat ice cream   Fatty, marbled beef and pork  Lean beef, pork, or venison   Poultry with skin  Poultry without skin   Butter, stick margarine  Reduced-fat, whipped, or liquid spreads   Coconut oil, palm oil Liquid vegetable oils: corn, canola, olive, soybean  and safflower oils         Avoid trans fats.  Trans fats increase levels of LDL-cholesterol. Hydrogenated fat in processed foods is the main source of trans fats in foods.  Trans fats can be found in stick margarine, shortening, processed sweets, baked goods, some fried foods, and packaged foods made with hydrogenated oils. Avoid foods with “partially hydrogenated oil” on the ingredient list such as: cookies, pastries, baked goods, biscuits, crackers, microwave popcorn, and frozen dinners.    Choose foods with heart healthy fats.  Polyunsaturated and monounsaturated fat are unsaturated fats that may help lower your blood cholesterol level when used in place of saturated fat in your diet.  Ask your RDN about taking a dietary supplement with plant sterols and stanols to help lower your cholesterol level.  Research shows that substituting saturated fats with unsaturated fats is beneficial to cholesterol levels. Try these easy swaps:    Instead of…  Try:   Butter, stick margarine, or solid  shortening Reduced-fat, whipped, or liquid spreads   Beef, pork, or poultry with skin Fish and seafood   Chips, crackers, snack foods Raw or unsalted nuts and seeds or nut  Anna  Hummus with vegetables  Avocado on toast   Coconut oil, palm oil Liquid vegetable oils: corn, canola, olive,  soybean and safflower oils        Limit the amount of cholesterol you eat to less than 200 milligrams per day.  Cholesterol is a substance carried through the bloodstream via lipoproteins, which are known as “transporters” of fat.  Some body functions need cholesterol to work properly, but too much cholesterol in the bloodstream can damage arteries and build up blood vessel linings (which can lead to heart attack and stroke). You should eat less than 200 milligrams cholesterol per day.  People respond differently to eating cholesterol. There is no test available right now that can figure out which people will respond more to dietary cholesterol and which will respond less. For individuals with high intake of dietary cholesterol, different types of increase (none, small, moderate, large) in LDL-cholesterol levels are all possible.  Food sources of cholesterol include egg yolks and organ meats such as liver, gizzards. Limit egg yolks to two to four per week and avoid organ meats like liver and gizzards to control cholesterol intake.    Tips for Choosing Heart-Healthy Carbohydrates  Consume foods rich in viscous (soluble) fiber  Viscous, or soluble, fiber is found in the walls of plant cells. Viscous fiber is found only in plant-based foods--animalbased foods like meat or dairy products do not contain fiber. In the stomach, viscous fibers absorb water and swell to form a thick, jelly-like mass. This helps to lower your unhealthy cholesterol  Rich sources of viscous fiber include asparagus, Amery sprouts, sweet potatoes, turnips, apricots, mangoes, oranges, legumes, barley, oats, and oat bran.  Eat at least 5 to 10 grams of viscous fiber each day. As you increase your fiber intake gradually, also increase the amount of water you drink. This will help prevent constipation.  If you have difficulty achieving this goal, ask your RDN about fiber laxatives. Choose fiber supplements made with viscous fibers such as psyllium seed husks or  methylcellulose to help lower unhealthy cholesterol.    Limit refined carbohydrates  There are three types of carbohydrates: starches, sugar, and fiber. Some carbohydrates occur naturally in food, like the starches in rice or corn or the sugars in fruits and milk. Refined carbohydrates--foods with high amounts of simple sugars--can raise triglyceride levels. High triglyceride levels are associated with coronary heart disease.  Some examples of refined carbohydrate foods are table sugar, sweets, and beverages sweetened with added sugar.    Tips for Reducing Sodium (Salt)  You should aim to limit your sodium intake to less than 2,000 mg sodium per day  Although sodium is important for your body to function, too much sodium can be harmful for people with high blood pressure.  As sodium and fluid buildup in your tissues and bloodstream, your blood pressure increases. High blood pressure may cause damage to other organs and increase your risk for a stroke.  Even if you take a pill for blood pressure or a water pill (diuretic) to remove fluid, it is still important to have less salt in your diet. Ask your doctor and RDN what amount of sodium is right for you.  Avoid processed foods. Eat more fresh foods.  Fresh fruits and vegetables are naturally low in sodium, as well as frozen vegetables and fruits that have no added juices or sauces.  Fresh meats are lower in sodium than processed meats, such as escamilla, sausage, and hotdogs. Read the nutrition label or ask your  to help you find a fresh meat that is low in sodium.  Eat less salt--at the table and when cooking.  A single teaspoon of table salt has 2,300 mg of sodium.  Leave the salt out of recipes for pasta, casseroles, and soups.    Ask your RDN how to cook your favorite recipes without sodium  Be a smart .  Look for food packages that say “salt-free” or “sodium-free.” These items contain less than 5 milligrams of sodium per serving.  “Very low-sodium”  products contain less than 35 milligrams of sodium per serving.  “Low-sodium” products contain less than 140 milligrams of sodium per serving.  Beware of “reduced salt” or “reduced sodium” products. These items may still be high in sodium. Check the nutrition label.  Add flavors to your food without adding sodium.  Try lemon juice, lime juice, fruit juice or vinegar.  Dry or fresh herbs add flavor. Try basil, bay leaf, dill, rosemary, parsley, elham, dry mustard, nutmeg, thyme, and paprika.  Pepper, red pepper flakes, and cayenne pepper can add spice to your meals without adding sodium. Hot sauce contains sodium, but if you use just a drop or two, it will not add up to much.  Buy a sodium-free seasoning blend or make your own at home.    Additional Lifestyle Tips  Achieve and maintain a healthy weight.  Talk with your RDN or your doctor about what is a healthy weight for you.  Set goals to reach and maintain that weight.  To lose weight, reduce your calorie intake along with increasing your physical activity. A weight loss of 10 to 15 pounds could reduce LDL-cholesterol by 5 milligrams per deciliter.  Participate in physical activity.  Talk with your health care team to find out what types of physical activity are best for you. Set a plan to get about 30 minutes of exercise on most days.    Foods Recommended  Grains  Grain foods including whole grains: whole wheat, barley, rye, buckwheat, corn, teff, quinoa, millet, amaranth, brown or wild rice, sorghum, and oats  Processed whole grains such as pasta, rice, hot and cold cereals, and snacks that contain less than 300 mg sodium per serving  Whole grain bread, crackers, rolls, or elaina with <80 mg sodium per slice (Note: yeast breads usually have less sodium than those made with baking soda),  Home-made bread made with reduced-sodium baking soda    Protein  Fresh red meat: lean, trimmed cuts of beef, pork, or lamb  Fresh poultry: skinless chicken or turkey  Fresh  seafood: fish (particularly fatty fish: salmon, herring), shrimp, lobster, clams, and scallops  Eggs (2-4 per week), eggwhites or egg substitute  Nuts and seeds (unsalted): peanuts, almonds, pistachios, and sunflower seeds, unsalted; peanut butter, almond butter, and sunflower seed butter, reduced sodium.  Soy foods: tofu, tempeh, or soynuts  Meat alternatives: veggie burgers and sausages from plant protein without added sodium  Legumes: such as dried beans, lentils, or peas at least a few times per week in place of other protein sources, unsalted    Dairy  Skim, ½% or 1% milk, low-fat yogurt, low-sodium cheeses (Swiss cheese, ricotta cheese, and fresh mozzarella, low sodium cottage cheese)  Fortified soymilk, almond milk, rice milk, hemp milk  Frozen desserts (½ cup) made from low-fat milk    Vegetables  Fresh, frozen, and canned (unsalted) whole vegetables, including dark-green, red and orange vegetables, legumes (beans and peas), and starchy vegetables without added sauces, salt, or sodium; low-sodium vegetable juices    Fruits  Fresh, frozen, canned and dried whole unsweetened fruits canned fruit packed in water or fruit juice without added sugar; 100% fruit juice    Oils  Unsaturated vegetable oils: [place], avocado, canola, cashew, corn, grapeseed, olive, safflower, sesame, soybean, sunflower  Margarines and spreads which list liquid vegetable oil as the first ingredient and does not contain trans fats (partially hydrogenated oil)  Salad dressings made from oil and low in sodium (salt)  Avocado    Other  Prepared foods, including soups, casseroles, and salads made from recommended ingredients and contain <600 mg sodium.  Homemade soups, casseroles, entrees, and side dishes typically contain less sodium that prepared alternatives.  Homemade soups and sauces such as gravy  Low-sodium crackers, chips, pretzels  Low-sodium seasonings (ketchup, BBQ)  Spices, herbs, Salt-free seasoning mixes and  marinades  Vinegar  Lemon or lime juice      Foods Not Recommended  Grains  Breakfast cereals, packaged baked goods, snack crackers, and prepared grains with more than 300 mg sodium per serving  Biscuits, cornbread, and other “quick” breads prepared with baking soda  Breads or crackers topped with salt  Bakery products, such as doughnuts, biscuits, croissants, Monegasque pastries, pies, cookies  Instant potatoes, noodles, rice, stuffing mix, or macaroni and cheese  Snacks made with partially hydrogenated oils, including chips, cheese puffs, snack mixes, regular crackers, butter-flavored popcorn  Prepackaged bread crumbs  Self-rising flours    Protein  Meats high in saturated fat such as ribs, t-bone steak, regular 70/30 hamburger  Processed red meats, such as escamilla, sausage, ham, pepperoni, hot dogs, corned beef, cured or smoke meats, canned meat, chili, brennon sausage, sardines, and spam with added sodium  Deli meats, such as pastrami, bologna, or salami (made of meat or poultry) with added sodium  Organ meat such as liver, gizzards, or sweetbread  Preseasoned and precooked meats  Poultry with skin or processed poultry (chicken and turkey) with skin, breading, or high-sodium marinades or sauces  Whole eggs or egg yolks (greater than 5 per week)  Fried meat, poultry, or fish  Smoked fish and meats  Salted legumes, nuts, seeds, or nut/seed butters  Meat alternatives with high levels of sodium (>300 mg per serving) or saturated fat (>5 g per serving)    Dairy  Whole milk,?2% fat milk, or Buttermilk  Cream, half-&-half  Cream cheese, sour cream  Regular and processed cheese or sauces  Regular-sodium cottage cheese    Vegetables  Canned or frozen vegetables with salt, fresh vegetables prepared with salt, butter, cheese, or cream sauce  Pickled vegetables such as olives, pickles, or sauerkraut  Tomato or pasta sauce with high levels of salt (>300 mg per serving)  Fried vegetables    Fruits   None    Oils  Solid shortening or  partially hydrogenated oils  Solid margarine made with hydrogenated or partially hydrogenated oils or trans fat  Salted margarine that contains trans fats  Butter (salted or unsalted)  Salad dressings with trans fat or high levels of sodium ([place], blue cheese, Panamanian, Italian)  Tropical oils (coconut, palm, palm kernel oils)    Other  Sugary and/or fatty desserts, candy, and other sweets  Canned soups that are >600 mg of sodium  Frozen meals and prepared sides that are >600 mg of sodium  Store-bought egg beaters (with added sodium)  Salts: sea salt, kosher salt, onion salt, and garlic salt, seasoning mixes containing salt  Flavorings: bouillon cubes, catsup or ketchup, barbeque sauce, Worcestershire sauce, soy sauce, salsa, relish, teriyaki sauce      Heart-Healthy Eating Sample 1-Day Menu  Breakfast  1 cup oatmeal  1 cup fat-free milk  1 cup blueberries  1 cup brewed coffee  1 ounce almonds  Lunch  2 slices whole-wheat bread  2 oz lean deli turkey breast  1 oz low-fat Swiss cheese  2 slices tomato  2 lettuce leaves  1 pear  1 cup skim milk  Afternoon Snack   1 oz trail mix (with nuts, seeds, raisins)  Evening Meal  3 oz broiled salmon  2/3 cup brown rice  1 tsp margarine  1/2 cup cooked broccoli  1/2 cup cooked carrots  1 cup tossed salad  1 teaspoon olive oil and vinegar dressing  1 small whole-wheat roll  1 tsp margarine  1 cup tea  Evening Snack   1 banana    www.eatright.org  Academy of Nutrition and Dietetics-recipes and helpful articles  www.CliniCast.com Search recipes by course, ingredient, season, freezer meals, etc  www.cookinglight.com  Recipe makeovers,  cooking demos  www.foodfit.com       Healthy Cookingà Menus for the Week  Recipe Makeovers  www.heart.org  Recipes from American Heart Association  www.[employer].com Has a magazine subscription, recipes/meals for small households

## 2024-11-21 NOTE — PROGRESS NOTES
Heart Failure Nurse  Progress Note    Patient was evaluated by the Heart Failure Nurse  for understanding, verbalization, demonstration, and recall of education related to heart failure, overall adherence to the behaviors necessary to maintain a compensated status, and risk for readmission.     Patient assessment:    Patient is able to verbalize signs/symptoms fluid overload/impending HF exacerbation and who to contact with problems                                          _X__ yes  ___ no      Patient is following a 2000 mg sodium diet                                             ___ yes  _X__ no    If no, barriers to 2000 mg sodium diet: Patient admits to not following, will do so moving forward,    Patient informed of 2-Part dietician classes that is free if sign up within 30 days of discharge or $40  _X__ yes  ___ no      Patient is adherent to medication regimen                                              __X_ yes  ___ no    If no, barriers to medication regimen: Admitted to recently not taking his furosemide due to frequency with working/travels one hour each way to work.    Patient has sufficient funds to purchase medication                      _X__ yes  ___ no      Patient has a scale in the home              ___ yes  _X__ no      Patient is adherent to daily weight monitoring                                        ___ yes   __X_ no    If no, barriers to daily weight monitoring:Patient does not have a scale at home. Patient was provided with a scale and will weigh himself daily after discharge.     Symptom Tracker Worksheet reviewed with patient  __X_ yes   ___ no      Patient verbalizes understanding of stoplight/heart failure zones          _X__ yes   ___ no      Patient understands the importance of 7-day follow-up appointment      __X_ yes  ___ no    Appointment Date:          Patient has adequate transportation to attend follow-up appointments    _X__ yes  ___ no    If no, was referral to  Social Work made  ___yes  ___ no      Family/Friend present during education: none    Additional consultations required: MARGUERITE Wetzel RN(signature)  Extension [phone]

## 2024-11-21 NOTE — DIETARY NOTE
[facility]   part of [facility]   CLINICAL NUTRITION    Rivera Edil Bush     Admitting diagnosis:  Acute on chronic congestive heart failure, unspecified heart failure type (HCC) [I50.9]    Ht:  5'11\"  Wt: 122.6 kg (270 lb 4.8 oz).   Body mass index is 37.7 kg/m².  IBW: 78.2kg    Wt Readings from Last 6 Encounters:   11/21/24 122.6 kg (270 lb 4.8 oz)   09/28/24 125.6 kg (277 lb)   09/14/24 125.2 kg (276 lb)   08/24/24 125.6 kg (277 lb)   07/08/24 122.6 kg (270 lb 3.2 oz)   06/05/24 125.2 kg (276 lb)        Labs/Meds reviewed    Diet:       Procedures    Clear liquid diet Is Patient on Accuchecks? No     Percent Meals Eaten (last 3 days)       None              Pt chart reviewed d/t HF diet ed.  Na and sodium guidelines reviewed.  Still on clears, RD to follow for advancement  Nursing notes reports   intake for last meal.  Tolerating po diet without diarrhea, emesis, or constipation.   No significant weight changes noted.     Patient is at low nutrition risk at this time.    Please consult if patient status changes or nutrition issues arise.    Lois Goldstein RD, LDN  Clinical Nutrition  [phone]

## 2024-11-21 NOTE — IMAGING NOTE
[facility]  Outside Information  Continuity of Care Document  11/2/2023  Rivera Bush - 59 y.o. Male; born [birth year]Ambulatory Telemetry Monitor, generated on Nov. 21, 2024November 21, 2024   Findings    Findings - Ambulatory Telemetry Monitor  This is a good quality study.    Findings - Ambulatory Telemetry Monitor  Predominant rhythm is atrial fibrillation.    Findings - Ambulatory Telemetry Monitor  The minimum heart rate recorded was 50 beats / minute. The maximum heart rate is 155 beats / minute. The mean heart rate is 90 beats / minute.    Findings - Ambulatory Telemetry Monitor  No evidence of AV block is noted.    Findings - Ambulatory Telemetry Monitor  A fib burden at 91%    Findings - Ambulatory Telemetry Monitor  No pauses were noted.    Impressions    Impression - TELEMETRY MONITOR  This is a good quality study.    Impression - TELEMETRY MONITOR  Predominant rhythm is atrial fibrillation.    Impression - TELEMETRY MONITOR  The minimum heart rate recorded was 50 beats / minute. The maximum heart rate is 155 beats / minute. The mean heart rate is 90 beats / minute.    Impression - TELEMETRY MONITOR  No evidence of AV block is noted.    Impression - TELEMETRY MONITOR  A fib burden at 91%    Impression - TELEMETRY MONITOR  No pauses were noted.    Patient Demographics    Patient Demographics  Patient Address Patient Name Communication   [address] [place] [address] Rivera Bush [phone] (Home)     Patient Demographics  Language Race / Ethnicity Marital Status   Unknown Unknown / Unknown Unknown     Document Information    Service Providers Document Coverage Dates   Johanna PEÑA  [phone] (Work)  [address] [place] [id number] Nov. 03, 2023November 03, 2023

## 2024-11-21 NOTE — CONSULTS
Consultation Note        Rivera Bush Patient Status:  Observation    1965 MRN [id number]   Location [facility] 0SW-A Attending Adam Tripp MD   Hosp Day # 0 PCP Won Davis MD       Reason for Consultation   Abdominal pain, abnormal findings on CT       History of Present Illness   Rivera Bush is a 59 year old male with PmHx of DM type 2, HTN, HLP, ISAAC/obesity, atrial fibrillation (on Xarelto-last dose 2024 PM), and CHF who presented to the ER with upper abdominal pain and shortness of breath with a weight gain of 15lbs in 2 weeks. He noted abdominal swelling as well. On admission, normal Hgb and WBC, creatinine 1.54, GFR 52, AST 37, ALT 24, ALP 95, T bili normal, lipase 63. CTA chest, CTA A/P (IV) w/ enlargement of mediastinal lymph nodes, cholelithiasis, splenomegaly, and inflammatory changes of fat adjacent to the 2nd portion of the duodenum. No bowel distention or thickening noted. Patient reports this morning, abdominal pain has slightly lessened since fluid removed since admission (down about 4L). He feels less distention. He has experienced intermittent heartburn and nausea over the last few months. He is on PPI once daily at home. Denies dysphagia or odynophagia. No vomiting. He reports regular bowel movements. No hematochezia. He does think he saw 1 black stool about 1 month ago. Denies frequent NSAID use. No frequent alcohol. No smoking. No known family history of GI malignancy.     He has a cholecystectomy and umbilical hernia repair planned for December with Dr. Mujica. Other GI history summarized below.               Gastrointestinal History   Family History: no fam hx of GI malignancy      2018 - NPOV Dr. Stone  + anemia, screening for colon CA  - occasional BRBPR on tissue paper  REC: colonoscopy, labs (SUNNY, REC: iron sulfate 325mg daily x 3 months)     2019 - EGD/Colonoscopy Dr. Stone  EGD: some debris in body, Bx: negative for H.  Pylori  Colonoscopy: small ulceration at IC valve, internal hemorrhoids, 5yr recall     4/19/2019 - OV Dr. Stone  + bloating  - he feels that his hemorrhoids can prolapse  - more heartburn than is typical for pt, takes Tums PRN  - 10 lb wt gain  - intermittent bloating for years w/ recent worsening  REC: GERD diet, labs, lactulose breath test, low FODMAP diet     6/19/2019 - Capsule Endoscopy Dr. Rodríguez  - normal small bowel examination    02/2024: OV w/ Evelyn Last, APRN  -GERD, nausea, bloating, epigastric pain, fatty liver  Rec: EGD, colonoscopy, fiber, US abdomen, Pepcid    07/19/2024-EGD and colonoscopy w/ Dr. Stone  -EGD: gastritis (Path: no celiac, negative for H pylori)  Rec: Omeprazole 20mg daily x 8 weeks  -Colonoscopy: 5mm sessile polyp in rectosigmoid colon (hyperplastic), Grade II hemorrhoids  Recall 10 years      PMH/MEDS/ALLERGIES/SH/FH:     Past Medical History:    Abdominal pain    above the belly button    Acute cystitis with hematuria    Acute diastolic (congestive) heart failure (HCC)    Anxiety disorder    Arthritis    Back pain    Bloating    most days    Blood in the stool    Blurred vision    Brachial neuritis or radiculitis NOS    C4,C5, nerve roots    Calculus of kidney    Last year do to medication i was taking    Chronic atrial fibrillation (HCC)    post op from [facility]    Chronic cough    medicine causes symptoms    Community acquired pneumonia of left lower lobe of lung    Decorative tattoo    Depression    Diabetes (HCC)    Diarrhea, unspecified    I believe from my medication    Dizziness    Fatigue    daily    Feeling lonely    Flatulence/gas pain/belching    Food intolerance    Headache disorder    migraines    Heart palpitations    Heartburn    at least one a week    Hemorrhoids    High blood pressure    High cholesterol    History of depression    Hyperlipidemia    Indigestion    not normal    Kidney stone    Leg swelling    Loss of appetite    Malignant hypertensive heart and  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 heart failure and with chronic kidney disease stage I through stage IV, or unspecified(404.00)    Migraines    Nausea    on and off    Night sweats    Obesity, unspecified    Obsessive-compulsive disorders    ISAAC (obstructive sleep apnea)    AHI-16, O2 alyce-71%/CPAP-10cwp    Other specified cardiac dysrhythmias(427.89)    Other testicular hypofunction    Pain in joints    Pneumonia, organism unspecified(486)    Problems with swallowing    Shortness of breath    with simple movements    Sleep disturbance    Stress    Suicidal thoughts    Thoracic or lumbosacral neuritis or radiculitis, unspecified    L3,L4    Thrombocytopenia (HCC)    Uncomfortable fullness after meals    Unspecified essential hypertension    Unspecified sleep apnea    wears CPAP    Visual impairment    Vomiting    3 times after heartburn    Wears glasses    Weight gain    Weight loss      Past Surgical History:   Procedure Laterality Date    Angiogram  7/15/14    no cad noted    Endovas repair, infrarenl abdom aortic aneurysm/dissect      Epidurography, radiological s & i  4/18/2012    Procedure: CERVICAL EPIDURAL;  Surgeon: Sekou Solorzano MD;  Location: [facility]    Fluor gid & loclzj ndl/cath spi dx/ther njx  5/11/2012    Procedure: CERVICAL EPIDURAL;  Surgeon: Edil Alcocer MD;  Location: [facility]    Injection, w/wo contrast, dx/therapeutic substance, epidural/subarachnoid; cervical/thoracic  4/18/2012    Procedure: CERVICAL EPIDURAL;  Surgeon: Sekou Solorzano MD;  Location: [facility]    Injection, w/wo contrast, dx/therapeutic substance, epidural/subarachnoid; cervical/thoracic  5/11/2012    Procedure: CERVICAL EPIDURAL;  Surgeon: Edil Alcocer MD;  Location: [facility]    M-sedaj by  phys perfrmg svc 5+ yr  4/18/2012    Procedure: CERVICAL EPIDURAL;  Surgeon: Sekou Solorzano MD;  Location: [facility]     M-sedaj by jadyn phys perfrmg svc 5+ yr  2012    Procedure: CERVICAL EPIDURAL;  Surgeon: Edil Alcocer MD;  Location: [facility]    Symp aaa urgent repair  10/14/2014    [place]; ascending aorta    Tonsillectomy          hydrALAZINE (Apresoline) tab 100 mg  hydrALAZINE (Apresoline) tab 100 mg  hydrALAZINE (Apresoline) tab 75 mg   Medications Ordered Prior to Encounter[1]    Current Allergies: Allergies[2]    Social History     Occupational History    Not on file   Tobacco Use    Smoking status: Former     Current packs/day: 0.00     Average packs/day: 0.5 packs/day for 28.0 years (14.0 ttl pk-yrs)     Types: Cigarettes     Start date: 1976     Quit date: 2004     Years since quittin.3    Smokeless tobacco: Never   Vaping Use    Vaping status: Never Used   Substance and Sexual Activity    Alcohol use: Not Currently     Alcohol/week: 1.0 standard drink of alcohol     Types: 1 Glasses of wine per week     Comment: Wine club once a month    Drug use: No    Sexual activity: Not on file           Family History   Problem Relation Age of Onset    Hypertension Father     Lipids Father     Prostate Cancer Father     Other (Other[other]) Paternal Grandfather     Heart Disorder Paternal Grandfather     Hypertension Paternal Grandfather     Stroke Paternal Grandfather     Hypertension Sister     Lipids Sister     Hypertension Brother     Lipids Brother     Psychiatric Brother     Hypertension Mother     Lipids Mother     Psychiatric Mother     Hypertension Maternal Grandfather     Heart Attack Maternal Grandfather     Stroke Maternal Grandfather               MEDICATIONS      [COMPLETED] hydrALAzine (Apresoline) 20 mg/mL injection 10 mg  10 mg Intravenous Once    [COMPLETED] morphINE PF 4 MG/ML injection 4 mg  4 mg Intravenous Once    [COMPLETED] furosemide (Lasix) 10 mg/mL injection 40 mg  40 mg Intravenous Once    [COMPLETED] iopamidol 76% (ISOVUE-370) injection for power injector  100 mL  Intravenous ONCE PRN    [COMPLETED] pantoprazole (Protonix) 40 mg in sodium chloride 0.9% PF 10 mL IV push  40 mg Intravenous Once    albuterol (Ventolin HFA) 108 (90 Base) MCG/ACT inhaler 2 puff  2 puff Inhalation Q6H PRN    cloNIDine (Catapres) tab 0.1 mg  0.1 mg Oral BID    cyclobenzaprine (Flexeril) tab 10 mg  10 mg Oral Nightly    eplerenone (Inspra) tab 50 mg  50 mg Oral Daily    fenofibrate micronized (Lofibra) cap 134 mg  134 mg Oral Daily with breakfast    flecainide (Tambocor) tab 100 mg  100 mg Oral BID    hydrALAZINE (Apresoline) tab 100 mg  100 mg Oral QAM    And    hydrALAZINE (Apresoline) tab 100 mg  100 mg Oral Noon    And    hydrALAZINE (Apresoline) tab 75 mg  75 mg Oral QPM    losartan (Cozaar) tab 100 mg  100 mg Oral Daily    meclizine (Antivert) tab 25 mg  25 mg Oral TID PRN    metoprolol succinate ER (Toprol XL) 24 hr tab 200 mg  200 mg Oral BID    rosuvastatin (Crestor) tab 40 mg  40 mg Oral Nightly    fluticasone-salmeterol (Advair Diskus) 250-50 MCG/ACT inhaler 1 puff  1 puff Inhalation BID    tamsulosin (Flomax) cap 0.4 mg  0.4 mg Oral QPM    rivaroxaban (Xarelto) tab 20 mg  20 mg Oral Nightly    acetaminophen (Tylenol Extra Strength) tab 500 mg  500 mg Oral Q4H PRN    melatonin tab 3 mg  3 mg Oral Nightly PRN    glycerin-hypromellose- (Artificial Tears) 0.2-0.2-1 % ophthalmic solution 1 drop  1 drop Both Eyes QID PRN    sodium chloride (Saline Mist) 0.65 % nasal solution 1 spray  1 spray Each Nare Q3H PRN    polyethylene glycol (PEG 3350) (Miralax) 17 g oral packet 17 g  17 g Oral Daily PRN    sennosides (Senokot) tab 17.2 mg  17.2 mg Oral Nightly PRN    bisacodyl (Dulcolax) 10 MG rectal suppository 10 mg  10 mg Rectal Daily PRN    fleet enema (Fleet) rectal enema 133 mL  1 enema Rectal Once PRN    ondansetron (Zofran) 4 MG/2ML injection 4 mg  4 mg Intravenous Q6H PRN    prochlorperazine (Compazine) 10 MG/2ML injection 5 mg  5 mg Intravenous Q8H PRN    furosemide (Lasix) 10 mg/mL  injection 40 mg  40 mg Intravenous BID (Diuretic)    glucose (Dex4) 15 GM/59ML oral liquid 15 g  15 g Oral Q15 Min PRN    Or    glucose (Glutose) 40% oral gel 15 g  15 g Oral Q15 Min PRN    Or    glucose-vitamin C (Dex-4) chewable tab 4 tablet  4 tablet Oral Q15 Min PRN    Or    dextrose 50% injection 50 mL  50 mL Intravenous Q15 Min PRN    Or    glucose (Dex4) 15 GM/59ML oral liquid 30 g  30 g Oral Q15 Min PRN    Or    glucose (Glutose) 40% oral gel 30 g  30 g Oral Q15 Min PRN    Or    glucose-vitamin C (Dex-4) chewable tab 8 tablet  8 tablet Oral Q15 Min PRN    insulin aspart (NovoLOG) 100 Units/mL FlexPen 1-68 Units  1-68 Units Subcutaneous TID CC    insulin degludec (Tresiba) 100 units/mL flextouch 10 Units  10 Units Subcutaneous Nightly    insulin aspart (NovoLOG) 100 Units/mL FlexPen 1-10 Units  1-10 Units Subcutaneous TID AC and HS    pantoprazole (Protonix) 40 mg in sodium chloride 0.9% PF 10 mL IV push  40 mg Intravenous Q24H    hydrALAzine (Apresoline) 20 mg/mL injection 10 mg  10 mg Intravenous Q6H PRN              ROS:     A comprehensive 14 point review of systems was completed.  Pertinent positives and negatives noted in the the HPI.          Physical Exam     Vital signs:  BP (!) 175/114 (BP Location: Right arm)   Pulse 56   Temp 98.1 °F (36.7 °C) (Oral)   Resp 14   Wt 270 lb 4.8 oz (122.6 kg)   SpO2 95%   BMI 37.70 kg/m²         Physical Exam        General: Appears alert, oriented x 3 and in no acute distress.  HEENT: Normal. No scleral icterus.   NECK: Supple. No neck vein distention.  CV: S1 and S2 normal. No murmurs or gallops.  LUNGS: Clear to auscultation.  ABDOMEN: Soft and non-distended. Mild to moderate epigastric tenderness. No masses. Bowel sounds are present.  EXTREMITIES: No edema, cyanosis or clubbing.  SKIN: Warm and dry.         IMAGING/LABS       Labs:   Lab Results   Component Value Date    WBC 8.3 11/21/2024    HGB 14.5 11/21/2024    HCT 43.8 11/21/2024    .0  11/21/2024    CREATSERUM 1.59 11/21/2024    BUN 15 11/21/2024     11/21/2024    K 4.0 11/21/2024     11/21/2024    CO2 31.0 11/21/2024    GLU 91 11/21/2024    CA 9.6 11/21/2024    ALB 4.4 11/20/2024    ALKPHO 95 11/20/2024    BILT 0.6 11/20/2024    AST 37 11/20/2024    ALT 24 11/20/2024    LIP 63 11/20/2024    MG 1.9 11/21/2024     Recent Labs   Lab 11/20/24  1435 11/21/24  0707   * 91   BUN 14 15   CREATSERUM 1.54* 1.59*   CA 9.5 9.6    140   K 4.8 4.0    105   CO2 26.0 31.0     Recent Labs   Lab 11/21/24  0707   RBC 4.90   HGB 14.5   HCT 43.8   MCV 89.4   MCH 29.6   MCHC 33.1   RDW 13.9   NEPRELIM 6.08   WBC 8.3   .0       Recent Labs   Lab 11/20/24  1435   ALT 24   AST 37*       Imaging:   CTA CHEST CTA ABDOMEN CTA PELVIS (CPT=71275/47449)  Narrative: PROCEDURE:  CTA CHEST CTA ABDOMEN CTA PELVIS (CPT=71275/94802)     COMPARISON:  [facility], CT, CT ABDOMEN+PELVIS KIDNEYSTONE 2D RNDR(NO IV,NO ORAL)(CPT=74176), 3/21/2023, 3:20 PM.  [facility], CT, CT ANGIOGRAPHY, CHEST (CPT=71275), 10/29/2023, 10:08 AM.     INDICATIONS:  worse pain with sob, lightheadednes     TECHNIQUE:  CT (with CTA imaging) of the chest, abdomen, and pelvis were obtained. Multi-planar reformatted/3-D images were created to optimize visualization of vascular anatomy.  Dose reduction techniques were used. Dose information is transmitted to   the ACR (American College of Radiology) NRDR (National Radiology Data Registry) which includes the Dose Index Registry.     PATIENT STATED HISTORY:(As transcribed by Technologist)  patient presents with severe epigastric pain. Stated he has a history of aortic aneurysm.      CONTRAST USED:  100cc of Isovue 370     FINDINGS:       CHEST:    LUNGS:  Bronchial wall thickening and interlobular septal thickening consistent with interstitial edema.  No focal consolidation.  MEDIASTINUM:  Multiple mediastinal lymph nodes, measuring up to 2.1 x 1.7 cm in the right lower  paratracheal region, 2.2 x 1.6 cm in the left lower paratracheal region, and 2.3 x 1.2 cm in the subcarinal region.  These are all increased in size.  PAM:  No mass or adenopathy.    CARDIAC:  No enlargement, pericardial thickening, or significant coronary artery calcification.  PLEURA:  Small bilateral pleural effusions.  CHEST WALL:  Stable bilateral gynecomastia.  No mass or axillary adenopathy.    AORTA:  No aneurysm or dissection.    VASCULATURE:  No visible pulmonary arterial thrombus or attenuation.       ABDOMEN/PELVIS:  LIVER:  No enlargement, atrophy, abnormal density, or significant focal lesion.    BILIARY:  Small calcified gallstones in gallbladder lumen.  No bile duct dilatation.  PANCREAS:  No lesion, fluid collection, ductal dilatation, or atrophy.    SPLEEN:  Enlarged, measuring up to 16 cm, previously 14 cm..    KIDNEYS:  No mass, obstruction, or calcification.  Simple bilateral renal cysts again demonstrated which require no further workup or follow-up.  ADRENALS:  Stable bilateral benign lipid rich adrenal adenomas.  AORTA:  No aneurysm or dissection.    RETROPERITONEUM:  No mass or adenopathy.    BOWEL/MESENTERY:  Inflammatory changes of fat adjacent to the 2nd portion of the duodenum.  No bowel distention or bowel wall thickening.  Normal appendix.  ABDOMINAL WALL:  Small umbilical hernia again demonstrated containing fat and a small amount of fluid.  URINARY BLADDER:  Moderate generalized bladder wall thickening.  No calculus.  PELVIC NODES:  No adenopathy.    PELVIC ORGANS:  No visible mass.  Pelvic organs appropriate for patient age.    BONES:  Degenerative changes of spine.  Stable sclerosis of right iliac bone.                   Impression: CONCLUSION:    1. Congestive heart failure and interstitial edema.  2. Marilyn duodenal fat stranding suggestive of duodenal inflammation/ulcer.  3. Generalized bladder wall thickening suggestive of cystitis.  4. Mild mediastinal lymphadenopathy.  5.  Increased splenomegaly.  6. Cholelithiasis.  7. 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  8. Small fat containing umbilical hernia.        LOCATION:  [id number]        Dictated by (CST): Sekou Rodriguez MD on 11/20/2024 at 4:41 PM       Finalized by (CST): Sekou Rodriguez MD on 11/20/2024 at 4:56 PM     XR CHEST AP PORTABLE  (CPT=71045)  Narrative: PROCEDURE:  XR CHEST AP PORTABLE  (CPT=71045)     TECHNIQUE:  AP chest radiograph was obtained.     COMPARISON:  [facility] , XR, XR CHEST PA + LAT CHEST (CPT=71046), 11/05/2021, 4:36 PM.     INDICATIONS:  CP for a few weeks, worse when moving around.  Hx of stomach ulcer.     PATIENT STATED HISTORY: (As transcribed by Technologist)  Patient states he's having midline chest/abdominal pain for a few weeks. The pain has become more severe the last week or so, but he's been experiencing the symptom for a few months. He states   history of gallstones, and hernia.          FINDINGS:  Cardiomegaly with sternotomy sutures.  Pulmonary vascular congestion.  Tortuous aorta.  No focal pulmonary opacity                   Impression: CONCLUSION:  Congestive heart failure.        LOCATION:  [id number]                 Dictated by (CST): Sekou Rodriguez MD on 11/20/2024 at 3:27 PM       Finalized by (CST): Sekou Rodriguez MD on 11/20/2024 at 3:27 PM               IMPRESSION:     Rivera Bush is a 59 year old male with PmHx of DM type 2, HTN, HLP, ISAAC/obesity, atrial fibrillation (on Xarelto-last dose 11/20/2024 PM), and CHF who presented to the ER with upper abdominal pain and shortness of breath with a weight gain of 15lbs in 2 weeks. He noted abdominal swelling as well. On admission, normal Hgb and WBC, creatinine 1.54, GFR 52, AST 37, ALT 24, ALP 95, T bili normal, lipase 63. CTA chest, CTA A/P (IV) w/ enlargement of mediastinal lymph nodes, cholelithiasis, splenomegaly, and inflammatory changes of fat adjacent to the 2nd portion of the duodenum. . He has experienced  intermittent heartburn and nausea over the last few months. He is on PPI once daily at home.He does think he saw 1 black stool about 1 month ago. EGD in July with gastritis. Cholecystectomy and umbilical hernia repair planned with Dr. Mujica in December  Epigastric pain and abnormal CT findings near duodenum: reports different pain than prior gallbladder pain (RUQ). Need to r/o PUD, H pylori, and less likely neoplasm  Elevated LFTs: stable from previous-likely r/t fatty liver  Splenomegaly: consider r/t CHF and fluid overload, consider fibroscan as outpatient to r/o fibrosis  A fib, on Xarelto-last dose 11/20/24 PM           PLAN:     EGD later this week once Xarelto has been held for 48 hours (Saturday?)  Hold Xarelto if Ok'd by cardiology service  Continue PPI IV daily  Management of IV fluids, antiemetics, and analgesics per primary team          Karolina Weeks, REINA  10:43 AM  11/21/2024  [facility]  [phone]      Thank you for the consult. Addendum from Dr Moss with final recommendations to follow.         GI Attending Addendum:  I have personally seen and examined this patient and agree with above nurse practitioner's history, physical exam, assessment and recommendations with the following modifications and/or additions:     Patient is a 59 year old with HTN, DM, Afib on Xarelto, with GI consult for abnormal CT scan, abdominal pain.   Patient with SOB. Also with last dose Xarelto - yesterday evening. Denies any overt GI bleeding. Abd soft, NTND. CT - possible PUD, ulcer or duodenitis, r/o GI lesion or malignancy.     Patient warrants EGD, when volume optimized; timing, consider in 2-3 days, given last plavix dose and CHF  Diuresis and xarelto per cardiology service  PPI IV q12 hrs   IVF, analgesia, anti-emetics per primary team      fEren Moss MD  11/21/2024  [facility]           [1]   No current facility-administered medications on file prior to encounter.     Current  Outpatient Medications on File Prior to Encounter   Medication Sig Dispense Refill    sodium chloride (DEEP SEA NASAL SPRAY) 0.65 % Nasal Solution 1 spray by Nasal route at bedtime.      hydrALAZINE 50 MG Oral Tab Take 1 tablet (50 mg total) by mouth 3 (three) times daily.      ASHWAGANDHA GUMMIES OR Take 1 Piece of gum by mouth at bedtime.      XARELTO 20 MG Oral Tab Take 1 tablet (20 mg total) by mouth daily 90 tablet 0    FENOFIBRATE 145 MG Oral Tab TAKE 1 TABLET BY MOUTH ONE TIME DAILY 90 tablet 0    FLECAINIDE 100 MG Oral Tab Take 1 tablet (100 mg total) by mouth 2 (two) times daily. 180 tablet 0    LOSARTAN 100 MG Oral Tab TAKE 1 TABLET BY MOUTH ONE TIME DAILY 90 tablet 0    metFORMIN  MG Oral Tablet 24 Hr TAKE TWO TABLETS BY MOUTH ONCE DAILY (Patient taking differently: Take 1 tablet (750 mg total) by mouth 2 (two) times daily with meals.) 180 tablet 0    POTASSIUM CHLORIDE 20 MEQ Oral Tab CR TAKE 1 TABLET BY MOUTH IN  THE MORNING AND 1 TABLET BY MOUTH IN THE EVENING. 180 tablet 0    SYMBICORT 160-4.5 MCG/ACT Inhalation Aerosol INHALE TWO PUFFS BY MOUTH TWICE DAILY (Patient taking differently: Inhale 2 puffs into the lungs 2 (two) times daily as needed.) 30.6 g 0    omeprazole 20 MG Oral Capsule Delayed Release Take one capsule (20 mg total) by mouth once daily, 30 minutes prior to breakfast 30 capsule 11    tamsulosin 0.4 MG Oral Cap Take 1 capsule (0.4 mg total) by mouth every evening. 90 capsule 3    cyclobenzaprine 10 MG Oral Tab Take 1 tablet (10 mg total) by mouth nightly. 10 tablet 0    hydrOXYzine 25 MG Oral Tab Take 1 tablet (25 mg total) by mouth nightly as needed for Itching. 20 tablet 0    METOPROLOL SUCCINATE  MG Oral Tablet 24 Hr TAKE ONE TABLET BY MOUTH TWICE DAILY 180 tablet 0    cloNIDine 0.1 MG Oral Tab Take 1 tablet (0.1 mg total) by mouth 2 (two) times daily.      EPLERENONE 50 MG Oral Tab TAKE 1 TABLET BY MOUTH ONE TIME DAILY 90 tablet 0    ROSUVASTATIN 40 MG Oral Tab Take 1  tablet (40 mg total) by mouth nightly 90 tablet 0    meclizine 25 MG Oral Tab Take 1 tablet (25 mg total) by mouth 3 (three) times daily as needed. 30 tablet 0    vitamin E 1000 UNITS Oral Cap Take 1 capsule (1,000 Units total) by mouth daily.      magnesium oxide 400 MG Oral Tab Take 1 tablet (400 mg total) by mouth daily.      cholecalciferol 125 MCG (5000 UT) Oral Tab Take 1 tablet (5,000 Units total) by mouth daily.      Tadalafil (CIALIS) 20 MG Oral Tab Take 1 tablet (20 mg total) by mouth daily as needed for Erectile Dysfunction. (Patient not taking: Reported on 11/20/2024) 30 tablet 5    ketoconazole 2 % External Cream Apply 1 Application topically daily. (Patient not taking: Reported on 11/20/2024) 1 each 2    Glucose Blood (ONETOUCH VERIO) In Vitro Strip Check blood sugars once daily. 100 strip 3    OneTouch Delica Lancets 30G Does not apply Misc 1 Lancet by Finger stick route daily. 100 each 3    albuterol (VENTOLIN HFA) 108 (90 Base) MCG/ACT Inhalation Aero Soln Inhale 2 puffs into the lungs every 6 (six) hours as needed for Shortness of Breath. 54 g 1    AZELASTINE  MCG/SPRAY Nasal Solution INHALE 1 SPRAY INTO EACH NOSTRIL 2 TIMES DAILY (Patient taking differently: 1 spray by Nasal route in the morning and 1 spray before bedtime.) 30 mL 2    FUROSEMIDE 20 MG Oral Tab TAKE 1 TABLET BY MOUTH  DAILY (Patient not taking: Reported on 11/20/2024) 90 tablet 3   [2]   Allergies  Allergen Reactions    Jardiance [Empagliflozin] SWELLING and DIZZINESS

## 2024-11-21 NOTE — CONSULTS
[facility]  Cardiology Consultation    Rivera Bush Patient Status:  Observation    1965 MRN [id number]   Location [facility] 0SW-A Attending Narendra Mcneill MD   Hosp Day # 0 PCP Won Davis MD     Reason for Consultation:  CHF    History of Present Illness:  Rivera Bush is a a(n) 59 year old diabetic with chronic HTN, HLP, ISAAC/obesity and Pafib who presents with abdominal discomfort.  He has chronic LOERA - most recently in August in our office SOB with minimal exertion.  At the time the most pressing issue related to hernia and gallbladder problems. In ED he was admitted for further evaluation and felt to be in acute CHF.    He has been compliant with hs HF regimen at home including eplerenone  Objectively, his CXR showed mild vascular congestion and cardiomegaly  proBNP elevated at 465 (prior was 105) and HS troponin negative  Serum creat 1.54 - slightly bumped, K 4.8  LDL 54 on rosuvastatin 40 nightly at home  Hg 14.4  HgA1c 5.3    History:  Past Medical History:    Abdominal pain    above the belly button    Acute cystitis with hematuria    Acute diastolic (congestive) heart failure (HCC)    Anxiety disorder    Arthritis    Back pain    Bloating    most days    Blood in the stool    Blurred vision    Brachial neuritis or radiculitis NOS    C4,C5, nerve roots    Calculus of kidney    Last year do to medication i was taking    Chronic atrial fibrillation (HCC)    post op from [facility]    Chronic cough    medicine causes symptoms    Community acquired pneumonia of left lower lobe of lung    Decorative tattoo    Depression    Diabetes (HCC)    Diarrhea, unspecified    I believe from my medication    Dizziness    Fatigue    daily    Feeling lonely    Flatulence/gas pain/belching    Food intolerance    Headache disorder    migraines    Heart palpitations    Heartburn    at least one a week    Hemorrhoids    High blood pressure    High cholesterol    History of depression    Hyperlipidemia     Indigestion    not normal    Kidney stone    Leg swelling    Loss of appetite    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, or unspecified(404.00)    Migraines    Nausea    on and off    Night sweats    Obesity, unspecified    Obsessive-compulsive disorders    ISAAC (obstructive sleep apnea)    AHI-16, O2 alyce-71%/CPAP-10cwp    Other specified cardiac dysrhythmias(427.89)    Other testicular hypofunction    Pain in joints    Pneumonia, organism unspecified(486)    Problems with swallowing    Shortness of breath    with simple movements    Sleep disturbance    Stress    Suicidal thoughts    Thoracic or lumbosacral neuritis or radiculitis, unspecified    L3,L4    Thrombocytopenia (HCC)    Uncomfortable fullness after meals    Unspecified essential hypertension    Unspecified sleep apnea    wears CPAP    Visual impairment    Vomiting    3 times after heartburn    Wears glasses    Weight gain    Weight loss     Past Surgical History:   Procedure Laterality Date    Angiogram  7/15/14    no cad noted    Endovas repair, infrarenl abdom aortic aneurysm/dissect      Epidurography, radiological s & i  4/18/2012    Procedure: CERVICAL EPIDURAL;  Surgeon: Sekou Solorzano MD;  Location: [facility]    Fluor gid & loclzj ndl/cath spi dx/ther njx  5/11/2012    Procedure: CERVICAL EPIDURAL;  Surgeon: Edil Alcocer MD;  Location: [facility]    Injection, w/wo contrast, dx/therapeutic substance, epidural/subarachnoid; cervical/thoracic  4/18/2012    Procedure: CERVICAL EPIDURAL;  Surgeon: Sekou Solorzano MD;  Location: [facility]    Injection, w/wo contrast, dx/therapeutic substance, epidural/subarachnoid; cervical/thoracic  5/11/2012    Procedure: CERVICAL EPIDURAL;  Surgeon: Edil Alcocer MD;  Location: [facility]    M-sedaj by sm phys perfrmg svc 5+ yr  4/18/2012     Procedure: CERVICAL EPIDURAL;  Surgeon: Sekou Solorzano MD;  Location: [facility]    M-sedaj by sm phys perfrmg svc 5+ yr  5/11/2012    Procedure: CERVICAL EPIDURAL;  Surgeon: Edil Alcocer MD;  Location: [facility]    Symp aaa urgent repair  10/14/2014    [place]; ascending aorta    Tonsillectomy       Family History   Problem Relation Age of Onset    Hypertension Father     Lipids Father     Prostate Cancer Father     Other (Other[other]) Paternal Grandfather     Heart Disorder Paternal Grandfather     Hypertension Paternal Grandfather     Stroke Paternal Grandfather     Hypertension Sister     Lipids Sister     Hypertension Brother     Lipids Brother     Psychiatric Brother     Hypertension Mother     Lipids Mother     Psychiatric Mother     Hypertension Maternal Grandfather     Heart Attack Maternal Grandfather     Stroke Maternal Grandfather       reports that he quit smoking about 20 years ago. His smoking use included cigarettes. He started smoking about 48 years ago. He has a 14 pack-year smoking history. He has never used smokeless tobacco. He reports that he does not currently use alcohol after a past usage of about 1.0 standard drink of alcohol per week. He reports that he does not use drugs.    Allergies:  Allergies[1]    Medications:    Current Facility-Administered Medications:     albuterol (Ventolin HFA) 108 (90 Base) MCG/ACT inhaler 2 puff, 2 puff, Inhalation, Q6H PRN    cloNIDine (Catapres) tab 0.1 mg, 0.1 mg, Oral, BID    cyclobenzaprine (Flexeril) tab 10 mg, 10 mg, Oral, Nightly    eplerenone (Inspra) tab 50 mg, 50 mg, Oral, Daily    fenofibrate micronized (Lofibra) cap 134 mg, 134 mg, Oral, Daily with breakfast    flecainide (Tambocor) tab 100 mg, 100 mg, Oral, BID    hydrALAZINE (Apresoline) tab 100 mg, 100 mg, Oral, QAM **AND** hydrALAZINE (Apresoline) tab 100 mg, 100 mg, Oral, Noon **AND** hydrALAZINE (Apresoline) tab 75 mg, 75 mg, Oral, QPM     losartan (Cozaar) tab 100 mg, 100 mg, Oral, Daily    meclizine (Antivert) tab 25 mg, 25 mg, Oral, TID PRN    metoprolol succinate ER (Toprol XL) 24 hr tab 200 mg, 200 mg, Oral, BID    rosuvastatin (Crestor) tab 40 mg, 40 mg, Oral, Nightly    fluticasone-salmeterol (Advair Diskus) 250-50 MCG/ACT inhaler 1 puff, 1 puff, Inhalation, BID    tamsulosin (Flomax) cap 0.4 mg, 0.4 mg, Oral, QPM    rivaroxaban (Xarelto) tab 20 mg, 20 mg, Oral, Nightly    acetaminophen (Tylenol Extra Strength) tab 500 mg, 500 mg, Oral, Q4H PRN    melatonin tab 3 mg, 3 mg, Oral, Nightly PRN    glycerin-hypromellose- (Artificial Tears) 0.2-0.2-1 % ophthalmic solution 1 drop, 1 drop, Both Eyes, QID PRN    sodium chloride (Saline Mist) 0.65 % nasal solution 1 spray, 1 spray, Each Nare, Q3H PRN    polyethylene glycol (PEG 3350) (Miralax) 17 g oral packet 17 g, 17 g, Oral, Daily PRN    sennosides (Senokot) tab 17.2 mg, 17.2 mg, Oral, Nightly PRN    bisacodyl (Dulcolax) 10 MG rectal suppository 10 mg, 10 mg, Rectal, Daily PRN    fleet enema (Fleet) rectal enema 133 mL, 1 enema, Rectal, Once PRN    ondansetron (Zofran) 4 MG/2ML injection 4 mg, 4 mg, Intravenous, Q6H PRN    prochlorperazine (Compazine) 10 MG/2ML injection 5 mg, 5 mg, Intravenous, Q8H PRN    furosemide (Lasix) 10 mg/mL injection 40 mg, 40 mg, Intravenous, BID (Diuretic)    glucose (Dex4) 15 GM/59ML oral liquid 15 g, 15 g, Oral, Q15 Min PRN **OR** glucose (Glutose) 40% oral gel 15 g, 15 g, Oral, Q15 Min PRN **OR** glucose-vitamin C (Dex-4) chewable tab 4 tablet, 4 tablet, Oral, Q15 Min PRN **OR** dextrose 50% injection 50 mL, 50 mL, Intravenous, Q15 Min PRN **OR** glucose (Dex4) 15 GM/59ML oral liquid 30 g, 30 g, Oral, Q15 Min PRN **OR** glucose (Glutose) 40% oral gel 30 g, 30 g, Oral, Q15 Min PRN **OR** glucose-vitamin C (Dex-4) chewable tab 8 tablet, 8 tablet, Oral, Q15 Min PRN    insulin aspart (NovoLOG) 100 Units/mL FlexPen 1-68 Units, 1-68 Units, Subcutaneous, TID CC     insulin degludec (Tresiba) 100 units/mL flextouch 10 Units, 10 Units, Subcutaneous, Nightly    insulin aspart (NovoLOG) 100 Units/mL FlexPen 1-10 Units, 1-10 Units, Subcutaneous, TID AC and HS    pantoprazole (Protonix) 40 mg in sodium chloride 0.9% PF 10 mL IV push, 40 mg, Intravenous, Q24H    hydrALAzine (Apresoline) 20 mg/mL injection 10 mg, 10 mg, Intravenous, Q6H PRN    Review of Systems:  A comprehensive review of systems was negative if not otherwise mention in above HPI.    /83 (BP Location: Right arm)   Pulse 63   Temp 98 °F (36.7 °C) (Oral)   Resp 18   Wt 270 lb 4.8 oz   SpO2 91%   BMI 37.70 kg/m²   Temp (24hrs), Av.8 °F (36.6 °C), Min:97.3 °F (36.3 °C), Max:98.2 °F (36.8 °C)       Intake/Output Summary (Last 24 hours) at 2024 0628  Last data filed at 2024 0515  Gross per 24 hour   Intake 360 ml   Output 5450 ml   Net -5090 ml     Wt Readings from Last 3 Encounters:   24 270 lb 4.8 oz   24 277 lb   24 276 lb       Physical Exam:   General: Alert and oriented x 3. No apparent distress. No respiratory or constitutional distress.  HEENT: Normocephalic   Neck: No JVD   Cardiac: Regular rate and rhythm. S1, S2 normal. No murmur   Lungs: Clear anteriorly  Extremities: trace edema  Neurologic: Alert and oriented, normal affect.  Skin: Warm and dry.     Laboratory Data:  Lab Results   Component Value Date    WBC 7.6 2024    HGB 14.4 2024    HCT 43.8 2024    .0 2024    CREATSERUM 1.54 2024    BUN 14 2024     2024    K 4.8 2024     2024    CO2 26.0 2024     2024    CA 9.5 2024    ALB 4.4 2024    ALKPHO 95 2024    BILT 0.6 2024    TP 7.4 2024    AST 37 2024    ALT 24 2024    LIP 63 2024    PGLU 92 2024       Imaging:  Echo Dec 2023:  The left ventricle is normal in size with mild left ventricular hypertrophy and normal global  left ventricular systolic function. The left ventricular ejection fraction is 50-55%.       Impression:  Principal Problem:    Acute on chronic congestive heart failure, unspecified heart failure type (HCC)    60 yo male with mild diastolic CHF exacerbation - improved with IV diuretic in ED  Chronic dyspnea on exertion - multifactorial  DM2  HTN - on clonidine, eplerenone, losartan and toprol  HLP - on high dose statin  ISAAC - CPAP at night  Obesity  Atrial flutter on chronic oral anticoagulation   Paroxysmal atrial fib - rhythm control strategy with oral flecainide    Recommendations:  - continue IV lasix BID for another 24 hrs; will not need long term oral diuretic with preserved LVEF on recent echo  - continue losartan, toprol and eplerenone for chronic diastolic HF  - monitor lytes; keep K>4, Mg>2  - strict I/O's and daily weights  - echo ordered - will help guide management  - rhythm control strategy for afib with BB and flecainide  - if GI interventions needed can hold Xarelto 24-48 hrs prior    Thank you for allowing me to participate in the care of your patient.    Kirk Castillo MD  11/21/2024  6:28 AM\       [1]   Allergies  Allergen Reactions    Jardiance [Empagliflozin] SWELLING and DIZZINESS

## 2024-11-21 NOTE — PROGRESS NOTES
[facility]   part of [facility]     Hospitalist Progress Note     Rivera Bush Patient Status:  Observation    1965 MRN [id number]   Location [facility] 0SW-A Attending Adam Tripp MD   Hosp Day # 0 PCP Won Davis MD     Chief Complaint: abd pain, SOB dyspnea     Subjective:     Patient  abd pain is resolved w/ large amt uo made.   Moving better    Objective:    Review of Systems:   A comprehensive review of systems was completed; pertinent positive and negatives stated in subjective.    Vital signs:  Temp:  [97.7 °F (36.5 °C)-98.2 °F (36.8 °C)] 98.2 °F (36.8 °C)  Pulse:  [55-66] 56  Resp:  [14-22] 15  BP: (147-205)/() 157/81  SpO2:  [91 %-98 %] 93 %    Physical Exam:    General: No acute distress  AO  bandaid over r eye s/p skin cancer removal   Respiratory: No wheezes, no rhonchi  sl diminished   Cardiovascular: S1, S2, regular rate and rhythm NSR   Abdomen: Soft, Non-tender, non-distended, obese  positive bowel sounds  Neuro: No new focal deficits.   Extremities: le bilat  edema  less, abd softer       Diagnostic Data:    Labs:  Recent Labs   Lab 24  1435 24  0707   WBC 7.6 8.3   HGB 14.4 14.5   MCV 90.1 89.4   .0 157.0       Recent Labs   Lab 24  1435 24  0707   * 91   BUN 14 15   CREATSERUM 1.54* 1.59*   CA 9.5 9.6   ALB 4.4  --     140   K 4.8 4.0    105   CO2 26.0 31.0   ALKPHO 95  --    AST 37*  --    ALT 24  --    BILT 0.6  --    TP 7.4  --        Estimated Creatinine Clearance: 53.3 mL/min (A) (based on SCr of 1.59 mg/dL (H)).    Recent Labs   Lab 24  1435   TROPHS 9       No results for input(s): \"PTP\", \"INR\" in the last 168 hours.               Microbiology    No results found for this visit on 24.      Imaging: Reviewed in Epic.  Echo-   1. Left ventricle: The cavity size was normal. Wall thickness was mildly to      moderately increased. Systolic function was normal. The estimated      ejection  fraction was 60-65%, by visual assessment. No diagnostic      evidence for regional wall motion abnormalities. Left ventricular      diastolic function parameters were normal for the patient's age.   2. Right ventricle: The cavity size was normal. Systolic function was      normal.   3. Left atrium: The atrium was mildly to moderately dilated.   4. Aortic root: The aortic root was 4.0cm diameter. The ascending aorta is      not well visualized.   5. Pulmonary arteries: Systolic pressure was mildly increased, in the range      of 40mm Hg to 45mm Hg.   6. Pericardium, extracardiac: There was no pericardial effusion.   Impressions:  This study is compared with previous dated 02/11/6/2021: No   significant change since prior study.   Medications:    fluticasone propionate  1 spray Each Nare Daily    cloNIDine  0.1 mg Oral BID    cyclobenzaprine  10 mg Oral Nightly    eplerenone  50 mg Oral Daily    fenofibrate micronized  134 mg Oral Daily with breakfast    flecainide  100 mg Oral BID    hydrALAZINE  100 mg Oral QAM    And    hydrALAZINE  100 mg Oral Noon    And    hydrALAZINE  75 mg Oral QPM    losartan  100 mg Oral Daily    Metoprolol Succinate ER  200 mg Oral BID    rosuvastatin  40 mg Oral Nightly    fluticasone-salmeterol  1 puff Inhalation BID    tamsulosin  0.4 mg Oral QPM    [Held by provider] rivaroxaban  20 mg Oral Nightly    furosemide  40 mg Intravenous BID (Diuretic)    insulin aspart  1-68 Units Subcutaneous TID CC    insulin degludec  10 Units Subcutaneous Nightly    insulin aspart  1-10 Units Subcutaneous TID AC and HS    pantoprazole  40 mg Intravenous Q24H       Assessment & Plan:       #HFrEF - acute exacerbation   EKG sinus, TWI in lateral leads  CTA c/a/p - congestive findings, interstital edema  Lasix 40mg IV BID, I/O's, daily weight  Echo  reviewed   Resume home ARB, Eplerenone  Cardiology  following       #Intractable abdominal pain  Ct noted duodenal inflammation/ulcer  IV PPI  GI consult   Hg  wnl  Will need EGD/ c scope ? Sat   Holding DOAC      #Accelerated HTN  #Essential HTN  Resume home meds: Hydralazine, metoprolol, clonidine     #Paroxysmal a. Fib  Flecainide  Xarelto for AC  on hold for scope     #HLD   Statin     #DM Type 2  Degludec, ISS     #BPH  Flomax     #Pagets disease of bone  Noted on CT     #CKD 3A  Cr close to baseline      #Aortic aneurysm repair   2014         Divina Kinney NP    Supplementary Documentation:     Quality:  DVT Mechanical Prophylaxis:     Early ambuation  DVT Pharmacologic Prophylaxis   Medication    [Held by provider] rivaroxaban (Xarelto) tab 20 mg                Code Status: Full Code  [place]: No urinary catheter in place  [place] Duration (in days):   Central line:    HIREN:     Discharge is dependent on: clinical course  At this point Mr. Bush is expected to be discharge to: home     The 21st Century Cures Act makes medical notes like these available to patients in the interest of transparency. Please be advised this is a medical document. Medical documents are intended to carry relevant information, facts as evident, and the clinical opinion of the practitioner. The medical note is intended as peer to peer communication and may appear blunt or direct. It is written in medical language and may contain abbreviations or verbiage that are unfamiliar.          ADDENDUM:    Patient seen and examined independently   Chest: diminished B/L  CVS: S1, S2, RRR  ABD: Soft, NT, ND, BS+  EXT: No c/c, +edema b/l     Assessment/Plan  I evaluated the patient and agree with the above note and plan.  The chart was reviewed and case was d/w rounding APN.   I made clinical decisions independently    #Acute CHF  #ABd pain  #HTN- uncontrolled   #DM    Diuresing well- cont lasix  Appreciate cardio input  Abd pain better- scopes per GI and hold DOAC  Adjust BP meds  Monitor BG     Adam Tripp MD

## 2024-11-21 NOTE — PLAN OF CARE
Assumed pt care at 2130  Pt is A&Ox4, following commands.   Pt on room air, VSS.  NSR  Pt denies pain.  Pt up ad gael.  Clear liquid diet  QID accuchceck  Strict I&O  Daily weights  CPAP for sleep  PIV saline locked  IV lasix  Bed in lowest position, call light in reach.     Problem: Diabetes/Glucose Control  Goal: Glucose maintained within prescribed range  Description: INTERVENTIONS:  - Monitor Blood Glucose as ordered  - Assess for signs and symptoms of hyperglycemia and hypoglycemia  - Administer ordered medications to maintain glucose within target range  - Assess barriers to adequate nutritional intake and initiate nutrition consult as needed  - Instruct patient on self management of diabetes  11/21/2024 0102 by Micki Desai, RN  Outcome: Progressing  11/21/2024 0102 by Micki Desai, RN  Outcome: Progressing     Problem: PAIN - ADULT  Goal: Verbalizes/displays adequate comfort level or patient's stated pain goal  Description: INTERVENTIONS:  - Encourage pt to monitor pain and request assistance  - Assess pain using appropriate pain scale  - Administer analgesics based on type and severity of pain and evaluate response  - Implement non-pharmacological measures as appropriate and evaluate response  - Consider cultural and social influences on pain and pain management  - Manage/alleviate anxiety  - Utilize distraction and/or relaxation techniques  - Monitor for opioid side effects  - Notify MD/LIP if interventions unsuccessful or patient reports new pain  - Anticipate increased pain with activity and pre-medicate as appropriate  Outcome: Progressing

## 2024-11-21 NOTE — PLAN OF CARE
Received patient at 0730. Alert and oriented x4. Tele rhythm NSR. O2 saturation 94%. Breath sounds clear. Bed is locked and in low position. Call light and personal belongings in reach. No c/o chest pain or shortness of breath. Pt voiding with no issue. Pt ambulated in hallway with no issues. Skin dry and intact. Care plan reviewed, pt verbalizes understanding.

## 2024-11-21 NOTE — HISTORICAL OFFICE NOTE
Facility Logo [facility]  [address], 4th [place] [address]  [phone]      Rivera Bush  Progress Note  Demographics:  Name: Rivera Bush YOB: 1965  Age: 59, Male Medical Record No: [id number]  Visited Date/Time: 08/06/2024 03:50 PM    Chief Complaints  2 month follow up  History of Present Illness  58-year-old male with complex medical history including hypertension, diabetes, hyperlipidemia, obstructive sleep apnea who is here with 3 to 4 months of progressive dyspnea and palpitations.  He also has a gallbladder that needs to be removed as well as an abdominal hernia that needs surgery.  This is scheduled currently for January 12    He is significantly short of breath.  He says just even walking the halls are make him short of breath and this is not something that occurred 3 to 4 months ago    He has been chronically on anticoagulation however I am not completely sure why.  His last monitors have all shown no atrial fibrillation but his most recent heart monitor which was placed by bhavesh shows atrial flutter.    Current visit January 16, 2024  - Patient had a cardioversion.  Maintaining sinus rhythm.'s been 1 month since his cardioversion    Visit June 11 , 2024  Patient is scheduled to have an EGD and colonoscopy and then eventually will need a hernia repair in gallbladder surgery.  He came into the office last week and was noted to be in A-fib but is back in sinus rhythm.  He thinks this may have been triggered by some severe abdominal discomfort from his hernia    Visit Aug 6, 2024  Patient had his GI procedures.  He had an ulcer and has a hiatal hernia.  May be seeing a surgeon for possible surgical repair  - Has been very good from an A-fib perspective.  No further episodes  Cardiac risk factors Diabetes, Hypertension, Dyslipidemia, Obesity and Former smoker  Past Medical History  1.History of thoracic aortic aneurysm  repair  2.Palpitations  3.PAF (paroxysmal atrial fibrillation)  4.H/O ascending aortic replacement  5.Obstructive sleep apnea - ISAAC  6.Hypertension (HTN), primary  7.Acute diastolic (congestive) heart failure  8.Hyperlipidemia (unspecified)  9.DM Type 2 (diabetes mellitus)  Past Surgical History  1.H/O ascending aortic replacement  Family History  No significant family history noted.  Social History  Smoking status Former smoker  Tobacco usage - No (Non-smoker for personal reasons (finding))  Review of systems  Cardiovascular No history of Chest pain, LOERA, Palpitations, Syncope, PND, Orthopnea, Edema and Claudication  Physical Examination  Vitals Right Arm Sitting  / 90 mmHg, Pulse rate 63 bpm, Height in 5' 11\", BMI: 39, Weight in 280 lbs (or) 127.01 kgs and BSA : 2.57 cc/m²  General Appearance No Acute Distress, Well groomed and Normal Body habitus  Cardiovascular   EKG/Other abnormalities  General - NAD  PEERLA/EOMI  JVD - normal  CTA Bilaterally  CV-regular rate rhythm S1-S2  GI- Soft, Nontender  Extremities normal pulses  Mood/Affect Congruent  Skin no lesions  Joint/Musculoskeletal - Normal  Allergies  1.Jardiance - Drug Class(Reaction:vomitting, diarrhea, myalgia, Severity:Severe)  Medications  1.atorvastatin 20 mg tablet, Take 1 tablet orally once a day.  2.cholecalciferol (vitamin D3) 50 mcg (2,000 unit) capsule, Take 1 capsule orally 2 times a day.  3.cloNIDine HCL 0.1 mg tablet, Take 1 tablet orally once a day.  4.eplerenone 50 mg tablet, Take 2 tablets orally once a day.  5.flecainide 100 mg tablet, Take 1 tablet orally 2 times a day.  6.furosemide 20 mg tablet, Take 1 tablet orally once a day.  7.hydrALAZINE 50 mg tablet, Take 1 tablet orally 3 times a day.  8.losartan 100 mg tablet, Take 1 tablet orally once a day.  9.magnesium 400 mg (as magnesium oxide) capsule, Take 1 capsule orally once a day.  10.metFORMIN  mg tablet,extended release 24 hr, Take 2 tablets orally once a  day.  11.metoprolol succinate  mg tablet,extended release 24 hr, Take 1 tablet orally 2 times a day.  12.potassium chloride ER 20 mEq tablet,extended release, Take 1 tablet orally 2 times a day.  13.Symbicort 160 mcg-4.5 mcg/actuation HFA aerosol inhaler, Inhale 2 puffs by mouth 2 times a day.  14.Ventolin HFA 90 mcg/actuation aerosol inhaler, Inhale 2 puffs by mouth every 6 hours as needed for shortness of breath.  15.Xarelto 20 mg tablet, Take 1 tablet orally once a day.  Impression  1.History of thoracic aortic aneurysm repair  2.Palpitations  3.PAF (paroxysmal atrial fibrillation)  4.H/O ascending aortic replacement  5.Obstructive sleep apnea - ISAAC  6.Hypertension (HTN), primary  7.Acute diastolic (congestive) heart failure  8.Hyperlipidemia (unspecified)  9.DM Type 2 (diabetes mellitus)  Assessment & Plan  Recommend low sodium diet, daily exercise and maintain a normal BMI. Recommend monitor blood pressure at home.    Increased BMI: Provide patient with information regarding diet and lifestyle changes.  ===============================  Cardiac Testing Personally Reviewed    ECG Reviewed -atrial flutter typical looking controlled ventricular response    Echo Results as patient's echocardiogram December 7, 2023 shows normal EF.  Left atrium is moderately enlarged    Stress Test Results patient had a left heart cath in 2014.  Angiographically normal heart arteries.    Monitor Results 100% atrial flutter    EP Procedures: Cardioversion December 22, 2023  Cardioversion April 26, 2024  CTI flutter ablation May 3, 2024  ==============================    Problem List:    # Symptomatic atrial flutter  - Now in sinus rhythm after his cardioversion.  And ablation  -But has had some A-fib.  This was spontaneously converted    #Paroxysmal atrial fibrillation  - In the future we may need to consider ablation for his A-fib but currently his most pressing issues are his GI Procedures.  There is no issue with proceeding  currently    #Cardiac risk assessment  - Patient may now proceed with surgery.  He may stop anticoagulation for 48 hours before the surgery.     # Essential hypertension     # Type 2 diabetes     # Obstructive sleep apnea     # []      Continue current meds except as outlined above.  =====================================    Check out Items:  Follow-up in 6 months  Future appointments  1.Follow up visit - Chris River MD (6 Months)  Miscellaneous  1.Reviewed trans thoracic echocardiogram, ambulatory telemetry monitor with the patient.  Nurses documentation  Refills: none  Upcoming surgeries: none  Use of assistive device: cpap  Verbal order for EKG: no  (MB, AMRIT)       Patient instructions  Follow up with Dr. River in 6 months  Lab Details  POCT GLUCOSE  05/03/2024 10:24:41 AM  POC GLUCOSE 89 70-99 mg/dL  F  BASIC METABOLIC PANEL (8)  04/26/2024 10:17:59 AM  GLUCOSE 92 70-99 mg/dL  F  SODIUM 142 136-145 mmol/L  F  POTASSIUM 4.2 3.5-5.1 mmol/L  F  CHLORIDE 112  mmol/L  F  CO2 25.0 21.0-32.0 mmol/L  F  ANION GAP 5 0-18 mmol/L  F  BUN 18 9-23 mg/dL  F  CREATININE 1.46 0.70-1.30 mg/dL H F  CALCIUM 9.1 8.5-10.1 mg/dL  F  OSMOLALITY CALCULATED 296 275-295 mOsm/kg H F  E GFR CR 55 >=60 mL/min/1.73m2 L F  FASTING PATIENT BMP ANSWER Yes   F  CBC, PLATELET; NO DIFFERENTIAL  04/26/2024 09:56:54 AM  WBC 7.4 4.0-11.0 x10(3) uL  F  RED BLOOD COUNT 5.06 4.30-5.70 x10(6)uL  F  HGB 15.0 13.0-17.5 g/dL  F  HCT 44.8 39.0-53.0 %  F  PLATELETS 156.0 150.0-450.0 10(3)uL  F  MEAN CELL VOLUME 88.5 80.0-100.0 fL  F  MEAN CORPUSCULAR HEMOGLOBIN 29.6 26.0-34.0 pg  F  MEAN CORPUSCULAR HGB CONC 33.5 31.0-37.0 g/dL  F  RED CELL DISTRIBUTION WIDTH CV 13.4 %  F  Diagnostics Details  Trans Thoracic Echocardiogram 12/07/2023  1.The left ventricle is normal in size with mild left ventricular hypertrophy and normal global left ventricular systolic function. The left ventricular ejection fraction is 50-55%.    2.Left atrium is moderately  enlarged.    3.The patient was in atrial flutter during the study.    ACTMonitoring 11/03/2023  1.This is a good quality study.    2.Predominant rhythm is atrial fibrillation.    3.The minimum heart rate recorded was 50 beats / minute. The maximum heart rate is 155 beats / minute. The mean heart rate is 90 beats / minute.    4.No evidence of AV block is noted.    5.A fib burden at 91%    6.No pauses were noted.    Care Providers: Chris River MD and Susan Reece  Electronically Authenticated by  Chris Rievr MD  08/06/2024 04:13:38 PM  Disclaimer: Components of this note were documented using voice recognition system and are subject to errors not corrected at proofreading. Contact the author of this note for any clarifications.

## 2024-11-22 NOTE — PLAN OF CARE
Assumed care of pt at 1930, pt denies pain. Pt with fluid restriction. On lasix iv with good response. Sb on tele. Iv sl. All questions and concerns addressed. Possible EGD/Colonoscopy Saturday. Xaralto on hold.

## 2024-11-22 NOTE — PROGRESS NOTES
[facility]   part of [facility]     Hospitalist Progress Note     Rivera Bush Patient Status:  Observation    1965 MRN [id number]   Location [facility] 0SW-A Attending Adam Tripp MD   Hosp Day # 0 PCP Won Davis MD     Subjective:     Feeling much better   Diuresing and losing weight     Objective:    Review of Systems:   A comprehensive review of systems was completed; pertinent positive and negatives stated in subjective.    Vital signs:  Temp:  [98.1 °F (36.7 °C)-98.3 °F (36.8 °C)] 98.2 °F (36.8 °C)  Pulse:  [57-62] 57  Resp:  [16-20] 16  BP: (124-166)/(64-94) 124/64  SpO2:  [91 %-96 %] 94 %    Physical Exam:    General: No acute distress  AO  bandaid over r eye s/p skin cancer removal   Respiratory: No wheezes, no rhonchi  sl diminished   Cardiovascular: S1, S2, regular rate and rhythm NSR   Abdomen: Soft, Non-tender, non-distended, obese  positive bowel sounds  Neuro: No new focal deficits.   Extremities: le bilat  edema- improving       Diagnostic Data:    Labs:  Recent Labs   Lab 24  1435 24  0707   WBC 7.6 8.3   HGB 14.4 14.5   MCV 90.1 89.4   .0 157.0       Recent Labs   Lab 24  1435 24  0707   * 91   BUN 14 15   CREATSERUM 1.54* 1.59*   CA 9.5 9.6   ALB 4.4  --     140   K 4.8 4.0    105   CO2 26.0 31.0   ALKPHO 95  --    AST 37*  --    ALT 24  --    BILT 0.6  --    TP 7.4  --        Estimated Creatinine Clearance: 53.3 mL/min (A) (based on SCr of 1.59 mg/dL (H)).    Recent Labs   Lab 24  1435   TROPHS 9       No results for input(s): \"PTP\", \"INR\" in the last 168 hours.               Microbiology    No results found for this visit on 24.      Imaging: Reviewed in Epic.  Echo-   1. Left ventricle: The cavity size was normal. Wall thickness was mildly to      moderately increased. Systolic function was normal. The estimated      ejection fraction was 60-65%, by visual assessment. No diagnostic      evidence for  regional wall motion abnormalities. Left ventricular      diastolic function parameters were normal for the patient's age.   2. Right ventricle: The cavity size was normal. Systolic function was      normal.   3. Left atrium: The atrium was mildly to moderately dilated.   4. Aortic root: The aortic root was 4.0cm diameter. The ascending aorta is      not well visualized.   5. Pulmonary arteries: Systolic pressure was mildly increased, in the range      of 40mm Hg to 45mm Hg.   6. Pericardium, extracardiac: There was no pericardial effusion.   Impressions:  This study is compared with previous dated 02/11/6/2021: No   significant change since prior study.   Medications:    pantoprazole  40 mg Intravenous Q12H    [START ON 11/23/2024] furosemide  20 mg Oral Daily    fluticasone propionate  1 spray Each Nare Daily    cloNIDine  0.1 mg Oral BID    cyclobenzaprine  10 mg Oral Nightly    eplerenone  50 mg Oral Daily    fenofibrate micronized  134 mg Oral Daily with breakfast    flecainide  100 mg Oral BID    hydrALAZINE  100 mg Oral QAM    And    hydrALAZINE  100 mg Oral Noon    And    hydrALAZINE  75 mg Oral QPM    losartan  100 mg Oral Daily    Metoprolol Succinate ER  200 mg Oral BID    rosuvastatin  40 mg Oral Nightly    fluticasone-salmeterol  1 puff Inhalation BID    tamsulosin  0.4 mg Oral QPM    [Held by provider] rivaroxaban  20 mg Oral Nightly    furosemide  40 mg Intravenous BID (Diuretic)    insulin aspart  1-68 Units Subcutaneous TID CC    insulin degludec  10 Units Subcutaneous Nightly    insulin aspart  1-10 Units Subcutaneous TID AC and HS       Assessment & Plan:       #HFrEF - acute exacerbation   Diuresing well   Echo  reviewed   Resume home ARB, Eplerenone  Cardiology  following       #Intractable abdominal pain  Ct noted duodenal inflammation/ulcer  IV PPI  GI consulted for scopes- inpatient vs outpatient   Hold DOAC       #Accelerated HTN  #Essential HTN  Resume home meds: Hydralazine, metoprolol,  clonidine     #Paroxysmal a. Fib  Flecainide  Xarelto for AC  on hold for scope     #HLD   Statin     #DM Type 2  Degludec, ISS     #BPH  Flomax     #Pagets disease of bone  Noted on CT     #CKD 3A  Cr close to baseline      #Aortic aneurysm repair   2014       DC planning this weekend   Per Cardiology benefit from SGLT2-I given HFpEF if ok with hospitalist (unsure if would need to make adjustments in metformin       Supplementary Documentation:     Quality:  DVT Mechanical Prophylaxis:     Early ambuation  DVT Pharmacologic Prophylaxis   Medication    [Held by provider] rivaroxaban (Xarelto) tab 20 mg                Code Status: Full Code  Medellin: No urinary catheter in place  Med[place]in Duration (in days):   Central line:    HIREN:     Discharge is dependent on: clinical course  At this point Mr. Bush is expected to be discharge to: home     The 21st Century Cures Act makes medical notes like these available to patients in the interest of transparency. Please be advised this is a medical document. Medical documents are intended to carry relevant information, facts as evident, and the clinical opinion of the practitioner. The medical note is intended as peer to peer communication and may appear blunt or direct. It is written in medical language and may contain abbreviations or verbiage that are unfamiliar.

## 2024-11-22 NOTE — PROGRESS NOTES
[facility]   part of [facility]     Cardiology Progress Note        Rivera Bush Patient Status:  Observation    1965 MRN [id number]   Location [facility] 0SW-A Attending Adam Tripp MD   Hosp Day # 0 PCP Won Davis MD         Subjective/ROS:    Denies sob- now resolved. Denies having pnd/orthopnea overnight.  Abd pain is improved but ct scan with duodenal abn.     Objective:  BP (!) 166/94 (BP Location: Right arm)   Pulse 60   Temp 98.2 °F (36.8 °C) (Oral)   Resp 16   Wt 270 lb 4.8 oz (122.6 kg)   SpO2 96%   BMI 37.70 kg/m²     Temp (24hrs), Av.2 °F (36.8 °C), Min:98.1 °F (36.7 °C), Max:98.3 °F (36.8 °C)      Tele  Sinus rhythm, sinus raine    Intake/Output:    Intake/Output Summary (Last 24 hours) at 2024 1103  Last data filed at 2024 0900  Gross per 24 hour   Intake 600 ml   Output 2100 ml   Net -1500 ml       Patient Weight for the past 72 hrs:   Weight   24 1351 285 lb (129.3 kg)   24 285 lb (129.3 kg)   24 0515 270 lb 4.8 oz (122.6 kg)        Physical Exam:    Gen: alert, oriented x 3, NAD walking around room   Heent: pupils equal, reactive. Mucous membranes moist.   Neck: no jvd  Cardiac: regular rate and rhythm, normal S1,S2  Lungs: clear posteriorly   Abd: soft, NT/ND +bs  Ext: very scant residual LE edema at sock line   Skin: Warm, dry  Neuro: No focal deficits    Labs:       CXR: Reviewed from - + pulm vascular congestion     CTA chest: 1. Congestive heart failure and interstitial edema.   2. Marilyn duodenal fat stranding suggestive of duodenal inflammation/ulcer.   3. Generalized bladder wall thickening suggestive of cystitis.   4. Mild mediastinal lymphadenopathy.   5. Increased splenomegaly.   6. Cholelithiasis.   7. 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   8. Small fat containing umbilical hernia.     Medications:     pantoprazole  40 mg Intravenous Q12H    fluticasone  propionate  1 spray Each Nare Daily    cloNIDine  0.1 mg Oral BID    cyclobenzaprine  10 mg Oral Nightly    eplerenone  50 mg Oral Daily    fenofibrate micronized  134 mg Oral Daily with breakfast    flecainide  100 mg Oral BID    hydrALAZINE  100 mg Oral QAM    And    hydrALAZINE  100 mg Oral Noon    And    hydrALAZINE  75 mg Oral QPM    losartan  100 mg Oral Daily    Metoprolol Succinate ER  200 mg Oral BID    rosuvastatin  40 mg Oral Nightly    fluticasone-salmeterol  1 puff Inhalation BID    tamsulosin  0.4 mg Oral QPM    [Held by provider] rivaroxaban  20 mg Oral Nightly    furosemide  40 mg Intravenous BID (Diuretic)    insulin aspart  1-68 Units Subcutaneous TID CC    insulin degludec  10 Units Subcutaneous Nightly    insulin aspart  1-10 Units Subcutaneous TID AC and HS         Assessment:    Abd pain- ct concerning for PUD, duodenitis, r/o duodenal lesion   IV PPI  Plan for egd this weekend- per GI  Acute/chronic diastolic hf  Appears resolved. Neg 6L, no wt today. 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 A1C 5.4    Plan:     Transition back to po diuretics tomorrow (ordered)  Xarelto on hold for EGD  Would benefit from SGLT2-I given HFpEF if ok with hospitalist (unsure if would need to make adjustments in metformin)      Will follow peripherally over the weekend while pt awaits his EGD. Please call if issues arise or if needed      Discussed plan of care with patient and nursing.       Shelly Carr, TARIK  [phone]      Patient seen and examined independently.  Note reviewed and labs reviewed. Agree with above assessment and plan.  59-year-old male who presents with abdominal pain  and is planning to undergo EGD tomorrow.  Also arrived with signs of volume overload in the setting of chronic diastolic heart failure.  States he has not been using his p.o. furosemide at home given difficulty getting the opportunity to urinate frequently given his commute and work schedule.  Resume Xarelto when okay from GI perspective.  Volume status is improved as of today.  Recommend transition to p.o. diuretics tomorrow.  Consider SGLT2 inhibitor addition.  Will follow peripherally from cardiology standpoint.  Please contact if any questions or concerns arise.        Gelacio Owens DO  Cardiologist  [facility]  11/22/2024 11:57 AM      Note to the patient: The 21st Century Cures Act makes medical notes like these available to patients in the interest of transparency. However, be advised that this is a medical document. It is intended as peer to peer communication. It is written in medical language and may contain abbreviations or verbiage that are unfamiliar. It may appear blunt or direct. Medical documents are intended to carry relevant information, facts as evident, and clinical opinion of the practitioner.     Disclaimer: Components of this note were documented using voice recognition system and are subject to errors not corrected at proofreading. Contact the author of this note for any clarifications.

## 2024-11-22 NOTE — PROGRESS NOTES
[facility]  GASTROENTEROLOGY PROGRESS NOTE   [facility]     Rivera Bush Patient Status:  Observation    1965 MRN [id number]   Location [facility] 0SW-A Attending Adam Tripp MD   Hosp Day # 0 PCP Won Davis MD       Reason for Consultation:   Abnormal CT - duodenum  Abdominal pain    Subjective:   Abdominal pain improved;   Still w/ SOB and LOERA  Denies any n/v.     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     Physical Exam  /78 (BP Location: Right arm)   Pulse 57   Temp 98.1 °F (36.7 °C) (Oral)   Resp 20   Wt 270 lb 4.8 oz (122.6 kg)   SpO2 93%   BMI 37.70 kg/m²   Body mass index is 37.7 kg/m².      Gen: No acute distress  Resp: no respiratory distress  Abd: Soft, non-tender, non-distended. No rebound tenderness, no guarding.   Neuro: Aox3.     Diagnostic Data:      Labs:  Recent Labs   Lab 24  1435 24  0707   WBC 7.6 8.3   HGB 14.4 14.5   MCV 90.1 89.4   .0 157.0       Recent Labs   Lab 24  1435 24  0707   * 91    BUN 14 15   CREATSERUM 1.54* 1.59*   CA 9.5 9.6   ALB 4.4  --     140   K 4.8 4.0    105   CO2 26.0 31.0   ALKPHO 95  --    AST 37*  --    ALT 24  --    BILT 0.6  --    TP 7.4  --        No results for input(s): \"PTP\", \"INR\" in the last 168 hours.    No data recorded        Imaging: Imaging data reviewed in Epic.    Medications:    fluticasone propionate  1 spray Each Nare Daily    cloNIDine  0.1 mg Oral BID    cyclobenzaprine  10 mg Oral Nightly    eplerenone  50 mg Oral Daily    fenofibrate micronized  134 mg Oral Daily with breakfast    flecainide  100 mg Oral BID    hydrALAZINE  100 mg Oral QAM    And    hydrALAZINE  100 mg Oral Noon    And    hydrALAZINE  75 mg Oral QPM    losartan  100 mg Oral Daily    Metoprolol Succinate ER  200 mg Oral BID    rosuvastatin  40 mg Oral Nightly    fluticasone-salmeterol  1 puff Inhalation BID    tamsulosin  0.4 mg Oral QPM    [Held by provider] rivaroxaban  20 mg Oral Nightly    furosemide  40 mg Intravenous BID (Diuretic)    insulin aspart  1-68 Units Subcutaneous TID CC    insulin degludec  10 Units Subcutaneous Nightly    insulin aspart  1-10 Units Subcutaneous TID AC and HS    pantoprazole  40 mg Intravenous Q24H       Allergies:  Allergies[1]    Imaging:  I have personally reviewed all pertinent available imaging.       ASSESSMENT / PLAN:     Patient is a 59 year old with HTN, DM, Afib on Xarelto, with GI consult for abnormal CT scan (duodenum), abdominal pain.   Concerning for PUD, duodenitis, r/o duodenal lesion.     Volume overload improving, diuresing, cards on consult; CHF exacerbation  Xarelto - last dose 11/21 - CrCl noted, likely washout by this weekend      Recommendations:   EGD - favor in the next several days, when volume optimized, SOB/LOERA resolved, and Xarelto washout as above   PPI IV q12 hours  Diuresis, anti-thrombotic per cardiology   IVF, analgesia, anti-emetics per primary team    D/w primary, Dr Qasim Moss MD  [facility]  Gastroenterology               [1]   Allergies  Allergen Reactions    Jardiance [Empagliflozin] SWELLING and DIZZINESS

## 2024-11-23 NOTE — OPERATIVE REPORT
EGD operative report  Patient Name: Rivera Bush  Date: 11/23/2024  Procedure: Esophagogastroduodenoscopy   Pre-Op Diagnosis: abnormal CT scan  Post-Op Diagnosis: normal exam  Attending: Angel Mendoza M.D.  Consent:  The risks, benefits, and alternatives were discussed with the patient / POA.  Risks included, but were not limited to, bleeding, perforation, medication effects, cardiac arrhythmias, and aspiration.  After all questions were answered to their satisfaction, a signed, informed, and witnessed consent was obtained.  Sedation: Monitored Anesthesia Care  Monitoring:  Pulsoximetry, pulse, respirations, and blood pressure were monitored throughout the entire procedure  Procedure: After achieving adequate sedation and placing the patient in the left lateral decubitus position, the lubricated upper endoscope was introduced into the mouth and advanced to the descending duodenum.  The endoscope was then withdrawn into the gastric antrum and placed in a retroflexed position.  The endoscope was then righted, and air was suctioned from the stomach.  The endoscope was then withdrawn from the patient, with careful visual inspection of the mucosa revealing no additional pathologic findings.  The patient tolerated the procedure without apparent procedural complications.  The patient left the procedure room in stable condition for recovery.    Findings:   Esophagus: The mucosa was normal.    GE junction: The GE junction is normal.  Stomach:  The gastric body, antrum, fundus, cardia, and angularis were normal.     Duodenum: The duodenal bulb, post-bulbar duodenum, and descending duodenum were normal.   Impression: Findings as above.  Recommendations:   Normal exam.  No findings to correlate with CT findings.  OK to resume anticoagulation.  Cardiac diet.  Follow-up with Dr Mendoza as needed in outpatient setting.      Angel Mendoza MD

## 2024-11-23 NOTE — PLAN OF CARE
Problem: Diabetes/Glucose Control  Goal: Glucose maintained within prescribed range  Description: INTERVENTIONS:  - Monitor Blood Glucose as ordered  - Assess for signs and symptoms of hyperglycemia and hypoglycemia  - Administer ordered medications to maintain glucose within target range  - Assess barriers to adequate nutritional intake and initiate nutrition consult as needed  - Instruct patient on self management of diabetes  Outcome: Progressing     Problem: Patient/Family Goals  Goal: Patient/Family Long Term Goal  Description: Patient's Long Term Goal: Stay healthy    Interventions:  - Resume home medication regimen  - Follow up with providers  - See additional Care Plan goals for specific interventions  Outcome: Progressing  Goal: Patient/Family Short Term Goal  Description: Patient's Short Term Goal: Discharge from hospital    Interventions:   - EGD Saturday  - IV PPI  - Lasix  - See additional Care Plan goals for specific interventions  Outcome: Progressing     Problem: PAIN - ADULT  Goal: Verbalizes/displays adequate comfort level or patient's stated pain goal  Description: INTERVENTIONS:  - Encourage pt to monitor pain and request assistance  - Assess pain using appropriate pain scale  - Administer analgesics based on type and severity of pain and evaluate response  - Implement non-pharmacological measures as appropriate and evaluate response  - Consider cultural and social influences on pain and pain management  - Manage/alleviate anxiety  - Utilize distraction and/or relaxation techniques  - Monitor for opioid side effects  - Notify MD/LIP if interventions unsuccessful or patient reports new pain  - Anticipate increased pain with activity and pre-medicate as appropriate  Outcome: Progressing

## 2024-11-23 NOTE — PROGRESS NOTES
[facility]   part of [facility]     Hospitalist Progress Note     Rivera Bush Patient Status:  Observation    1965 MRN [id number]   Location [facility] 0SW-A Attending Adam Tripp MD   Hosp Day # 0 PCP Won Davis MD     Subjective:     Feeling much better   Ready to go home     Objective:    Review of Systems:   A comprehensive review of systems was completed; pertinent positive and negatives stated in subjective.    Vital signs:  Temp:  [97.3 °F (36.3 °C)-98.3 °F (36.8 °C)] 97.4 °F (36.3 °C)  Pulse:  [51-64] 57  Resp:  [16-18] 18  BP: (112-166)/(56-94) 145/82  SpO2:  [93 %-96 %] 93 %    Physical Exam:    General: No acute distress    Respiratory: No wheezes, no rhonchi  Cardiovascular: S1, S2, regular rate and rhythm NSR   Abdomen: Soft, Non-tender, non-distended, obese  positive bowel sounds  Neuro: No new focal deficits.   Extremities: le bilat  edema- improving       Diagnostic Data:    Labs:  Recent Labs   Lab 24  1435 24  0707   WBC 7.6 8.3   HGB 14.4 14.5   MCV 90.1 89.4   .0 157.0       Recent Labs   Lab 24  1435 24  0707 24  0532   * 91 92   BUN 14 15 31*   CREATSERUM 1.54* 1.59* 1.97*   CA 9.5 9.6 9.4   ALB 4.4  --   --     140 141   K 4.8 4.0 3.5    105 103   CO2 26.0 31.0 32.0   ALKPHO 95  --   --    AST 37*  --   --    ALT 24  --   --    BILT 0.6  --   --    TP 7.4  --   --        Estimated Creatinine Clearance: 43 mL/min (A) (based on SCr of 1.97 mg/dL (H)).    Recent Labs   Lab 24  1435   TROPHS 9       No results for input(s): \"PTP\", \"INR\" in the last 168 hours.               Microbiology    No results found for this visit on 24.      Imaging: Reviewed in Epic.  Echo-   1. Left ventricle: The cavity size was normal. Wall thickness was mildly to      moderately increased. Systolic function was normal. The estimated      ejection fraction was 60-65%, by visual assessment. No diagnostic      evidence  for regional wall motion abnormalities. Left ventricular      diastolic function parameters were normal for the patient's age.   2. Right ventricle: The cavity size was normal. Systolic function was      normal.   3. Left atrium: The atrium was mildly to moderately dilated.   4. Aortic root: The aortic root was 4.0cm diameter. The ascending aorta is      not well visualized.   5. Pulmonary arteries: Systolic pressure was mildly increased, in the range      of 40mm Hg to 45mm Hg.   6. Pericardium, extracardiac: There was no pericardial effusion.   Impressions:  This study is compared with previous dated 02/11/6/2021: No   significant change since prior study.   Medications:    pantoprazole  40 mg Intravenous Q12H    furosemide  20 mg Oral Daily    fluticasone propionate  1 spray Each Nare Daily    cloNIDine  0.1 mg Oral BID    cyclobenzaprine  10 mg Oral Nightly    eplerenone  50 mg Oral Daily    fenofibrate micronized  134 mg Oral Daily with breakfast    flecainide  100 mg Oral BID    hydrALAZINE  100 mg Oral QAM    And    hydrALAZINE  100 mg Oral Noon    And    hydrALAZINE  75 mg Oral QPM    losartan  100 mg Oral Daily    Metoprolol Succinate ER  200 mg Oral BID    rosuvastatin  40 mg Oral Nightly    fluticasone-salmeterol  1 puff Inhalation BID    tamsulosin  0.4 mg Oral QPM    [Held by provider] rivaroxaban  20 mg Oral Nightly    insulin aspart  1-68 Units Subcutaneous TID CC    insulin degludec  10 Units Subcutaneous Nightly    insulin aspart  1-10 Units Subcutaneous TID AC and HS       Assessment & Plan:       #HFrEF - acute exacerbation   Diuresing well   Echo  reviewed   Resume home ARB, Eplerenone  Cardiology  following       #Intractable abdominal pain  Ct noted duodenal inflammation/ulcer  IV PPI  GI consulted for scopes- inpatient vs outpatient   Hold DOAC       #Accelerated HTN  #Essential HTN  Resume home meds: Hydralazine, metoprolol, clonidine     #Paroxysmal a. Fib  Flecainide  Xarelto for AC  on  hold for scope     #HLD   Statin     #DM Type 2  Degludec, ISS     #BPH  Flomax     #Pagets disease of bone  Noted on CT     #CKD 3A- CR increased  -hold further diuresis thru the weekend and repeat BMP next and f/u with Cardiology and PCP      #Aortic aneurysm repair   2014       DC planning after EGD   Per Cardiology benefit from SGLT2 but pt will not take it and believes he had a reaction. Will continue metformin and can f/u as outpt        Supplementary Documentation:     Quality:  DVT Mechanical Prophylaxis:     Early ambuation  DVT Pharmacologic Prophylaxis   Medication    [Held by provider] rivaroxaban (Xarelto) tab 20 mg                Code Status: Full Code  Medellin: No urinary catheter in place  [place] Duration (in days):   Central line:    HIREN:     Discharge is dependent on: clinical course  At this point Mr. Bush is expected to be discharge to: home     The 21st Century Cures Act makes medical notes like these available to patients in the interest of transparency. Please be advised this is a medical document. Medical documents are intended to carry relevant information, facts as evident, and the clinical opinion of the practitioner. The medical note is intended as peer to peer communication and may appear blunt or direct. It is written in medical language and may contain abbreviations or verbiage that are unfamiliar.

## 2024-11-23 NOTE — PROGRESS NOTES
Progress Note  Rivera Bush Patient Status:  Observation    1965 MRN [id number]   Location [facility] 8NE-A Attending Adam Tripp MD   Hosp Day # 0 PCP Won Davis MD     Subjective:  No cardiac events over night.  No anginal symptoms.     Objective:  /84 (BP Location: Left arm)   Pulse 59   Temp 97.7 °F (36.5 °C) (Oral)   Resp 18   Wt 266 lb 1.5 oz (120.7 kg)   SpO2 96%   BMI 37.11 kg/m²     Telemetry: SR 59 BPM.      Intake/Output:    Intake/Output Summary (Last 24 hours) at 2024 0819  Last data filed at 2024 0500  Gross per 24 hour   Intake 720 ml   Output 2000 ml   Net -1280 ml       Last 3 Weights   24 0520 266 lb 1.5 oz (120.7 kg)   24 0515 270 lb 4.8 oz (122.6 kg)   24 285 lb (129.3 kg)   24 1351 285 lb (129.3 kg)   24 0756 277 lb (125.6 kg)   24 1307 276 lb (125.2 kg)       Labs:  Recent Labs   Lab 24  1435 24  0707 24  0532   * 91 92   BUN 14 15 31*   CREATSERUM 1.54* 1.59* 1.97*   EGFRCR 52* 50* 38*   CA 9.5 9.6 9.4    140 141   K 4.8 4.0 3.5    105 103   CO2 26.0 31.0 32.0     Recent Labs   Lab 24  1435 24  0707   RBC 4.86 4.90   HGB 14.4 14.5   HCT 43.8 43.8   MCV 90.1 89.4   MCH 29.6 29.6   MCHC 32.9 33.1   RDW 13.6 13.9   NEPRELIM 5.85 6.08   WBC 7.6 8.3   .0 157.0         Recent Labs   Lab 24  1435   TROPHS 9   24:  EKG:  SR 64 BPM, QRS:  104 ms, QTc:  464 ms, IL: 162 ms. Lateral ST depression.   24: Echo:   1. Left ventricle: The cavity size was normal. Wall thickness was mildly to      moderately increased. Systolic function was normal. The estimated      ejection fraction was 60-65%, by visual assessment. No diagnostic      evidence for regional wall motion abnormalities. Left ventricular      diastolic function parameters were normal for the patient's age.   2. Right ventricle: The cavity size was normal. Systolic function was       normal.   3. Left atrium: The atrium was mildly to moderately dilated.   4. Aortic root: The aortic root was 4.0cm diameter. The ascending aorta is      not well visualized.   5. Pulmonary arteries: Systolic pressure was mildly increased, in the range      of 40mm Hg to 45mm Hg.   6. Pericardium, extracardiac: There was no pericardial effusion.   Impressions:  This study is compared with previous dated 02/11/6/2021: No   significant change since prior study.     Review of Systems:   Constitutional: No fevers, chills, fatigue or night sweats.  ENT: No mouth pain, neck pain, running nose, headaches or swollen glands.  Skin: No rashes, pruritus or skin changes,  Respiratory:  + LOERA.   CV: Denies chest pain, palpitations, orthopnea, PND or dizziness.  Musculoskeletal: No joint pain, stiffness or swelling.  GI: No nausea, vomiting or diarrhea. No blood in stools.  Neurologic: No seizures, tremors, weakness or numbness.     Physical Exam:  Gen: alert, oriented x 3, NAD  Heent: pupils equal, reactive. Mucous membranes moist.   Neck: no jvd  Cardiac: regular rate and rhythm, normal S1,S2, no murmur, gallop or rub.   Lungs: Clear upper, Dim bases.   Abd: soft, NT/ND +bs  Ext: no edema  Skin: Warm, dry  Neuro: No focal deficits    Medications:     pantoprazole  40 mg Intravenous Q12H    [Held by provider] furosemide  20 mg Oral Daily    fluticasone propionate  1 spray Each Nare Daily    cloNIDine  0.1 mg Oral BID    cyclobenzaprine  10 mg Oral Nightly    eplerenone  50 mg Oral Daily    fenofibrate micronized  134 mg Oral Daily with breakfast    flecainide  100 mg Oral BID    hydrALAZINE  100 mg Oral QAM    And    hydrALAZINE  100 mg Oral Noon    And    hydrALAZINE  75 mg Oral QPM    losartan  100 mg Oral Daily    Metoprolol Succinate ER  200 mg Oral BID    rosuvastatin  40 mg Oral Nightly    fluticasone-salmeterol  1 puff Inhalation BID    tamsulosin  0.4 mg Oral QPM    [Held by provider] rivaroxaban  20 mg Oral Nightly     insulin aspart  1-68 Units Subcutaneous TID CC    insulin degludec  10 Units Subcutaneous Nightly    insulin aspart  1-10 Units Subcutaneous TID AC and HS       Assessment:  Acute on chronic diastolic /Right sided HF: Trops neg.   LVEF:  60-65 %, PAP02: 40-45 mmHg.  Adm BNP: 465.   Wt. Down 21 Lbs overall, Total net en.3 liters.   Lasix 40 mg IV Bid, Eplerenone 50 mg, Losartan 100 mg, Toprol 200 mg every day.   Abdominal pain:  Adm Dx  CT showed duodenal ulcer.   GI following and considering scope.   Hx Thoracic AAA repair: 14.   PAF/AFL:  Remains SR.   BB, Flecainide 100 mg Bid, Xarelto on hold- per GI.   Mod PHTN  HTN: Moderate control.   Dyslipidemia:  Crestor 40 mg every day. HDL: 35  LDL 54  Obesity:  BMI:  37.11.   Type 2 DM: A1C: 5.5  DANIEL/CKD Stage 3:  Cr:  1.97 - worse, GFR:  38. Baseline Cr: 1.25  ISAAC: CPAP nightly.     Plan:  Recent acute on chronic HFpEF.   Well diuresed.   Remains SR.    Xarelto on hold pending GI scope.   Change to po diuretics. Hold with elevated Cr.  Follow renal function.   Continue current cardiac regime.     Plan of care discussed with patient, DONY.    Karlene Gerardo, REINA  2024  8:19 AM

## 2024-11-23 NOTE — ANESTHESIA POSTPROCEDURE EVALUATION
[facility]    Rivera Bush Patient Status:  Inpatient   Age/Gender 59 year old male MRN [id number]   Location [facility] 8NE-A Attending Adam Tripp MD   Hosp Day # 1 PCP Won Davis MD       Anesthesia Post-op Note    ESOPHAGOGASTRODUODENOSCOPY (EGD)    Procedure Summary       Date: 11/23/24 Room / Location:  ENDOSCOPY 02 /  ENDOSCOPY    Anesthesia Start: 1237 Anesthesia Stop: 1253    Procedure: ESOPHAGOGASTRODUODENOSCOPY (EGD) Diagnosis: (Normal exam)    Surgeons: Angel Mendoza MD Anesthesiologist: Yelena John MD    Anesthesia Type: MAC ASA Status: 3            Anesthesia Type: MAC    Vitals Value Taken Time   /72 11/23/24 1329   Temp  11/23/24 1344   Pulse 56 11/23/24 1329   Resp 11 11/23/24 1329   SpO2 95 % 11/23/24 1329       Patient Location: Endoscopy    Anesthesia Type: MAC    Airway Patency: patent    Postop Pain Control: adequate    Mental Status: preanesthetic baseline    Nausea/Vomiting: none    Cardiopulmonary/Hydration status: stable euvolemic    Complications: no apparent anesthesia related complications    Postop vital signs: stable    Dental Exam: Unchanged from Preop    Patient to be discharged from PACU when criteria met.

## 2024-11-23 NOTE — PLAN OF CARE
NURSING DISCHARGE NOTE    Discharged Home via Ambulatory.  Accompanied by Family member  Belongings Taken by patient/family.    Patient is stable to discharge home. Medically cleared by all consults and hospitalist. Reviewed discharge instructions about medications and post-discharge follow up visits with patient and her family at bedside. Reinforced heart failure home care instructions. Patient verbalized understanding. Questions and concerns addressed. IV line dc'ed, pressure and dressing applied. Clean/dry/intact. Discharge navigator completed. Discharge AVS printed out and given to patient. Tele box removed, cleaned, and returned to cabinet. All patient's belongings sent with patient.    Problem: Diabetes/Glucose Control  Goal: Glucose maintained within prescribed range  Description: INTERVENTIONS:  - Monitor Blood Glucose as ordered  - Assess for signs and symptoms of hyperglycemia and hypoglycemia  - Administer ordered medications to maintain glucose within target range  - Assess barriers to adequate nutritional intake and initiate nutrition consult as needed  - Instruct patient on self management of diabetes  11/23/2024 1711 by Neetu Heller, RN  Outcome: Completed  11/23/2024 1202 by Neetu Heller, RN  Outcome: Progressing     Problem: Patient/Family Goals  Goal: Patient/Family Long Term Goal  Description: Patient's Long Term Goal: Stay healthy    Interventions:  - Resume home medication regimen  - Follow up with providers  - See additional Care Plan goals for specific interventions  11/23/2024 1711 by Neetu Heller, RN  Outcome: Completed  11/23/2024 1202 by Neetu Heller, RN  Outcome: Progressing  Goal: Patient/Family Short Term Goal  Description: Patient's Short Term Goal: Discharge from hospital    Interventions:   - EGD Saturday  - IV PPI  - Lasix  - See additional Care Plan goals for specific interventions  11/23/2024 1711 by Neetu Heller,  RN  Outcome: Completed  11/23/2024 1202 by Neetu Heller RN  Outcome: Progressing     Problem: PAIN - ADULT  Goal: Verbalizes/displays adequate comfort level or patient's stated pain goal  Description: INTERVENTIONS:  - Encourage pt to monitor pain and request assistance  - Assess pain using appropriate pain scale  - Administer analgesics based on type and severity of pain and evaluate response  - Implement non-pharmacological measures as appropriate and evaluate response  - Consider cultural and social influences on pain and pain management  - Manage/alleviate anxiety  - Utilize distraction and/or relaxation techniques  - Monitor for opioid side effects  - Notify MD/LIP if interventions unsuccessful or patient reports new pain  - Anticipate increased pain with activity and pre-medicate as appropriate  11/23/2024 1711 by Neetu Heller RN  Outcome: Completed  11/23/2024 1202 by Neetu Heller, RN  Outcome: Progressing

## 2024-11-23 NOTE — ANESTHESIA PREPROCEDURE EVALUATION
PRE-OP EVALUATION    Patient Name: Rivera Bush    Admit Diagnosis: Acute on chronic congestive heart failure, unspecified heart failure type (HCC) [I50.9]    Pre-op Diagnosis: anemia    ESOPHAGOGASTRODUODENOSCOPY (EGD)    Anesthesia Procedure: ESOPHAGOGASTRODUODENOSCOPY (EGD)    Surgeons and Role:     * Angel Mendoza MD - Primary    Pre-op vitals reviewed.  Temp: 97.7 °F (36.5 °C)  Pulse: 56  Resp: 18  BP: 164/83  SpO2: 96 %  Body mass index is 37.11 kg/m².    Current medications reviewed.  Hospital Medications:   pantoprazole (Protonix) 40 mg in sodium chloride 0.9% PF 10 mL IV push  40 mg Intravenous Q12H    [Held by provider] furosemide (Lasix) tab 20 mg  20 mg Oral Daily    fluticasone propionate (Flonase) 50 MCG/ACT nasal suspension 1 spray  1 spray Each Nare Daily    [COMPLETED] hydrALAzine (Apresoline) 20 mg/mL injection 10 mg  10 mg Intravenous Once    [COMPLETED] morphINE PF 4 MG/ML injection 4 mg  4 mg Intravenous Once    [COMPLETED] furosemide (Lasix) 10 mg/mL injection 40 mg  40 mg Intravenous Once    [COMPLETED] iopamidol 76% (ISOVUE-370) injection for power injector  100 mL Intravenous ONCE PRN    [COMPLETED] pantoprazole (Protonix) 40 mg in sodium chloride 0.9% PF 10 mL IV push  40 mg Intravenous Once    albuterol (Ventolin HFA) 108 (90 Base) MCG/ACT inhaler 2 puff  2 puff Inhalation Q6H PRN    cloNIDine (Catapres) tab 0.1 mg  0.1 mg Oral BID    cyclobenzaprine (Flexeril) tab 10 mg  10 mg Oral Nightly    eplerenone (Inspra) tab 50 mg  50 mg Oral Daily    fenofibrate micronized (Lofibra) cap 134 mg  134 mg Oral Daily with breakfast    flecainide (Tambocor) tab 100 mg  100 mg Oral BID    hydrALAZINE (Apresoline) tab 100 mg  100 mg Oral QAM    And    hydrALAZINE (Apresoline) tab 100 mg  100 mg Oral Noon    And    hydrALAZINE (Apresoline) tab 75 mg  75 mg Oral QPM    losartan (Cozaar) tab 100 mg  100 mg Oral Daily    meclizine (Antivert) tab 25 mg  25 mg Oral TID PRN    metoprolol succinate  ER (Toprol XL) 24 hr tab 200 mg  200 mg Oral BID    rosuvastatin (Crestor) tab 40 mg  40 mg Oral Nightly    fluticasone-salmeterol (Advair Diskus) 250-50 MCG/ACT inhaler 1 puff  1 puff Inhalation BID    tamsulosin (Flomax) cap 0.4 mg  0.4 mg Oral QPM    [Held by provider] rivaroxaban (Xarelto) tab 20 mg  20 mg Oral Nightly    acetaminophen (Tylenol Extra Strength) tab 500 mg  500 mg Oral Q4H PRN    melatonin tab 3 mg  3 mg Oral Nightly PRN    glycerin-hypromellose- (Artificial Tears) 0.2-0.2-1 % ophthalmic solution 1 drop  1 drop Both Eyes QID PRN    sodium chloride (Saline Mist) 0.65 % nasal solution 1 spray  1 spray Each Nare Q3H PRN    polyethylene glycol (PEG 3350) (Miralax) 17 g oral packet 17 g  17 g Oral Daily PRN    sennosides (Senokot) tab 17.2 mg  17.2 mg Oral Nightly PRN    bisacodyl (Dulcolax) 10 MG rectal suppository 10 mg  10 mg Rectal Daily PRN    fleet enema (Fleet) rectal enema 133 mL  1 enema Rectal Once PRN    ondansetron (Zofran) 4 MG/2ML injection 4 mg  4 mg Intravenous Q6H PRN    prochlorperazine (Compazine) 10 MG/2ML injection 5 mg  5 mg Intravenous Q8H PRN    [COMPLETED] furosemide (Lasix) 10 mg/mL injection 40 mg  40 mg Intravenous BID (Diuretic)    glucose (Dex4) 15 GM/59ML oral liquid 15 g  15 g Oral Q15 Min PRN    Or    glucose (Glutose) 40% oral gel 15 g  15 g Oral Q15 Min PRN    Or    glucose-vitamin C (Dex-4) chewable tab 4 tablet  4 tablet Oral Q15 Min PRN    Or    dextrose 50% injection 50 mL  50 mL Intravenous Q15 Min PRN    Or    glucose (Dex4) 15 GM/59ML oral liquid 30 g  30 g Oral Q15 Min PRN    Or    glucose (Glutose) 40% oral gel 30 g  30 g Oral Q15 Min PRN    Or    glucose-vitamin C (Dex-4) chewable tab 8 tablet  8 tablet Oral Q15 Min PRN    insulin aspart (NovoLOG) 100 Units/mL FlexPen 1-68 Units  1-68 Units Subcutaneous TID CC    insulin degludec (Tresiba) 100 units/mL flextouch 10 Units  10 Units Subcutaneous Nightly    insulin aspart (NovoLOG) 100 Units/mL FlexPen  1-10 Units  1-10 Units Subcutaneous TID AC and HS    hydrALAzine (Apresoline) 20 mg/mL injection 10 mg  10 mg Intravenous Q6H PRN       Outpatient Medications:   Prescriptions Prior to Admission[1]    Allergies: Jardiance [empagliflozin]      Anesthesia Evaluation    Patient summary reviewed.    Anesthetic Complications  (-) history of anesthetic complications         GI/Hepatic/Renal                                 Cardiovascular                (+) obesity  (+) hypertension   (+) hyperlipidemia  (+) CAD             (+) dysrhythmias and atrial fibrillation  (+) CHF                Endo/Other      (+) diabetes and well controlled,                           Pulmonary      (+) asthma              (+) sleep apnea and CPAP      Neuro/Psych      (+) depression  (+) anxiety         (+) neuromuscular disease             Ascending thoracic aneurysm sp repair  Carotid artery stenosis        Past Surgical History:   Procedure Laterality Date    Angiogram  7/15/14    no cad noted    Endovas repair, infrarenl abdom aortic aneurysm/dissect      Epidurography, radiological s & i  4/18/2012    Procedure: CERVICAL EPIDURAL;  Surgeon: Sekou Solorzano MD;  Location: [facility]    Fluor gid & loclzj ndl/cath spi dx/ther njx  5/11/2012    Procedure: CERVICAL EPIDURAL;  Surgeon: Edil Alcocer MD;  Location: [facility]    Injection, w/wo contrast, dx/therapeutic substance, epidural/subarachnoid; cervical/thoracic  4/18/2012    Procedure: CERVICAL EPIDURAL;  Surgeon: Sekou Solorzano MD;  Location: [facility]    Injection, w/wo contrast, dx/therapeutic substance, epidural/subarachnoid; cervical/thoracic  5/11/2012    Procedure: CERVICAL EPIDURAL;  Surgeon: Edil Alcocer MD;  Location: [facility]    M-sedaj by sm phys perfrmg svc 5+ yr  4/18/2012    Procedure: CERVICAL EPIDURAL;  Surgeon: Sekou Solorzano MD;  Location: [facility]     M-sedaj by  phys perfrmg svc 5+ yr  2012    Procedure: CERVICAL EPIDURAL;  Surgeon: Edil Alcocer MD;  Location: [facility]    Symp aaa urgent repair  10/14/2014    Ducor; ascending aorta    Tonsillectomy       Social History     Socioeconomic History    Marital status:    Tobacco Use    Smoking status: Former     Current packs/day: 0.00     Average packs/day: 0.5 packs/day for 28.0 years (14.0 ttl pk-yrs)     Types: Cigarettes     Start date: 1976     Quit date: 2004     Years since quittin.3    Smokeless tobacco: Never   Vaping Use    Vaping status: Never Used   Substance and Sexual Activity    Alcohol use: Not Currently     Alcohol/week: 1.0 standard drink of alcohol     Types: 1 Glasses of wine per week     Comment: Wine club once a month    Drug use: No   Other Topics Concern    Caffeine Concern Yes     Comment: 1 cup of coffee daily    Exercise Yes     Comment: walking     History   Drug Use No     Available pre-op labs reviewed.  Lab Results   Component Value Date    WBC 8.3 2024    RBC 4.90 2024    HGB 14.5 2024    HCT 43.8 2024    MCV 89.4 2024    MCH 29.6 2024    MCHC 33.1 2024    RDW 13.9 2024    .0 2024     Lab Results   Component Value Date     2024    K 3.5 2024     2024    CO2 32.0 2024    BUN 31 (H) 2024    CREATSERUM 1.97 (H) 2024    GLU 92 2024    CA 9.4 2024            Airway      Mallampati: III  Mouth opening: >3 FB  TM distance: 4 - 6 cm  Neck ROM: full Cardiovascular      Rhythm: regular  Rate: normal     Dental             Pulmonary    Pulmonary exam normal.                 Other findings              ASA: 3   Plan: MAC  NPO status verified and           Plan/risks discussed with: patient                Present on Admission:   Abdominal pain   Controlled diabetes mellitus type 2 with complications (HCC)   Primary  hypertension   Atrial fibrillation (HCC)             [1]   Medications Prior to Admission   Medication Sig Dispense Refill Last Dose/Taking    sodium chloride (DEEP SEA NASAL SPRAY) 0.65 % Nasal Solution 1 spray by Nasal route at bedtime.   11/19/2024 at 10:00 PM    hydrALAZINE 50 MG Oral Tab Take 1 tablet (50 mg total) by mouth 3 (three) times daily.   11/20/2024 at  6:00 AM    ASHWAGANDHA GUMMIES OR Take 1 Piece of gum by mouth at bedtime.   11/19/2024 at  8:00 PM    XARELTO 20 MG Oral Tab Take 1 tablet (20 mg total) by mouth daily 90 tablet 0 11/19/2024 at  8:00 PM    FENOFIBRATE 145 MG Oral Tab TAKE 1 TABLET BY MOUTH ONE TIME DAILY 90 tablet 0 11/20/2024 at  6:00 AM    FLECAINIDE 100 MG Oral Tab Take 1 tablet (100 mg total) by mouth 2 (two) times daily. 180 tablet 0 11/20/2024 at  6:00 AM    LOSARTAN 100 MG Oral Tab TAKE 1 TABLET BY MOUTH ONE TIME DAILY 90 tablet 0 11/19/2024 at  8:00 PM    metFORMIN  MG Oral Tablet 24 Hr TAKE TWO TABLETS BY MOUTH ONCE DAILY (Patient taking differently: Take 1 tablet (750 mg total) by mouth 2 (two) times daily with meals.) 180 tablet 0 11/20/2024 at  6:00 AM    POTASSIUM CHLORIDE 20 MEQ Oral Tab CR TAKE 1 TABLET BY MOUTH IN  THE MORNING AND 1 TABLET BY MOUTH IN THE EVENING. 180 tablet 0 11/20/2024 at  6:00 AM    SYMBICORT 160-4.5 MCG/ACT Inhalation Aerosol INHALE TWO PUFFS BY MOUTH TWICE DAILY (Patient taking differently: Inhale 2 puffs into the lungs 2 (two) times daily as needed.) 30.6 g 0 Past Month    omeprazole 20 MG Oral Capsule Delayed Release Take one capsule (20 mg total) by mouth once daily, 30 minutes prior to breakfast 30 capsule 11 11/20/2024 at  6:00 AM    tamsulosin 0.4 MG Oral Cap Take 1 capsule (0.4 mg total) by mouth every evening. 90 capsule 3 11/19/2024 at  8:00 PM    cyclobenzaprine 10 MG Oral Tab Take 1 tablet (10 mg total) by mouth nightly. 10 tablet 0 Past Week    hydrOXYzine 25 MG Oral Tab Take 1 tablet (25 mg total) by mouth nightly as needed for  Itching. 20 tablet 0 Past Month    METOPROLOL SUCCINATE  MG Oral Tablet 24 Hr TAKE ONE TABLET BY MOUTH TWICE DAILY 180 tablet 0 11/20/2024 at  6:00 AM    cloNIDine 0.1 MG Oral Tab Take 1 tablet (0.1 mg total) by mouth 2 (two) times daily.   11/20/2024 at  6:00 AM    EPLERENONE 50 MG Oral Tab TAKE 1 TABLET BY MOUTH ONE TIME DAILY 90 tablet 0 11/20/2024 at  6:00 AM    ROSUVASTATIN 40 MG Oral Tab Take 1 tablet (40 mg total) by mouth nightly 90 tablet 0 11/19/2024 at  8:00 PM    meclizine 25 MG Oral Tab Take 1 tablet (25 mg total) by mouth 3 (three) times daily as needed. 30 tablet 0 Past Month    vitamin E 1000 UNITS Oral Cap Take 1 capsule (1,000 Units total) by mouth daily.   11/19/2024 at  8:00 PM    magnesium oxide 400 MG Oral Tab Take 1 tablet (400 mg total) by mouth daily.   11/19/2024 at  8:00 PM    cholecalciferol 125 MCG (5000 UT) Oral Tab Take 1 tablet (5,000 Units total) by mouth daily.   11/19/2024 at  8:00 PM    Tadalafil (CIALIS) 20 MG Oral Tab Take 1 tablet (20 mg total) by mouth daily as needed for Erectile Dysfunction. (Patient not taking: Reported on 11/20/2024) 30 tablet 5 Unknown    ketoconazole 2 % External Cream Apply 1 Application topically daily. (Patient not taking: Reported on 11/20/2024) 1 each 2 Unknown    Glucose Blood (ONETOUCH VERIO) In Vitro Strip Check blood sugars once daily. 100 strip 3 Unknown    OneTouch Delica Lancets 30G Does not apply Misc 1 Lancet by Finger stick route daily. 100 each 3 Unknown    albuterol (VENTOLIN HFA) 108 (90 Base) MCG/ACT Inhalation Aero Soln Inhale 2 puffs into the lungs every 6 (six) hours as needed for Shortness of Breath. 54 g 1 More than a month    AZELASTINE  MCG/SPRAY Nasal Solution INHALE 1 SPRAY INTO EACH NOSTRIL 2 TIMES DAILY (Patient taking differently: 1 spray by Nasal route in the morning and 1 spray before bedtime.) 30 mL 2 More than a month    [DISCONTINUED] FUROSEMIDE 20 MG Oral Tab TAKE 1 TABLET BY MOUTH  DAILY (Patient not  taking: Reported on 11/20/2024) 90 tablet 3 Unknown

## 2024-11-23 NOTE — PLAN OF CARE
Assumed care of patient 1930.    Patient alert and oriented x4. On RA with adequate sats, CPAP at night. Sinus raine on tele. Continent bowel and bladder. Denies pain at this time. Up ad gael. Call light within reach. Fall precautions in place. Makes needs known    Plan  EGD Saturday  IV PPI  Lasix    Problem: Patient/Family Goals  Goal: Patient/Family Long Term Goal  Description: Patient's Long Term Goal: Stay healthy    Interventions:  - Resume home medication regimen  - Follow up with providers  - See additional Care Plan goals for specific interventions  11/23/2024 0027 by Butch Escamilla RN  Outcome: Progressing  11/23/2024 0025 by Butch Escamilla RN  Outcome: Progressing  11/23/2024 0025 by Butch Escamilla RN  Outcome: Progressing  Goal: Patient/Family Short Term Goal  Description: Patient's Short Term Goal: Discharge from hospital    Interventions:   - EGD Saturday  - IV PPI  - Lasix  - See additional Care Plan goals for specific interventions  11/23/2024 0027 by Butch Escamilla RN  Outcome: Progressing  11/23/2024 0025 by Butch Escamilla RN  Outcome: Progressing  11/23/2024 0025 by Butch Escamilla RN  Outcome: Progressing     Problem: Diabetes/Glucose Control  Goal: Glucose maintained within prescribed range  Description: INTERVENTIONS:  - Monitor Blood Glucose as ordered  - Assess for signs and symptoms of hyperglycemia and hypoglycemia  - Administer ordered medications to maintain glucose within target range  - Assess barriers to adequate nutritional intake and initiate nutrition consult as needed  - Instruct patient on self management of diabetes  11/23/2024 0027 by Butch Escamilla RN  Outcome: Progressing  11/23/2024 0025 by Butch Escamilla RN  Outcome: Progressing  11/23/2024 0025 by Butch Escamilla RN  Outcome: Progressing     Problem: PAIN - ADULT  Goal: Verbalizes/displays adequate comfort level or patient's stated pain goal  Description: INTERVENTIONS:  - Encourage pt to monitor pain and  request assistance  - Assess pain using appropriate pain scale  - Administer analgesics based on type and severity of pain and evaluate response  - Implement non-pharmacological measures as appropriate and evaluate response  - Consider cultural and social influences on pain and pain management  - Manage/alleviate anxiety  - Utilize distraction and/or relaxation techniques  - Monitor for opioid side effects  - Notify MD/LIP if interventions unsuccessful or patient reports new pain  - Anticipate increased pain with activity and pre-medicate as appropriate  11/23/2024 0027 by Butch Escamilla, RN  Outcome: Progressing  11/23/2024 0025 by Butch Escamilla, RN  Outcome: Progressing  11/23/2024 0025 by Butch Escamilla, RN  Outcome: Progressing

## 2024-11-23 NOTE — DISCHARGE SUMMARY
[place] [facility]  DISCHARGE SUMMARY     Rivera Bush Patient Status:  Inpatient    1965 MRN [id number]   Location [facility] 8NE-A Attending Adam Tripp MD   Hosp Day # 1 PCP Won Davis MD     Date of Admission: 2024  Date of Discharge:  2024    Discharge Disposition: Home or Self Care    Admitting Diagnosis:   Acute on chronic congestive heart failure, unspecified heart failure type (HCC) [I50.9]    Hospital Discharge Diagnoses:  #HFrEF - acute exacerbation   Diuresing well   Echo  reviewed   Resume home ARB, Eplerenone  Cardiology  following       #Intractable abdominal pain  Ct noted duodenal inflammation/ulcer  IV PPI  GI consulted for scopes- inpatient vs outpatient   Hold DOAC       #Accelerated HTN  #Essential HTN  Resume home meds: Hydralazine, metoprolol, clonidine     #Paroxysmal a. Fib  Flecainide  Xarelto for AC  on hold for scope     #HLD   Statin     #DM Type 2  Degludec, ISS     #BPH  Flomax     #Pagets disease of bone  Noted on CT     #CKD 3A- CR increased  -hold further diuresis thru the weekend and repeat BMP next and f/u with Cardiology and PCP      #Aortic aneurysm repair           DC planning after EGD   Per Cardiology benefit from SGLT2 but pt will not take it and believes he had a reaction. Will continue metformin and can f/u as outpt      Lace+ Score: 64  59-90 High Risk  29-58 Medium Risk  0-28   Low Risk.     Brief Synopsis: pt seen by cardiology and aggressively diuresed. Pt improved faster than expected.  GI placed on consultation and delayed EGD until CHF compensated and DOAC held.   Pt had EGD unimpressive and CHF meds adjusted.   Pt cleared by Cardiology and GI and DC in stable condition.     Procedures during hospitalization:   EGD    Lab/Test results pending at Discharge:   None     Consultants:  GI, Cardiology     Discharge Medication List:     Discharge Medications        CHANGE how you take these medications        Instructions  Prescription details   azelastine 137 MCG/SPRAY Soln  What changed:   how to take this  when to take this      INHALE 1 SPRAY INTO EACH NOSTRIL 2 TIMES DAILY   Quantity: 30 mL  Refills: 2     furosemide 20 MG Tabs  Commonly known as: Lasix  Start taking on: November 25, 2024  What changed: These instructions start on November 25, 2024. If you are unsure what to do until then, ask your doctor or other care provider.      Take 1 tablet (20 mg total) by mouth daily.   Quantity: 90 tablet  Refills: 3            CONTINUE taking these medications        Instructions Prescription details   albuterol 108 (90 Base) MCG/ACT Aers  Commonly known as: Ventolin HFA      Inhale 2 puffs into the lungs every 6 (six) hours as needed for Shortness of Breath.   Quantity: 54 g  Refills: 1     ASHWAGANDHA GUMMIES OR      Take 1 Piece of gum by mouth at bedtime.   Refills: 0     cholecalciferol 125 MCG (5000 UT) Tabs  Commonly known as: Vitamin D3      Take 1 tablet (5,000 Units total) by mouth daily.   Refills: 0     cloNIDine 0.1 MG Tabs  Commonly known as: Catapres      Take 1 tablet (0.1 mg total) by mouth 2 (two) times daily.   Refills: 0     cyclobenzaprine 10 MG Tabs  Commonly known as: Flexeril      Take 1 tablet (10 mg total) by mouth nightly.   Quantity: 10 tablet  Refills: 0     Deep Sea Nasal Spray 0.65 % Soln  Generic drug: sodium chloride      1 spray by Nasal route at bedtime.   Refills: 0     eplerenone 50 MG Tabs  Commonly known as: INSPRA      TAKE 1 TABLET BY MOUTH ONE TIME DAILY   Quantity: 90 tablet  Refills: 0     fenofibrate 145 MG Tabs  Commonly known as: Tricor      TAKE 1 TABLET BY MOUTH ONE TIME DAILY   Quantity: 90 tablet  Refills: 0     flecainide 100 MG Tabs  Commonly known as: Tambocor      Take 1 tablet (100 mg total) by mouth 2 (two) times daily.   Quantity: 180 tablet  Refills: 0     hydrALAZINE 50 MG Tabs  Commonly known as: Apresoline      Take 1 tablet (50 mg total) by mouth 3 (three) times daily.    Refills: 0     hydrOXYzine 25 MG Tabs  Commonly known as: Atarax      Take 1 tablet (25 mg total) by mouth nightly as needed for Itching.   Quantity: 20 tablet  Refills: 0     losartan 100 MG Tabs  Commonly known as: Cozaar      TAKE 1 TABLET BY MOUTH ONE TIME DAILY   Quantity: 90 tablet  Refills: 0     magnesium oxide 400 MG Tabs  Commonly known as: Mag-Ox      Take 1 tablet (400 mg total) by mouth daily.   Refills: 0     meclizine 25 MG Tabs  Commonly known as: Antivert      Take 1 tablet (25 mg total) by mouth 3 (three) times daily as needed.   Quantity: 30 tablet  Refills: 0     metFORMIN  MG Tb24  Commonly known as: Glucophage XR      TAKE TWO TABLETS BY MOUTH ONCE DAILY   Quantity: 180 tablet  Refills: 0     Metoprolol Succinate  MG Tb24  Commonly known as: TOPROL-XL      TAKE ONE TABLET BY MOUTH TWICE DAILY   Quantity: 180 tablet  Refills: 0     omeprazole 20 MG Cpdr  Commonly known as: PriLOSEC      Take one capsule (20 mg total) by mouth once daily, 30 minutes prior to breakfast   Quantity: 30 capsule  Refills: 11     OneTouch Delica Lancets 30G Misc      1 Lancet by Finger stick route daily.   Quantity: 100 each  Refills: 3     OneTouch Verio Strp      Check blood sugars once daily.   Quantity: 100 strip  Refills: 3     potassium chloride 20 MEQ Tbcr  Commonly known as: Klor-Con M20      TAKE 1 TABLET BY MOUTH IN  THE MORNING AND 1 TABLET BY MOUTH IN THE EVENING.   Quantity: 180 tablet  Refills: 0     rosuvastatin 40 MG Tabs  Commonly known as: Crestor      Take 1 tablet (40 mg total) by mouth nightly   Quantity: 90 tablet  Refills: 0     Symbicort 160-4.5 MCG/ACT Aero  Generic drug: Budesonide-Formoterol Fumarate      INHALE TWO PUFFS BY MOUTH TWICE DAILY   Quantity: 30.6 g  Refills: 0     tamsulosin 0.4 MG Caps  Commonly known as: Flomax      Take 1 capsule (0.4 mg total) by mouth every evening.   Quantity: 90 capsule  Refills: 3     vitamin E 1000 UNITS Caps  Commonly known as: Alpha-E       Take 1 capsule (1,000 Units total) by mouth daily.   Refills: 0     Xarelto 20 MG Tabs  Generic drug: rivaroxaban      Take 1 tablet (20 mg total) by mouth daily   Quantity: 90 tablet  Refills: 0            STOP taking these medications      ketoconazole 2 % Crea  Commonly known as: Nizoral        Tadalafil 20 MG Tabs  Commonly known as: Cialis                  Where to Get Your Medications        These medications were sent to [id number] - [place], [place] - [address] [facility] [phone], [phone] [address] [facility] [address]      Phone: [phone]   furosemide 20 MG Tabs         Follow-up appointment:   Karlene Gerardo APRN  [address] [id number] [phone]    Go on 12/5/2024  You have an appointment scheduled for 12/5/24 at 1:30 pm    Won Davis MD  [address] [place] [id number] [phone]    Follow up in 1 week(s)      Angel Mendoza MD  [address] [facility]   [facility] [address] [phone]    Follow up  As needed, if abdominal discomfort recurs (without any recurrent water weight)      -----------------------------------------------------------------------------------------------  PATIENT DISCHARGE INSTRUCTIONS: See electronic chart    Adam Tripp MD 11/23/2024    Time spent: 33 minutes

## 2024-11-25 NOTE — PROGRESS NOTES
Transitional Care Management   Discharge Date: 24  Contact Date: 2024    Assessment:  TCM Initial Assessment    General:  Assessment completed with: Patient  Patient Subjective: Today I went back to work but they sent me home because I didn't have a doctors note.  Right now I'm [place] the bowling [place] watching my parents bowl. I have been walking without gasping for air. Losing the water weight was huge.  Chief Complaint: CHF  Verify patient name and  with patient/ caregiver: Yes    Hospital Stay/Discharge:  Tell me what you understand of why you were in the hospital or emergency department: heart failure  Prior to leaving the hospital were your Discharge Instructions reviewed with you?: Yes  Did you receive a copy of your written Discharge Instructions?: Yes  What questions do you have about your Discharge Instructions?: none  Do you feel better or worse since you left the hospital or emergency department?: Better    Follow - Up Appointment:  Do you have a follow-up appointment?: Yes  Date: 24  Physician: cardiology  Are there any barriers to getting to your follow-up appointment?: No    Home Health/DME:  Prior to leaving the hospital was Home Health (HH) arranged for you?: No     Prior to leaving the hospital or emergency department was Durable Medical Equipment (DME), medical supplies, or infusions arranged for you?: No  Are DME/medical supply/infusions needs identified by staff during this assessment?: No     Medications/Diet:  Did any of your medications change, during or after your hospital stay or ED visit?: Yes  Do you have your new or updated medications?: Yes  Do you understand what your medications are for and possible side effects?: Yes  Are there any reasons that keep you from taking your medication as prescribed?: No  Any concerns about medication refills?: No    Were you given a different diet per your Discharge Instructions?: Yes  Diet Type: sodium and fluid  restriction  Reason: CHF  Are there any barriers to following that diet?: No     Questions/Concerns:  Do you have any questions or concerns that have not been discussed?: No           Nursing Interventions: NCM reviewed discharge instructions and when to seek medical attention with the patient. He states that he is feeling great. He states that he did go to work this morning but was sent home as he did not have a return to work letter. He states that he should be getting one from the hospitalist and if he doesn't he is seeing the cardiologist tomorrow and will get one at that time. He states that right now he is sitting at the [employer] [place] watching his parents bowl. He states that he is walking around for the first time in a long time and has no shortness of breath. He states that his breathing is so much better now and losing all the water weight make a big difference. He states that all of his swelling has resolved. He states that he is being very careful with watching his sodium and fluid intake. NCM discussed importance of proper daily weights, keeping log and when to report weight gain; he v/u. He states that his bp today was 145/81 and bs was 92. He denied having any fever, n/v/c/d, sob, pain, lightheadedness, HA or any new or worsening symptoms. Med review completed. He denied having any questions or concerns at this time.     Medication Review:   Current Outpatient Medications   Medication Sig Dispense Refill    furosemide 20 MG Oral Tab Take 1 tablet (20 mg total) by mouth daily. 90 tablet 3    sodium chloride (DEEP SEA NASAL SPRAY) 0.65 % Nasal Solution 1 spray by Nasal route at bedtime.      hydrALAZINE 50 MG Oral Tab Take 1 tablet (50 mg total) by mouth 3 (three) times daily.      ASHWAGANDHA GUMMIES OR Take 1 Piece of gum by mouth at bedtime.      XARELTO 20 MG Oral Tab Take 1 tablet (20 mg total) by mouth daily 90 tablet 0    FENOFIBRATE 145 MG Oral Tab TAKE 1 TABLET BY MOUTH ONE TIME DAILY 90 tablet  0    FLECAINIDE 100 MG Oral Tab Take 1 tablet (100 mg total) by mouth 2 (two) times daily. 180 tablet 0    LOSARTAN 100 MG Oral Tab TAKE 1 TABLET BY MOUTH ONE TIME DAILY 90 tablet 0    metFORMIN  MG Oral Tablet 24 Hr TAKE TWO TABLETS BY MOUTH ONCE DAILY (Patient taking differently: Take 1 tablet (750 mg total) by mouth 2 (two) times daily with meals.) 180 tablet 0    POTASSIUM CHLORIDE 20 MEQ Oral Tab CR TAKE 1 TABLET BY MOUTH IN  THE MORNING AND 1 TABLET BY MOUTH IN THE EVENING. 180 tablet 0    SYMBICORT 160-4.5 MCG/ACT Inhalation Aerosol INHALE TWO PUFFS BY MOUTH TWICE DAILY (Patient taking differently: Inhale 2 puffs into the lungs 2 (two) times daily as needed.) 30.6 g 0    omeprazole 20 MG Oral Capsule Delayed Release Take one capsule (20 mg total) by mouth once daily, 30 minutes prior to breakfast 30 capsule 11    tamsulosin 0.4 MG Oral Cap Take 1 capsule (0.4 mg total) by mouth every evening. 90 capsule 3    cyclobenzaprine 10 MG Oral Tab Take 1 tablet (10 mg total) by mouth nightly. 10 tablet 0    hydrOXYzine 25 MG Oral Tab Take 1 tablet (25 mg total) by mouth nightly as needed for Itching. 20 tablet 0    METOPROLOL SUCCINATE  MG Oral Tablet 24 Hr TAKE ONE TABLET BY MOUTH TWICE DAILY 180 tablet 0    cloNIDine 0.1 MG Oral Tab Take 1 tablet (0.1 mg total) by mouth 2 (two) times daily.      EPLERENONE 50 MG Oral Tab TAKE 1 TABLET BY MOUTH ONE TIME DAILY 90 tablet 0    Glucose Blood (ONETOUCH VERIO) In Vitro Strip Check blood sugars once daily. 100 strip 3    OneTouch Delica Lancets 30G Does not apply Misc 1 Lancet by Finger stick route daily. 100 each 3    ROSUVASTATIN 40 MG Oral Tab Take 1 tablet (40 mg total) by mouth nightly 90 tablet 0    albuterol (VENTOLIN HFA) 108 (90 Base) MCG/ACT Inhalation Aero Soln Inhale 2 puffs into the lungs every 6 (six) hours as needed for Shortness of Breath. 54 g 1    AZELASTINE  MCG/SPRAY Nasal Solution INHALE 1 SPRAY INTO EACH NOSTRIL 2 TIMES DAILY  (Patient taking differently: 1 spray by Nasal route in the morning and 1 spray before bedtime.) 30 mL 2    meclizine 25 MG Oral Tab Take 1 tablet (25 mg total) by mouth 3 (three) times daily as needed. 30 tablet 0    vitamin E 1000 UNITS Oral Cap Take 1 capsule (1,000 Units total) by mouth daily.      magnesium oxide 400 MG Oral Tab Take 1 tablet (400 mg total) by mouth daily.      cholecalciferol 125 MCG (5000 UT) Oral Tab Take 1 tablet (5,000 Units total) by mouth daily.       Did patient review medications using current pill bottles and not just a medication list?  No  Discharge medications reviewed/discussed/and reconciled against outpatient medications with patient.  Any changes or updates to medications sent to primary care provider.  Patient Acknowledged    SDOH:   Transportation Needs: No Transportation Needs (11/20/2024)    Transportation Needs     Lack of Transportation: No     Car Seat: Not on file     Financial Resource Strain: Low Risk  (11/9/2021)    [place]    Overall Financial Resource Strain (CARDIA)     Difficulty of Paying Living Expenses: Not hard at all         Diagnosis specifics:  CHF:   With your CHF diagnosis weighing yourself is very important  How often are you weighing yourself? Every day  Is there any reason you are unable to weigh yourself daily?  No  What was your weight yesterday? 266   Today? 267  Were you told about any fluid restrictions? Yes  Have you noticed any shortness of breath or waking up short of breath? no    Do you notice any pain or swelling in your abdomen?   no      Ankles or Legs?   no    Follow-up Appointments:  Your appointments       Date & Time Appointment Department (Center)    Dec 02, 2024 12:30 PM [facility] Follow Up with Nathalie Giles PA-C [facility], [address] [place] [address] ([facility] [address])        Jan 25, 2025 8:00 AM CST Follow Up Visit with Nathalie Giles  [facility], [address] [place] [address] ([facility] [address] [address])              [address]  [address] [place] [id number] [phone]            Transitional Care Clinic  Was TCC Ordered: Yes  Was TCC Scheduled: No, Explain Pt prefers to see PCP       Primary Care Provider (If no TCC appointment)  Does patient already have a PCP appointment scheduled? Yes  Nurse Care Manager Confirmed PCP office TCM appointment with patient     Specialist  Does the patient have any other follow-up appointment(s) that need to be scheduled? Yes   -If yes: Nurse Care Manager reviewed upcoming specialist appointments with patient: Yes   -Does the patient need assistance scheduling appointment(s): No, pt seeing cardiology on 11/26/24    CCM referral placed:  No    Book By Date: 12/7/24

## 2024-11-26 NOTE — PAYOR COMM NOTE
--------------  ADMISSION REVIEW     Payor: [employer] CHOICE/HMO/POS/EPO  Subscriber #:  [id number]  Authorization Number: [id number]    Admit date: 11/22/24  Admit time:  4:54 PM   Admit Orders (From admission, onward)       Start     Ordered    11/22/24 1610  Admit to inpatient Once  Once        Ordering Provider: Adam Tripp MD   Question:  Diagnosis  Answer:  CHF (congestive heart failure) ([facility])    11/22/24 1609    11/20/24 2024  Place in observation Once  Once        Ordering Provider: Leeann Walker MD   Question:  Diagnosis  Answer:  Acute on chronic congestive heart failure, unspecified heart failure type (HCC)    11/20/24 2023      REVIEW DOCUMENTATION:  ED Provider Notes signed by Leeann Walker MD at 11/21/2024 10:57 AM    Patient Seen in: [facility] Emergency Department    History     Chief Complaint   Patient presents with    Abdomen/Flank Pain     Stated Complaint: CP for a few weeks, worse when moving around.  Hx of stomach ulcer.    Subjective:   HPI      Pt present with epigastric pain for weeks, worse over the last 2-3 weeks. Pain is worse with activity and also associated with sob and lightheadedness. He denies association with food/meals. He is scheduled for gallbladder removal. He denies nausea, vomiting or back pain. No urinary sxs. Pt had bm earlier today.     Objective:     Past Medical History:    Abdominal pain    above the belly button    Acute cystitis with hematuria    Acute diastolic (congestive) heart failure (HCC)    Anxiety disorder    Arthritis    Back pain    Bloating    most days    Blood in the stool    Blurred vision    Brachial neuritis or radiculitis NOS    C4,C5, nerve roots    Calculus of kidney    Last year do to medication i was taking    Chronic atrial fibrillation (HCC)    post op from [facility]    Chronic cough    medicine causes symptoms    Community acquired pneumonia of left lower lobe of lung    Decorative tattoo    Depression    Diabetes (HCC)    Diarrhea,  unspecified    I believe from my medication    Dizziness    Fatigue    daily    Feeling lonely    Flatulence/gas pain/belching    Food intolerance    Headache disorder    migraines    Heart palpitations    Heartburn    at least one a week    Hemorrhoids    High blood pressure    High cholesterol    History of depression    Hyperlipidemia    Indigestion    not normal    Kidney stone    Leg swelling    Loss of appetite    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, or unspecified(404.00)    Migraines    Nausea    on and off    Night sweats    Obesity, unspecified    Obsessive-compulsive disorders    ISAAC (obstructive sleep apnea)    AHI-16, O2 alyce-71%/CPAP-10cwp    Other specified cardiac dysrhythmias(427.89)    Other testicular hypofunction    Pain in joints    Pneumonia, organism unspecified(486)    Problems with swallowing    Shortness of breath    with simple movements    Sleep disturbance    Stress    Suicidal thoughts    Thoracic or lumbosacral neuritis or radiculitis, unspecified    L3,L4    Thrombocytopenia (HCC)    Uncomfortable fullness after meals    Unspecified essential hypertension    Unspecified sleep apnea    wears CPAP    Visual impairment    Vomiting    3 times after heartburn    Wears glasses    Weight gain    Weight loss     Physical Exam     ED Triage Vitals [11/20/24 1351]   BP (!) 209/125   Pulse 65   Resp 18   Temp 97.3 °F (36.3 °C)   Temp src Temporal   SpO2 95 %   O2 Device None (Room air)       Current Vitals:   Vital Signs  BP: (!) 175/114  Pulse: 56  Resp: 14  Temp: 98.1 °F (36.7 °C)  Temp src: Oral  MAP (mmHg): (!) 132    Oxygen Therapy  SpO2: 95 %  O2 Device: None (Room air)  Mode: AutoPAP  O2 Flow Rate (L/min): 0 L/min  Pulse Oximetry Type: Continuous  Oximetry Probe Site Changed: No  Pulse Ox Probe Location: Left hand        Physical Exam  Vitals and nursing note reviewed.   Constitutional:        General: He is not in acute distress.     Appearance: Normal appearance.   HENT:      Head: Normocephalic.      Nose: Nose normal.      Mouth/Throat:      Mouth: Mucous membranes are moist.   Eyes:      Extraocular Movements: Extraocular movements intact.      Pupils: Pupils are equal, round, and reactive to light.   Cardiovascular:      Rate and Rhythm: Normal rate and regular rhythm.      Pulses: Normal pulses.   Pulmonary:      Effort: Pulmonary effort is normal.   Abdominal:      General: Abdomen is flat. Bowel sounds are normal. There is no distension.      Palpations: Abdomen is soft.      Tenderness: There is abdominal tenderness in the epigastric area. There is no right CVA tenderness, left CVA tenderness, guarding or rebound.      Hernia: No hernia is present.   Musculoskeletal:         General: No swelling or tenderness. Normal range of motion.      Cervical back: Normal range of motion.   Skin:     General: Skin is warm and dry.   Neurological:      Mental Status: He is alert and oriented to person, place, and time. Mental status is at baseline.   Psychiatric:         Mood and Affect: Mood normal.       ED Course     Labs Reviewed   CBC WITH DIFFERENTIAL WITH PLATELET - Abnormal; Notable for the following components:       Result Value    Lymphocyte Absolute 0.88 (*)     All other components within normal limits   COMP METABOLIC PANEL (14) - Abnormal; Notable for the following components:    Glucose 111 (*)     Creatinine 1.54 (*)     eGFR-Cr 52 (*)     AST 37 (*)     All other components within normal limits   LIPASE - Abnormal; Notable for the following components:    Lipase 63 (*)     All other components within normal limits   PRO BETA NATRIURETIC PEPTIDE - Abnormal; Notable for the following components:    Pro-Beta Natriuretic Peptide 465 (*)     All other components within normal limits   BASIC METABOLIC PANEL (8) - Abnormal; Notable for the following components:    Creatinine 1.59 (*)     eGFR-Cr  50 (*)     All other components within normal limits   CBC WITH DIFFERENTIAL WITH PLATELET - Abnormal; Notable for the following components:    Lymphocyte Absolute 0.98 (*)     All other components within normal limits   TROPONIN I HIGH SENSITIVITY - Normal   HEMOGLOBIN A1C - Normal   MAGNESIUM - Normal   POCT GLUCOSE - Normal   POCT GLUCOSE - Normal     EKG    Rate, intervals and axes as noted on EKG Report.  Rate: 64  Rhythm: Sinus Rhythm  Reading: T wave inversions lateral leads    CTA CHEST CTA ABDOMEN CTA PELVIS (CPT=71275/81605)    Result Date: 11/20/2024  CONCLUSION:  1. Congestive heart failure and interstitial edema. 2. Marilyn duodenal fat stranding suggestive of duodenal inflammation/ulcer. 3. Generalized bladder wall thickening suggestive of cystitis. 4. Mild mediastinal lymphadenopathy. 5. Increased splenomegaly. 6. Cholelithiasis. 7. 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 8. Small fat containing umbilical hernia.   LOCATION:  [id number]   Dictated by (CST): Sekou Rodriguez MD on 11/20/2024 at 4:41 PM     Finalized by (CST): Sekou Rodriguez MD on 11/20/2024 at 4:56 PM       XR CHEST AP PORTABLE  (CPT=71045)    Result Date: 11/20/2024  CONCLUSION:  Congestive heart failure.   LOCATION:  [id number]      Dictated by (CST): Sekou Rodriguez MD on 11/20/2024 at 3:27 PM     Finalized by (CST): Sekou Rodriguez MD on 11/20/2024 at 3:27 PM           MDM      Medical Decision Making    The differential includes the following  Aortic dissection or aneurysmal rupture which is a significant life threat, PE which is also a significant life threat, CHF exacerbation, cholecystitis    Pertinent comorbidities include  As listed above    Pertinent social history includes  As listed above    Labs  Lipase 63, proBNP 465, troponin 9    Imaging studies  I reviewed the images and my independent interpreation after review is evidence of pulmonary edema is present.     External data reviewed      Discussion of  management with external providers  hospitalist    ER course  On arrival to the emergency room patient severely hypertensive with blood pressure of 205/100.  Patient's O2 sat 91 to 93%.  Patient has some signs of pitting edema though he states this is not too unusual for him.  Given his history of aortic aneurysm repair and reports of his severe pain with lightheadedness and shortness of breath in conjunction with his high blood pressure readings a CTA of the chest abdomen pelvis was obtained which was ultimately negative for aortic dissection or rupture of aneurysm.  Patient does have findings of pulmonary edema therefore has been given IV Lasix.  Upon reassessment he reports feeling better.  Patient CT also with signs of inflammation of the duodenum therefore has been given pantoprazole.  Patient's been admitted to the [facility] for further management.    Clinical Impression:  1. Acute on chronic congestive heart failure, unspecified heart failure type (HCC)         Disposition:  Admit  11/21/2024 10:47 am    Hospital Problems       Present on Admission  Date Reviewed: 11/20/2024            ICD-10-CM Noted POA    * (Principal) Acute on chronic congestive heart failure, unspecified heart failure type (HCC) I50.9 11/20/2024 Unknown                   11/20 H&P   Chief Complaint: Weight gain and abdominal pain        Subjective:  History of Present Illness:      Rivera Bush is a 59 year old male with a past medical history of CHF, hypertension, hyperlipidemia, ISAAC, obesity who presents emerged apartment with abdominal pain.  Patient states he is gained about 15 pounds in the last 2 weeks.  He feels like is all sitting in his stomach.  Been having distention and pain for the last couple of days.  He also endorses exertional shortness of breath.  Difficulty taking a deep breath due to his enlarged abdomen.  Denies any recent fevers, no chills, denies chest pain, no diarrhea or constipation.  Has no other  compl      General: No acute distress, Alert, pleasant   Respiratory: Bibasilar crackles   Cardiovascular: S1, S2. Regular rate and rhythm  Abdomen: Soft, Non-tender, non-distended, positive bowel sounds  Neuro: No new focal deficits  Extremities: 3+ b/l LE pitting edema      #HFrEF - acute exacerbation   EKG sinus, TWI in lateral leads  CXR with fluid  CTA c/a/p - congestive findings, interstital edema  Trop 9,   Lasix 40mg IV BID, I/O's, daily weight  Echo  Resume home ARB, Eplerenone  Cardiology consult      #Intractable abdominal pain  Ct noted duodenal inflammation/ulcer  IV PPI  GI consult   Hg wnl     #Accelerated HTN  #Essential HTN  Resume home meds: Hydralazine, metoprolol, clonidine     #Paroxysmal a. Fib  Flecainide  Xarelto for AC    #HLD   Statin     #DM Type 2  Degludec, ISS     #BPH  Flomax     #Pagets disease of bone  Noted on CT     #CKD 3A  Cr close to baseline      #Aortic aneurysm repair   2014 11/21 Cardiology     Reason for Consultation:  CHF     History of Present Illness:  Rivera Bush is a a(n) 59 year old diabetic with chronic HTN, HLP, ISAAC/obesity and Pafib who presents with abdominal discomfort.  He has chronic LOERA - most recently in August in our office SOB with minimal exertion.  At the time the most pressing issue related to hernia and gallbladder problems. In ED he was admitted for further evaluation and felt to be in acute CHF.     He has been compliant with hs HF regimen at home including eplerenone  Objectively, his CXR showed mild vascular congestion and cardiomegaly  proBNP elevated at 465 (prior was 105) and HS troponin negative  Serum creat 1.54 - slightly bumped, K 4.8  LDL 54 on rosuvastatin 40 nightly at home  Hg 14.4  HgA1c 5.3       Impression:  Principal Problem:    Acute on chronic congestive heart failure, unspecified heart failure type (HCC)     58 yo male with mild diastolic CHF exacerbation - improved with IV diuretic in ED  Chronic dyspnea on  exertion - multifactorial  DM2  HTN - on clonidine, eplerenone, losartan and toprol  HLP - on high dose statin  ISAAC - CPAP at night  Obesity  Atrial flutter on chronic oral anticoagulation   Paroxysmal atrial fib - rhythm control strategy with oral flecainide     Recommendations:  - continue IV lasix BID for another 24 hrs; will not need long term oral diuretic with preserved LVEF on recent echo  - continue losartan, toprol and eplerenone for chronic diastolic HF  - monitor lytes; keep K>4, Mg>2  - strict I/O's and daily weights  - echo ordered - will help guide management  - rhythm control strategy for afib with BB and flecainide  - if GI interventions needed can hold Xarelto 24-48 hrs prior        11/21 GI    Reason for Consultation   Abdominal pain, abnormal findings on CT         History of Present Illness   Rivera Bush is a 59 year old male with PmHx of DM type 2, HTN, HLP, ISAAC/obesity, atrial fibrillation (on Xarelto-last dose 11/20/2024 PM), and CHF who presented to the ER with upper abdominal pain and shortness of breath with a weight gain of 15lbs in 2 weeks. He noted abdominal swelling as well. On admission, normal Hgb and WBC, creatinine 1.54, GFR 52, AST 37, ALT 24, ALP 95, T bili normal, lipase 63. CTA chest, CTA A/P (IV) w/ enlargement of mediastinal lymph nodes, cholelithiasis, splenomegaly, and inflammatory changes of fat adjacent to the 2nd portion of the duodenum. No bowel distention or thickening noted. Patient reports this morning, abdominal pain has slightly lessened since fluid removed since admission (down about 4L). He feels less distention. He has experienced intermittent heartburn and nausea over the last few months. He is on PPI once daily at home. Denies dysphagia or odynophagia. No vomiting. He reports regular bowel movements. No hematochezia. He does think he saw 1 black stool about 1 month ago. Denies frequent NSAID use. No frequent alcohol. No smoking. No known family history of GI  malignancy.      He has a cholecystectomy and umbilical hernia repair planned for December with Dr. Mujica. Other GI history summarized below.        IMPRESSION:      Rivera Bush is a 59 year old male with PmHx of DM type 2, HTN, HLP, ISAAC/obesity, atrial fibrillation (on Xarelto-last dose 11/20/2024 PM), and CHF who presented to the ER with upper abdominal pain and shortness of breath with a weight gain of 15lbs in 2 weeks. He noted abdominal swelling as well. On admission, normal Hgb and WBC, creatinine 1.54, GFR 52, AST 37, ALT 24, ALP 95, T bili normal, lipase 63. CTA chest, CTA A/P (IV) w/ enlargement of mediastinal lymph nodes, cholelithiasis, splenomegaly, and inflammatory changes of fat adjacent to the 2nd portion of the duodenum. . He has experienced intermittent heartburn and nausea over the last few months. He is on PPI once daily at home.He does think he saw 1 black stool about 1 month ago. EGD in July with gastritis. Cholecystectomy and umbilical hernia repair planned with Dr. Mujica in December  Epigastric pain and abnormal CT findings near duodenum: reports different pain than prior gallbladder pain (RUQ). Need to r/o PUD, H pylori, and less likely neoplasm  Elevated LFTs: stable from previous-likely r/t fatty liver  Splenomegaly: consider r/t CHF and fluid overload, consider fibroscan as outpatient to r/o fibrosis  A fib, on Xarelto-last dose 11/20/24 PM               PLAN:      EGD later this week once Xarelto has been held for 48 hours (Saturday?)  Hold Xarelto if Ok'd by cardiology service  Continue PPI IV daily  Management of IV fluids, antiemetics, and analgesics per primary team            GI Attending Addendum:  I have personally seen and examined this patient and agree with above nurse practitioner's history, physical exam, assessment and recommendations with the following modifications and/or additions:      Patient is a 59 year old with HTN, DM, Afib on Xarelto, with GI consult for  abnormal CT scan, abdominal pain.   Patient with SOB. Also with last dose Xarelto - yesterday evening. Denies any overt GI bleeding. Abd soft, NTND. CT - possible PUD, ulcer or duodenitis, r/o GI lesion or malignancy.      Patient warrants EGD, when volume optimized; timing, consider in 2-3 days, given last plavix dose and CHF  Diuresis and xarelto per cardiology service  PPI IV q12 hrs   IVF, analgesia, anti-emetics per primary team          11/21 IM    Chief Complaint: abd pain, SOB dyspnea         Subjective:  Patient  abd pain is resolved w/ large amt uo made.   Moving better      Respiratory: No wheezes, no rhonchi  sl diminished       Extremities: le bilat  edema  less, abd softer       #HFrEF - acute exacerbation   EKG sinus, TWI in lateral leads  CTA c/a/p - congestive findings, interstital edema  Lasix 40mg IV BID, I/O's, daily weight  Echo  reviewed   Resume home ARB, Eplerenone  Cardiology  following       #Intractable abdominal pain  Ct noted duodenal inflammation/ulcer  IV PPI  GI consult   Hg wnl  Will need EGD/ c scope ? Sat   Holding DOAC      #Accelerated HTN  #Essential HTN  Resume home meds: Hydralazine, metoprolol, clonidine     #Paroxysmal a. Fib  Flecainide  Xarelto for AC  on hold for scope     #HLD   Statin     #DM Type 2  Degludec, ISS     #BPH  Flomax     #Pagets disease of bone  Noted on CT     #CKD 3A  Cr close to baseline      #Aortic aneurysm repair   2014 11/22 GI       Reason for Consultation:   Abnormal CT - duodenum  Abdominal pain     Subjective:   Abdominal pain improved;   Still w/ SOB and LOERA  Denies any n/v.       Patient is a 59 year old with HTN, DM, Afib on Xarelto, with GI consult for abnormal CT scan (duodenum), abdominal pain.   Concerning for PUD, duodenitis, r/o duodenal lesion.      Volume overload improving, diuresing, cards on consult; CHF exacerbation  Xarelto - last dose 11/21 - CrCl noted, likely washout by this weekend        Recommendations:   EGD - favor  in the next several days, when volume optimized, SOB/LOERA resolved, and Xarelto washout as above   PPI IV q12 hours  Diuresis, anti-thrombotic per cardiology   IVF, analgesia, anti-emetics per primary team         11/22 Cardiology         Denies sob- now resolved. Denies having pnd/orthopnea overnight.  Abd pain is improved but ct scan with duodenal abn.         Sinus rhythm, sinus raine       Assessment:     Abd pain- ct concerning for PUD, duodenitis, r/o duodenal lesion   IV PPI  Plan for egd this weekend- per GI  Acute/chronic diastolic hf  Appears resolved. Neg 6L, no wt today. Clinical sx resolved  Toprol, losartan, hydralazine, inspra  Chronic loera with obesity, shyam, presumed secondary phtn  Hx aflutter s/p ablation 5/2024 (Dr River)  In sinus on flecainide, historically on xarelto which is on hold for GI procedure  Htn- bp labile   Ckd- no labs today   Hl- ldl 54, on crestor 40 mg daily  Hx small PE 2021  DM- managed with metformin. A1C 5.4     Plan:      Transition back to po diuretics tomorrow (ordered)  Xarelto on hold for EGD  Would benefit from SGLT2-I given HFpEF if ok with hospitalist (unsure if would need to make adjustments in metformin)        Patient seen and examined independently.  Note reviewed and labs reviewed. Agree with above assessment and plan.  59-year-old male who presents with abdominal pain and is planning to undergo EGD tomorrow.  Also arrived with signs of volume overload in the setting of chronic diastolic heart failure.  States he has not been using his p.o. furosemide at home given difficulty getting the opportunity to urinate frequently given his commute and work schedule.  Resume Xarelto when okay from GI perspective.  Volume status is improved as of today.  Recommend transition to p.o. diuretics tomorrow.  Consider SGLT2 inhibitor addition.  Will follow peripherally from cardiology standpoint.  Please contact if any questions or concerns arise.         11/22 JUAN R Gómez  and losing weight       General: No acute distress  AO  bandaid over r eye s/p skin cancer removal   Respiratory: No wheezes, no rhonchi  sl diminished   Cardiovascular: S1, S2, regular rate and rhythm NSR   Abdomen: Soft, Non-tender, non-distended, obese  positive bowel sounds  Neuro: No new focal deficits.   Extremities: le bilat  edema- improving         #HFrEF - acute exacerbation   Diuresing well   Echo  reviewed   Resume home ARB, Eplerenone  Cardiology  following       #Intractable abdominal pain  Ct noted duodenal inflammation/ulcer  IV PPI  GI consulted for scopes- inpatient vs outpatient   Hold DOAC       #Accelerated HTN  #Essential HTN  Resume home meds: Hydralazine, metoprolol, clonidine     #Paroxysmal a. Fib  Flecainide  Xarelto for AC  on hold for scope     #HLD   Statin     #DM Type 2  Degludec, ISS     #BPH  Flomax     #Pagets disease of bone  Noted on CT     #CKD 3A  Cr close to baseline      #Aortic aneurysm repair   2014                EGD operative report  Patient Name: Rivera Bush  Date: 11/23/2024  Procedure: Esophagogastroduodenoscopy   Pre-Op Diagnosis: abnormal CT scan  Post-Op Diagnosis: normal exam             Findings:   Esophagus: The mucosa was normal.    GE junction: The GE junction is normal.  Stomach:  The gastric body, antrum, fundus, cardia, and angularis were normal.                   Duodenum: The duodenal bulb, post-bulbar duodenum, and descending duodenum were normal.   Impression: Findings as above.  Recommendations:   Normal exam.  No findings to correlate with CT findings.  OK to resume anticoagulation.  Cardiac diet.  Follow-up with Dr Mendoza as needed in outpatient setting.             Lab 11/20/24  1435 11/21/24  0707   WBC 7.6 8.3   HGB 14.4 14.5   MCV 90.1 89.4   .0 157.0              Recent Labs   Lab 11/20/24  1435 11/21/24  0707   * 91   BUN 14 15   CREATSERUM 1.54* 1.59*   CA 9.5 9.6   ALB 4.4  --     140   K 4.8 4.0    105   CO2  26.0 31.0   ALKPHO 95  --    AST 37*  --    ALT 24  --    BILT 0.6  --    TP 7.4  --        Medications 11/17/24 11/18/24 11/19/24 11/20/24 11/21/24 11/22/24 11/23/24   cloNIDine (Catapres) tab 0.1 mg  Dose: 0.1 mg  Freq: 2 times daily Route: OR  Start: 11/20/24 2100 End: 11/23/24 2042 2209 SG-Given      0802 RR-Given     2136 KT-Given      0848 MD-Given     2021 DH-Given      0923 KD-Given     2042-D/C'd      cyclobenzaprine (Flexeril) tab 10 mg  Dose: 10 mg  Freq: Nightly Route: OR  Start: 11/20/24 2100 End: 11/23/24 2042 2206 SG-Given      2031 LA-Given      2021 DH-Given      2042-D/C'd      eplerenone (Inspra) tab 50 mg  Dose: 50 mg  Freq: Daily Route: OR  Start: 11/21/24 0930 End: 11/23/24 2042        0802 RR-Given      0849 MD-Given      0924 KD-Given     2042-D/C'd      fenofibrate micronized (Lofibra) cap 134 mg  Dose: 134 mg  Freq: Daily with breakfast Route: OR  Start: 11/21/24 0800 End: 11/23/24 2042        0801 RR-Given      0849 MD-Given      0923 KD-Given     2042-D/C'd      flecainide (Tambocor) tab 100 mg  Dose: 100 mg  Freq: 2 times daily Route: OR  Start: 11/20/24 2100 End: 11/23/24 2042 2247 SG-Given      0802 RR-Given     2031 LA-Given      0849 MD-Given     2021 DH-Given      0923 KD-Given     2042-D/C'd                     furosemide (Lasix) 10 mg/mL injection 40 mg  Dose: 40 mg  Freq: 2 times daily (diuretic) Route: IV  Start: 11/20/24 2030 End: 11/22/24 1820       2159 SG-Given      0801 RR-Given     1805 RR-Given      0852 MD-Given     1820 MG-Given         furosemide (Lasix) 10 mg/mL injection 40 mg  Dose: 40 mg  Freq: Once Route: IV  Start: 11/20/24 1543 End: 11/20/24 1627       1627 ET-Given                           hydrALAzine (Apresoline) 20 mg/mL injection 10 mg  Dose: 10 mg  Freq: Once Route: IV  Start: 11/20/24 1436 End: 11/20/24 1442   Admin Instructions:   Titrate to maintain systolic BP within <170       1442 JR-Given            hydrALAZINE (Apresoline) tab 100  mg  Dose: 100 mg  Freq: Every morning Route: OR  Start: 11/21/24 0900 End: 11/23/24 2042        0802 RR-Given      0850 MD-Given      0922 KD-Given     2042-D/C'd      And   hydrALAZINE (Apresoline) tab 100 mg  Dose: 100 mg  Freq: Daily at noon Route: OR  Start: 11/21/24 1200 End: 11/23/24 2042        1156 RR-Given      (1200 MG)-Not Given [C]      (1200 KD)-Not Given     2042-D/C'd      And   hydrALAZINE (Apresoline) tab 75 mg  Dose: 75 mg  Freq: Every evening Route: OR  Start: 11/20/24 2030 End: 11/23/24 2042 2206 SG-Given      1804 RR-Given      1722 MG-Given           2042-D/C'd            losartan (Cozaar) tab 100 mg  Dose: 100 mg  Freq: Daily Route: OR  Start: 11/21/24 0930 End: 11/23/24 2042        0802 RR-Given      0848 MD-Given      0923 KD-Given     2042-D/C'd      metoprolol succinate ER (Toprol XL) 24 hr tab 200 mg  Dose: 200 mg  Freq: 2 times daily Route: OR  Start: 11/20/24 2100 End: 11/23/24 2042   Admin Instructions:   Do not crush       2204 SG-Given      0801 RR-Given     2031 LA-Given      0848 MD-Given     2024 DH-Given      (0900 KD)-Not Given [C]     2042-D/C'd      morphINE PF 4 MG/ML injection 4 mg  Dose: 4 mg  Freq: Once Route: IV  Start: 11/20/24 1436 End: 11/20/24 1442       1442 JR-Given           pantoprazole (Protonix) 40 mg in sodium chloride 0.9% PF 10 mL IV push  Dose: 40 mg  Freq: Every 12 hours Route: IV  Start: 11/22/24 0830 End: 11/23/24 2042   Admin Instructions:   Dilute with 10 ml NS; IV push over 2 minutes         0830 MD-Given     2025 DH-Given      0923 KD-Given          2042-D/C'd      pantoprazole (Protonix) 40 mg in sodium chloride 0.9% PF 10 mL IV push  Dose: 40 mg  Freq: Every 24 hours Route: IV  Start: 11/21/24 1730 End: 11/22/24 0823   Admin Instructions:   Dilute with 10 ml NS; IV push over 2 minutes        1805 RR-Given      0823-D/C'd       pantoprazole (Protonix) 40 mg in sodium chloride 0.9% PF 10 mL IV push  Dose: 40 mg  Freq: Once Route: IV  Start:  11/20/24 1712 End: 11/20/24 1729   Admin Instructions:   Dilute with 10 ml NS; IV push over 2 minutes       1727 DZ-Given           rivaroxaban (Xarelto) tab 20 mg  Dose: 20 mg  Freq: Nightly Route: OR  Start: 11/20/24 2100 End: 11/23/24 2042   Admin Instructions:   Give with food.       2203 SG-Given      1350 NS-Held by provider [C]     2100 NS      2100 NS      2042 AD-Unheld by provider     2042-D/C'd          meclizine (Antivert) tab 25 mg  Dose: 25 mg  Freq: 3 times daily PRN Route: OR  PRN Reason: Dizziness  Start: 11/20/24 2022 End: 11/23/24 2042 2030 LA-Given       2042-D/C'd        11/23/24 1546 97.7 °F (36.5 °C) 65 18 122/54 91 % -- None (Room air) -- KA   11/23/24 1539 -- 66 -- -- 95 % -- -- -- KA   11/23/24 1329 -- 56 11 138/72 95 % -- -- -- DC   11/23/24 1320 -- 57 10 125/74 95 % -- -- -- DC   11/23/24 1315 -- 58 14 129/67 96 % -- -- -- DC   11/23/24 1310 -- 59 20 123/72 94 % -- -- -- DC   11/23/24 1305 -- 58 15 119/71 94 % -- -- -- DC   11/23/24 1300 -- 58 14 101/58 92 % -- -- -- DC   11/23/24 1258 -- 59 19 101/58 94 % -- -- -- DC   11/23/24 1255 -- 60 15 105/45 94 % -- -- -- DC   11/23/24 1250 -- 57 12 105/45 97 % -- None (Room air) -- DC   11/23/24 1145 97.8 °F (36.6 °C) 61 17 120/73 93 % -- None (Room air) 0 L/min KD   11/23/24 0920 -- 56 -- 164/83 Abnormal  -- -- -- -- KD   11/23/24 0804 97.7 °F (36.5 °C) 59 18 150/84 96 % -- None (Room air) -- KA   11/23/24 0520 -- -- -- -- -- -- CPAP --    11/23/24 0520 97.4 °F (36.3 °C) 57 18 145/82 93 % 266 lb 1.5 oz (120.7 kg) -- 0 L/min    11/22/24 2320 97.3 °F (36.3 °C) 51 18 112/56 95 % -- CPAP 0 L/min    11/22/24 2100 -- 58 -- -- 93 % -- -- --    11/22/24 1930 97.8 °F (36.6 °C) 64 18 147/75 94 % -- None (Room air) 0 L/min    11/22/24 1658 97.8 °F (36.6 °C) 60 18 136/71 94 % -- None (Room air) -- AL   11/22/24 1629 97.7 °F (36.5 °C) 57 16 131/77 93 % -- -- -- PETE   11/22/24 1200 98.3 °F (36.8 °C) 57 16 124/64 94 % -- None (Room air) -- PETE    11/22/24 0837 98.2 °F (36.8 °C) 60 16 166/94 Abnormal  96 % -- None (Room air) 0 L/min PETE   11/22/24 0510 98.1 °F (36.7 °C) 57 20 126/78 93 % -- -- -- OG   11/22/24 0017 98.2 °F (36.8 °C) 59 20 131/70 91 % -- -- -- OG   11/21/24 1954 98.3 °F (36.8 °C) 62 20 124/69 93 % -- -- -- OG   11/21/24 1639 -- 59 -- -- 94 % -- -- -- ALA   11/21/24 1638 98.1 °F (36.7 °C) 58 16 146/82 94 % -- None (Room air) 0 L/min ALA   11/21/24 1637 -- 60 -- -- 94 % -- -- -- ALA   11/21/24 1636 -- 60 -- -- 95 % -- -- -- ALA   11/21/24 1635 -- 59 -- -- 92 % -- -- -- ALA   11/21/24 1634 -- 60 -- -- 94 % -- -- -- ALA   11/21/24 1142 -- 56 15 -- 93 % -- -- -- ALA   11/21/24 1141 -- 58 -- -- 92 % -- -- -- ALA   11/21/24 1140 -- 57 -- -- 94 % -- -- -- ALA   11/21/24 1139 98.2 °F (36.8 °C) 56 14 157/81 94 % -- None (Room air) 0 L/min ALA   11/21/24 1138 -- 56 -- -- 93 % -- -- -- ALA   11/21/24 1137 -- 55 -- -- 93 % -- -- -- ALA   11/21/24 0755 -- 56 -- -- 95 % -- -- -- ALA   11/21/24 0754 -- 58 -- -- 94 % -- -- -- ALA   11/21/24 0753 -- 56 -- -- 94 % -- -- -- ALA   11/21/24 0752 98.1 °F (36.7 °C) 55 14 175/114 Abnormal  93 % -- None (Room air) 0 L/min ALA   11/21/24 0751 -- 58 -- -- 95 % -- -- -- ALA   11/21/24 0750 -- 58 -- -- 96 % -- -- -- ALA   11/21/24 0515 98 °F (36.7 °C) 63 18 147/83 91 % 270 lb 4.8 oz (122.6 kg) None (Room air) -- AT   11/21/24 0154 -- 66 -- -- 93 % -- -- -- AT   11/21/24 0000 98.2 °F (36.8 °C) 60 18 147/79 93 % -- CPAP -- AT   11/20/24 2300 -- 65 -- -- 93 % -- -- -- AT   11/20/24 2025 -- -- -- -- -- 285 lb (129.3 kg) -- -- SG   11/20/24 1930 97.7 °F (36.5 °C) 60 20 178/95 Abnormal  97 % -- None (Room air) -- AT   11/20/24 1921 -- 62 19 166/98 Abnormal  96 % -- -- -- ET   11/20/24 1852 -- 61 22 179/96 Abnormal  93 % -- -- -- ET   11/20/24 1830 -- 61 19 172/86 Abnormal  92 % -- None (Room air) -- ET   11/20/24 1700 -- 63 17 165/85 Abnormal  95 % -- None (Room air) -- ET   11/20/24 1615 -- 61 17 169/95 Abnormal  96 % -- None  (Room air) -- ET   24 1530 -- 61 -- 180/97 Abnormal  95 % -- None (Room air) -- JR   24 1515 -- 62 -- -- 96 % -- None (Room air) -- JR   24 1445 -- 64 -- 205/106 Abnormal  97 % -- None (Room air) -- JR   24 1441 -- 65 -- 198/110 Abnormal  98 % -- None (Room air) -- JR   24 1351 97.3 °F (36.3 °C) 65 18 209/125 Abnormal  95 % 285 lb (129.3 kg) None (Room air) -- DZ             --------------  DISCHARGE REVIEW    Payor: [employer]/HMO/POS/EPO  Subscriber #:  [id number]  Authorization Number: [id number]    Admit date: 24  Admit time:   4:54 PM  Discharge Date: 2024  6:42 PM     Admitting Physician: Leeann Walker MD  Attending Physician:  No att. providers found  Primary Care Physician: Won Davis MD          Discharge Summary Notes        Discharge Summary signed by Adam Tripp MD at 2024  4:47 PM       Author: Adam Tripp MD Specialty: HOSPITALIST, Internal Medicine Author Type: Physician    Filed: 2024  4:47 PM Date of Service: 2024  4:44 PM Status: Signed    : Adam Tripp MD (Physician)           [facility]     Rivera Bush Patient Status:  Inpatient    1965 MRN [id number]   Location [facility] 8NE-A Attending Adam Tripp MD   Hosp Day # 1 PCP Won Davis MD     Date of Admission: 2024  Date of Discharge:  2024    Discharge Disposition: Home or Self Care    Admitting Diagnosis:   Acute on chronic congestive heart failure, unspecified heart failure type (HCC) [I50.9]    Hospital Discharge Diagnoses:  #HFrEF - acute exacerbation   Diuresing well   Echo  reviewed   Resume home ARB, Eplerenone  Cardiology  following       #Intractable abdominal pain  Ct noted duodenal inflammation/ulcer  IV PPI  GI consulted for scopes- inpatient vs outpatient   Hold DOAC       #Accelerated HTN  #Essential HTN  Resume home meds: Hydralazine, metoprolol, clonidine     #Paroxysmal a.  Fib  Flecainide  Xarelto for AC  on hold for scope     #HLD   Statin     #DM Type 2  Degludec, ISS     #BPH  Flomax     #Pagets disease of bone  Noted on CT     #CKD 3A- CR increased  -hold further diuresis thru the weekend and repeat BMP next and f/u with Cardiology and PCP      #Aortic aneurysm repair   2014        DC planning after EGD   Per Cardiology benefit from SGLT2 but pt will not take it and believes he had a reaction. Will continue metformin and can f/u as outpt      Lace+ Score: 64  59-90 High Risk  29-58 Medium Risk  0-28   Low Risk.     Brief Synopsis: pt seen by cardiology and aggressively diuresed. Pt improved faster than expected.  GI placed on consultation and delayed EGD until CHF compensated and DOAC held.   Pt had EGD unimpressive and CHF meds adjusted.   Pt cleared by Cardiology and GI and DC in stable condition.     Procedures during hospitalization:   EGD    Lab/Test results pending at Discharge:   None     Consultants:  GI, Cardiology     Discharge Medication List:     Discharge Medications        CHANGE how you take these medications        Instructions Prescription details   azelastine 137 MCG/SPRAY Soln  What changed:   how to take this  when to take this      INHALE 1 SPRAY INTO EACH NOSTRIL 2 TIMES DAILY   Quantity: 30 mL  Refills: 2     furosemide 20 MG Tabs  Commonly known as: Lasix  Start taking on: November 25, 2024  What changed: These instructions start on November 25, 2024. If you are unsure what to do until then, ask your doctor or other care provider.      Take 1 tablet (20 mg total) by mouth daily.   Quantity: 90 tablet  Refills: 3            CONTINUE taking these medications        Instructions Prescription details   albuterol 108 (90 Base) MCG/ACT Aers  Commonly known as: Ventolin HFA      Inhale 2 puffs into the lungs every 6 (six) hours as needed for Shortness of Breath.   Quantity: 54 g  Refills: 1     ASHWAGANDHA GUMMIES OR      Take 1 Piece of gum by mouth at bedtime.    Refills: 0     cholecalciferol 125 MCG (5000 UT) Tabs  Commonly known as: Vitamin D3      Take 1 tablet (5,000 Units total) by mouth daily.   Refills: 0     cloNIDine 0.1 MG Tabs  Commonly known as: Catapres      Take 1 tablet (0.1 mg total) by mouth 2 (two) times daily.   Refills: 0     cyclobenzaprine 10 MG Tabs  Commonly known as: Flexeril      Take 1 tablet (10 mg total) by mouth nightly.   Quantity: 10 tablet  Refills: 0     Deep Sea Nasal Spray 0.65 % Soln  Generic drug: sodium chloride      1 spray by Nasal route at bedtime.   Refills: 0     eplerenone 50 MG Tabs  Commonly known as: INSPRA      TAKE 1 TABLET BY MOUTH ONE TIME DAILY   Quantity: 90 tablet  Refills: 0     fenofibrate 145 MG Tabs  Commonly known as: Tricor      TAKE 1 TABLET BY MOUTH ONE TIME DAILY   Quantity: 90 tablet  Refills: 0     flecainide 100 MG Tabs  Commonly known as: Tambocor      Take 1 tablet (100 mg total) by mouth 2 (two) times daily.   Quantity: 180 tablet  Refills: 0     hydrALAZINE 50 MG Tabs  Commonly known as: Apresoline      Take 1 tablet (50 mg total) by mouth 3 (three) times daily.   Refills: 0     hydrOXYzine 25 MG Tabs  Commonly known as: Atarax      Take 1 tablet (25 mg total) by mouth nightly as needed for Itching.   Quantity: 20 tablet  Refills: 0     losartan 100 MG Tabs  Commonly known as: Cozaar      TAKE 1 TABLET BY MOUTH ONE TIME DAILY   Quantity: 90 tablet  Refills: 0     magnesium oxide 400 MG Tabs  Commonly known as: Mag-Ox      Take 1 tablet (400 mg total) by mouth daily.   Refills: 0     meclizine 25 MG Tabs  Commonly known as: Antivert      Take 1 tablet (25 mg total) by mouth 3 (three) times daily as needed.   Quantity: 30 tablet  Refills: 0     metFORMIN  MG Tb24  Commonly known as: Glucophage XR      TAKE TWO TABLETS BY MOUTH ONCE DAILY   Quantity: 180 tablet  Refills: 0     Metoprolol Succinate  MG Tb24  Commonly known as: TOPROL-XL      TAKE ONE TABLET BY MOUTH TWICE DAILY   Quantity: 180  tablet  Refills: 0     omeprazole 20 MG Cpdr  Commonly known as: PriLOSEC      Take one capsule (20 mg total) by mouth once daily, 30 minutes prior to breakfast   Quantity: 30 capsule  Refills: 11     OneTouch Delica Lancets 30G Misc      1 Lancet by Finger stick route daily.   Quantity: 100 each  Refills: 3     OneTouch Verio Strp      Check blood sugars once daily.   Quantity: 100 strip  Refills: 3     potassium chloride 20 MEQ Tbcr  Commonly known as: Klor-Con M20      TAKE 1 TABLET BY MOUTH IN  THE MORNING AND 1 TABLET BY MOUTH IN THE EVENING.   Quantity: 180 tablet  Refills: 0     rosuvastatin 40 MG Tabs  Commonly known as: Crestor      Take 1 tablet (40 mg total) by mouth nightly   Quantity: 90 tablet  Refills: 0     Symbicort 160-4.5 MCG/ACT Aero  Generic drug: Budesonide-Formoterol Fumarate      INHALE TWO PUFFS BY MOUTH TWICE DAILY   Quantity: 30.6 g  Refills: 0     tamsulosin 0.4 MG Caps  Commonly known as: Flomax      Take 1 capsule (0.4 mg total) by mouth every evening.   Quantity: 90 capsule  Refills: 3     vitamin E 1000 UNITS Caps  Commonly known as: Alpha-E      Take 1 capsule (1,000 Units total) by mouth daily.   Refills: 0     Xarelto 20 MG Tabs  Generic drug: rivaroxaban      Take 1 tablet (20 mg total) by mouth daily   Quantity: 90 tablet  Refills: 0            STOP taking these medications      ketoconazole 2 % Crea  Commonly known as: Nizoral        Tadalafil 20 MG Tabs  Commonly known as: Cialis                  Where to Get Your Medications        These medications were sent to [id number] - [place], [place] - [address] [facility] [address] [phone], [phone] [address] [facility] [address] [facility] [address]      Phone: [phone]   furosemide 20 MG Tabs         Follow-up appointment:   Karlene Gerardo APRN  [address] [facility] [address] [facility] [address] [phone]    Go on 12/5/2024  You have an appointment scheduled for 12/5/24 at 1:30 pm    Won Davis MD  [address] [place] [address]  [id number] [phone]    Follow up in 1 week(s)      Angel Mendoza MD  [address] [place] [id number] [phone]    Follow up  As needed, if abdominal discomfort recurs (without any recurrent water weight)      -----------------------------------------------------------------------------------------------  PATIENT DISCHARGE INSTRUCTIONS: See electronic chart    Adam Tripp MD 11/23/2024    Time spent: 33 minutes      Electronically signed by Adam Tripp MD on 11/23/2024  4:47 PM         REVIEWER COMMENTS

## 2024-11-29 NOTE — CDS QUERY
Dear Dr. Tripp,      Can the diagnosis of heart failure be further clarified?  ( X ) Acute n chronic HFpEF     (   ) Other - please specify:  ___________________________________________________________________________  Clinical Indicators:      CTA chest abd/pel: Congestive heart failure and interstitial edema.      H&P: Reports 15 lb weight gain over two weeks, exertional shortness of breath. Difficulty taking a deep breath due to his enlarged abdomen.   Exam: bibasilar crackles. 3+ bilateral LE pitting edema  Pro-BNP: 465  #HFrEF - acute exacerbation      Cardiology Consult: compliant with hs HF regimen at home including eplerenone.  #mild diastolic CHF exacerbation - improved with IV diuretic in ED      2D Echo: Left ventricle: The cavity size was normal. Wall thickness was mildly to    moderately increased. Systolic function was normal. The estimated ejection fraction was 60-65%, by visual assessment. No diagnostic evidence for regional wall motion abnormalities. Left ventricular diastolic function parameters were normal for the patient's age     Cardiology: #Acute on chronic diastolic /Right sided HF: Trops neg   - Wt. Down 21 Lbs overall, Total net en.3 liters.      Discharge Summary: #HFrEF - acute exacerbation     Risk Factors: DM2, HTN, CKD 3A   Treatment: Lasix 40 mg IV twice daily  -     Use of terms such as suspected, possible, or probable (associated with a specific diagnosis that is being evaluated, monitored, or treated as if it exists) are acceptable and can be coded in the inpatient setting, when documented at the time of discharge.     For questions regarding this query, please contact Lead Clinical :   Sara Perez RN, CCDS   Cell# [phone]                  Thank you!                                           THIS FORM IS A PERMANENT PART OF THE MEDICAL RECORD

## 2024-12-02 NOTE — PROGRESS NOTES
Subjective:   Rivera Bush is a 59 year old male who presents for hospital follow up.   He was discharged from [facility] to Home or Self Care  Admission Date: 11/20/24   Discharge Date: 11/23/24  Hospital Discharge Diagnosis:   CHF exacerbation  Intractable abdominal pain   Accelerated HTN    Interactive contact within 2 business days post discharge first initiated on Date: 11/25/2024    I accessed Monitor and/or "[employer]" Everywhere and personally reviewed the following for the recent hospitalization: provider notes, consults, summaries, labs and other test results and the pertinent findings are documented below.     HPI:   Patient was admitted for acute CHF exacerbation. Brief Synopsis: pt seen by cardiology and aggressively diuresed. Pt improved faster than expected.  GI placed on consultation and delayed EGD until CHF compensated and DOAC held.   Pt had EGD unimpressive and CHF meds adjusted.   Pt cleared by Cardiology and GI and DC in stable condition.     He had f/up with cardiology outpatient on 11/29. He was started on farxiga 10mg daily and will titrate onto entresto.   Weight at home has been around 268 lbs baseline.   BP's at home have been 130's/80's.   Today, he c/o blurry vision, constant x weeks. Had a recent eye exam at [facility]. Blood sugar last week was 80, has not checked it since. Denies dizziness, shakiness, confusion or other hypoglycemia symptoms.      History/Other:   Current Medications:  Medication Reconciliation:  I am aware of an inpatient discharge within the last 30 days.  The discharge medication list has been reconciled with the patient's current medication list and reviewed by me. See medication list for additions of new medication, and changes to current doses of medications and discontinued medications.  Outpatient Medications Marked as Taking for the 12/2/24 encounter (Office Visit) with Nathalie Giles PA-C   Medication Sig    dapagliflozin (FARXIGA) 10 MG  Oral Tab Farxiga 10 mg tablet, [RxNorm: [id number]]    furosemide 20 MG Oral Tab Take 1 tablet (20 mg total) by mouth daily.    sodium chloride (DEEP SEA NASAL SPRAY) 0.65 % Nasal Solution 1 spray by Nasal route at bedtime.    hydrALAZINE 50 MG Oral Tab Take 1 tablet (50 mg total) by mouth 3 (three) times daily.    ASHWAGANDHA GUMMIES OR Take 1 Piece of gum by mouth at bedtime.    XARELTO 20 MG Oral Tab Take 1 tablet (20 mg total) by mouth daily    FENOFIBRATE 145 MG Oral Tab TAKE 1 TABLET BY MOUTH ONE TIME DAILY    FLECAINIDE 100 MG Oral Tab Take 1 tablet (100 mg total) by mouth 2 (two) times daily.    LOSARTAN 100 MG Oral Tab TAKE 1 TABLET BY MOUTH ONE TIME DAILY    metFORMIN  MG Oral Tablet 24 Hr TAKE TWO TABLETS BY MOUTH ONCE DAILY (Patient taking differently: Take 1 tablet (750 mg total) by mouth 2 (two) times daily with meals.)    POTASSIUM CHLORIDE 20 MEQ Oral Tab CR TAKE 1 TABLET BY MOUTH IN  THE MORNING AND 1 TABLET BY MOUTH IN THE EVENING.    SYMBICORT 160-4.5 MCG/ACT Inhalation Aerosol INHALE TWO PUFFS BY MOUTH TWICE DAILY (Patient taking differently: Inhale 2 puffs into the lungs 2 (two) times daily as needed.)    omeprazole 20 MG Oral Capsule Delayed Release Take one capsule (20 mg total) by mouth once daily, 30 minutes prior to breakfast    tamsulosin 0.4 MG Oral Cap Take 1 capsule (0.4 mg total) by mouth every evening.    cyclobenzaprine 10 MG Oral Tab Take 1 tablet (10 mg total) by mouth nightly.    hydrOXYzine 25 MG Oral Tab Take 1 tablet (25 mg total) by mouth nightly as needed for Itching.    METOPROLOL SUCCINATE  MG Oral Tablet 24 Hr TAKE ONE TABLET BY MOUTH TWICE DAILY    cloNIDine 0.1 MG Oral Tab Take 1 tablet (0.1 mg total) by mouth 2 (two) times daily.    EPLERENONE 50 MG Oral Tab TAKE 1 TABLET BY MOUTH ONE TIME DAILY    Glucose Blood (ONETOUCH VERIO) In Vitro Strip Check blood sugars once daily.    OneTouch Delica Lancets 30G Does not apply Misc 1 Lancet by Finger stick route daily.     ROSUVASTATIN 40 MG Oral Tab Take 1 tablet (40 mg total) by mouth nightly    albuterol (VENTOLIN HFA) 108 (90 Base) MCG/ACT Inhalation Aero Soln Inhale 2 puffs into the lungs every 6 (six) hours as needed for Shortness of Breath.    AZELASTINE  MCG/SPRAY Nasal Solution INHALE 1 SPRAY INTO EACH NOSTRIL 2 TIMES DAILY (Patient taking differently: 1 spray by Nasal route in the morning and 1 spray before bedtime.)    meclizine 25 MG Oral Tab Take 1 tablet (25 mg total) by mouth 3 (three) times daily as needed.    vitamin E 1000 UNITS Oral Cap Take 1 capsule (1,000 Units total) by mouth daily.    magnesium oxide 400 MG Oral Tab Take 1 tablet (400 mg total) by mouth daily.    cholecalciferol 125 MCG (5000 UT) Oral Tab Take 1 tablet (5,000 Units total) by mouth daily.       Review of Systems:  GENERAL: weight stable, energy stable, no sweating  SKIN: denies any unusual skin lesions  EYES: denies blurred vision or double vision  HEENT: denies nasal congestion, sinus pain or ST  LUNGS: denies shortness of breath with exertion  CARDIOVASCULAR: denies chest pain on exertion or palpitations  GI: denies abdominal pain, denies heartburn, denies diarrhea  MUSCULOSKELETAL: denies pain, normal range of motion of extremities  NEURO: denies headaches, denies dizziness, denies weakness  PSYCHE: denies depression or anxiety  HEMATOLOGIC: denies hx of anemia, or bruising, denies bleeding  ENDOCRINE: denies thyroid history  ALL/ASTHMA: denies hx of allergy or asthma    Objective:   No LMP for male patient.  Estimated body mass index is 37.94 kg/m² as calculated from the following:    Height as of this encounter: 5' 11\" (1.803 m).    Weight as of this encounter: 272 lb (123.4 kg).   BP (!) 170/90   Pulse 80   Temp 98.3 °F (36.8 °C)   Resp 14   Ht 5' 11\" (1.803 m)   Wt 272 lb (123.4 kg)   SpO2 99%   BMI 37.94 kg/m²    GENERAL: well developed, well nourished, in no apparent distress  SKIN: no rashes, no suspicious  lesions  HEENT: atraumatic, normocephalic, ears and throat are clear  EYES: PERRLA, EOMI, conjunctiva are clear  NECK: supple, no adenopathy, no bruits  CHEST: no chest tenderness  LUNGS: clear to auscultation  CARDIO: RRR without murmur  GI: good BS's, no masses, HSM or tenderness  MUSCULOSKELETAL: back is not tender, FROM of the extremities  EXTREMITIES: no cyanosis, clubbing or edema  NEURO: Oriented times three, cranial nerves are intact, motor and sensory are grossly intact    Assessment & Plan:   1. Acute on chronic congestive heart failure, unspecified heart failure type (HCC) (Primary)  Continue daily weights, med titration to entresto per cardiology.   2. Controlled type 2 diabetes mellitus with complication, without long-term current use of insulin (HCC)  Check BMP today. Tolerating farxiga so far. Continue current meds. Eye exam record requested.   -     Basic Metabolic Panel (8)  3. 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 He is due for next dose of hydralazine now he will take it when he gets home. Will be starting entresto per cardiology plan, pt will continue to monitor carefully. For now he will take an extra 1/2 dose of hydralazine if BP > 160 systolic.      Return in about 2 weeks (around 12/16/2024) for follow-up.

## 2024-12-04 NOTE — TELEPHONE ENCOUNTER
Last time medication was refilled: 4/3/24  Next office visit due/scheduled: 12/16/24  Last office visit: 12/2/24  Last Labs: 11/27/24

## 2024-12-04 NOTE — TELEPHONE ENCOUNTER
Last time medication was refilled: 11/29/23  Next office visit due/scheduled: 12/16/24  Last office visit: 12/2/24  Last Labs: 11/27/24

## 2024-12-05 NOTE — TELEPHONE ENCOUNTER
Diabetic eye exam notes reviewed by provider. Speciality comments updated. Exam dated 12/12/23 already scanned into chart.

## 2024-12-16 NOTE — PROGRESS NOTES
Rivera Bush is a 59 year old male.     HPI:   Patient presents for recheck of his hypertension. Pt has been taking medications as instructed, no medication side effects, just started entresto through CHF clinic at [facility]. His home BP monitoring in the range of 140's systolic and 80's diastolic.  He still feels much better since diuresis during hospital stay from CHF exacerbation     Blood sugar readings have been very stable, 140's after meals and fasting in the 70-80's.    Has stress test upcoming later this week. Sees Dr River in January   Wt Readings from Last 6 Encounters:   12/16/24 274 lb (124.3 kg)   12/02/24 272 lb (123.4 kg)   12/10/24 266 lb 8 oz (120.9 kg)   09/28/24 277 lb (125.6 kg)   09/14/24 276 lb (125.2 kg)   08/24/24 277 lb (125.6 kg)     Body mass index is 38.22 kg/m².    Lab Results   Component Value Date    CHOLEST 116 02/26/2024    CHOLEST 176 11/27/2023    CHOLEST 219 (H) 06/30/2023     Lab Results   Component Value Date    HDL 35 (L) 02/26/2024    HDL 38 (L) 11/27/2023    HDL 28 (L) 06/30/2023     Lab Results   Component Value Date    TRIG 194 (H) 02/26/2024    TRIG 351 (H) 11/27/2023    TRIG 872 (H) 06/30/2023     Lab Results   Component Value Date    LDL 54 02/26/2024    LDL 91 11/27/2023    LDL  06/30/2023      Comment:         LDL cholesterol not calculated. Triglyceride levels  greater than 400 mg/dL invalidate calculated LDL results.     Reference range: <100     Desirable range <100 mg/dL for primary prevention;    <70 mg/dL for patients with CHD or diabetic patients   with > or = 2 CHD risk factors.     LDL-C is now calculated using the Nay   calculation, which is a validated novel method providing   better accuracy than the Friedewald equation in the   estimation of LDL-C.   Kevyn KOO et al. FRANCISCO JAVIER. 2013;310(19): 0386-7924   (http://education.[email or web address]/faq/NZS426)       Lab Results   Component Value Date    AST 37 (H) 11/20/2024    AST 22 07/11/2024    AST  21 02/26/2024     Lab Results   Component Value Date    ALT 24 11/20/2024    ALT 27 07/11/2024    ALT 21 02/26/2024     Lab Results   Component Value Date    GLU 72 12/02/2024    GLU 92 11/23/2024    GLU 91 11/21/2024       Current Outpatient Medications   Medication Sig Dispense Refill    ENTRESTO 24-26 MG Oral Tab Take 1 tablet by mouth 2 (two) times daily.      FLECAINIDE 100 MG Oral Tab Take 1 tablet (100 mg total) by mouth 2 (two) times daily. 180 tablet 0    POTASSIUM CHLORIDE 20 MEQ Oral Tab CR TAKE 1 TABLET BY MOUTH IN  THE MORNING AND 1 TABLET BY MOUTH IN THE EVENING. 180 tablet 0    XARELTO 20 MG Oral Tab Take 1 tablet (20 mg total) by mouth daily 90 tablet 0    ALBUTEROL 108 (90 Base) MCG/ACT Inhalation Aero Soln Inhale 2 puffs into the lungs every 6 (six) hours as needed for Shortness of Breath 54 g 0    Budesonide-Formoterol Fumarate 160-4.5 MCG/ACT Inhalation Aerosol INHALE TWO PUFFS BY MOUTH TWICE DAILY 30.6 g 0    FENOFIBRATE 145 MG Oral Tab TAKE 1 TABLET BY MOUTH ONE TIME DAILY (Patient not taking: Reported on 12/16/2024) 90 tablet 0    ROSUVASTATIN 40 MG Oral Tab Take 1 tablet (40 mg total) by mouth nightly (Patient not taking: Reported on 12/16/2024) 90 tablet 0    dapagliflozin (FARXIGA) 10 MG Oral Tab Farxiga 10 mg tablet, [RxNorm: 9980564]      furosemide 20 MG Oral Tab Take 1 tablet (20 mg total) by mouth daily. 90 tablet 3    hydrALAZINE 50 MG Oral Tab Take 1 tablet (50 mg total) by mouth 3 (three) times daily.      omeprazole 20 MG Oral Capsule Delayed Release Take one capsule (20 mg total) by mouth once daily, 30 minutes prior to breakfast 30 capsule 11    tamsulosin 0.4 MG Oral Cap Take 1 capsule (0.4 mg total) by mouth every evening. 90 capsule 3    METOPROLOL SUCCINATE  MG Oral Tablet 24 Hr TAKE ONE TABLET BY MOUTH TWICE DAILY 180 tablet 0    cloNIDine 0.1 MG Oral Tab Take 1 tablet (0.1 mg total) by mouth 2 (two) times daily.      Glucose Blood (ONETOUCH VERIO) In Vitro Strip Check  blood sugars once daily. 100 strip 3    OneTouch Delica Lancets 30G Does not apply Misc 1 Lancet by Finger stick route daily. 100 each 3    AZELASTINE  MCG/SPRAY Nasal Solution INHALE 1 SPRAY INTO EACH NOSTRIL 2 TIMES DAILY 30 mL 2    vitamin E 1000 UNITS Oral Cap Take 1 capsule (1,000 Units total) by mouth daily.      magnesium oxide 400 MG Oral Tab Take 1 tablet (400 mg total) by mouth daily.      cholecalciferol 125 MCG (5000 UT) Oral Tab Take 1 tablet (5,000 Units total) by mouth daily.      EPLERENONE 50 MG Oral Tab TAKE 1 TABLET BY MOUTH ONE TIME DAILY 90 tablet 0      Past Medical History:    Abdominal pain    above the belly button    Acute cystitis with hematuria    Acute diastolic (congestive) heart failure (HCC)    Anxiety disorder    Arthritis    Back pain    Bloating    most days    Blood in the stool    Blurred vision    Brachial neuritis or radiculitis NOS    C4,C5, nerve roots    Calculus of kidney    Last year do to medication i was taking    Chronic atrial fibrillation (HCC)    post op from [facility]    Chronic cough    medicine causes symptoms    Community acquired pneumonia of left lower lobe of lung    Decorative tattoo    Depression    Diabetes (HCC)    Diarrhea, unspecified    I believe from my medication    Dizziness    Fatigue    daily    Feeling lonely    Flatulence/gas pain/belching    Food intolerance    Headache disorder    migraines    Heart palpitations    Heartburn    at least one a week    Hemorrhoids    High blood pressure    High cholesterol    History of depression    Hyperlipidemia    Indigestion    not normal    Kidney stone    Leg swelling    Loss of appetite    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, or unspecified(404.00)    Migraines    Nausea    on and off    Night sweats    Obesity, unspecified    Obsessive-compulsive disorders    ISAAC (obstructive sleep apnea)    AHI-16, O2  alyce-71%/CPAP-10cwp    Other specified cardiac dysrhythmias(427.89)    Other testicular hypofunction    Pain in joints    Pneumonia, organism unspecified(486)    Problems with swallowing    Shortness of breath    with simple movements    Sleep disturbance    Stress    Suicidal thoughts    Thoracic or lumbosacral neuritis or radiculitis, unspecified    L3,L4    Thrombocytopenia (HCC)    Uncomfortable fullness after meals    Unspecified essential hypertension    Unspecified sleep apnea    wears CPAP    Visual impairment    Vomiting    3 times after heartburn    Wears glasses    Weight gain    Weight loss      Past Surgical History:   Procedure Laterality Date    Angiogram  7/15/14    no cad noted    Endovas repair, infrarenl abdom aortic aneurysm/dissect      Epidurography, radiological s & i  4/18/2012    Procedure: CERVICAL EPIDURAL;  Surgeon: Sekou Solorzano MD;  Location: [facility]    Fluor gid & loclzj ndl/cath spi dx/ther njx  5/11/2012    Procedure: CERVICAL EPIDURAL;  Surgeon: Edil Alcocer MD;  Location: [facility]    Injection, w/wo contrast, dx/therapeutic substance, epidural/subarachnoid; cervical/thoracic  4/18/2012    Procedure: CERVICAL EPIDURAL;  Surgeon: Sekou Solorzano MD;  Location: [facility]    Injection, w/wo contrast, dx/therapeutic substance, epidural/subarachnoid; cervical/thoracic  5/11/2012    Procedure: CERVICAL EPIDURAL;  Surgeon: Edil Alcocer MD;  Location: [facility]    M-sedaj by  phys perfrmg svc 5+ yr  4/18/2012    Procedure: CERVICAL EPIDURAL;  Surgeon: Sekou Solorzano MD;  Location: [facility]    M-sedaj by  phys perfrmg svc 5+ yr  5/11/2012    Procedure: CERVICAL EPIDURAL;  Surgeon: Edil Alcocer MD;  Location: [facility]    Symp aaa urgent repair  10/14/2014    [place]; ascending aorta    Tonsillectomy        Social History:    Social History      Socioeconomic History    Marital status:    Tobacco Use    Smoking status: Former     Current packs/day: 0.00     Average packs/day: 0.5 packs/day for 28.0 years (14.0 ttl pk-yrs)     Types: Cigarettes     Start date: 1976     Quit date: 2004     Years since quittin.4    Smokeless tobacco: Never   Vaping Use    Vaping status: Never Used   Substance and Sexual Activity    Alcohol use: Not Currently     Alcohol/week: 1.0 standard drink of alcohol     Types: 1 Glasses of wine per week     Comment: Wine club once a month    Drug use: No   Other Topics Concern    Caffeine Concern Yes     Comment: 1 cup of coffee daily    Exercise Yes     Comment: walking     Social Drivers of Health     Financial Resource Strain: Low Risk  (2021)    [facility]    Overall Financial Resource Strain (CARDI)     Difficulty of Paying Living Expenses: Not hard at all   Food Insecurity: No Food Insecurity (2024)    Food Insecurity     Food Insecurity: Never true   Transportation Needs: No Transportation Needs (2024)    Transportation Needs     Lack of Transportation: No   Housing Stability: Low Risk  (2024)    Housing Stability     Housing Instability: No         REVIEW OF SYSTEMS:   GENERAL HEALTH: feels well otherwise  SKIN: denies any unusual skin lesions or rashes  RESPIRATORY: denies shortness of breath with exertion  CARDIOVASCULAR: denies chest pain on exertion  GI: denies abdominal pain and denies heartburn  NEURO: denies headaches    EXAM:   BP (!) 162/72   Pulse 63   Temp 97 °F (36.1 °C) (Temporal)   Resp 18   Ht 5' 11\" (1.803 m)   Wt 274 lb (124.3 kg)   SpO2 97%   BMI 38.22 kg/m²   GENERAL: well developed, well nourished,in no apparent distress  SKIN: no rashes,no suspicious lesions  HEENT: atraumatic, normocephalic,ears and throat are clear  NECK: supple,no adenopathy,no bruits  LUNGS: clear to auscultation  CARDIO: RRR without  murmur  GI: good BS's,no masses, HSM or tenderness  EXTREMITIES: no cyanosis, clubbing or edema    ASSESSMENT AND PLAN:   1. Controlled type 2 diabetes mellitus with complication, without long-term current use of insulin (HCC) (Primary)  Doing well, tolerating farxiga well. A1c is 5.4%. ok to stop metformin and monitor blood sugar.    -     Hemoglobin A1C; Future; Expected date: 02/20/2025  -     CBC With Differential With Platelet; Future; Expected date: 02/20/2025  -     Comp Metabolic Panel (14); Future; Expected date: 02/20/2025  -     Lipid Panel; Future; Expected date: 02/20/2025  -     Urinalysis, Routine; Future; Expected date: 02/20/2025  -     Microalb/Creat Ratio, Random Urine; Future; Expected date: 02/20/2025  -     TSH W Reflex To Free T4; Future; Expected date: 02/20/2025  2. 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      The patient indicates understanding of these issues and agrees to the plan.  Return in about 2 months (around 2/21/2025) for med check, follow-up.

## 2025-01-25 NOTE — PROGRESS NOTES
HPI:   Rivera Bush is a 59 year old male who presents for recheck of his diabetes.   Patient’s FBS have been controlled.   Last visit with ophthalmologist was 12/2024, exam was normal.    Pt has been checking his feet on a regular basis. Pt denies any tingling of the feet.   Pt denies any issues with depression.     Pt states on 1/11/25 had a sudden episode of R sided vision loss and R sided headache (temple area shooting to the R eye), followed by b/l burry vision, which lasted in total about 5 minutes, then resolved. He was bowling with family at the time but was not exerting himself, states he was standing still when symptoms began. Reports his blood sugar and BP were normal that day, he had not been drinking and was not stressed   Pt states this episode is prior to past rare episodes. He had ER visit for this in 2020, CT brain negative, MRI brain outpatient after that was also normal.     Now c/o pressure in the R eye 'on a regular basis', but denies headache.  BP's at home have been about at baseline, 150/90. He is supposed to increase his entresto dose to 2 tabs BID and sees cardiology next week for med check    Wt Readings from Last 6 Encounters:   01/25/25 271 lb (122.9 kg)   12/16/24 274 lb (124.3 kg)   12/02/24 272 lb (123.4 kg)   12/23/24 268 lb (121.6 kg)   09/28/24 277 lb (125.6 kg)   09/14/24 276 lb (125.2 kg)     Body mass index is 37.8 kg/m².     No components found for: \"HGBA1C\"  HEMOGLOBIN A1c (% of total Hgb)   Date Value   01/20/2025 5.4   07/11/2024 5.6   02/26/2024 5.5     HgbA1C (%)   Date Value   11/20/2024 5.4     CHOLESTEROL, TOTAL (mg/dL)   Date Value   01/20/2025 140   02/26/2024 116   11/27/2023 176     HDL CHOLESTEROL (mg/dL)   Date Value   01/20/2025 41   02/26/2024 35 (L)   11/27/2023 38 (L)     LDL-CHOLESTEROL (mg/dL (calc))   Date Value   01/20/2025 75   02/26/2024 54   11/27/2023 91     AST (U/L)   Date Value   01/20/2025 21   11/20/2024 37 (H)   07/11/2024 22   02/26/2024  21     ALT (U/L)   Date Value   01/20/2025 23   11/20/2024 24   07/11/2024 27   02/26/2024 21      Malb/Cre Calc   Date Value Ref Range Status   02/15/2019 34.5 (H) <=30.0 ug/mg Final   11/07/2018 48.7 (H) <=30.0 ug/mg Final   05/18/2018 44.4 (H) <=30.0 ug/mg Final       Current Outpatient Medications   Medication Sig Dispense Refill    diazePAM 5 MG Oral Tab Take 1 tablet 30 minutes prior to test. Repeat at time of test if needed. 2 tablet 0    ENTRESTO 24-26 MG Oral Tab Take 1 tablet by mouth 2 (two) times daily.      FLECAINIDE 100 MG Oral Tab Take 1 tablet (100 mg total) by mouth 2 (two) times daily. 180 tablet 0    POTASSIUM CHLORIDE 20 MEQ Oral Tab CR TAKE 1 TABLET BY MOUTH IN  THE MORNING AND 1 TABLET BY MOUTH IN THE EVENING. 180 tablet 0    XARELTO 20 MG Oral Tab Take 1 tablet (20 mg total) by mouth daily 90 tablet 0    ALBUTEROL 108 (90 Base) MCG/ACT Inhalation Aero Soln Inhale 2 puffs into the lungs every 6 (six) hours as needed for Shortness of Breath 54 g 0    Budesonide-Formoterol Fumarate 160-4.5 MCG/ACT Inhalation Aerosol INHALE TWO PUFFS BY MOUTH TWICE DAILY 30.6 g 0    EPLERENONE 50 MG Oral Tab TAKE 1 TABLET BY MOUTH ONE TIME DAILY 90 tablet 0    FENOFIBRATE 145 MG Oral Tab TAKE 1 TABLET BY MOUTH ONE TIME DAILY 90 tablet 0    ROSUVASTATIN 40 MG Oral Tab Take 1 tablet (40 mg total) by mouth nightly 90 tablet 0    dapagliflozin (FARXIGA) 10 MG Oral Tab Farxiga 10 mg tablet, [RxNorm: 7679099]      furosemide 20 MG Oral Tab Take 1 tablet (20 mg total) by mouth daily. 90 tablet 3    hydrALAZINE 50 MG Oral Tab Take 1 tablet (50 mg total) by mouth 3 (three) times daily.      omeprazole 20 MG Oral Capsule Delayed Release Take one capsule (20 mg total) by mouth once daily, 30 minutes prior to breakfast 30 capsule 11    tamsulosin 0.4 MG Oral Cap Take 1 capsule (0.4 mg total) by mouth every evening. 90 capsule 3    METOPROLOL SUCCINATE  MG Oral Tablet 24 Hr TAKE ONE TABLET BY MOUTH TWICE DAILY 180  tablet 0    cloNIDine 0.1 MG Oral Tab Take 1 tablet (0.1 mg total) by mouth 2 (two) times daily.      Glucose Blood (ONETOUCH VERIO) In Vitro Strip Check blood sugars once daily. 100 strip 3    OneTouch Delica Lancets 30G Does not apply Misc 1 Lancet by Finger stick route daily. 100 each 3    AZELASTINE  MCG/SPRAY Nasal Solution INHALE 1 SPRAY INTO EACH NOSTRIL 2 TIMES DAILY 30 mL 2    vitamin E 1000 UNITS Oral Cap Take 1 capsule (1,000 Units total) by mouth daily.      magnesium oxide 400 MG Oral Tab Take 1 tablet (400 mg total) by mouth daily.      cholecalciferol 125 MCG (5000 UT) Oral Tab Take 1 tablet (5,000 Units total) by mouth daily.        Past Medical History:    Abdominal pain    above the belly button    Acute cystitis with hematuria    Acute diastolic (congestive) heart failure (HCC)    Anxiety disorder    Arthritis    Back pain    Bloating    most days    Blood in the stool    Blurred vision    Brachial neuritis or radiculitis NOS    C4,C5, nerve roots    Calculus of kidney    Last year do to medication i was taking    Chronic atrial fibrillation (HCC)    post op from [facility]    Chronic cough    medicine causes symptoms    Community acquired pneumonia of left lower lobe of lung    Decorative tattoo    Depression    Diabetes (HCC)    Diarrhea, unspecified    I believe from my medication    Dizziness    Fatigue    daily    Feeling lonely    Flatulence/gas pain/belching    Food intolerance    Headache disorder    migraines    Heart palpitations    Heartburn    at least one a week    Hemorrhoids    High blood pressure    High cholesterol    History of depression    Hyperlipidemia    Indigestion    not normal    Kidney stone    Leg swelling    Loss of appetite    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, or unspecified(404.00)    Migraines    Nausea    on and off    Night sweats    Obesity, unspecified     Obsessive-compulsive disorders    ISAAC (obstructive sleep apnea)    AHI-16, O2 alyce-71%/CPAP-10cwp    Other specified cardiac dysrhythmias(427.89)    Other testicular hypofunction    Pain in joints    Pneumonia, organism unspecified(486)    Problems with swallowing    Shortness of breath    with simple movements    Sleep disturbance    Stress    Suicidal thoughts    Thoracic or lumbosacral neuritis or radiculitis, unspecified    L3,L4    Thrombocytopenia (HCC)    Uncomfortable fullness after meals    Unspecified essential hypertension    Unspecified sleep apnea    wears CPAP    Visual impairment    Vomiting    3 times after heartburn    Wears glasses    Weight gain    Weight loss      Past Surgical History:   Procedure Laterality Date    Angiogram  7/15/14    no cad noted    Endovas repair, infrarenl abdom aortic aneurysm/dissect      Epidurography, radiological s & i  4/18/2012    Procedure: CERVICAL EPIDURAL;  Surgeon: Sekou Solorzano MD;  Location: [facility]    Fluor gid & loclzj ndl/cath spi dx/ther njx  5/11/2012    Procedure: CERVICAL EPIDURAL;  Surgeon: Edil Alcocer MD;  Location: [facility]    Injection, w/wo contrast, dx/therapeutic substance, epidural/subarachnoid; cervical/thoracic  4/18/2012    Procedure: CERVICAL EPIDURAL;  Surgeon: Sekou Solorzano MD;  Location: [facility]    Injection, w/wo contrast, dx/therapeutic substance, epidural/subarachnoid; cervical/thoracic  5/11/2012    Procedure: CERVICAL EPIDURAL;  Surgeon: Edil Alcocer MD;  Location: [facility]    M-sedaj by  phys perfrmg svc 5+ yr  4/18/2012    Procedure: CERVICAL EPIDURAL;  Surgeon: Sekou Solorzano MD;  Location: [facility]    M-sedaj by  phys perfrmg svc 5+ yr  5/11/2012    Procedure: CERVICAL EPIDURAL;  Surgeon: Edil Alcocer MD;  Location: [facility]    Symp aaa urgent repair  10/14/2014     [place]; ascending aorta    Tonsillectomy        Social History:   Social History     Socioeconomic History    Marital status:    Tobacco Use    Smoking status: Former     Current packs/day: 0.00     Average packs/day: 0.5 packs/day for 28.0 years (14.0 ttl pk-yrs)     Types: Cigarettes     Start date: 1976     Quit date: 2004     Years since quittin.5    Smokeless tobacco: Never   Vaping Use    Vaping status: Never Used   Substance and Sexual Activity    Alcohol use: Not Currently     Alcohol/week: 1.0 standard drink of alcohol     Types: 1 Glasses of wine per week     Comment: Wine club once a month    Drug use: No   Other Topics Concern    Caffeine Concern Yes     Comment: 1 cup of coffee daily    Exercise Yes     Comment: walking     Social Drivers of Health     Financial Resource Strain: Low Risk  (2021)    [facility]    Overall Financial Resource Strain ([facility])     Difficulty of Paying Living Expenses: Not hard at all   Food Insecurity: No Food Insecurity (2024)    Food Insecurity     Food Insecurity: Never true   Transportation Needs: No Transportation Needs (2024)    Transportation Needs     Lack of Transportation: No   Housing Stability: Low Risk  (2024)    Housing Stability     Housing Instability: No          REVIEW OF SYSTEMS:   GENERAL HEALTH: feels well otherwise  SKIN: denies any unusual skin lesions or rashes  RESPIRATORY: denies shortness of breath with exertion  CARDIOVASCULAR: denies chest pain on exertion  GI: denies abdominal pain and denies heartburn  NEURO: denies headaches    EXAM:   /82   Pulse 80   Temp 98.2 °F (36.8 °C)   Resp 16   Ht 5' 11\" (1.803 m)   Wt 271 lb (122.9 kg)   SpO2 98%   BMI 37.80 kg/m²   GENERAL: well developed, well nourished,in no apparent distress  SKIN: no rashes,no suspicious lesions  HEENT: PERRLA, EOMI, funduscopic exam + red reflex no visible bleed or other  abnormality.  NECK: supple,no adenopathy,no bruits  LUNGS: clear to auscultation  CARDIO: RRR without murmur   EXTREMITIES: no cyanosis, clubbing or edema  NEURO: alert and oriented  Bilateral barefoot skin diabetic exam is normal, visualized feet and the appearance is normal.  Bilateral monofilament/sensation of both feet is normal.  Pulsation pedal pulse exam of both lower legs/feet is normal as well.        ASSESSMENT AND PLAN:   Rivera Bush is a 59 year old male who presents for a recheck of his diabetes.   Diabetic control is good.  1. Sudden onset unilateral headache (Primary)  -     MRI BRAIN MRA BRAIN+MRA NECK (ALL W+WO) (CPT=70553/32952/89050); Future; Expected date: 01/25/2025  2. Vision loss of right eye  -     MRI BRAIN MRA BRAIN+MRA NECK (ALL W+WO) (CPT=70553/71969/40218); Future; Expected date: 01/25/2025    Single episode on 1/11 that completely resolved. Similar prior episodes evaluated in the past with CT brain and MRI brain, attributed to migraines. Recommend checking MRA brain/neck to r/o aneurysm. Discussed continuing to focus on good BP control for cardiovascular risk reduction. ER if symptoms return.  3. Situational anxiety  Ok to take diazepam prior to MRA   -     diazePAM; Take 1 tablet 30 minutes prior to test. Repeat at time of test if needed.  Dispense: 2 tablet; Refill: 0       The patient indicates understanding of these issues and agrees to the plan. 2/22/2025 Return for appointment as scheduled, or sooner as needed.

## 2025-01-25 NOTE — PATIENT INSTRUCTIONS
Zyrtec at night  Flonase every day and astelin antihistamine nasal spray   Afrin decongestant nasal spray for 1-2 days only

## 2025-02-22 NOTE — PROGRESS NOTES
Rivera Bush is a 59 year old male.  HPI:   Pt went for MRI/MRA yesterday, went into the test ok but then had a panic attack. Was unable to complete the scan.  Requesting order with sedation.     Sinus pressure ongoing, zyrtec or claritin help but make him fatigued; using saline spray and flonase as well. Has difficulty breathing through the nose.     Current Outpatient Medications   Medication Sig Dispense Refill    diazePAM 5 MG Oral Tab Take 1 tablet 30 minutes prior to test. Repeat at time of test if needed. 2 tablet 0    ENTRESTO 24-26 MG Oral Tab Take 1 tablet by mouth 2 (two) times daily.      FLECAINIDE 100 MG Oral Tab Take 1 tablet (100 mg total) by mouth 2 (two) times daily. 180 tablet 0    POTASSIUM CHLORIDE 20 MEQ Oral Tab CR TAKE 1 TABLET BY MOUTH IN  THE MORNING AND 1 TABLET BY MOUTH IN THE EVENING. 180 tablet 0    XARELTO 20 MG Oral Tab Take 1 tablet (20 mg total) by mouth daily 90 tablet 0    ALBUTEROL 108 (90 Base) MCG/ACT Inhalation Aero Soln Inhale 2 puffs into the lungs every 6 (six) hours as needed for Shortness of Breath 54 g 0    Budesonide-Formoterol Fumarate 160-4.5 MCG/ACT Inhalation Aerosol INHALE TWO PUFFS BY MOUTH TWICE DAILY 30.6 g 0    EPLERENONE 50 MG Oral Tab TAKE 1 TABLET BY MOUTH ONE TIME DAILY 90 tablet 0    FENOFIBRATE 145 MG Oral Tab TAKE 1 TABLET BY MOUTH ONE TIME DAILY 90 tablet 0    ROSUVASTATIN 40 MG Oral Tab Take 1 tablet (40 mg total) by mouth nightly 90 tablet 0    dapagliflozin (FARXIGA) 10 MG Oral Tab Farxiga 10 mg tablet, [RxNorm: 6355960]      furosemide 20 MG Oral Tab Take 1 tablet (20 mg total) by mouth daily. 90 tablet 3    hydrALAZINE 50 MG Oral Tab Take 1 tablet (50 mg total) by mouth 3 (three) times daily.      omeprazole 20 MG Oral Capsule Delayed Release Take one capsule (20 mg total) by mouth once daily, 30 minutes prior to breakfast 30 capsule 11    tamsulosin 0.4 MG Oral Cap Take 1 capsule (0.4 mg total) by mouth every evening. 90 capsule 3     METOPROLOL SUCCINATE  MG Oral Tablet 24 Hr TAKE ONE TABLET BY MOUTH TWICE DAILY 180 tablet 0    cloNIDine 0.1 MG Oral Tab Take 1 tablet (0.1 mg total) by mouth 2 (two) times daily.      Glucose Blood (ONETOUCH VERIO) In Vitro Strip Check blood sugars once daily. 100 strip 3    OneTouch Delica Lancets 30G Does not apply Misc 1 Lancet by Finger stick route daily. 100 each 3    AZELASTINE  MCG/SPRAY Nasal Solution INHALE 1 SPRAY INTO EACH NOSTRIL 2 TIMES DAILY 30 mL 2    vitamin E 1000 UNITS Oral Cap Take 1 capsule (1,000 Units total) by mouth daily.      magnesium oxide 400 MG Oral Tab Take 1 tablet (400 mg total) by mouth daily.      cholecalciferol 125 MCG (5000 UT) Oral Tab Take 1 tablet (5,000 Units total) by mouth daily.        Past Medical History:    Abdominal pain    above the belly button    Acute cystitis with hematuria    Acute diastolic (congestive) heart failure (HCC)    Anxiety disorder    Arthritis    Back pain    Bloating    most days    Blood in the stool    Blurred vision    Brachial neuritis or radiculitis NOS    C4,C5, nerve roots    Calculus of kidney    Last year do to medication i was taking    Chronic atrial fibrillation (HCC)    post op from [facility]    Chronic cough    medicine causes symptoms    Community acquired pneumonia of left lower lobe of lung    Decorative tattoo    Depression    Diabetes (HCC)    Diarrhea, unspecified    I believe from my medication    Dizziness    Fatigue    daily    Feeling lonely    Flatulence/gas pain/belching    Food intolerance    Headache disorder    migraines    Heart palpitations    Heartburn    at least one a week    Hemorrhoids    High blood pressure    High cholesterol    History of depression    Hyperlipidemia    Indigestion    not normal    Kidney stone    Leg swelling    Loss of appetite    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, or unspecified(404.00)     Migraines    Nausea    on and off    Night sweats    Obesity, unspecified    Obsessive-compulsive disorders    ISAAC (obstructive sleep apnea)    AHI-16, O2 alyce-71%/CPAP-10cwp    Other specified cardiac dysrhythmias(427.89)    Other testicular hypofunction    Pain in joints    Pneumonia, organism unspecified(486)    Problems with swallowing    Shortness of breath    with simple movements    Sleep disturbance    Stress    Suicidal thoughts    Thoracic or lumbosacral neuritis or radiculitis, unspecified    L3,L4    Thrombocytopenia    Uncomfortable fullness after meals    Unspecified essential hypertension    Unspecified sleep apnea    wears CPAP    Visual impairment    Vomiting    3 times after heartburn    Wears glasses    Weight gain    Weight loss      Social History:  Social History     Socioeconomic History    Marital status:    Tobacco Use    Smoking status: Former     Current packs/day: 0.00     Average packs/day: 0.5 packs/day for 28.0 years (14.0 ttl pk-yrs)     Types: Cigarettes     Start date: 1976     Quit date: 2004     Years since quittin.6    Smokeless tobacco: Never   Vaping Use    Vaping status: Never Used   Substance and Sexual Activity    Alcohol use: Not Currently     Alcohol/week: 1.0 standard drink of alcohol     Types: 1 Glasses of wine per week     Comment: Wine club once a month    Drug use: No   Other Topics Concern    Caffeine Concern Yes     Comment: 1 cup of coffee daily    Exercise Yes     Comment: walking     Social Drivers of Health     Food Insecurity: No Food Insecurity (2024)    Food Insecurity     Food Insecurity: Never true   Transportation Needs: No Transportation Needs (2024)    Transportation Needs     Lack of Transportation: No   Housing Stability: Low Risk  (2024)    Housing Stability     Housing Instability: No        REVIEW OF SYSTEMS:   GENERAL HEALTH: feels well otherwise. Denies fever, chills, unintentional weight change  SKIN:  denies any unusual skin lesions or rashes  RESPIRATORY: denies shortness of breath with exertion, denies cough or wheezing  CARDIOVASCULAR: denies chest pain or palpitations, denies leg swelling  GI: denies abdominal pain and denies heartburn. Denies nausea, vomiting, diarrhea, constipation  NEURO: denies headaches, dizziness, weakness, syncope    EXAM:   /80   Pulse 78   Temp 98.1 °F (36.7 °C)   Resp 16   Ht 5' 11\" (1.803 m)   Wt 273 lb (123.8 kg)   SpO2 99%   BMI 38.08 kg/m²   GENERAL: well developed, well nourished,in no apparent distress  SKIN: no rashes,no suspicious lesions, warm and dry  HEENT: atraumatic, normocephalic,ears and throat are clear  NECK: supple,no adenopathy, no thyromegaly  LUNGS: clear to auscultation b/l no W/R/R  CARDIO: RRR without murmur  GI: good BS's,no masses, HSM, distension or tenderness  EXTREMITIES: no cyanosis, clubbing or edema  MUSCULOSKELETAL: FROM, no joint swelling or bony tenderness  NEURO: a/ox3, no focal deficits  PSYCH: mood and affect normal    ASSESSMENT AND PLAN:   1. Chronic maxillary sinusitis (Primary)  -     ENT Referral - In Network  2. Sudden onset unilateral headache  -     MRI BRAIN MRA BRAIN MRA NECK (CPT=70551/47536/01181); Future; Expected date: 02/23/2025  3. Vision loss of right eye  -     MRI BRAIN MRA BRAIN MRA NECK (CPT=70551/82531/32417); Future; Expected date: 02/23/2025   Single episode on 1/11 that completely resolved. Similar prior episodes evaluated in the past with CT brain and MRI brain, attributed to migraines. Recommend checking MRA brain/neck to r/o aneurysm. Discussed continuing to focus on good BP control for cardiovascular risk reduction. ER if symptoms return.   -reordered MRI/MRA with sedation, at patient's request. Patient attempted the MRI and was too claustrophobic to complete.     The patient indicates understanding of these issues and agrees to the plan.  Return if symptoms worsen or fail to improve.

## 2025-02-28 NOTE — TELEPHONE ENCOUNTER
Budesonide 160-4.5  Last time medication was refilled 12/3/24  Last office visit  2/22/25  Next office visit due/scheduled No future appointments.  Passed protocol, Medication sent.    Rosuvastatin  Last time medication was refilled 12/3/24  Last office visit  2/22/25  Next office visit due/scheduled No future appointments  Passed protocol, Medication sent.    Potassium  Last time medication was refilled 12/4/24  Last office visit  2/22/25  Next office visit due/scheduled No future appointments  Medication not on protocol.

## 2025-02-28 NOTE — TELEPHONE ENCOUNTER
Last time medication was refilled 12/03/2024  Last office visit  02/22/2025  Next office visit due/scheduled No future appointments.        Medication not on protocol.

## 2025-03-02 NOTE — TELEPHONE ENCOUNTER
The original prescription was discontinued on 12/16/2024 by Tawanna Gonzalez for the following reason: Physician directed. Renewing this prescription may not be appropriate.

## 2025-04-18 NOTE — DISCHARGE INSTRUCTIONS
Home Care Instructions  [facility] Department of Radiology        Have someone drive you home after your procedure.  You may not drive yourself home    AFTER YOU ARRIVE HOME    Take things easy for the rest of the day after your procedure.    DO drink plenty of fluids  Back to regular medication schedule  DO resume your regular diet  DO NOT drive or operative machinery for at least 24 hours  DO NOT smoke for at least 24 hours

## 2025-04-18 NOTE — ANESTHESIA POSTPROCEDURE EVALUATION
[facility]    Rivera Bush Patient Status:  Outpatient   Age/Gender 60 year old male MRN [id number]   Location [facility] MRI Attending Nathalie Giles PA-C   Hosp Day # 0 PCP Won Davis MD       Anesthesia Post-op Note        Procedure Summary       Date: 04/18/25 Room / Location: [facility] Perioperative Service; [facility] MRI; [facility] X-ray    Anesthesia Start: 0815 Anesthesia Stop: 0958    Procedure: MRI BRAIN MRA BRAIN+MRA NECK (ALL W+WO) (CPT=70553/05866/40218) Diagnosis:       Sudden onset unilateral headache      Vision loss of right eye    Scheduled Providers: Darrius Botello MD Anesthesiologist: Darrius Botello MD    Anesthesia Type: general ASA Status: 3            Anesthesia Type: general    Vitals Value Taken Time   /62 04/18/25 09:58   Temp 97.6 °F (36.4 °C) 04/18/25 09:58   Pulse 66 04/18/25 09:58   Resp 16 04/18/25 09:58   SpO2 95 % 04/18/25 09:58           Patient Location: PACU    Anesthesia Type: general    Airway Patency: patent and extubated    Postop Pain Control: adequate    Mental Status: mildly sedated but able to meaningfully participate in the post-anesthesia evaluation    Nausea/Vomiting: none    Cardiopulmonary/Hydration status: stable euvolemic    Complications: no apparent anesthesia related complications    Postop vital signs: stable    Dental Exam: Unchanged from Preop    Patient to be discharged from PACU when criteria met.

## 2025-04-18 NOTE — ANESTHESIA PREPROCEDURE EVALUATION
PRE-OP EVALUATION    Patient Name: Rivera Bush    Admit Diagnosis: Sudden onset unilateral headache [R51.9]  Vision loss of right eye [H54.61]    Pre-op Diagnosis: * No surgery found *        Anesthesia Procedure: MRI BRAIN MRA BRAIN+MRA NECK (ALL W+WO) (CPT=70553/30000/66859)    * Surgery not found *    Pre-op vitals reviewed.  Temp: 97.6 °F (36.4 °C)  Pulse: 62  Resp: 18  BP: 170/94  SpO2: 96 %  Body mass index is 38.08 kg/m².    Current medications reviewed.  Hospital Medications:  Current Medications[1]    Outpatient Medications:   Prescriptions Prior to Admission[2]    Allergies: Jardiance [empagliflozin]      Anesthesia Evaluation    Patient summary reviewed.    Anesthetic Complications           GI/Hepatic/Renal                                 Cardiovascular                (+) obesity  (+) hypertension     (+) CAD                (+) CHF                Endo/Other      (+) diabetes                            Pulmonary      (+) asthma         (+) shortness of breath     (+) sleep apnea       Neuro/Psych      (+) depression           (+) neuromuscular disease                     Past Surgical History[3]  Social Hx on file[4]  History   Drug Use No     Available pre-op labs reviewed.  Lab Results   Component Value Date    WBC 5.9 01/20/2025    RBC 5.20 01/20/2025    HGB 15.3 01/20/2025    HCT 48.4 01/20/2025    MCV 93.1 01/20/2025    MCH 29.4 01/20/2025    MCHC 31.6 (L) 01/20/2025    RDW 12.8 01/20/2025     01/20/2025     Lab Results   Component Value Date     04/18/2025     01/20/2025    K 4.3 04/18/2025    K 4.4 01/20/2025     04/18/2025     01/20/2025    CO2 27.0 04/18/2025    CO2 30 01/20/2025    BUN 17 01/20/2025    CREATSERUM 1.44 (H) 01/20/2025    GLU 95 01/20/2025    CA 9.3 01/20/2025            Airway      Mallampati: III  Mouth opening: <3 FB  TM distance: < 4 cm  Neck ROM: limited Cardiovascular    Cardiovascular exam normal.  Rhythm: regular  Rate: normal      Dental             Pulmonary    Pulmonary exam normal.                 Other findings              ASA: 3   Plan: general  NPO status verified and patient meets guidelines.          Plan/risks discussed with: patient                Present on Admission:  **None**             [1]    glucose (Dex4) 15 GM/59ML oral liquid 15 g  15 g Oral Q15 Min PRN    Or    glucose (Glutose) 40% oral gel 15 g  15 g Oral Q15 Min PRN    Or    glucose-vitamin C (Dex-4) chewable tab 4 tablet  4 tablet Oral Q15 Min PRN    Or    dextrose 50% injection 50 mL  50 mL Intravenous Q15 Min PRN    Or    glucose (Dex4) 15 GM/59ML oral liquid 30 g  30 g Oral Q15 Min PRN    Or    glucose (Glutose) 40% oral gel 30 g  30 g Oral Q15 Min PRN    Or    glucose-vitamin C (Dex-4) chewable tab 8 tablet  8 tablet Oral Q15 Min PRN    sodium chloride 0.9% infusion   Intravenous Continuous   [2] (Not in a hospital admission)  [3]   Past Surgical History:  Procedure Laterality Date    Angiogram  7/15/14    no cad noted    Endovas repair, infrarenl abdom aortic aneurysm/dissect      Epidurography, radiological s & i  4/18/2012    Procedure: CERVICAL EPIDURAL;  Surgeon: Sekou Solorzano MD;  Location: [facility]    Fluor gid & loclzj ndl/cath spi dx/ther njx  5/11/2012    Procedure: CERVICAL EPIDURAL;  Surgeon: Edil Alcocer MD;  Location: [facility]    Injection, w/wo contrast, dx/therapeutic substance, epidural/subarachnoid; cervical/thoracic  4/18/2012    Procedure: CERVICAL EPIDURAL;  Surgeon: Sekou Solorzano MD;  Location: [facility]    Injection, w/wo contrast, dx/therapeutic substance, epidural/subarachnoid; cervical/thoracic  5/11/2012    Procedure: CERVICAL EPIDURAL;  Surgeon: Edil Alcocer MD;  Location: [facility]    M-sedaj by  phys perfrmg svc 5+ yr  4/18/2012    Procedure: CERVICAL EPIDURAL;  Surgeon: Sekou Solorzano MD;  Location: [facility]    M-sedaj by jadyn phys perfrmg svc 5+ yr  2012    Procedure: CERVICAL EPIDURAL;  Surgeon: Eidl Alcocer MD;  Location: [facility]    Symp aaa urgent repair  10/14/2014    [place]; ascending aorta    Tonsillectomy     [4]   Social History  Socioeconomic History    Marital status:    Tobacco Use    Smoking status: Former     Current packs/day: 0.00     Average packs/day: 0.5 packs/day for 28.0 years (14.0 ttl pk-yrs)     Types: Cigarettes     Start date: 1976     Quit date: 2004     Years since quittin.7    Smokeless tobacco: Never   Vaping Use    Vaping status: Never Used   Substance and Sexual Activity    Alcohol use: Not Currently     Alcohol/week: 1.0 standard drink of alcohol     Types: 1 Glasses of wine per week     Comment: Wine club once a month    Drug use: No   Other Topics Concern    Caffeine Concern Yes     Comment: 1 cup of coffee daily    Exercise Yes     Comment: walking

## 2025-04-18 NOTE — ANESTHESIA PROCEDURE NOTES
Airway  Date/Time: 4/18/2025 8:19 AM  Reason: elective    Difficult airway    General Information and Staff   Patient location during procedure: OR  Anesthesiologist: Darrius Botello MD  Performed: anesthesiologist   Performed by: Darrius Botello MD  Authorized by: Darrius Botello MD        Indications and Patient Condition  Indications for airway management: anesthesia  Sedation level: deep      Preoxygenated: yesPatient position: sniffing    Mask difficulty assessment: 1 - vent by mask  Planned trial extubation    Final Airway Details    Final airway type: supraglottic airway      Successful airway: classic  Size: 4     Number of attempts at approach: 1  Ventilation between attempts: supraglottic airway

## 2025-04-30 NOTE — PROGRESS NOTES
Rivera Bush is a 60 year old male.  HPI:   Patient following upon MRI/MRA brain/neck on 4/18  1. There is a short segment severe stenosis or short segment occlusion at the distal V4 segment of the right vertebral artery just beyond the origin of the right PICA.  A CTA of the head is recommended for further assessment.     Pt was informed at the time, he thought he had appt with cardiologist next day but this was just an echo.   Past 2 weeks he c/o gradually worsening dizziness, balance issues, weakness, unsure if he passed out at home, blurred vision of the right eye, headaches.   Current Medications[1]   Past Medical History[2]   Social History:  Short Social Hx on File[3]     REVIEW OF SYSTEMS:   GENERAL HEALTH: not feeling himself. Denies fever, chills, unintentional weight change  SKIN: denies any unusual skin lesions or rashes  RESPIRATORY: denies shortness of breath with exertion, denies cough or wheezing  CARDIOVASCULAR: denies chest pain or palpitations, denies leg swelling  GI: denies abdominal pain and denies heartburn. Denies nausea, vomiting, diarrhea, constipation     EXAM:   /90   Pulse 62   Temp 97.8 °F (36.6 °C)   Resp (P) 18   Ht (P) 5' 11\" (1.803 m)   Wt 273 lb (123.8 kg)   SpO2 98%   BMI (P) 38.08 kg/m²   GENERAL: well developed, well nourished,in no apparent distress  SKIN: no rashes,no suspicious lesions, warm and dry  HEENT: atraumatic, normocephalic,ears and throat are clear. PERRLA, EOMI  NECK: supple,no adenopathy, no thyromegaly  LUNGS: clear to auscultation b/l no W/R/R  CARDIO: RRR without murmur   EXTREMITIES: no cyanosis, clubbing or edema   NEURO: a/ox3, no focal deficits  PSYCH: mood and affect normal    ASSESSMENT AND PLAN:   1. Stenosis of right vertebral artery (Primary)  -     Neurosurgery Referral - In Network  2. Vision loss of right eye  3. Transient loss of consciousness  4. Confusion  5. Dizziness     Recommend patient consult neurosurgery however due to  recent onset of worsening neurologic symptoms, recommend patient go to ER now to r/o acute stroke and determine if urgent intervention is needed for the vertebral artery stenosis.      The patient indicates understanding of these issues and agrees to the plan. He declines ambulance at this time.          [1]   Current Outpatient Medications   Medication Sig Dispense Refill    POTASSIUM CHLORIDE 20 MEQ Oral Tab CR TAKE 1 TABLET BY MOUTH IN  THE MORNING AND 1 TABLET BY MOUTH IN THE EVENING 180 tablet 0    BUDESONIDE-FORMOTEROL FUMARATE 160-4.5 MCG/ACT Inhalation Aerosol INHALE TWO PUFFS BY MOUTH TWICE DAILY 30.6 g 0    ROSUVASTATIN 40 MG Oral Tab Take 1 tablet (40 mg total) by mouth nightly 90 tablet 0    EPLERENONE 50 MG Oral Tab TAKE 1 TABLET BY MOUTH ONE TIME DAILY 90 tablet 0    diazePAM 5 MG Oral Tab Take 1 tablet 30 minutes prior to test. Repeat at time of test if needed. 2 tablet 0    ENTRESTO 24-26 MG Oral Tab Take 1 tablet by mouth 2 (two) times daily.      FLECAINIDE 100 MG Oral Tab Take 1 tablet (100 mg total) by mouth 2 (two) times daily. 180 tablet 0    XARELTO 20 MG Oral Tab Take 1 tablet (20 mg total) by mouth daily 90 tablet 0    ALBUTEROL 108 (90 Base) MCG/ACT Inhalation Aero Soln Inhale 2 puffs into the lungs every 6 (six) hours as needed for Shortness of Breath 54 g 0    FENOFIBRATE 145 MG Oral Tab TAKE 1 TABLET BY MOUTH ONE TIME DAILY 90 tablet 0    dapagliflozin (FARXIGA) 10 MG Oral Tab Farxiga 10 mg tablet, [RxNorm: 2096317]      furosemide 20 MG Oral Tab Take 1 tablet (20 mg total) by mouth daily. 90 tablet 3    hydrALAZINE 50 MG Oral Tab Take 1 tablet (50 mg total) by mouth 3 (three) times daily.      omeprazole 20 MG Oral Capsule Delayed Release Take one capsule (20 mg total) by mouth once daily, 30 minutes prior to breakfast 30 capsule 11    tamsulosin 0.4 MG Oral Cap Take 1 capsule (0.4 mg total) by mouth every evening. 90 capsule 3    METOPROLOL SUCCINATE  MG Oral Tablet 24 Hr TAKE  ONE TABLET BY MOUTH TWICE DAILY 180 tablet 0    cloNIDine 0.1 MG Oral Tab Take 1 tablet (0.1 mg total) by mouth 2 (two) times daily.      Glucose Blood (ONETOUCH VERIO) In Vitro Strip Check blood sugars once daily. 100 strip 3    AZELASTINE  MCG/SPRAY Nasal Solution INHALE 1 SPRAY INTO EACH NOSTRIL 2 TIMES DAILY 30 mL 2    vitamin E 1000 UNITS Oral Cap Take 1 capsule (1,000 Units total) by mouth daily.      magnesium oxide 400 MG Oral Tab Take 1 tablet (400 mg total) by mouth daily.      cholecalciferol 125 MCG (5000 UT) Oral Tab Take 1 tablet (5,000 Units total) by mouth daily.     [2]   Past Medical History:   Abdominal pain    above the belly button    Acute cystitis with hematuria    Acute diastolic (congestive) heart failure (HCC)    Anxiety disorder    Arthritis    Back pain    Bloating    most days    Blood in the stool    Blurred vision    Brachial neuritis or radiculitis NOS    C4,C5, nerve roots    Calculus of kidney    Last year do to medication i was taking    Chronic atrial fibrillation (HCC)    post op from [facility]    Chronic cough    medicine causes symptoms    Community acquired pneumonia of left lower lobe of lung    Decorative tattoo    Depression    Diabetes (HCC)    Diarrhea, unspecified    I believe from my medication    Dizziness    Fatigue    daily    Feeling lonely    Flatulence/gas pain/belching    Food intolerance    Headache disorder    migraines    Heart palpitations    Heartburn    at least one a week    Hemorrhoids    High blood pressure    High cholesterol    History of depression    Hx of motion sickness    Hyperlipidemia    Indigestion    not normal    Kidney stone    Leg swelling    Loss of appetite    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, or unspecified(404.00)    Migraines    Nausea    on and off    Night sweats    Obesity, unspecified    Obsessive-compulsive disorders    ISAAC (obstructive  sleep apnea)    AHI-16, O2 alyce-71%/CPAP-10cwp    Other specified cardiac dysrhythmias(427.89)    Other testicular hypofunction    Pain in joints    Pneumonia, organism unspecified(486)    Problems with swallowing    Shortness of breath    with simple movements    Sleep disturbance    Stress    Suicidal thoughts    Thoracic or lumbosacral neuritis or radiculitis, unspecified    L3,L4    Thrombocytopenia    Uncomfortable fullness after meals    Unspecified essential hypertension    Unspecified sleep apnea    wears CPAP    Visual impairment    Vomiting    3 times after heartburn    Wears glasses    Weight gain    Weight loss   [3]   Social History  Socioeconomic History    Marital status:    Tobacco Use    Smoking status: Former     Current packs/day: 0.00     Average packs/day: 0.5 packs/day for 28.0 years (14.0 ttl pk-yrs)     Types: Cigarettes     Start date: 1976     Quit date: 2004     Years since quittin.8    Smokeless tobacco: Never   Vaping Use    Vaping status: Never Used   Substance and Sexual Activity    Alcohol use: Not Currently     Alcohol/week: 1.0 standard drink of alcohol     Types: 1 Glasses of wine per week     Comment: Wine club once a month    Drug use: No   Other Topics Concern    Caffeine Concern Yes     Comment: 1 cup of coffee daily    Exercise Yes     Comment: walking     Social Drivers of Health     Food Insecurity: No Food Insecurity (2024)    Food Insecurity     Food Insecurity: Never true   Transportation Needs: No Transportation Needs (2024)    Transportation Needs     Lack of Transportation: No   Housing Stability: Low Risk  (2024)    Housing Stability     Housing Instability: No

## 2025-04-30 NOTE — ED INITIAL ASSESSMENT (HPI)
60YM c/c of dizziness Pt state that he been having R eye vision loss, dizziness and headache that been coming and going for the last 4 months Pt state that these episodes are becoming more frequent Pt state that today he was having R eye vision loss and dizziness this morning when he awoken Pt state that his symptoms are slightly better at this time

## 2025-04-30 NOTE — ED PROVIDER NOTES
Patient Seen in: [facility] Emergency Department      History     Chief Complaint   Patient presents with    Vision Problem     Stated Complaint: 4 months of R eye blurred vision. States L eye sometimes. Worsening confusion o*    Subjective:   HPI    60-year-old gentleman history of diabetes, here for evaluation of vision changes in the right eye, headache, generally not feeling well, intermittent dizziness.  Symptoms ongoing over the past 4 months, sent to the ER at the instruction of his primary doctor as he recently had an MRI/MRA of his head and neck performed that showed severe stenosis of the right vertebral artery.  Patient states he will have episodes frequently, almost daily where he loses complete vision in the right eye for about 5 minutes.  He has associated headaches or sports associated neck discomfort, has episodes of dizziness where he feels very off balance.  Symptoms come and go he will generally feel okay at other times.  Denies any falls or injury any chest pain shortness of breath fevers vomiting diarrhea any other complaints or concerns.  Patient was able to drive himself to the ER.  History of Present Illness               Objective:     Past Medical History:    Abdominal pain    above the belly button    Acute cystitis with hematuria    Acute diastolic (congestive) heart failure (HCC)    Anxiety disorder    Arthritis    Back pain    Bloating    most days    Blood in the stool    Blurred vision    Brachial neuritis or radiculitis NOS    C4,C5, nerve roots    Calculus of kidney    Last year do to medication i was taking    Chronic atrial fibrillation (HCC)    post op from [facility]    Chronic cough    medicine causes symptoms    Community acquired pneumonia of left lower lobe of lung    Decorative tattoo    Depression    Diabetes (HCC)    Diarrhea, unspecified    I believe from my medication    Dizziness    Fatigue    daily    Feeling lonely    Flatulence/gas pain/belching    Food intolerance     Headache disorder    migraines    Heart palpitations    Heartburn    at least one a week    Hemorrhoids    High blood pressure    High cholesterol    History of depression    Hx of motion sickness    Hyperlipidemia    Indigestion    not normal    Kidney stone    Leg swelling    Loss of appetite    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, or unspecified(404.00)    Migraines    Nausea    on and off    Night sweats    Obesity, unspecified    Obsessive-compulsive disorders    ISAAC (obstructive sleep apnea)    AHI-16, O2 alyce-71%/CPAP-10cwp    Other specified cardiac dysrhythmias(427.89)    Other testicular hypofunction    Pain in joints    Pneumonia, organism unspecified(486)    Problems with swallowing    Shortness of breath    with simple movements    Sleep disturbance    Stress    Suicidal thoughts    Thoracic or lumbosacral neuritis or radiculitis, unspecified    L3,L4    Thrombocytopenia    Uncomfortable fullness after meals    Unspecified essential hypertension    Unspecified sleep apnea    wears CPAP    Visual impairment    Vomiting    3 times after heartburn    Wears glasses    Weight gain    Weight loss              Past Surgical History:   Procedure Laterality Date    Angiogram  7/15/14    no cad noted    Endovas repair, infrarenl abdom aortic aneurysm/dissect      Epidurography, radiological s & i  4/18/2012    Procedure: CERVICAL EPIDURAL;  Surgeon: Sekou Solorzano MD;  Location: [facility]    Fluor gid & loclzj ndl/cath spi dx/ther njx  5/11/2012    Procedure: CERVICAL EPIDURAL;  Surgeon: Edil Alcocer MD;  Location: [facility]    Injection, w/wo contrast, dx/therapeutic substance, epidural/subarachnoid; cervical/thoracic  4/18/2012    Procedure: CERVICAL EPIDURAL;  Surgeon: Sekou Solorzano MD;  Location: [facility]    Injection, w/wo contrast,  dx/therapeutic substance, epidural/subarachnoid; cervical/thoracic  5/11/2012    Procedure: CERVICAL EPIDURAL;  Surgeon: Edil Alcocer MD;  Location: [facility]    M-sedaj by  phys perfrmg svc 5+ yr  4/18/2012    Procedure: CERVICAL EPIDURAL;  Surgeon: Sekou Solorzano MD;  Location: [facility]    M-sedaj by  phys perfrmg svc 5+ yr  5/11/2012    Procedure: CERVICAL EPIDURAL;  Surgeon: Edil Alcocer MD;  Location: [facility]    Symp aaa urgent repair  10/14/2014    [place]; ascending aorta    Tonsillectomy                  No pertinent social history.                              Physical Exam     ED Triage Vitals [04/30/25 1323]   BP (!) 208/96   Pulse 61   Resp 20   Temp 97.6 °F (36.4 °C)   Temp src Temporal   SpO2 97 %   O2 Device None (Room air)       Current Vitals:   Vital Signs  BP: 153/81  Pulse: 64  Resp: 15  Temp: 97.6 °F (36.4 °C)  Temp src: Temporal  MAP (mmHg): (!) 101    Oxygen Therapy  SpO2: 94 %  O2 Device: None (Room air)        Physical Exam      Physical Exam  Vitals signs and nursing note reviewed.   General:  Patient laying supine in the bed in no acute distress  Head: Normocephalic and atraumatic.   HEENT:  Mucous membranes are moist.   Cardiovascular:  Normal rate and regular rhythm.  No Edema  Pulmonary:  Pulmonary effort is normal.  Normal breath sounds. No wheezing, rhonchi or rales.   Abdominal: Soft nontender nondistended, normal bowel sounds, no guarding no rebound tenderness  Skin: Warm and dry  Neurological: Awake alert, speech is normal, motor 5/5 in bilateral upper and lower extremities.  sensation is grossly intact throughout.  No facial asymmetry.  Stable narrow based gait.      Physical Exam                ED Course     Labs Reviewed   COMP METABOLIC PANEL (14) - Abnormal; Notable for the following components:       Result Value    Glucose 122 (*)     Alkaline Phosphatase 132 (*)     All other components within  normal limits   C-REACTIVE PROTEIN - Normal   SED RATE, WESTERGREN (AUTOMATED) - Normal   CBC WITH DIFFERENTIAL WITH PLATELET   RAINBOW DRAW BLUE     EKG    Rate, intervals and axes as noted on EKG Report.  Rate: 59  Rhythm: Sinus Rhythm  Reading: T wave inversions in 1 and aVL similar to prior, no acute ischemic changes              Results            CTA BRAIN + CTA CAROTIDS (CPT=70496/39313)  Result Date: 4/30/2025  PROCEDURE:  CTA BRAIN + CTA CAROTIDS (CPT=70496/56749)  COMPARISON:  [facility] , CT, CT SOFT TISSUE OF NECK(CONTRAST ONLY) (CPT=70491), 1/15/2020, 7:20 PM.  INDICATIONS:  4 months of R eye blurred vision. States L eye sometimes.  Headaches, balance issues, right vertebral artery stenosis seen on recent MRI/MRA brain  TECHNIQUE:  CT angiography of the head and neck were obtained with non-ionic contrast.  3D volume rendering images were obtained by the technologist as well as the radiologist on an independent workstation.  Multiplanar 3D reformatted imaging including multiplanar MIP images were obtained.  Curved planar reformats were performed through the carotid and vertebral arteries. All measurements obtained in this exam were performed using NASCET criteria.  Dose reduction techniques were used. Dose information is transmitted to the ACR (American College of Radiology) NRDR (National Radiology Data Registry) which includes the Dose Index Registry.  PATIENT STATED HISTORY:(As transcribed by Technologist)  4 months of R eye blurred vision. States L eye sometimes.  Headaches, balance issues, right vertebral artery stenosis seen on recent MRI/MRA brain   CONTRAST USED:  80cc of Isovue 370  FINDINGS:  VASCULATURE:  The V4 segment of the right vertebral artery is hypoplastic.  There is extensive vascular disease in the distal portion.  There is a short segment of critical/high-grade stenosis of the right vertebral artery.  The most distal V4 segment is  patent but of small caliber.  The left vertebral artery  is dominant.  There is moderate circumferential vascular calcification of the distal V4 segment with approximately 50% loss of the vertebral body height.  The anterior, middle, and posterior cerebral arteries are patent without occlusion or aneurysm.  There is some mild vascular calcification involving the suprasellar portions of the internal carotid arteries bilaterally without occlusion. VENTRICLES:  Normal for age.  No enlargement or displacement. CEREBRUM:  Normal for age.  No excessive atrophy, mass, or hemorrhage, or abnormal enhancement. CEREBELLUM:  Normal for age.  No excessive atrophy, mass, or hemorrhage, or abnormal enhancement. BRAINSTEM:  Normal for age.  No excessive atrophy, mass, or hemorrhage, or abnormal enhancement. BASAL CISTERNS:  No subarachnoid hemorrhage or effacement.  AORTA:  The patient is status post sternotomy.  No aortic aneurysm or dissection.  Normal brachiocephalic artery and subclavian arteries.  LEFT INTERNAL CAROTID:  No hemodynamically significant stenosis or dissection. EXTERNAL CAROTID:  No hemodynamically significant stenosis or dissection COMMON CAROTID:  No hemodynamically significant stenosis or dissection VERTEBRAL:  No hemodynamically significant stenosis or dissection  RIGHT INTERNAL CAROTID:   No hemodynamically significant stenosis or dissection EXTERNAL CAROTID:    No hemodynamically significant stenosis or dissection COMMON CAROTID:   No hemodynamically significant stenosis or dissection VERTEBRAL:   No hemodynamically significant stenosis or dissection  OTHER:  The visualized soft tissues of the neck are also unremarkable, except for a 3.3 cm lipoma within the right scalene muscle.  This is of no significance.  Mild mosaic lung parenchymal pattern involving the lung apices consistent with small airway disease..              CONCLUSION:  1. Unremarkable CT angiogram of the neck. 2. There is a short segment of high-grade stenosis/critical stenosis involving the  distal V4 segment of the right vertebral artery.  The left vertebral artery is dominant. 3. No evidence of occlusion or significant stenosis involving the anterior, middle and posterior cerebral arteries.  Mild vascular calcification involves the distal intracranial internal carotid arteries bilaterally. 4. No intracranial hemorrhage, mass, or acute infarct.  The orbits are unremarkable.    LOCATION:  [facility]   Dictated by (CST): Evangelista Bruce MD on 4/30/2025 at 5:20 PM     Finalized by (CST): Evangelista Bruce MD on 4/30/2025 at 5:35 PM       MRI BRAIN MRA BRAIN+MRA NECK (ALL W+WO) (CPT=70553/95995/05571)  Result Date: 4/18/2025  PROCEDURE:  MRI BRAIN MRA HEAD+MRA NECK (ALL W+WO) (CPT=70553/07562/17822)  COMPARISON:  [facility], MRI BRAIN (CPT=70551), 1/16/2020, 6:34 PM.  INDICATIONS:  H54.61 Vision loss of right eye R51.9 Sudden onset unilateral headache  TECHNIQUE:  MRI of the brain was performed with multi-planar T1, T2-weighted images with FLAIR sequences and diffusion weighted images without and with infusion.  MR angiography of the brain without and with infusion and MR angiography of the neck without and with infusion was performed using 3D time of flight, multi-planar and 3D reconstructed images. All measurements obtained in this exam were performed using NASCET criteria.  Examination performed with anesthesia.  PATIENT STATED HISTORY:(As transcribed by Technologist)  Over last four months having episodes of shooting pain to right eye and looses vision in right eye for 5-7 minutes.  No known injury.   CONTRAST USED:  20 mL of Dotarem  FINDINGS:  The ventricles and sulci are within normal limits.  There is no midline shift or mass effect.  The basal cisterns are patent.   Mild scattered sulcal T2/FLAIR hyperintense signal may relate to supplemental oxygen and/or propofol use from anesthesia.  There is a background of trace chronic small vessel ischemic disease.  There is no acute intracranial  hemorrhage or extra-axial fluid collection identified.    There is no abnormal parenchymal or leptomeningeal enhancement.  There is no restricted diffusion to suggest acute ischemia/infarction.  A small amount fluid is seen within the left mastoid air cells.  Trace mucosal thickening within the ethmoid and frontal sinuses.  MRA BRAIN The upper cervical, petrous, cavernous, and supraclinoid internal carotid arteries are patent.  An anterior communicating artery is seen.  The branches of the anterior cerebral and middle cerebral arteries are patent.  A small left posterior communicating artery is identified.  The branches of the posterior cerebral and superior cerebellar arteries are patent.  The basilar artery has a normal course and caliber.   The origins of the bilateral PICA are seen.  There is a short segment severe stenosis or short segment occlusion at the distal V4 segment of the right vertebral artery just beyond the origin of the right PICA.  MRA NECK  There is a 3 vessel aortic arch.  The common carotid arteries, carotid bulbs, and cervical internal carotid arteries are patent.  The left vertebral artery is dominant.  The vertebral arteries arise from the respective subclavian arteries.  The cervical vertebral arteries are patent.            CONCLUSION:   1. There is a short segment severe stenosis or short segment occlusion at the distal V4 segment of the right vertebral artery just beyond the origin of the right PICA.  A CTA of the head is recommended for further assessment.  2. No acute infarct, acute intracranial hemorrhage, or hydrocephalus.  3. Trace chronic small vessel ischemic disease.   The radiology support staff is in the process of contacting the referring physician regarding this report.   LOCATION:  [facility]   Dictated by (CST): Stromberg, LeRoy, MD on 4/18/2025 at 10:22 AM     Finalized by (CST): Stromberg, LeRoy, MD on 4/18/2025 at 10:43 AM                            MDM   This is a  60-year-old gentleman here for evaluation of intermittent vision changes in the right eye, headaches, neck pain, dizziness.  Differential includes vertigo near syncope, symptoms associated with vertebral artery stenosis.  Will obtain a CTA of the head and neck to evaluate further discussed with neurosurgery.    Spoke with Dr. Hearn, states from his point no surgical intervention recommended, recommends discussing with neurology.  Spoke with Dr. Little from neuro IR as well recommends no surgical intervention, agrees with plan for discussion with neurology    1930  Spoke with Dr. Chopra, states believes it is less likely RCVS as he would expect these episodes to be longer lasting, perhaps days.  Does recommend checking CRP and sed rate to rule out temporal arteritis, if these are normal states patient can follow-up with clinic for further evaluation continue statin continues anticoagulant start baby aspirin daily.    Discussed evaluation and consultations with patient as well as his family at bedside they are in agreement with plan they will follow-up in neurology clinic for further evaluation, start baby aspirin daily, stressed the need of compliance with all daily medications check his blood pressure at home return to ER symptoms worsen or change or any other new concerns.  Patient is in agreement with plan    Medical Decision Making      Disposition and Plan     Clinical Impression:  1. Vision changes    2. Dizziness    3. Elevated blood pressure reading    4. TIA (transient ischemic attack)         Disposition:  Discharge  4/30/2025  8:58 pm    Follow-up:  Won Davis MD  [address] [place] [id number]  [phone]    Follow up  Follow-up with your PMD for reevaluation in 24 to 48 hours.  Return to ER if symptoms worsen or change or if any other new concerns.    [facility], [address] [place] [address] [place] [address]  [id number] [phone]  Schedule an appointment as soon as possible for a visit in 1 day(s)      [address] [place] [address] [place] [id number] [phone]  Call  choose option 1 for general neurology and state that you are following up for TIA          Medications Prescribed:  Discharge Medication List as of 4/30/2025  9:12 PM        START taking these medications    Details   aspirin 81 MG Oral Chew Tab Chew 1 tablet (81 mg total) by mouth daily., Normal, Disp-30 tablet, R-0             Supplementary Documentation:

## 2025-05-02 NOTE — TELEPHONE ENCOUNTER
TIA CLINIC SCREENING    Situation  Date of ED visit/TIA diagnosis: 4/30/2025    Time of discharge from ED: 2122    Is patient currently admitted?  No If YES - TIA Clinic Appointment not required.    Delete Background and Assessment sections and skip to Recommendation.     Background  Does patient already see an [facility] neurologist? No  Name:  If YES - TIA Clinic Appointment not required.    Route completed message on to patient's neurologist for follow up recommendation.       Assessment  Patient's current anti-platelet therapy: 81mg ASA   Patient's current statin therapy: Rosuvastatin   Has 2D Echo with bubble test been done? No  Date:      Recommendation  Is TIA Clinic Appointment indicated?  Unsure - routing encounter to Stroke Director for recommendation   If YES Contact patient to schedule appointment NO LATER THAN 48 HOURS AFTER ED DISCHARGE.    If message left for patient, document message and route encounter to [facility] to follow up.  If patient declines appointment within 24-48 hours, document that and find an appointment suitable to patient's schedule.  If patient declines appointment, document that and reason for decline.   If UNSURE  Route encounter to clinic provider for recommendation.   If NO  indicate reason and sign encounter.       TIA APPOINTMENT STATUS: Routing to provider for further recommendation.

## 2025-05-02 NOTE — TELEPHONE ENCOUNTER
Pt called to schedule his appt. His best call back number to schedule appt after provider's review is [phone].

## 2025-05-02 NOTE — TELEPHONE ENCOUNTER
Spoke with patient and informed him that Dr Hernandes would like him to see an ophthalmologist- referral placed for patient to see Dr Evelyn Hadley. [employer] message sent to patient with referral attached.   Appointment scheduled with Dr Hernandes on 5/12 at 2:30 in [facility].

## 2025-05-05 NOTE — PROGRESS NOTES
Rivera Bush is a 60 year old male.  HPI:   Pt here for ER f/up   Was seen here on 4/30 and advised to go to ER due to recent findings of high-grade stenosis of right vertebral artery, with symptoms of dizziness, vision change, neck pain.     Saw cardiology today. Will have 7 day monitor and they are increasing his entresto to BID dosing.     Had eye exam last week as well, ophthalmologist Dr Ash, stated he could see a vessel that looked pale on the right eye, referred to neuroophthalmologist, he is waiting to hear back from [facility] for an appointment.   L eye is fine    Current symptoms are unchanged from last week. They include posterior neck pain that radiates to behind the right eye; R eye episodes of vision cloudiness and shadowing.  He also has dizziness and unsteadiness; feels weak in his legs, unsteady, and slower to process information.     Current Medications[1]   Past Medical History[2]   Social History:  Short Social Hx on File[3]     REVIEW OF SYSTEMS:   GENERAL HEALTH: Denies fever, chills, unintentional weight change  SKIN: denies any unusual skin lesions or rashes  RESPIRATORY: denies shortness of breath with exertion, denies cough or wheezing  CARDIOVASCULAR: denies leg swelling  GI: denies abdominal pain and denies heartburn. Denies nausea, vomiting, diarrhea, constipation  NEURO: denies syncope    EXAM:   /88   Pulse 74   Temp 97.7 °F (36.5 °C)   Resp 18   Ht 5' 11\" (1.803 m)   Wt 273 lb (123.8 kg)   SpO2 98%   BMI 38.08 kg/m²   GENERAL: well developed, well nourished,in no apparent distress  SKIN: no rashes,no suspicious lesions, warm and dry  HEENT: atraumatic, normocephalic,   LUNGS: clear to auscultation b/l no W/R/R  CARDIO: RRR without murmur   EXTREMITIES: no cyanosis, clubbing or edema   NEURO: a/ox3. + unsteady when standing suddenly, + reported difficulty with right eye visual acuity during visit. PERRLA, EOMI, no facial weakness.  PSYCH: mood and  affect normal    ASSESSMENT AND PLAN:   1. Stenosis of right vertebral artery (Primary)   -patient is symptomatic but stable. CTA brain/neck on 4/30 confirmed no acute CVA. He has appointment to see neurology to discuss if intervention is appropriate on 5/12. For now I am recommending that he stay off of work through 5/12 due to ongoing dizziness, weakness in the legs, and vision changes. Will complete work paperwork accordingly.   The patient indicates understanding of these issues and agrees to the plan.  Return in about 3 weeks (around 5/26/2025) for follow-up.        [1]   Current Outpatient Medications   Medication Sig Dispense Refill    aspirin 81 MG Oral Chew Tab Chew 1 tablet (81 mg total) by mouth daily. 30 tablet 0    POTASSIUM CHLORIDE 20 MEQ Oral Tab CR TAKE 1 TABLET BY MOUTH IN  THE MORNING AND 1 TABLET BY MOUTH IN THE EVENING 180 tablet 0    BUDESONIDE-FORMOTEROL FUMARATE 160-4.5 MCG/ACT Inhalation Aerosol INHALE TWO PUFFS BY MOUTH TWICE DAILY 30.6 g 0    ROSUVASTATIN 40 MG Oral Tab Take 1 tablet (40 mg total) by mouth nightly 90 tablet 0    EPLERENONE 50 MG Oral Tab TAKE 1 TABLET BY MOUTH ONE TIME DAILY 90 tablet 0    diazePAM 5 MG Oral Tab Take 1 tablet 30 minutes prior to test. Repeat at time of test if needed. 2 tablet 0    ENTRESTO 24-26 MG Oral Tab Take 1 tablet by mouth 2 (two) times daily.      FLECAINIDE 100 MG Oral Tab Take 1 tablet (100 mg total) by mouth 2 (two) times daily. 180 tablet 0    XARELTO 20 MG Oral Tab Take 1 tablet (20 mg total) by mouth daily 90 tablet 0    ALBUTEROL 108 (90 Base) MCG/ACT Inhalation Aero Soln Inhale 2 puffs into the lungs every 6 (six) hours as needed for Shortness of Breath 54 g 0    FENOFIBRATE 145 MG Oral Tab TAKE 1 TABLET BY MOUTH ONE TIME DAILY 90 tablet 0    dapagliflozin (FARXIGA) 10 MG Oral Tab Farxiga 10 mg tablet, [RxNorm: 0333117]      furosemide 20 MG Oral Tab Take 1 tablet (20 mg total) by mouth daily. 90 tablet 3    hydrALAZINE 50 MG Oral Tab Take  1 tablet (50 mg total) by mouth 3 (three) times daily.      omeprazole 20 MG Oral Capsule Delayed Release Take one capsule (20 mg total) by mouth once daily, 30 minutes prior to breakfast 30 capsule 11    tamsulosin 0.4 MG Oral Cap Take 1 capsule (0.4 mg total) by mouth every evening. 90 capsule 3    METOPROLOL SUCCINATE  MG Oral Tablet 24 Hr TAKE ONE TABLET BY MOUTH TWICE DAILY 180 tablet 0    cloNIDine 0.1 MG Oral Tab Take 1 tablet (0.1 mg total) by mouth 2 (two) times daily.      Glucose Blood (ONETOUCH VERIO) In Vitro Strip Check blood sugars once daily. 100 strip 3    AZELASTINE  MCG/SPRAY Nasal Solution INHALE 1 SPRAY INTO EACH NOSTRIL 2 TIMES DAILY 30 mL 2    vitamin E 1000 UNITS Oral Cap Take 1 capsule (1,000 Units total) by mouth daily.      magnesium oxide 400 MG Oral Tab Take 1 tablet (400 mg total) by mouth daily.      cholecalciferol 125 MCG (5000 UT) Oral Tab Take 1 tablet (5,000 Units total) by mouth daily.     [2]   Past Medical History:   Abdominal pain    above the belly button    Acute cystitis with hematuria    Acute diastolic (congestive) heart failure (HCC)    Anxiety disorder    Arthritis    Back pain    Bloating    most days    Blood in the stool    Blurred vision    Brachial neuritis or radiculitis NOS    C4,C5, nerve roots    Calculus of kidney    Last year do to medication i was taking    Chronic atrial fibrillation (HCC)    post op from [facility]    Chronic cough    medicine causes symptoms    Community acquired pneumonia of left lower lobe of lung    Decorative tattoo    Depression    Diabetes (HCC)    Diarrhea, unspecified    I believe from my medication    Dizziness    Fatigue    daily    Feeling lonely    Flatulence/gas pain/belching    Food intolerance    Headache disorder    migraines    Heart palpitations    Heartburn    at least one a week    Hemorrhoids    High blood pressure    High cholesterol    History of depression    Hx of motion sickness    Hyperlipidemia     Indigestion    not normal    Kidney stone    Leg swelling    Loss of appetite    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, or unspecified(404.00)    Migraines    Nausea    on and off    Night sweats    Obesity, unspecified    Obsessive-compulsive disorders    ISAAC (obstructive sleep apnea)    AHI-16, O2 alyce-71%/CPAP-10cwp    Other specified cardiac dysrhythmias(427.89)    Other testicular hypofunction    Pain in joints    Pneumonia, organism unspecified(486)    Problems with swallowing    Shortness of breath    with simple movements    Sleep disturbance    Stress    Suicidal thoughts    Thoracic or lumbosacral neuritis or radiculitis, unspecified    L3,L4    Thrombocytopenia    Uncomfortable fullness after meals    Unspecified essential hypertension    Unspecified sleep apnea    wears CPAP    Visual impairment    Vomiting    3 times after heartburn    Wears glasses    Weight gain    Weight loss   [3]   Social History  Socioeconomic History    Marital status:    Tobacco Use    Smoking status: Former     Current packs/day: 0.00     Average packs/day: 0.5 packs/day for 28.0 years (14.0 ttl pk-yrs)     Types: Cigarettes     Start date: 1976     Quit date: 2004     Years since quittin.8    Smokeless tobacco: Never   Vaping Use    Vaping status: Never Used   Substance and Sexual Activity    Alcohol use: Not Currently     Alcohol/week: 1.0 standard drink of alcohol     Types: 1 Glasses of wine per week     Comment: Wine club once a month    Drug use: No   Other Topics Concern    Caffeine Concern Yes     Comment: 1 cup of coffee daily    Exercise Yes     Comment: walking     Social Drivers of Health     Food Insecurity: No Food Insecurity (2024)    Food Insecurity     Food Insecurity: Never true   Transportation Needs: No Transportation Needs (2024)    Transportation Needs     Lack of Transportation: No    Housing Stability: Low Risk  (11/20/2024)    Housing Stability     Housing Instability: No
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
## 2025-05-12 NOTE — PATIENT INSTRUCTIONS
Refill policies:    Allow 2-3 business days for refills; controlled substances may take longer.  Contact your pharmacy at least 5 days prior to running out of medication and have them send an electronic request or submit request through the “request refill” option in your [employer] account.  Refills are not addressed on weekends; covering physicians do not authorize routine medications on weekends.  No narcotics or controlled substances are refilled after noon on Fridays or by on call physicians.  By law, narcotics must be electronically prescribed.  A 30 day supply with no refills is the maximum allowed.  If your prescription is due for a refill, you may be due for a follow up appointment.  To best provide you care, patients receiving routine medications need to be seen at least once a year.  Patients receiving narcotic/controlled substance medications need to be seen at least once every 3 months.  In the event that your preferred pharmacy does not have the requested medication in stock (e.g. Backordered), it is your responsibility to find another pharmacy that has the requested medication available.  We will gladly send a new prescription to that pharmacy at your request.    Scheduling Tests:    If your physician has ordered radiology tests such as MRI or CT scans, please contact Central Scheduling at [phone] right away to schedule the test.  Once scheduled, the [facility] Centralized Referral Team will work with your insurance carrier to obtain pre-certification or prior authorization.  Depending on your insurance carrier, approval may take 3-10 days.  It is highly recommended patients assure they have received an authorization before having a test performed.  If test is done without insurance authorization, patient may be responsible for the entire amount billed.      Precertification and Prior Authorizations:  If your physician has recommended that you have a procedure or additional testing performed the [facility]  Centralized Referral Team will contact your insurance carrier to obtain pre-certification or prior authorization.    You are strongly encouraged to contact your insurance carrier to verify that your procedure/test has been approved and is a COVERED benefit.  Although the [facility] Centralized Referral Team does its due diligence, the insurance carrier gives the disclaimer that \"Although the procedure is authorized, this does not guarantee payment.\"    Ultimately the patient is responsible for payment.   Thank you for your understanding in this matter.  Paperwork Completion:  If you require FMLA or disability paperwork for your recovery, please make sure to either drop it off or have it faxed to our office at [phone]. Be sure the form has your name and date of birth on it.  The form will be faxed to our Forms Department and they will complete it for you.  There is a 25$ fee for all forms that need to be filled out.  Please be aware there is a 10-14 day turnaround time.  You will need to sign a release of information (ALVINO) form if your paperwork does not come with one.  You may call the Forms Department with any questions at [phone].  Their fax number is [phone].

## 2025-05-12 NOTE — TELEPHONE ENCOUNTER
Patient dropped brought in FMLA paperwork during 5/5 appointment to be completed, patient left the building after appointment with provider before ALVINO could be filled out by patient.     Forwarded to forms department for completion.

## 2025-05-13 NOTE — PROGRESS NOTES
The following individual(s) verbally consented to be recorded using ambient AI listening technology and understand that they can each withdraw their consent to this listening technology at any point by asking the clinician to turn off or pause the recording:    Patient name: Rivera Solo Jose Miguelshanias  Additional names:  none
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
## 2025-05-15 NOTE — TELEPHONE ENCOUNTER
Prior authorization approved  Payer: [employer] Case ID: [id number]    [phone]  Note from payer: The request has been approved. The authorization is effective from 05/14/2025 to 11/10/2025, as long as the member is enrolled in their current health plan. The request was reviewed and approved by a licensed clinical pharmacist. This request has been approved with quantity limit of 18 tablets per 30 days. A written notification letter will follow with additional details.  Approval Details    Authorized from May 14, 2025 to November 10, 2025    Updated pt through [email or web address].

## 2025-05-16 NOTE — TELEPHONE ENCOUNTER
Family Medical Leave Act forms received via email. Logged for processing. [employer] message sent for Release of Information completion

## 2025-05-19 NOTE — TELEPHONE ENCOUNTER
Patient called gathered the below;    Type of Leave: Cont. Family Medical Leave Act   Reason for Leave: Dizziness; TIA; Migraines   Start date of leave: 04/28/2025  End date of leave: 05/20/25 w/ Return to work 05/21/2025  How many flare ups per month/length?:  How many appts per month/length?:   Was Fee and Turnaround info Given?:

## 2025-05-19 NOTE — TELEPHONE ENCOUNTER
Peng,     Please sign off on 2 form if you agree to: Family Medical Leave Act and Return to work due to Migraines,Dizziness, Stenosis of right vertebral artery     -Signature page will be the first page scanned  -From your Inbasket, Highlight the patient and click Chart   -Double click the 5/12/25 Forms Completion telephone encounter  -Scroll down to the Media section   -Click the blue Hyperlink: Family Medical Leave Act Peng 5/19/25  Return to work Peng 5/19/25  -Click Acknowledge located in the top right ribbon/menu   -Drag the mouse into the blank space of the document and a + sign will appear. Left click to   electronically sign the document.  -Once signed, simply exit out of the screen and you signature will be saved.     Thank you,     Lalita

## 2025-05-20 NOTE — TELEPHONE ENCOUNTER
Forms Completed and efaxed to [employer] at [phone] ,Efax confirmation received. [employer] message sent to patient.
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
## 2025-05-27 NOTE — TELEPHONE ENCOUNTER
Rosuvastatin  Last time medication was refilled 2/28/25  Last office visit  5/27/25  Next office visit due/scheduled   No future appointments  Passed protocol, Medication sent.      Budesonide  Last time medication was refilled 2/28/25  Last office visit  5/27/25  Next office visit due/scheduled No future appointments  Medication failed protocol

## 2025-05-27 NOTE — PROGRESS NOTES
The following individual(s) verbally consented to be recorded using ambient AI listening technology and understand that they can each withdraw their consent to this listening technology at any point by asking the clinician to turn off or pause the recording:    Patient name: Rivera Solo Rachelle  Additional names: Carmen Bush

## 2025-05-27 NOTE — TELEPHONE ENCOUNTER
EPLERENONE   Last time medication was refilled 2/28/25  Last office visit  5/27/25  Next office visit due/scheduled no future appointments  Medication failed protocol      Potassium  Last time medication was refilled 3/2/25  Last office visit  5/27/25  Next office visit due/scheduled No future appointments  Medication not on protocol.

## 2025-05-28 NOTE — PATIENT INSTRUCTIONS
·  PLAN: Doxycycline, take as directed. Finish all the medication even if you feel better. Avoid sun exposure and wear sunscreen during use.   · Probiotics or yogurt daily during antibiotic use will help decrease stomach upset and restore good bacteria to t
no

## 2025-05-28 NOTE — TELEPHONE ENCOUNTER
Fenofibrate 145 MG   Last time medication was refilled 12/03/2024  Last office visit  05/27/2025  Next office visit due/scheduled   Future Appointments   Date Time Provider Department Center   6/10/2025 10:30 AM Nathalie Giles PA-C [facility]   8/18/2025 10:30 AM Aramis Hernandes MD [facility]     Medication not on protocol.            Flecainide 100 MG   Last time medication was refilled 12/04/2024  Last office visit  05/27/2025  Next office visit due/scheduled   Future Appointments   Date Time Provider Department Center   6/10/2025 10:30 AM Nathalie Giles PA-C [facility]   8/18/2025 10:30 AM Aramis Hernandes MD [facility]     Medication not on protocol.

## 2025-06-02 NOTE — TELEPHONE ENCOUNTER
Last time medication was refilled 12/4/24  Last office visit  5/27/25  Next office visit due/scheduled   Future Appointments   Date Time Provider Department Center   6/10/2025 10:30 AM Nathalie Giles PA-C [facility]   8/18/2025 10:30 AM Aramis Hernandes MD [facility]     Medication not on protocol.
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
## 2025-06-11 NOTE — TELEPHONE ENCOUNTER
FMLA form brought in to 6/10 OV with [employer]. Attached 5/13, 5/27 and 6/10 office notes. Per provider comment pt will be reaching out to office on 6/12 after cardiology appt.    Placed in pending forms bin.

## 2025-06-20 NOTE — PROGRESS NOTES
Rivera Bush is a 60 year old male.  HPI:     History of Present Illness  Rivera Bush is a 60 year old male with resistant hypertension who presents for blood pressure management and follow-up.    His blood pressure remains improved but still increases to 140's despite hydralazine, entresto and other meds as well as dietary sodium restriction. He experiences sporadic high readings and feels 'out of it' after taking hydralazine. He reports that his body does not respond well to medications or lifestyle changes. He is scheduled for renal denervation in August with cardiology.    He experiences persistent vertigo and migraines, with coordination improving. He has still not received his migraine medication from the pharmacy. He feels ready to return to work at this time.    His diabetes is well-managed with a diet low in sweets, with occasional indulgences. He reports no issues with his prostate, kidneys, or liver, stays hydrated, and avoids sugary cleanses.     Current Medications[1]   Past Medical History[2]   Social History:  Short Social Hx on File[3]     REVIEW OF SYSTEMS:   GENERAL HEALTH: feels well otherwise. Denies fever, chills, unintentional weight change  SKIN: denies any unusual skin lesions or rashes  RESPIRATORY: denies shortness of breath with exertion, denies cough or wheezing  CARDIOVASCULAR: denies chest pain or palpitations, denies leg swelling  GI: denies abdominal pain and denies heartburn. Denies nausea, vomiting, diarrhea, constipation  NEURO: denies headaches, dizziness, weakness, syncope    EXAM:   /70   Pulse 67   Temp 98.5 °F (36.9 °C)   Resp 18   Ht 5' 11\" (1.803 m)   Wt 276 lb (125.2 kg)   SpO2 95%   BMI 38.49 kg/m²   GENERAL: well developed, well nourished,in no apparent distress  SKIN: no rashes,no suspicious lesions, warm and dry  HEENT: atraumatic, normocephalic,ears and throat are clear  NECK: supple,no adenopathy, no thyromegaly  LUNGS: clear to auscultation  b/l no W/R/R  CARDIO: RRR without murmur  GI: good BS's,no masses, HSM, distension or tenderness  EXTREMITIES: no cyanosis, clubbing or edema  MUSCULOSKELETAL: FROM, no joint swelling or bony tenderness  NEURO: a/ox3, no focal deficits  PSYCH: mood and affect normal    ASSESSMENT AND PLAN:   1. 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 Controlled type 2 diabetes mellitus with complication, without long-term current use of insulin (HCC) at goal continue farxiga and lifestyle   3. Intractable migraine without aura and without status migrainosus   Continue nurtec, follow up with neurology as scheduled. Ok to return to work at this time.     The patient indicates understanding of these issues and agrees to the plan.  Return in about 3 months (around 9/10/2025) for routine physical, when convenient.        [1]   Current Outpatient Medications   Medication Sig Dispense Refill    hydrALAZINE 25 MG Oral Tab Take 1 tablet (25 mg total) by mouth.      ENTRESTO  MG Oral Tab       XARELTO 20 MG Oral Tab Take 1 tablet (20 mg total) by mouth daily 90 tablet 0    FLECAINIDE 100 MG Oral Tab Take 1 tablet (100 mg total) by mouth 2 (two) times daily 180 tablet 0    FENOFIBRATE 145 MG Oral Tab TAKE 1 TABLET BY MOUTH ONE TIME DAILY 90 tablet 0    EPLERENONE 50 MG Oral Tab TAKE 1 TABLET BY MOUTH ONE TIME DAILY 90 tablet 0    ROSUVASTATIN 40 MG Oral Tab TAKE 1 TABLET BY MOUTH NIGHTLY 90 tablet 0    BUDESONIDE-FORMOTEROL FUMARATE 160-4.5 MCG/ACT Inhalation Aerosol INHALE TWO PUFFS BY MOUTH TWICE DAILY 30.6 g 0    POTASSIUM CHLORIDE 20 MEQ Oral Tab CR TAKE 1 TABLET BY MOUTH IN  THE MORNING AND 1 TABLET BY MOUTH IN THE EVENING 180 tablet 0    ondansetron (ZOFRAN) 4 mg tablet Take 1 tablet (4 mg total) by mouth every 8 (eight) hours as needed. 20 tablet 0    Rimegepant Sulfate (NURTEC) 75 MG Oral Tablet Dispersible  Take 75 mg by mouth daily. 4 tablet 0    Rimegepant Sulfate (NURTEC OR) Take by mouth.      HYDROcodone-acetaminophen 5-325 MG Oral Tab       diazePAM 5 MG Oral Tab Take 1 tablet 30 minutes prior to test. Repeat at time of test if needed. 2 tablet 0    ALBUTEROL 108 (90 Base) MCG/ACT Inhalation Aero Soln Inhale 2 puffs into the lungs every 6 (six) hours as needed for Shortness of Breath 54 g 0    dapagliflozin (FARXIGA) 10 MG Oral Tab Farxiga 10 mg tablet, [RxNorm: 3272434]      furosemide 20 MG Oral Tab Take 1 tablet (20 mg total) by mouth daily. 90 tablet 3    hydrALAZINE 50 MG Oral Tab Take 1 tablet (50 mg total) by mouth 3 (three) times daily.      omeprazole 20 MG Oral Capsule Delayed Release Take one capsule (20 mg total) by mouth once daily, 30 minutes prior to breakfast 30 capsule 11    tamsulosin 0.4 MG Oral Cap Take 1 capsule (0.4 mg total) by mouth every evening. 90 capsule 3    METOPROLOL SUCCINATE  MG Oral Tablet 24 Hr TAKE ONE TABLET BY MOUTH TWICE DAILY 180 tablet 0    cloNIDine 0.1 MG Oral Tab Take 1 tablet (0.1 mg total) by mouth 2 (two) times daily.      Glucose Blood (ONETOUCH VERIO) In Vitro Strip Check blood sugars once daily. 100 strip 3    AZELASTINE  MCG/SPRAY Nasal Solution INHALE 1 SPRAY INTO EACH NOSTRIL 2 TIMES DAILY 30 mL 2    vitamin E 1000 UNITS Oral Cap Take 1 capsule (1,000 Units total) by mouth daily.      magnesium oxide 400 MG Oral Tab Take 1 tablet (400 mg total) by mouth daily.      cholecalciferol 125 MCG (5000 UT) Oral Tab Take 1 tablet (5,000 Units total) by mouth daily.     [2]   Past Medical History:   Abdominal pain    above the belly button    Acute cystitis with hematuria    Acute diastolic (congestive) heart failure (HCC)    Anxiety disorder    Arthritis    Back pain    Bloating    most days    Blood in the stool    Blurred vision    Brachial neuritis or radiculitis NOS    C4,C5, nerve roots    Calculus of kidney    Last year do to medication i was taking     Chronic atrial fibrillation (HCC)    post op from [facility]    Chronic cough    medicine causes symptoms    Community acquired pneumonia of left lower lobe of lung    Decorative tattoo    Depression    Diabetes (HCC)    Diarrhea, unspecified    I believe from my medication    Dizziness    Fatigue    daily    Feeling lonely    Flatulence/gas pain/belching    Food intolerance    Headache disorder    migraines    Heart palpitations    Heartburn    at least one a week    Hemorrhoids    High blood pressure    High cholesterol    History of depression    Hx of motion sickness    Hyperlipidemia    Indigestion    not normal    Kidney stone    Leg swelling    Loss of appetite    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, or unspecified(404.00)    Migraines    Nausea    on and off    Night sweats    Obesity, unspecified    Obsessive-compulsive disorders    ISAAC (obstructive sleep apnea)    AHI-16, O2 alyce-71%/CPAP-10cwp    Other specified cardiac dysrhythmias(427.89)    Other testicular hypofunction    Pain in joints    Pneumonia, organism unspecified(486)    Problems with swallowing    Shortness of breath    with simple movements    Sleep disturbance    Stress    Suicidal thoughts    Thoracic or lumbosacral neuritis or radiculitis, unspecified    L3,L4    Thrombocytopenia    Uncomfortable fullness after meals    Unspecified essential hypertension    Unspecified sleep apnea    wears CPAP    Visual impairment    Vomiting    3 times after heartburn    Wears glasses    Weight gain    Weight loss   [3]   Social History  Socioeconomic History    Marital status:    Tobacco Use    Smoking status: Former     Current packs/day: 0.00     Average packs/day: 0.5 packs/day for 28.0 years (14.0 ttl pk-yrs)     Types: Cigarettes     Start date: 1976     Quit date: 2004     Years since quittin.9    Smokeless tobacco: Never   Vaping Use     Vaping status: Never Used   Substance and Sexual Activity    Alcohol use: Not Currently     Alcohol/week: 1.0 standard drink of alcohol     Types: 1 Glasses of wine per week     Comment: Wine club once a month    Drug use: No   Other Topics Concern    Caffeine Concern Yes     Comment: 1-2 cups of coffee daily    Exercise Yes     Comment: walking     Social Drivers of Health     Food Insecurity: No Food Insecurity (11/20/2024)    Food Insecurity     Food Insecurity: Never true   Transportation Needs: No Transportation Needs (11/20/2024)    Transportation Needs     Lack of Transportation: No   Housing Stability: Low Risk  (11/20/2024)    Housing Stability     Housing Instability: No

## 2025-07-14 NOTE — TELEPHONE ENCOUNTER
Rosuvastatin 40 MG   Last time medication was refilled 05/27/2025  Last office visit  06/20/2025  Next office visit due/scheduled   Future Appointments   Date Time Provider Department Center   8/6/2025 12:00 PM  IV[facility] RM 1 CATH LAB  [facility]   8/18/2025 10:30 AM Aramis Hernandes MD [facility]   9/10/2025  9:30 AM Nathalie Giles PA-C [facility] [address]         Passed protocol, Medication sent.          Xarelto 20 MG   Last time medication was refilled 06/02/2025  Last office visit  06/20/2025  Next office visit due/scheduled   Future Appointments   Date Time Provider Department Center   8/6/2025 12:00 PM  [facility]   8/18/2025 10:30 AM Aramis Hernandes MD [facility]   9/10/2025  9:30 AM Nathalie Giles PA-C [facility] [address]       Medication not on protocol.

## 2025-07-14 NOTE — TELEPHONE ENCOUNTER
Eplerenone 50 MG  Last time medication was refilled 05/27/2025  Last office visit  06/20/2025  Next office visit due/scheduled   Future Appointments   Date Time Provider Department Center   8/6/2025 12:00 PM  IVS RM 1 CATH LAB [facility]   8/18/2025 10:30 AM Aramis Hernandes MD [facility]   9/10/2025  9:30 AM Nathalie Giles PA-C [facility] [address]       Medication not on protocol.            Fenofibrate 145 MG   Last time medication was refilled 05/28/2025  Last office visit  06/20/2025  Next office visit due/scheduled   Future Appointments   Date Time Provider Department Center   8/6/2025 12:00 PM  IVS RM 1 CATH LAB [facility]   8/18/2025 10:30 AM Aramis Hernandes MD [facility]   9/10/2025  9:30 AM Nathalie Giles PA-C [facility] [address] [address]       Medication not on protocol.          Flecainide 100 MG   Last time medication was refilled 05/28/2025  Last office visit  06/20/2025  Next office visit due/scheduled   Future Appointments   Date Time Provider Department Center   8/6/2025 12:00 [facility]   8/18/2025 10:30 AM rAamis Hernandes MD [facility]   9/10/2025  9:30 AM Nathalie Giles PA-C [facility] [address]       Medication not on protocol.      Potassium 20 MEQ   Last time medication was refilled 05/27/2025  Last office visit  06/20/2025  Next office visit due/scheduled   Future Appointments   Date Time Provider Department Center   8/6/2025 12:00 PM  [facility]   8/18/2025 10:30 AM Aramis Hernandes MD [facility]   9/10/2025  9:30 AM Nathalie Giles PAArnolC [facility] [address]       Medication not on protocol.

## (undated) NOTE — MR AVS SNAPSHOT
[address] 61 [phone]               Thank you for choosing us for your health care visit with ANDREW Scruggs.   We are glad to serve you and happy to provide you with this summary · Go to a grocery or convenience market instead of a [employer]. Look for choices like sandwiches, yogurt, fresh fruit, and juices. · Buy precut, prepackaged fresh or frozen fruits and vegetables.  You can open the package for a snack, salad, smo Commonly known as:  LIPITOR           Flecainide Acetate 100 MG Tabs   Take 100 mg by mouth 2 (two) times daily. Commonly known as:  TAMBOCOR           gemfibrozil 600 MG Tabs   Take 1 tablet (600 mg total) by mouth 2 (two) times daily before meals.    Co Bring a paper prescription for each of these medications    - Lorcaserin HCl ER 20 MG Tb24            MyChart     Visit MyChart  You can access your MyChart to more actively manage your health care and view more details from this visit by going to https:/

## (undated) NOTE — ED AVS SNAPSHOT
Erick Ciro   MRN: [id number]    Department:  [facility] Emergency Department   Date of Visit:  12/13/2018           Disclosure     Insurance plans vary and the physician(s) referred by the ER may not be covered by your plan.  Please contact yo tell this physician (or your personal doctor if your instructions are to return to your personal doctor) about any new or lasting problems. The primary care or specialist physician will see patients referred from the [facility] Emergency Department.  Vaughn Rachel

## (undated) NOTE — LETTER
Date: 3/21/2024    Patient Name: Rivera Bush      To Whom it may concern:    The above patient was seen at [facility] for treatment of a medical condition.    This patient should be excused from attending work from March 19th through March 21, 2024. The patient may return to work on March 22, 2024 without restriction. Please call our office with any questions or concerns.       Sincerely,    REINA Mix

## (undated) NOTE — Clinical Note
Date: 2/7/2017    Patient Name: Alix You          To Whom it may concern: This letter has been written at the patient's request. The above patient was seen at the [facility] for treatment of a medical condition.     This patient qi

## (undated) NOTE — LETTER
25    Patient Name: Rivera Bush    : 1965     To Whom it may concern:     This letter has been written with the patient's permission. The patient is currently under my medical care.     This patient should be excused from attending work from 25 through 25.         Sincerely,        Nathalie Giles PA-C

## (undated) NOTE — MR AVS SNAPSHOT
[facility]  [address]  [phone]               Thank you for choosing us for your health care visit with [facility].   We are glad to serve you and happy to provide you with this summary of Inhale 2 puffs into the lungs every 6 (six) hours as needed for Shortness of Breath. AmLODIPine Besylate 10 MG Tabs   Take 10 mg by mouth daily.  Indications: High Blood Pressure   Commonly known as:  NORVASC           aspirin EC 81 MG Tbec   Take Commonly known as:  COUMADIN                   MyChart     Visit MyChart  You can access your MyChart to more actively manage your health care and view more details from this visit by going to [email or web address].   If you've recently had a stay at

## (undated) NOTE — LETTER
June 20, 2018        [address]  [facility] [address]      Dear Malinda Arroyo:    I am the Nurse Care Manager with Adin Whitman MD office. Just reaching out to see how you are doing since your discharge home.    When you have a minute wou

## (undated) NOTE — LETTER
[facility] Pre-Admission Testing Department  Phone: [phone]  OUTSIDE TESTING RESULT REQUEST      TO:   DR. TRAVIS DELVALLE Today's Date: 25    FAX #: [phone]     IMPORTANT: FOR YOUR IMMEDIATE ATTENTION  Please FAX all test results listed below to: [phone]     Testing already done on or about: 2025     * * * * If testing is NOT complete, arrange with patient A.S.A.P. * * * *      Patient Name: Rivera Bush  Surgery Date:     CSN: [id number]  Medical Record: [id number]   : 1965 - A: 60 y      Sex: male  * Surgery not found *  Procedure:   Anesthesia Type: SEDATION     Surgeon: MRI       The following Testing and Time Line are REQUIRED PER ANESTHESIA     EKG READ AND SIGNED WITHIN   90 days      Thank You,   Sent by: CATRACHITO MICHAEL RN      CONFIDENTIALITY NOTICE  This transmission is intended only for the use of the individual or entity to which it is addressed and may contain information that is privileged and confidential.  If the reader of this message is not the intended recipient, you are hereby notified that any disclosure, distribution, or copying of this information is strictly prohibited.  If you have received this transmission in error, please notify us immediately by telephone, and return the original documents to us at the address listed above.

## (undated) NOTE — LETTER
ASTHMA ACTION PLAN for Taz Martínez     : 1965     Date: 2022  Provider:  Corina Amos PA-C  Phone for doctor or clinic: [place] [address]  [place] [address]  [phone]    [facility]

## (undated) NOTE — MR AVS SNAPSHOT
[address] 59 [phone]               Thank you for choosing us for your health care visit with ANDREW Mcmahan.   We are glad to serve you and happy to provide you with this summary · Check with your doctor or pharmacist before taking a decongestant. Some decongestants can worsen high blood pressure. Lifestyle changes  · Maintain a healthy weight. Get help to lose any extra pounds. · Cut back on salt.   ¨ Limit canned, dried, package · Weakness in the muscles of your face, arms, or legs  · Trouble speaking  · Extreme drowsiness  · Confusion  · Fainting or dizziness  · Pulsating or rushing sound in your ears  · Unexplained nosebleed  · Weakness, tingling, or numbness of your face, arms, AmLODIPine Besylate 10 MG Tabs   Take 10 mg by mouth daily.  Indications: High Blood Pressure   Commonly known as:  NORVASC           Atorvastatin Calcium 20 MG Tabs   TAKE ONE TABLET BY MOUTH EVERY NIGHT AT BEDTIME   Commonly known as:  LIPITOR These medications were sent to [address] [phone], [address], [address] [address]     Phone:  [phone]    - MetFORMIN HCl  MG Tb24            Today's Orders     Basic Metabolic Panel

## (undated) NOTE — LETTER
ASTHMA ACTION PLAN for Priscila Manning     : 1965     Date: 2020  Provider:  Cadence Smiley PA-C  Phone for doctor or clinic: [address] [place] [address], [address] [place] [id number] [facility], [address] [place] [id number] [phone]

## (undated) NOTE — LETTER
ASTHMA ACTION PLAN for Alessandro Gutierrez     : 1965     Date: 1/3/2019  Provider:  Suzanne Acosta MD  Phone for doctor or clinic: [facility], [facility] 2, [address] [facility]

## (undated) NOTE — LETTER
ASTHMA ACTION PLAN for [facility]     : 1965     Date: 2017  Provider:  López Culver PA-C  Phone for doctor or clinic: [address] [place] [address] [place] [phone]

## (undated) NOTE — Clinical Note
MIREYAI, TCM call made, see notes. Patient with appointment to Follow up with Mookie Mayfield on 2/23/17, NCM changed to TCM HFU visit type.

## (undated) NOTE — LETTER
21      Patient Name: John Ramirez    : 1965     To Whom it may concern: This letter has been written with the patient's permission. The above patient was seen at the [facility] for treatment of a medical condition.      Shimon Suggs

## (undated) NOTE — Clinical Note
Hi! Patients BP was 170/100 x 2 at the OV. But he had yet to take any of his morning meds when I saw him. I think he has follow up scheduled for his BP already but wanted fyi. Thanks!

## (undated) NOTE — IP AVS SNAPSHOT
[facility] [place] [address] [place], [address] ~ [phone]                Discharge Summary   2/27/2017    [facility]           Admission Information        Provider Department    2/27/2017 Aedla Valdivia MD  3ne[facility] [    ]    [    ]       Flecainide Acetate 100 MG Tabs   Last time this was given:  100 mg on 3/1/2017  8:32 AM   Commonly known as:  TAMBOCOR        Take 100 mg by mouth 2 (two) times daily.       [    ]    [    ]    [    ]    [    ]       gemfibrozil 600 Commonly known as:  K-DUR M20        Take 20 mEq by mouth 2 (two) times daily. [    ]    [    ]    [    ]    [    ]       Warfarin Sodium 10 MG Tabs   Commonly known as:  COUMADIN        Take 10 mg by mouth nightly.       [    ]    [    ]    [    ] Recent Hematology Lab Results  (Last 3 results in the past 90 days)    WBC RBC Hemoglobin Hematocrit MCV MCH MCHC RDW Platelet MPV    (79/50/73)  8.3 (02/27/17)  5.15 (02/27/17)  15.4 (02/27/17)  44.6 (02/27/17)  86.6 -- -- -- (02/27/17)  219.0 --    (0 you to explore options for quitting.     - If you have concerns related to behavioral health issues or thoughts of harming yourself, contact [facility] at [phone].     - If you don’t have insurance, Vu Hensley hydrALAzine HCl 25 MG Oral Tab    Flecainide Acetate 100 MG Oral Tab    Losartan Potassium-HCTZ 100-25 MG Oral Tab    Metoprolol Succinate ER (TOPROL-XL) 200 MG Oral Tablet 24 Hr    GuanFACINE HCl (TENEX) 1 MG Oral Tab    AmLODIPine Besylate (NORVASC) 10 HYDROcodone-acetaminophen 5-325 MG Oral Tab       Use:  Treat pain   Most common side effects: Constipation, drowsiness, dizziness, urinary retention (inability to urinate)   What to report to your healthcare team: Difficulty urinating, dizziness, no christy

## (undated) NOTE — LETTER
[address]  [address]  Authorization for Surgical Operation and Procedure     Date:___________                                                                                                         Time:__________  I hereby authorize Surgeon(s):  Angel Mendoza MD, my physician and his/her assistants (if applicable), which may include medical students, residents, and/or fellows, to perform the following surgical operation/ procedure and administer such anesthesia as may be determined necessary by my physician:  Operation/Procedure name (s) Procedure(s):  ESOPHAGOGASTRODUODENOSCOPY (EGD) on Rivera Bush   2.   I recognize that during the surgical operation/procedure, unforeseen conditions may necessitate additional or different procedures than those listed above.  I, therefore, further authorize and request that the above-named surgeon, assistants, or designees perform such procedures as are, in their judgment, necessary and desirable.    3.   My surgeon/physician has discussed prior to my surgery the potential benefits, risks and side effects of this procedure; the likelihood of achieving goals; and potential problems that might occur during recuperation.  They also discussed reasonable alternatives to the procedure, including risks, benefits, and side effects related to the alternatives and risks related to not receiving this procedure.  I have had all my questions answered and I acknowledge that no guarantee has been made as to the result that may be obtained.    4.   Should the need arise during my operation/procedure, which includes change of level of care prior to discharge, I also consent to the administration of blood and/or blood products.  Further, I understand that despite careful testing and screening of blood or blood products by collecting agencies, I may still be subject to ill effects as a result of receiving a blood transfusion and/or blood  products.  The following are some, but not all, of the potential risks that can occur: fever and allergic reactions, hemolytic reactions, transmission of diseases such as Hepatitis, AIDS and Cytomegalovirus (CMV) and fluid overload.  In the event that I wish to have an autologous transfusion of my own blood, or a directed donor transfusion, I will discuss this with my physician.  Check only if Refusing Blood or Blood Products  I understand refusal of blood or blood products as deemed necessary by my physician may have serious consequences to my condition to include possible death. I hereby assume responsibility for my refusal and release the hospital, its personnel, and my physicians from any responsibility for the consequences of my refusal.          o  Refuse      5.   I authorize the use of any specimen, organs, tissues, body parts or foreign objects that may be removed from my body during the operation/procedure for diagnosis, research or teaching purposes and their subsequent disposal by hospital authorities.  I also authorize the release of specimen test results and/or written reports to my treating physician on the hospital medical staff or other referring or consulting physicians involved in my care, at the discretion of the Pathologist or my treating physician.    6.   I consent to the photographing or videotaping of the operations or procedures to be performed, including appropriate portions of my body for medical, scientific, or educational purposes, provided my identity is not revealed by the pictures or by descriptive texts accompanying them.  If the procedure has been photographed/videotaped, the surgeon will obtain the original picture, image, videotape or CD.  The hospital will not be responsible for storage, release or maintenance of the picture, image, tape or CD.    7.   I consent to the presence of a  or observers in the operating room as deemed necessary by my physician or  their designees.    8.   I recognize that in the event my procedure results in extended X-Ray/fluoroscopy time, I may develop a skin reaction.    9. If I have a Do Not Attempt Resuscitation (DNAR) order in place, that status will be suspended while in the operating room, procedural suite, and during the recovery period unless otherwise explicitly stated by me (or a person authorized to consent on my behalf). The surgeon or my attending physician will determine when the applicable recovery period ends for purposes of reinstating the DNAR order.  10. Patients having a sterilization procedure: I understand that if the procedure is successful the results will be permanent and it will therefore be impossible for me to inseminate, conceive, or bear children.  I also understand that the procedure is intended to result in sterility, although the result has not been guaranteed.   11. I acknowledge that my physician has explained sedation/analgesia administration to me including the risk and benefits I consent to the administration of sedation/analgesia as may be necessary or desirable in the judgment of my physician.    I CERTIFY THAT I HAVE READ AND FULLY UNDERSTAND THE ABOVE CONSENT TO OPERATION and/or OTHER PROCEDURE.    _________________________________________  __________________________________  Signature of Patient     Signature of Responsible Person         ___________________________________         Printed Name of Responsible Person           _________________________________                 Relationship to Patient  _________________________________________  ______________________________  Signature of Witness          Date  Time      Patient Name: Rivera Bush     : 1965                 Printed: 2024     Medical Record #: [id number]                     Page 1 of [address] [place] [address]  [id number]    Consent for Anesthesia    I,  Rivera Bush agree to be cared for by an anesthesiologist, who is specially trained to monitor me and give me medicine to put me to sleep or keep me comfortable during my procedure    I understand that my anesthesiologist is not an employee or agent of [facility] [employer]. He or she works for [employer].    As the patient asking for anesthesia services, I agree to:  Allow the anesthesiologist (anesthesia doctor) to give me medicine and do additional procedures as necessary. Some examples are: Starting or using an “IV” to give me medicine, fluids or blood during my procedure, and having a breathing tube placed to help me breathe when I’m asleep (intubation). In the event that my heart stops working properly, I understand that my anesthesiologist will make every effort to sustain my life, unless otherwise directed by [facility] Do Not Resuscitate documents.  Tell my anesthesia doctor before my procedure:  If I am pregnant.  The last time that I ate or drank.  All of the medicines I take (including prescriptions, herbal supplements, and pills I can buy without a prescription (including street drugs/illegal medications). Failure to inform my anesthesiologist about these medicines may increase my risk of anesthetic complications.  If I am allergic to anything or have had a reaction to anesthesia before.  I understand how the anesthesia medicine will help me (benefits).  I understand that with any type of anesthesia medicine there are risks:  The most common risks are: nausea, vomiting, sore throat, muscle soreness, damage to my eyes, mouth, or teeth (from breathing tube placement).  Rare risks include: remembering what happened during my procedure, allergic reactions to medications, injury to my airway, heart, lungs, vision, nerves, or muscles and in extremely rare instances death.  My doctor has explained to me other choices available to me for my care  (alternatives).  Pregnant Patients (“epidural”):  I understand that the risks of having an epidural (medicine given into my back to help control pain during labor), include itching, low blood pressure, difficulty urinating, headache or slowing of the baby’s heart. Very rare risks include infection, bleeding, seizure, irregular heart rhythms and nerve injury.  Regional Anesthesia (“spinal”, “epidural”, & “nerve blocks”):  I understand that rare but potential complications include headache, bleeding, infection, seizure, irregular heart rhythms, and nerve injury.    I can change my mind about having anesthesia services at any time before I get the medicine.    _____________________________________________________________________________  Patient (or Representative) Signature/Relationship to Patient  Date   Time    _____________________________________________________________________________   Name (if used)    Language/Organization   Time    _____________________________________________________________________________  Anesthesiologist Signature     Date   Time  I have discussed the procedure and information above with the patient (or patient’s representative) and answered their questions. The patient or their representative has agreed to have anesthesia services.    _____________________________________________________________________________  Witness        Date   Time  I have verified that the signature is that of the patient or patient’s representative, and that it was signed before the procedure  Patient Name: Rivera Bush     : 1965                 Printed: 2024     Medical Record #: [id number]                     Page 2 of 2

## (undated) NOTE — LETTER
Date & Time: 12/13/2018, 12:48 PM  Patient: Rogerio Johnson  Encounter Provider(s):    Mavis Sousa MD       To Whom It May Concern: Luis F Silva was seen and treated in our department on 12/13/2018. He may return to work on 12/15/2018.     If yo

## (undated) NOTE — MR AVS SNAPSHOT
[facility]  [address]  [phone]               Thank you for choosing us for your health care visit with [facility].   We are glad to serve you and happy to provide you with this summary of Inhale 2 puffs into the lungs every 6 (six) hours as needed for Shortness of Breath. AmLODIPine Besylate 10 MG Tabs   Take 10 mg by mouth daily.  Indications: High Blood Pressure   Commonly known as:  NORVASC           aspirin EC 81 MG Tbec   Take view more details from this visit by going to [email or web address]. If you've recently had a stay at the Hospital you can access your discharge instructions in Access [facility] by going to Visits < Admission Summaries.  If you've been to the Emergency Depar

## (undated) NOTE — Clinical Note
TCM call completed today. TCM-HFU scheduled for 1/27/2020. The patient has an appointment with the cardiologist today for review of meds and a-fib FU. Thank you.

## (undated) NOTE — MR AVS SNAPSHOT
[address] 96 [phone]               Thank you for choosing us for your health care visit with ANDREW Recinos.   We are glad to serve you and happy to provide you with this summary Instructions and Information about Your Health        Eating Heart-Healthy Food: Using the [address] for your heart doesn’t have to be hard or boring. You just need to know how to make healthier choices.  The DASH eating plan has been developed to A serving is:  · 1 ounce cooked meats, poultry, or fish  · 1 egg  Best choices: Lean poultry and fish. Trim away visible fat. Broil, grill, roast, or boil instead of frying. Remove skin from poultry before eating.  Limit how much red meat you eat.  Nuts, se AmLODIPine Besylate 10 MG Tabs   Take 10 mg by mouth daily. Indications: High Blood Pressure   Commonly known as:  NORVASC           aspirin EC 81 MG Tbec   Take 81 mg by mouth daily.            Atorvastatin Calcium 20 MG Tabs   Take 20 mg by mouth nightly - Lorcaserin HCl ER 20 MG Tb24            MyChart     Visit MyChart  You can access your MyChart to more actively manage your health care and view more details from this visit by going to [email or web address].   If you've recently had a stay at the

## (undated) NOTE — MR AVS SNAPSHOT
[facility]  [address]  [phone]               Thank you for choosing us for your health care visit with [facility].   We are glad to serve you and happy to provide you with this summary of Take 100 mg by mouth 2 (two) times daily. Commonly known as:  TAMBOCOR           gemfibrozil 600 MG Tabs   Take 1 tablet (600 mg total) by mouth 2 (two) times daily before meals.    Commonly known as:  LOPID           GuanFACINE HCl 1 MG Tabs   Take 0.5 m office, you can view your past visit information in Note by going to Visits < Visit Summaries. Note questions? Call [phone] for help. Note is NOT to be used for urgent needs. For medical emergencies, dial 911.            Visit EDWARD-GONZALO

## (undated) NOTE — LETTER
ASTHMA ACTION PLAN for [facility]     : 1965     Date: 2019  Provider:  REINA Gonzales  Phone for doctor or clinic: [facility], [place] [address], [address], [address] [place] [address]  Александр Painter [phone]

## (undated) NOTE — LETTER
25    Patient Name: Rivera Bush    : 1965     To Whom it may concern:     This letter has been written with the patient's permission. The patient is currently under my medical care.     This patient is cleared to return to work without restriction on 25.         Sincerely,        Nathalie Giles PA-C

## (undated) NOTE — MR AVS SNAPSHOT
[address] [phone]               Thank you for choosing us for your health care visit with Jacobo Doherty RD.   We are glad to serve you and happy to provide you with this summary of med list.                Albuterol Sulfate  (90 BASE) MCG/ACT Aers   Inhale 2 puffs into the lungs every 6 (six) hours as needed for Shortness of Breath. AmLODIPine Besylate 10 MG Tabs   Take 10 mg by mouth daily.  Indications: High Blood P Take 1 tablet by mouth 2 (two) times daily. 10 mg 3 days weekly. 7.5 2 days weekly.  5mg 2 days weekly   Commonly known as:  COUMADIN                   MyChart     Visit MyChart  You can access your MyChart to more actively manage your health care and view

## (undated) NOTE — MR AVS SNAPSHOT
[address] [phone]               Thank you for choosing us for your health care visit with Nilson Ordaz RD.   We are glad to serve you and happy to provide you with this summary of prescription is complete take over the counter Vitamin D 2,000 units daily. Commonly known as:  DRISDOL/VITAMIN D2           Flecainide Acetate 100 MG Tabs   Take 100 mg by mouth 2 (two) times daily.    Commonly known as:  TAMBOCOR           gemfibrozil 6

## (undated) NOTE — Clinical Note
Date: 3/22/2017        Patient Name: Alix You          To Whom it may concern: This letter has been written at the patient's request. The above patient was seen at the [facility] for treatment of a medical condition.     This patient

## (undated) NOTE — LETTER
10/02/24    Patient: Rivera Bush  : 1965 Visit date: 10/1/2024    Dear  Won Davis MD    Thank you for referring Rivera Bush to my practice.  Please find my assessment and plan below.    Assessment   1. Biliary colic    2. Umbilical hernia without obstruction and without gangrene        Rivera Bush is a very nice 59 year old male who was referred to me with concern for symptomatic cholelithiasis and a symptomatic umbilical hernia. Abdominal ultrasound from 2024 revealed cholelithiasis.  Exam today confirms the presence of a small, reducible umbilical hernia with diastases recti.  Patient also had a CT scan on 3/21/2023 which revealed a small umbilical hernia and cholelithiasis.  Patient's symptoms do seem consistent with symptomatic cholelithiasis.      Plan  I recommend scheduling laparoscopic cholecystectomy with ICG cholangiography, possible open with concomitant primary suture repair of umbilical hernia repair.  The details of this procedure were discussed including the expected recovery time, risks, benefits and alternatives.  Patient expressed understanding and wished to schedule surgery.  Surgery has been scheduled for 2024.    In the meantime, patient should maintain a low-fat diet.  He should contact me or present to the ER with any new or worsening symptoms.    Patient did have persistently elevated blood pressure in clinic today.  I advised him to follow-up with his PCP for further management of this issue.       Sincerely,       Leon Mujica MD   CC:   No Recipients

## (undated) NOTE — MR AVS SNAPSHOT
[facility]  [address]  [phone]               Thank you for choosing us for your health care visit with [facility].   We are glad to serve you and happy to provide you with this summary of Take 10 mg by mouth daily. Indications: High Blood Pressure   Commonly known as:  NORVASC           aspirin EC 81 MG Tbec   Take 81 mg by mouth daily. Atorvastatin Calcium 20 MG Tabs   Take 20 mg by mouth nightly.    Commonly known as:  LIPITOR office, you can view your past visit information in Over[employer] Media by going to Visits < Visit Summaries. Overinteractive Media questions? Call [phone] for help. Over[employer] is NOT to be used for urgent needs. For medical emergencies, dial 911.            Visit EDWARD-EL

## (undated) NOTE — MR AVS SNAPSHOT
[facility] SUR[facility]  [address]  [phone]               Thank you for choosing us for your health care visit with [facility].   We are glad to serve you and happy to provide you with this summary of Inhale 2 puffs into the lungs every 6 (six) hours as needed for Shortness of Breath. AmLODIPine Besylate 10 MG Tabs   Take 10 mg by mouth daily.  Indications: High Blood Pressure   Commonly known as:  NORVASC           aspirin EC 81 MG Tbec   Take view more details from this visit by going to [email or web address]. If you've recently had a stay at the Hospital you can access your discharge instructions in [facility] by going to Visits < Admission Summaries.  If you've been to the Emergency Depar

## (undated) NOTE — Clinical Note
Date: 3/27/2017    Patient Name: Rosa Bloom          To Whom it may concern: This letter has been written at the patient's request. The above patient was seen at the [facility] for treatment of a medical condition.     This patient

## (undated) NOTE — LETTER
ASTHMA ACTION PLAN for [facility]     : 1965     Date: 2023  Provider:  Kuldeep Lui PA-C  Phone for doctor or clinic: [facility], [address] [place] [address]  Yoselyn Ferguson [address] [phone]    ACT Score: 24      You can use the colors of a traffic light to help learn about your asthma medicines. 1. Green - Go! % of Personal Best Peak Flow Use controller medicine. Breathing is good  No cough or wheeze  Can work and play Medicine How much to take When to take it    Symbiccort inhaler 160/4.5, 2 puffs twice a day          2. Yellow - Caution. 50-79% Personal Best Peak  Flow. Use reliever medicine to keep an asthma attack from getting bad. Cough  Wheezing  Tight Chest  Wake up at night Medicine How much to take When to take it    Ventolin HFA (albuterol) inhaler 1-2 puffs every 4-6 hours as needed        Additional instructions         3. Red - Stop! Danger!  <50% Personal Best Peak  Flow. Take these medications until  Get help from a doctor   Medicine not helping  Breathing is hard and fast  Nose opens wide  Can't walk  Ribs show  Can't talk well Medicine How much to take When to take it    Ventolin (albuterol) inhaler 2 puffs NOW     Additional Instructions If your symptoms do not improve and you cannot contact your doctor, [facility] or call 911 immediately! [x] Asthma Action Plan reviewed with patient (and caregiver if necessary) and a copy of the plan was given to the patient/caregiver. [] Asthma Action Plan reviewed with patient (and caregiver if necessary) on the phone and mailed copy to patient or submitted via [address]      Signatures:  Provider  Kuldeep Lui PA-C   Patient Caretaker

## (undated) NOTE — MR AVS SNAPSHOT
[facility] [address]  [facility]  [phone]               Thank you for choosing us for your health care visit with Opal Castillo MD.  We are glad to serve you and happy to provide you with [facility] [E55.9], Pure hypercholesterolemia [E78.00], Essential hypertension [I10]           Vitamin B12 [E]    Complete by:  Jun 05, 2017 (Approximate)    Assoc Dx:  Type 2 diabetes mellitus without complication, without long-term current use of insulin ([facility] [Q14 If you are confident that your benefit plan will not require a referral or authorization, such as [place] Medicaid, please feel free to schedule your appointment immediately.  However, if you are unsure about the requirements for authorization, please wait gemfibrozil 600 MG Tabs   Take 1 tablet (600 mg total) by mouth 2 (two) times daily before meals. Commonly known as:  LOPID           GuanFACINE HCl 1 MG Tabs   Take 0.5 mg by mouth daily.    Commonly known as:  TENEX           hydrALAzine HCl 25 MG Tabs Educational Information     Healthy Diet and Regular Exercise  [facility] [employer] [address] for making healthy food choices  -   Enjoy your food, but eat less. Fully enjoy your food when eating. Don’t eat while distracted and slow down.    Avoid

## (undated) NOTE — Clinical Note
ASTHMA ACTION PLAN for Lobito Ryan     : 1965     Date: 2017  Provider:  Oli Waters MD  Phone for doctor or clinic: [facility], [address] [facility] , [address] [facility] [address] [phone]    ACT

## (undated) NOTE — LETTER
25      Patient Name: Rivera Bush    : 1965     To Whom it may concern:     This letter has been written with the patient's permission. The patient is currently under my medical care.     This patient should be excused from attending work from 25  through 25.         Sincerely,        Nathalie Giles PA-C

## (undated) NOTE — MR AVS SNAPSHOT
[facility]  [address]  [phone]               Thank you for choosing us for your health care visit with [facility].   We are glad to serve you and happy to provide you with this summary of Inhale 2 puffs into the lungs every 6 (six) hours as needed for Shortness of Breath. AmLODIPine Besylate 10 MG Tabs   Take 10 mg by mouth daily.  Indications: High Blood Pressure   Commonly known as:  NORVASC           aspirin EC 81 MG Tbec   Take Commonly known as:  COUMADIN                   MyChart     Visit MyChart  You can access your MyChart to more actively manage your health care and view more details from this visit by going to [email or web address].   If you've recently had a stay at

## (undated) NOTE — LETTER
[facility] [address], [address]    Consent for Operation    Date: __3/1/17_________    Time: 1051______    1.  I authorize the performance upon Kacey Lights the following operation:    Cystoscopy, removal of ureteral stent videotape. The [facility] will not be responsible for storage or maintenance of this tape. 6. For the purpose of advancing medical education, I consent to the admittance of observers to the Operating Room.     7. I authorize the use of any specimen, organs Signature of Patient:   ___________________________    When the patient is a minor or mentally incompetent to give consent:  Signature of person authorized to consent for patient: ___________________________   Relationship to patient: _____________________ drugs/illegal medications). Failure to inform my anesthesiologist about these medicines may increase my risk of anesthetic complications. · If I am allergic to anything or have had a reaction to anesthesia before.     3. I understand how the anesthesia med I have discussed the procedure and information above with the patient (or patient’s representative) and answered their questions. The patient or their representative has agreed to have anesthesia services.     _______________________________________________

## (undated) NOTE — LETTER
22    Patient Name: Chaitanya Flores    : 1965     To Whom it may concern: This letter has been written with the patient's permission. The patient is currently under my medical care.      This patient should be excused from attending work f

## (undated) NOTE — MR AVS SNAPSHOT
[facility]  [address]  [phone]               Thank you for choosing us for your health care visit with [facility].   We are glad to serve you and happy to provide you with this summary of Inhale 2 puffs into the lungs every 6 (six) hours as needed for Shortness of Breath. AmLODIPine Besylate 10 MG Tabs   Take 10 mg by mouth daily.  Indications: High Blood Pressure   Commonly known as:  NORVASC           aspirin EC 81 MG Tbec   Take view more details from this visit by going to [email or web address]. If you've recently had a stay at the Hospital you can access your discharge instructions in [employer] by going to Visits < Admission Summaries.  If you've been to the Emergency Depar

## (undated) NOTE — MR AVS SNAPSHOT
[facility]  [address]  [phone]               Thank you for choosing us for your health care visit with [facility].   We are glad to serve you and happy to provide you with this summary of Inhale 2 puffs into the lungs every 6 (six) hours as needed for Shortness of Breath. AmLODIPine Besylate 10 MG Tabs   Take 10 mg by mouth daily.  Indications: High Blood Pressure   Commonly known as:  NORVASC           aspirin EC 81 MG Tbec   Take Commonly known as:  COUMADIN                   MyChart     Visit MyChart  You can access your MyChart to more actively manage your health care and view more details from this visit by going to [email or web address].   If you've recently had a stay at

## (undated) NOTE — MR AVS SNAPSHOT
[facility] [address]  [place]  [phone]               Thank you for choosing us for your health care visit with Horald Mcardle, PA-C.   We are glad to serve you and happy to provide you Your physician has referred you to a specialist.  Your physician or the clinic staff will provide you with the phone number you should call to schedule your appointment.      If you are confident that your benefit plan will not require a referral or authori •To schedule Imaging or tests at [facility] Scheduling [phone], Go to [facility] A ER Building (For example: CT scans, X rays, Ultrasound, MRI)  •Cardiac Testing in ER building Building A second floor Cardiac Testing [phone] (For example:  [employer] Narcotic medications can only be filled in 30 day increments and must be refilled at an office visit only. If your prescription is due for a refill, you may be due for a follow-up appointment.   We cannot refill your maintenance medications at a preventati Commonly known as:  NORCO           Ipratropium Bromide 0.03 % Soln   2 sprays by Nasal route every 12 (twelve) hours. Commonly known as:  ATROVENT           Losartan Potassium-HCTZ 100-25 MG Tabs   Take 1 tablet by mouth daily.    Commonly known as:  HYZ the bedroom and bathroom. ? Get up slowly from lying down or sitting if you get dizzy. ? Keep a working flashlight near your bed. STAIRS:  ? Keep stairwells well lit with light switches at top and bottom. ? Install sturdy handrails on both sides. ?  Charolett Estimable

## (undated) NOTE — IP AVS SNAPSHOT
[facility] [place] [address] [place], [address] ~ [phone]                Discharge Summary   2/19/2017    [facility]           Admission Information        Provider Department    2/19/2017 Vinay Diaz MD  2ne-A         Jennifer Celestin CONTINUE taking these medications        Instructions Authorizing Provider    Morning Afternoon Evening As Needed    Albuterol Sulfate  (90 Base) MCG/ACT Aers        Inhale 2 puffs into the lungs every 6 (six) hours as needed for Commonly known as:  GLUCOPHAGE-XR        Take 2 tablets (1,500 mg total) by mouth daily.    Stop taking on:  3/14/2017    Fracisco Boone                           Metoprolol Succinate  MG Tb24   Last time this was given:  200 mg on 2/21/2017  8:12 2/23/2017 2:00 PM [place] Traore MD [facility]   3/10/2017 9:15 AM [facility]   4/7/2017 9:00 AM Delma Celestin RD [facility]   Wed appt at 7:30   Need to go to [address] [place] [address] Loop- Apr 07, 2017  9:00 AM   Follow up with Luke Menjivar RD   [facility] ([facility] [address])    [address], [address]   [phone]              Discharge Orders     Future Labs/Procedures Expected by E 5.51 (02/19/17)  16.4 (02/19/17)  47.0 (02/19/17)  85.3    (02/19/17)  211.0       Recent Hematology Lab Results (cont.)  (Last 3 results in the past 90 days)    Neutrophil % Lymphocyte % Monocyte % Eosinophil % Basophil % Prelim Neut Abs Final Neut Abs Ly [address] to help you get signed up for insurance coverage. Patient [address] is a Federal Navigator program that can help with your Affordable Care Act coverage, as well as all types of Medicaid plans.   To get signed up and covere Use: Treat abnormal blood pressure (high or low), cardiac conditions; and/or abnormal heart rates/rhythms   Most common side effects: Dizziness or feeling lightheaded (especially with standing), heart rate changes, headaches, nausea/vomiting   What to repo General Nerve Function Medications     topiramate (TOPAMAX) 50 MG Oral Tab       Use:  Treat conditions such as seizures, headaches, depression, anxiety and other neurologic conditions   Most common side effects:  Dizziness, drowsiness, headache, nausea/vo

## (undated) NOTE — LETTER
ASTHMA ACTION PLAN for Leslee Brittle [facility]     : 1965     Date: 2/15/2019  Provider:  REINA Landers  Phone for doctor or clinic: [facility], [facility], [address] [place] [address], [address] [place] [address] [phone]

## (undated) NOTE — MR AVS SNAPSHOT
[facility] [address]  [place] [facility]  [phone]               Thank you for choosing us for your health care visit with Curly Oro PA-C.   We are glad to serve you and happy to provide you •[facility] call [phone] usually in [address] [facility] in Clinic in [place] at [facility].  [address] Mon-Fri at 8am-7:30pm, and Sat/Sun 9am-430pm  Also at [address]  Call [phone] for info    • Please call our office about to be seen at least twice a year. .         Follow Up with Our Office     Return in about 2 weeks (around 2/21/2017).       Your Appointments     Feb 10, 2017  8:30 AM   EDWCard Protime with [address] [facility] (Brittney Bigger Take 100 mg by mouth 2 (two) times daily. Commonly known as:  TAMBOCOR           gemfibrozil 600 MG Tabs   Take 1 tablet (600 mg total) by mouth 2 (two) times daily before meals.    Commonly known as:  LOPID           GuanFACINE HCl 1 MG Tabs   Take 0.5 m URINALYSIS, AUTO, W/O SCOPE    Complete by:  As directed    Assoc Dx:  Urinary urgency [R39.15]           Chlamydia/Gc Amplification [E]    Complete by:   Feb 07, 2017    Assoc Dx:  Urinary urgency [R39.15]           Urology Referral - In [address] schedule your appointment. Failure to obtain required authorization numbers can create reimbursement difficulties for you.     Assoc Dx:  Dermatitis [L30.9]        UROLOGY - INTERNAL [[id number] CUSTOM]  Order #:  [id number]         **REFERRAL REQUEST**    You

## (undated) NOTE — MR AVS SNAPSHOT
[facility]  [address]  [phone]               Thank you for choosing us for your health care visit with [facility].   We are glad to serve you and happy to provide you with this summary of Take 100 mg by mouth 2 (two) times daily. Commonly known as:  TAMBOCOR           gemfibrozil 600 MG Tabs   Take 1 tablet (600 mg total) by mouth 2 (two) times daily before meals.    Commonly known as:  LOPID           GuanFACINE HCl 1 MG Tabs   Take 0.5 m office, you can view your past visit information in [employer] by going to Visits < Visit Summaries. [employer] questions? Call [phone] for help. [employer] is NOT to be used for urgent needs. For medical emergencies, dial 911.            Visit EDWARD-EL

## (undated) NOTE — MR AVS SNAPSHOT
[facility]  [address]  [phone]               Thank you for choosing us for your health care visit with [facility].   We are glad to serve you and happy to provide you with this summary of Inhale 2 puffs into the lungs every 6 (six) hours as needed for Shortness of Breath. AmLODIPine Besylate 10 MG Tabs   Take 10 mg by mouth daily.  Indications: High Blood Pressure   Commonly known as:  NORVASC           aspirin EC 81 MG Tbec   Take Commonly known as:  COUMADIN                   MyChart     Visit MyChart  You can access your MyChart to more actively manage your health care and view more details from this visit by going to [email or web address].   If you've recently had a stay at

## (undated) NOTE — LETTER
[address] [address]  ?  11/25/24  ?  Re: Rivera Bush  ?  To Whom It May Concern:    Rivera Bush was admitted to [facility] from 11/20/2024  to 11/23/2024.    Please excuse Rivera Bush from attending work for these days.    The patient may return to work on  with the following restrictions: - need to follow up with his MDs- including PCP, GI, cards MD as scheduled.  No other restrictions.     Patient will follow up with his  primary care physician upon discharge.   Further restrictions and work excuse will be based on primary care physician's evaluation.  ?  Thank you,    Divina Kinney NP  [facility]

## (undated) NOTE — LETTER
Dr. Mujica    Surgical Clearance Needed    Date: 10/1/2024                                                                       From: Khushbu Norton: Dr. Cordova                                                                                Fax: [phone]    RE: Surgical Clearance               # of Pages: 1        Patient Name: Rivera Bush    Patient YOB: 1965    Consult For: Cardiac Clearance and anti coagulation management    Surgery: Laparoscopic cholecystectomy possible open with injection of indocyanine green (ICG) and umbilical herniorrhaphy    Date of Surgery: 12/13/2024 at [facility]        This facsimile transmission is provided for your internal use only. If the reader of this message is not the intended recipient, you are hereby notified that you have received this document in error, and that any review, dissemination, distribution, or copying of this message is strictly prohibited. If you have received this communication in error, please notify us immediately by telephone and return the original message to us by mail.

## (undated) NOTE — MR AVS SNAPSHOT
[facility] [address]  [facility]  [phone]               Thank you for choosing us for your health care visit with Evelin Rod PA-C.   We are glad to serve you and happy to provide you Thank you for choosing Elida Okeefe PA-C at [place] [address]  To Do: Rivera Bush  1. Pt to continue to follow-up with specialist  2.  Follow-up in office in 3 months, sooner if problems    • Please signup for MY CHART, which is electronic acces healthier and to improve your quality of life.     Referrals, and Radiology Information:    If your insurance requires a referral to a specialist, please allow 5 business days to process your referral request.    If Akbar Levine PA-C orders a CT or MRI Take 100 mg by mouth 2 (two) times daily. Commonly known as:  TAMBOCOR           gemfibrozil 600 MG Tabs   Take 1 tablet (600 mg total) by mouth 2 (two) times daily before meals.    Commonly known as:  LOPID           GuanFACINE HCl 1 MG Tabs   Take 0.5 m Educational Information     TOP FALL PREVENTION TIPS    INSIDE YOUR HOME   KITCHEN:  ? Use non skid mats only. ? Clean up spills as soon as they happen. ? Keep objects that you use often within easy reach.   BATHROOM:  ? Install grab bars on the bathroo

## (undated) NOTE — MR AVS SNAPSHOT
[address] [phone]               Thank you for choosing us for your health care visit with Cecily Wong RD.   We are glad to serve you and happy to provide you with this summary of This list is accurate as of: 2/3/17 10:28 AM.  Always use your most recent med list.                Albuterol Sulfate  (90 Base) MCG/ACT Aers   Inhale 2 puffs into the lungs every 6 (six) hours as needed for Shortness of Breath.            AmLODIPine Commonly known as:  TOPAMAX           Warfarin Sodium 5 MG Tabs   Take 1 tablet by mouth 2 (two) times daily. 10 mg 3 days weekly. 7.5 2 days weekly.  5mg 2 days weekly   Commonly known as:  COUMADIN                   MyChart     Visit MyChart  You can acce

## (undated) NOTE — MR AVS SNAPSHOT
[address] [phone]               Thank you for choosing us for your health care visit with Gunner Yousif RD.   We are glad to serve you and happy to provide you with this summary of AmLODIPine Besylate 10 MG Tabs   Take 10 mg by mouth daily. Indications: High Blood Pressure   Commonly known as:  NORVASC           aspirin EC 81 MG Tbec   Take 81 mg by mouth daily.            Atorvastatin Calcium 20 MG Tabs   Take 20 mg by mouth You can access your MyChart to more actively manage your health care and view more details from this visit by going to [email or web address].   If you've recently had a stay at the Hospital you can access your discharge instructions in [address] by alexey

## (undated) NOTE — LETTER
Date: 11/1/2022    Patient Name: All Zhao          To Whom it may concern: The above patient was seen at the [facility] for treatment of a medical condition. This patient should be excused from attending work from 10/31/2022 through 11/1/2022. The patient may return to work on 11/2/2022 without limitations.         Sincerely,    Vonda Arcos MD

## (undated) NOTE — LETTER
ASTHMA ACTION PLAN for Michael Mo     : 1965     Date: 2021  Provider:  Leny Jones PA-C  Phone for doctor or clinic: [facility], [address]   [place] [address], [address] [facility]  [address] [phone]  [phone]

## (undated) NOTE — LETTER
ASTHMA ACTION PLAN for Rashaad Deleon     : 1965     Date: 2018  Provider:  Nancy Zaman PA-C  Phone for doctor or clinic: [facility], [place] [address], [address] [place] [phone]

## (undated) NOTE — LETTER
To: DR. MOOK ROBLES  Patient Name: Rivera Bush  -Age / Sex: 1965-A: 60 y  male   Medical Records: [id number] CSN: [id number]    Request for History & Physical for Radiology Procedure at [facility]    The above patient is scheduled to have a procedure performed in Radiology.  In order for the procedure to be performed safely, a comprehensive History & Physical, to include the Review of Systems, is required within 30 days of the scheduled appointment.      Procedure:  MRI WITH ANESTHESIA  Date Scheduled:  2025 Ordered by (Ordering Physician):  DR. MARY TREJO    Please FAX the completed H&P to [facility] at [phone].  For Questions: Call [facility] [phone]    If you cannot provide us with a comprehensive History & Physical prior to this appointment, you will need to cancel and reschedule the appointment by calling Central Scheduling [phone].  Thank you.

## (undated) NOTE — LETTER
ASTHMA ACTION PLAN for Rivera Bush     : 1965     Date: 3/21/2024  Provider:  REINA Mix  Phone for doctor or clinic: [facility], [address] [place] [phone]    ACT Score: 21      You can use the colors of a traffic light to help learn about your asthma medicines.      1. Green - Go! % of Personal Best Peak Flow Use controller medicine.   Breathing is good  No cough or wheeze  Can work and play Medicine How much to take When to take it    Budesonide-Formoterol Fumarate   Inhale 2 puffs into the lungs 2 (two) times daily         2. Yellow - Caution. 50-79% Personal Best Peak  Flow.  Use reliever medicine to keep an asthma attack from getting bad.   Cough  Wheezing  Tight Chest  Wake up at night Medicine How much to take When to take it    albuterol   Inhale 2 puffs into the lungs every 6 (six) hours as needed for Shortness of Breath        Additional instructions         3. Red - Stop! Danger!  <50% Personal Best Peak  Flow. Take these medications until  Get help from a doctor   Medicine not helping  Breathing is hard and fast  Nose opens wide  Can't walk  Ribs show  Can't talk well Medicine How much to take When to take it    2 puffs now!  Call 911!  Go to nearest ER!     Additional Instructions If your symptoms do not improve and you cannot contact your doctor, [facility] or call 911 immediately!     [x] Asthma Action Plan reviewed with patient (and caregiver if necessary) and a copy of the plan was given to the patient/caregiver.   [] Asthma Action Plan reviewed with patient (and caregiver if necessary) on the phone and mailed copy to patient or submitted via [employer].     Signatures:  Provider  REINA Mix   Patient Caretaker

## (undated) NOTE — Clinical Note
Hi, Dr. Victorina Perez, Dr. Janice Amor and Alcides Muse! Hope you are all doing well! Thank you for referring Mr. Davis [facility] for rheumatologic evaluation. Please see the discussion portion of my note and let me know if you have any questions.      Leigha Fontaine, DO  [facility]

## (undated) NOTE — LETTER
19    RE: Pretty Arnett    : 1965    Dear Dr. Zelda Worrell,    Your patient is being scheduled for a pain management procedure at [facility].    Procedure:  Cervical epidural steroid injection with local x3   Date of Procedure: TBD pending waqar

## (undated) NOTE — LETTER
01/17/20    Rivera Bush      To Whom It May Concern: This letter has been written at the patient's request. The above patient was seen at [facility] for treatment of a medical condition from 1/15/2020 through 1/17/2020.     The patient may retur

## (undated) NOTE — LETTER
25      Patient Name: Rivera Bush    : 1965     To Whom it may concern:     This letter has been written with the patient's permission. The patient is currently under my medical care.     This patient should be excused from attending work from 25 through 25. Patient is cleared to return to work on 25.         Sincerely,       Nathalie Giles PA-C